# Patient Record
Sex: FEMALE | Race: WHITE | Employment: OTHER | URBAN - METROPOLITAN AREA
[De-identification: names, ages, dates, MRNs, and addresses within clinical notes are randomized per-mention and may not be internally consistent; named-entity substitution may affect disease eponyms.]

---

## 2021-07-06 ENCOUNTER — HOSPITAL ENCOUNTER (INPATIENT)
Facility: HOSPITAL | Age: 76
LOS: 1 days | Discharge: HOME HEALTH | DRG: 176 | End: 2021-07-08
Attending: EMERGENCY MEDICINE | Admitting: HOSPITALIST
Payer: MEDICARE

## 2021-07-06 ENCOUNTER — APPOINTMENT (OUTPATIENT)
Dept: GENERAL RADIOLOGY | Age: 76
DRG: 176 | End: 2021-07-06
Attending: EMERGENCY MEDICINE
Payer: MEDICARE

## 2021-07-06 ENCOUNTER — APPOINTMENT (OUTPATIENT)
Dept: CT IMAGING | Age: 76
DRG: 176 | End: 2021-07-06
Attending: EMERGENCY MEDICINE
Payer: MEDICARE

## 2021-07-06 DIAGNOSIS — E11.65 TYPE 2 DIABETES MELLITUS WITH HYPERGLYCEMIA, WITHOUT LONG-TERM CURRENT USE OF INSULIN (HCC): ICD-10-CM

## 2021-07-06 DIAGNOSIS — I26.99 BILATERAL PULMONARY EMBOLISM (HCC): Primary | ICD-10-CM

## 2021-07-06 LAB
ALBUMIN SERPL-MCNC: 3.1 G/DL (ref 3.4–5)
ALP LIVER SERPL-CCNC: 57 U/L
ALT SERPL-CCNC: 21 U/L
ANION GAP SERPL CALC-SCNC: 9 MMOL/L (ref 0–18)
ANION GAP SERPL CALC-SCNC: 9 MMOL/L (ref 0–18)
AST SERPL-CCNC: 13 U/L (ref 15–37)
BASOPHILS NFR BLD AUTO: 0.4 %
BILIRUB SERPL-MCNC: 0.2 MG/DL (ref 0.1–2)
BUN BLD-MCNC: 16 MG/DL (ref 7–18)
BUN BLD-MCNC: 18 MG/DL (ref 7–18)
BUN/CREAT SERPL: 11 (ref 10–20)
BUN/CREAT SERPL: 14.4 (ref 10–20)
CALCIUM BLD-MCNC: 8.2 MG/DL (ref 8.5–10.1)
CALCIUM BLD-MCNC: 8.5 MG/DL (ref 8.5–10.1)
CHLORIDE SERPL-SCNC: 110 MMOL/L (ref 98–112)
CO2 SERPL-SCNC: 21 MMOL/L (ref 21–32)
CO2 SERPL-SCNC: 24 MMOL/L (ref 21–32)
CREAT BLD-MCNC: 1.25 MG/DL
CREAT BLD-MCNC: 1.45 MG/DL
D-DIMER: 1.15 UG/ML FEU (ref ?–0.77)
DEPRECATED RDW RBC AUTO: 48.6 FL (ref 35.1–46.3)
DEPRECATED RDW RBC AUTO: 48.9 FL (ref 35.1–46.3)
EOSINOPHIL # BLD AUTO: 0.03 X10(3) UL (ref 0–0.7)
EOSINOPHIL NFR BLD AUTO: 0.2 %
ERYTHROCYTE [DISTWIDTH] IN BLOOD BY AUTOMATED COUNT: 13.7 % (ref 11–15)
ERYTHROCYTE [DISTWIDTH] IN BLOOD BY AUTOMATED COUNT: 13.8 % (ref 11–15)
GLOBULIN PLAS-MCNC: 3.4 G/DL (ref 2.8–4.4)
GLUCOSE BLD-MCNC: 158 MG/DL (ref 70–99)
GLUCOSE BLD-MCNC: 310 MG/DL (ref 70–99)
HCT VFR BLD AUTO: 42.5 %
HGB BLD-MCNC: 13.5 G/DL
IMM GRANULOCYTES # BLD AUTO: 0.23 X10(3) UL (ref 0–1)
IMM GRANULOCYTES NFR BLD: 1.7 %
LYMPHOCYTES # BLD AUTO: 1.27 X10(3) UL (ref 1–4)
LYMPHOCYTES NFR BLD AUTO: 9.6 %
M PROTEIN MFR SERPL ELPH: 6.5 G/DL (ref 6.4–8.2)
MCH RBC QN AUTO: 31.4 PG (ref 26–34)
MCH RBC QN AUTO: 31.5 PG (ref 26–34)
MCHC RBC AUTO-ENTMCNC: 32.9 G/DL (ref 31–37)
MCV RBC AUTO: 95.5 FL
MCV RBC AUTO: 96.3 FL
MONOCYTES # BLD AUTO: 0.63 X10(3) UL (ref 0.1–1)
MONOCYTES NFR BLD AUTO: 4.7 %
NEUTROPHILS # BLD AUTO: 11.08 X10 (3) UL (ref 1.5–7.7)
NEUTROPHILS # BLD AUTO: 11.08 X10(3) UL (ref 1.5–7.7)
NEUTROPHILS NFR BLD AUTO: 83.4 %
NT-PROBNP SERPL-MCNC: 804 PG/ML (ref ?–450)
OSMOLALITY SERPL CALC.SUM OF ELEC: 301 MOSM/KG (ref 275–295)
OSMOLALITY SERPL CALC.SUM OF ELEC: 307 MOSM/KG (ref 275–295)
POTASSIUM SERPL-SCNC: 3.7 MMOL/L (ref 3.5–5.1)
POTASSIUM SERPL-SCNC: 4 MMOL/L (ref 3.5–5.1)
PSA SERPL DL<=0.01 NG/ML-MCNC: 12.2 SECONDS (ref 12–14.3)
RBC # BLD AUTO: 4.3 X10(6)UL
RBC # BLD AUTO: 4.45 X10(6)UL
SARS-COV-2 RNA RESP QL NAA+PROBE: NOT DETECTED
SODIUM SERPL-SCNC: 142 MMOL/L (ref 136–145)
SODIUM SERPL-SCNC: 143 MMOL/L (ref 136–145)
TROPONIN I SERPL-MCNC: <0.045 NG/ML (ref ?–0.04)
WBC # BLD AUTO: 11.7 X10(3) UL (ref 4–11)
WBC # BLD AUTO: 13.3 X10(3) UL (ref 4–11)

## 2021-07-06 PROCEDURE — 99219 INITIAL OBSERVATION CARE,LEVL II: CPT | Performed by: STUDENT IN AN ORGANIZED HEALTH CARE EDUCATION/TRAINING PROGRAM

## 2021-07-06 PROCEDURE — 71045 X-RAY EXAM CHEST 1 VIEW: CPT | Performed by: EMERGENCY MEDICINE

## 2021-07-06 PROCEDURE — 71275 CT ANGIOGRAPHY CHEST: CPT | Performed by: EMERGENCY MEDICINE

## 2021-07-06 RX ORDER — LORAZEPAM 2 MG/ML
INJECTION INTRAMUSCULAR EVERY 10 MIN PRN
Status: DISCONTINUED | OUTPATIENT
Start: 2021-07-06 | End: 2021-07-08

## 2021-07-06 RX ORDER — ONDANSETRON 2 MG/ML
4 INJECTION INTRAMUSCULAR; INTRAVENOUS EVERY 6 HOURS PRN
Status: DISCONTINUED | OUTPATIENT
Start: 2021-07-06 | End: 2021-07-08

## 2021-07-06 RX ORDER — METHYLPREDNISOLONE SODIUM SUCCINATE 125 MG/2ML
60 INJECTION, POWDER, LYOPHILIZED, FOR SOLUTION INTRAMUSCULAR; INTRAVENOUS EVERY 8 HOURS
Status: DISCONTINUED | OUTPATIENT
Start: 2021-07-06 | End: 2021-07-08

## 2021-07-06 RX ORDER — ALBUTEROL SULFATE 2.5 MG/3ML
2.5 SOLUTION RESPIRATORY (INHALATION) EVERY 2 HOUR PRN
Status: DISCONTINUED | OUTPATIENT
Start: 2021-07-06 | End: 2021-07-08

## 2021-07-06 RX ORDER — LEVOTHYROXINE SODIUM 88 UG/1
88 TABLET ORAL EVERY OTHER DAY
Status: DISCONTINUED | OUTPATIENT
Start: 2021-07-08 | End: 2021-07-08

## 2021-07-06 RX ORDER — IPRATROPIUM BROMIDE AND ALBUTEROL SULFATE 2.5; .5 MG/3ML; MG/3ML
3 SOLUTION RESPIRATORY (INHALATION) ONCE
Status: COMPLETED | OUTPATIENT
Start: 2021-07-06 | End: 2021-07-06

## 2021-07-06 RX ORDER — FUROSEMIDE 20 MG/1
20 TABLET ORAL 2 TIMES DAILY
COMMUNITY
End: 2021-08-10

## 2021-07-06 RX ORDER — LEVOTHYROXINE SODIUM 88 UG/1
88 TABLET ORAL
COMMUNITY
End: 2021-08-16

## 2021-07-06 RX ORDER — METOCLOPRAMIDE HYDROCHLORIDE 5 MG/ML
10 INJECTION INTRAMUSCULAR; INTRAVENOUS EVERY 8 HOURS PRN
Status: DISCONTINUED | OUTPATIENT
Start: 2021-07-06 | End: 2021-07-08

## 2021-07-06 RX ORDER — HEPARIN SODIUM AND DEXTROSE 10000; 5 [USP'U]/100ML; G/100ML
18 INJECTION INTRAVENOUS ONCE
Status: COMPLETED | OUTPATIENT
Start: 2021-07-06 | End: 2021-07-07

## 2021-07-06 RX ORDER — HEPARIN SODIUM AND DEXTROSE 10000; 5 [USP'U]/100ML; G/100ML
INJECTION INTRAVENOUS CONTINUOUS
Status: DISCONTINUED | OUTPATIENT
Start: 2021-07-06 | End: 2021-07-08

## 2021-07-06 RX ORDER — MELATONIN
3 NIGHTLY PRN
Status: DISCONTINUED | OUTPATIENT
Start: 2021-07-06 | End: 2021-07-08

## 2021-07-06 RX ORDER — LORATADINE 10 MG/1
10 TABLET ORAL DAILY
COMMUNITY

## 2021-07-06 RX ORDER — METOPROLOL TARTRATE 50 MG/1
25 TABLET, FILM COATED ORAL 2 TIMES DAILY
COMMUNITY
End: 2021-08-16

## 2021-07-06 RX ORDER — ALBUTEROL SULFATE 2.5 MG/3ML
2.5 SOLUTION RESPIRATORY (INHALATION) EVERY 6 HOURS PRN
COMMUNITY
End: 2021-07-12 | Stop reason: CLARIF

## 2021-07-06 RX ORDER — ALPRAZOLAM 0.5 MG/1
0.5 TABLET ORAL NIGHTLY
Status: DISCONTINUED | OUTPATIENT
Start: 2021-07-06 | End: 2021-07-08

## 2021-07-06 RX ORDER — IPRATROPIUM BROMIDE AND ALBUTEROL SULFATE 2.5; .5 MG/3ML; MG/3ML
3 SOLUTION RESPIRATORY (INHALATION)
Status: DISCONTINUED | OUTPATIENT
Start: 2021-07-07 | End: 2021-07-08

## 2021-07-06 RX ORDER — HYDROCODONE BITARTRATE AND ACETAMINOPHEN 5; 325 MG/1; MG/1
1 TABLET ORAL EVERY 6 HOURS PRN
Status: DISCONTINUED | OUTPATIENT
Start: 2021-07-06 | End: 2021-07-08

## 2021-07-06 RX ORDER — ALPRAZOLAM 0.5 MG/1
0.5 TABLET ORAL NIGHTLY
Status: ON HOLD | COMMUNITY
End: 2021-07-08

## 2021-07-06 RX ORDER — HEPARIN SODIUM 5000 [USP'U]/ML
80 INJECTION INTRAVENOUS; SUBCUTANEOUS ONCE
Status: COMPLETED | OUTPATIENT
Start: 2021-07-06 | End: 2021-07-06

## 2021-07-06 RX ORDER — HYDROCODONE BITARTRATE AND ACETAMINOPHEN 5; 325 MG/1; MG/1
1 TABLET ORAL EVERY 6 HOURS PRN
COMMUNITY
End: 2021-08-16

## 2021-07-06 RX ORDER — METHYLPREDNISOLONE SODIUM SUCCINATE 125 MG/2ML
125 INJECTION, POWDER, LYOPHILIZED, FOR SOLUTION INTRAMUSCULAR; INTRAVENOUS ONCE
Status: COMPLETED | OUTPATIENT
Start: 2021-07-06 | End: 2021-07-06

## 2021-07-06 RX ORDER — ACETAMINOPHEN 325 MG/1
650 TABLET ORAL EVERY 6 HOURS PRN
Status: DISCONTINUED | OUTPATIENT
Start: 2021-07-06 | End: 2021-07-08

## 2021-07-06 RX ORDER — ESOMEPRAZOLE MAGNESIUM 40 MG/1
40 CAPSULE, DELAYED RELEASE ORAL
COMMUNITY
End: 2022-01-17

## 2021-07-06 RX ORDER — CETIRIZINE HYDROCHLORIDE 10 MG/1
10 TABLET ORAL DAILY
Status: DISCONTINUED | OUTPATIENT
Start: 2021-07-07 | End: 2021-07-08

## 2021-07-06 RX ORDER — LEVOTHYROXINE SODIUM 0.07 MG/1
75 TABLET ORAL EVERY OTHER DAY
Status: DISCONTINUED | OUTPATIENT
Start: 2021-07-07 | End: 2021-07-08

## 2021-07-06 RX ORDER — PANTOPRAZOLE SODIUM 20 MG/1
20 TABLET, DELAYED RELEASE ORAL
Status: DISCONTINUED | OUTPATIENT
Start: 2021-07-07 | End: 2021-07-08

## 2021-07-06 RX ORDER — FUROSEMIDE 20 MG/1
20 TABLET ORAL 2 TIMES DAILY
Status: DISCONTINUED | OUTPATIENT
Start: 2021-07-06 | End: 2021-07-08

## 2021-07-06 RX ORDER — NICOTINE 21 MG/24HR
1 PATCH, TRANSDERMAL 24 HOURS TRANSDERMAL DAILY
Status: DISCONTINUED | OUTPATIENT
Start: 2021-07-06 | End: 2021-07-08

## 2021-07-07 ENCOUNTER — APPOINTMENT (OUTPATIENT)
Dept: CV DIAGNOSTICS | Facility: HOSPITAL | Age: 76
DRG: 176 | End: 2021-07-07
Attending: STUDENT IN AN ORGANIZED HEALTH CARE EDUCATION/TRAINING PROGRAM
Payer: MEDICARE

## 2021-07-07 LAB
ANION GAP SERPL CALC-SCNC: 6 MMOL/L (ref 0–18)
APTT PPP: 101.6 SECONDS (ref 25.4–36.1)
APTT PPP: >300 SECONDS (ref 25.4–36.1)
ATRIAL RATE: 78 BPM
BASOPHILS # BLD AUTO: 0.03 X10(3) UL (ref 0–0.2)
BASOPHILS NFR BLD AUTO: 0.2 %
BUN BLD-MCNC: 16 MG/DL (ref 7–18)
BUN/CREAT SERPL: 14.2 (ref 10–20)
CALCIUM BLD-MCNC: 8.3 MG/DL (ref 8.5–10.1)
CHLORIDE SERPL-SCNC: 111 MMOL/L (ref 98–112)
CO2 SERPL-SCNC: 25 MMOL/L (ref 21–32)
CREAT BLD-MCNC: 1.13 MG/DL
DEPRECATED RDW RBC AUTO: 48.7 FL (ref 35.1–46.3)
EOSINOPHIL # BLD AUTO: 0 X10(3) UL (ref 0–0.7)
EOSINOPHIL NFR BLD AUTO: 0 %
ERYTHROCYTE [DISTWIDTH] IN BLOOD BY AUTOMATED COUNT: 13.7 % (ref 11–15)
GLUCOSE BLD-MCNC: 194 MG/DL (ref 70–99)
HCT VFR BLD AUTO: 40.7 %
HGB BLD-MCNC: 13.5 G/DL
IMM GRANULOCYTES # BLD AUTO: 0.36 X10(3) UL (ref 0–1)
IMM GRANULOCYTES NFR BLD: 2.6 %
LYMPHOCYTES # BLD AUTO: 0.99 X10(3) UL (ref 1–4)
LYMPHOCYTES NFR BLD AUTO: 7.2 %
MCH RBC QN AUTO: 32 PG (ref 26–34)
MCHC RBC AUTO-ENTMCNC: 33.2 G/DL (ref 31–37)
MCV RBC AUTO: 96.4 FL
MONOCYTES # BLD AUTO: 0.2 X10(3) UL (ref 0.1–1)
MONOCYTES NFR BLD AUTO: 1.5 %
NEUTROPHILS # BLD AUTO: 12.09 X10 (3) UL (ref 1.5–7.7)
NEUTROPHILS # BLD AUTO: 12.09 X10(3) UL (ref 1.5–7.7)
NEUTROPHILS NFR BLD AUTO: 88.5 %
OSMOLALITY SERPL CALC.SUM OF ELEC: 300 MOSM/KG (ref 275–295)
P AXIS: 49 DEGREES
P-R INTERVAL: 114 MS
PLATELET # BLD AUTO: 289 10(3)UL (ref 150–450)
PLATELET # BLD AUTO: 289 10(3)UL (ref 150–450)
POTASSIUM SERPL-SCNC: 3.9 MMOL/L (ref 3.5–5.1)
Q-T INTERVAL: 382 MS
QTC CALCULATION (BEZET): 435 MS
R AXIS: -11 DEGREES
RBC # BLD AUTO: 4.22 X10(6)UL
SODIUM SERPL-SCNC: 142 MMOL/L (ref 136–145)
T AXIS: 33 DEGREES
VENTRICULAR RATE: 78 BPM
WBC # BLD AUTO: 13.7 X10(3) UL (ref 4–11)

## 2021-07-07 PROCEDURE — 99225 SUBSEQUENT OBSERVATION CARE: CPT | Performed by: HOSPITALIST

## 2021-07-07 PROCEDURE — 93306 TTE W/DOPPLER COMPLETE: CPT | Performed by: STUDENT IN AN ORGANIZED HEALTH CARE EDUCATION/TRAINING PROGRAM

## 2021-07-07 RX ORDER — BUSPIRONE HYDROCHLORIDE 5 MG/1
5 TABLET ORAL 2 TIMES DAILY
Status: DISCONTINUED | OUTPATIENT
Start: 2021-07-07 | End: 2021-07-08

## 2021-07-07 NOTE — CM/SW NOTE
Lionel met with pt, daughter and granddaughter this afternoon. Pt normally lives alone in [place] and plan is for her to move in with granddaughter in [place]. Pt has a Medicare Supplement that is only good for [place] and she also has a [address] in [place] that covers her medications. Family was advised that they need to look into Med Part D plans and Medicare Supplement plans for her. Daughter also had questions about whether pt qualified for Medicaid as a supplement.     Will provide family with info on Medicare Part C/ [place] [address], [facility] Worker/Discharge Planner  [phone]

## 2021-07-07 NOTE — PLAN OF CARE
Assumed care at 0730  A&Ox4, per granddaughter forgetful at times  NSR on tele  On RA, pt states breathing feels better than on admission  Heparin gtt infusing per protocol  Next PTT tmrw AM  Echo completed  Awaiting US BLE  Will continue to monitor

## 2021-07-08 ENCOUNTER — APPOINTMENT (OUTPATIENT)
Dept: ULTRASOUND IMAGING | Facility: HOSPITAL | Age: 76
DRG: 176 | End: 2021-07-08
Attending: STUDENT IN AN ORGANIZED HEALTH CARE EDUCATION/TRAINING PROGRAM
Payer: MEDICARE

## 2021-07-08 VITALS
SYSTOLIC BLOOD PRESSURE: 131 MMHG | DIASTOLIC BLOOD PRESSURE: 69 MMHG | OXYGEN SATURATION: 90 % | HEIGHT: 64 IN | RESPIRATION RATE: 21 BRPM | WEIGHT: 171.94 LBS | BODY MASS INDEX: 29.35 KG/M2 | HEART RATE: 83 BPM | TEMPERATURE: 98 F

## 2021-07-08 LAB
APTT PPP: 154.8 SECONDS (ref 25.4–36.1)
APTT PPP: 46 SECONDS (ref 25.4–36.1)
BILIRUB UR QL STRIP.AUTO: NEGATIVE
COLOR UR AUTO: YELLOW
EST. AVERAGE GLUCOSE BLD GHB EST-MCNC: 151 MG/DL (ref 68–126)
GLUCOSE BLD-MCNC: 136 MG/DL (ref 70–99)
GLUCOSE BLD-MCNC: 248 MG/DL (ref 70–99)
GLUCOSE UR STRIP.AUTO-MCNC: 50 MG/DL
HBA1C MFR BLD HPLC: 6.9 % (ref ?–5.7)
HYALINE CASTS #/AREA URNS AUTO: PRESENT /LPF
KETONES UR STRIP.AUTO-MCNC: NEGATIVE MG/DL
LEUKOCYTE ESTERASE UR QL STRIP.AUTO: NEGATIVE
PH UR STRIP.AUTO: 5 [PH] (ref 5–8)
PLATELET # BLD AUTO: 300 10(3)UL (ref 150–450)
PROT UR STRIP.AUTO-MCNC: NEGATIVE MG/DL
SP GR UR STRIP.AUTO: 1.01 (ref 1–1.03)
UROBILINOGEN UR STRIP.AUTO-MCNC: <2 MG/DL

## 2021-07-08 PROCEDURE — 99239 HOSP IP/OBS DSCHRG MGMT >30: CPT | Performed by: HOSPITALIST

## 2021-07-08 PROCEDURE — 93970 EXTREMITY STUDY: CPT | Performed by: STUDENT IN AN ORGANIZED HEALTH CARE EDUCATION/TRAINING PROGRAM

## 2021-07-08 PROCEDURE — 90792 PSYCH DIAG EVAL W/MED SRVCS: CPT | Performed by: OTHER

## 2021-07-08 PROCEDURE — 99233 SBSQ HOSP IP/OBS HIGH 50: CPT | Performed by: CLINICAL NURSE SPECIALIST

## 2021-07-08 RX ORDER — ESCITALOPRAM OXALATE 10 MG/1
10 TABLET ORAL EVERY MORNING
Qty: 30 TABLET | Refills: 0 | Status: SHIPPED | OUTPATIENT
Start: 2021-07-08 | End: 2021-08-16

## 2021-07-08 RX ORDER — BUSPIRONE HYDROCHLORIDE 5 MG/1
10 TABLET ORAL 2 TIMES DAILY
Status: DISCONTINUED | OUTPATIENT
Start: 2021-07-08 | End: 2021-07-08

## 2021-07-08 RX ORDER — PREDNISONE 20 MG/1
60 TABLET ORAL
Status: DISCONTINUED | OUTPATIENT
Start: 2021-07-09 | End: 2021-07-08

## 2021-07-08 RX ORDER — DEXTROSE MONOHYDRATE 25 G/50ML
50 INJECTION, SOLUTION INTRAVENOUS
Status: DISCONTINUED | OUTPATIENT
Start: 2021-07-08 | End: 2021-07-08

## 2021-07-08 RX ORDER — BUSPIRONE HYDROCHLORIDE 10 MG/1
10 TABLET ORAL 2 TIMES DAILY
Qty: 60 TABLET | Refills: 0 | Status: SHIPPED | OUTPATIENT
Start: 2021-07-08 | End: 2021-08-16

## 2021-07-08 RX ORDER — PREDNISONE 20 MG/1
20 TABLET ORAL ONCE
Status: COMPLETED | OUTPATIENT
Start: 2021-07-08 | End: 2021-07-08

## 2021-07-08 RX ORDER — PREDNISONE 20 MG/1
TABLET ORAL
Qty: 26 TABLET | Refills: 0 | Status: SHIPPED | OUTPATIENT
Start: 2021-07-09 | End: 2021-07-26

## 2021-07-08 RX ORDER — ALPRAZOLAM 0.5 MG/1
TABLET ORAL
Qty: 60 TABLET | Refills: 0 | Status: SHIPPED | OUTPATIENT
Start: 2021-07-08 | End: 2021-08-16

## 2021-07-08 NOTE — CM/SW NOTE
Order received for  \"Please price apixaban or rivaroxaban for patient\"  Pt presently only has coverage for medications in [place].  SW/CM can provide pt with 30 day coupon for either medication. Would need specific script.     Karime Marion LCSW  /Discharge Planner  [phone]

## 2021-07-08 NOTE — PLAN OF CARE
Assumed pt care at [facility] oriented x4; Forgetful. Patient is very anxious; see MAR  Tele; NSR. Placed on 2L NC to maintain SpO2 >89%. C/o headache; PRN norco given  Expiratory wheezes noted after ambulation  IV solumedrol as scheduled  Heparin gtt infusing per order; Next ptt this am  Ambulating with standby assist to bathroom  US legs to be completed    Plan of care discussed with patient.

## 2021-07-08 NOTE — PROGRESS NOTES
07/08/21 1114   Mobility   O2 walk?  Yes   SPO2% on Room Air at Rest 98   SPO2% Ambulation on Room Air 91

## 2021-07-08 NOTE — HOME CARE LIAISON
Received referral from [address] [place] [address]. Met with patient and daughter Jacy Miguel at the bedside to discuss Residential home health and confirm choice. Patient provided with list of [place] [address] providers from [facility], patient choice is [facility]. Financial interest disclosure provided to patient. Residential brochure provided with contact information. All questions addressed and answered. Referral Reserved in Aidin and contact information placed on AVS. SW notified.

## 2021-07-08 NOTE — PLAN OF CARE
Assumed patient care at 0730.  VSS   Alert x4, forgetful and anxious   Eliquis started, heparin gtt discontinued   02 walk completed with PT, lowest saturating 90-91 RA   Eliquis rx provided by [facility]   A1c 6.9- diabetes education reviewed with nurse specialist   Patient reported urinary frequency- UA negative   Dr. Shu Rodriguez to see  Possible DC today, discussed with pt's víctor coyle

## 2021-07-08 NOTE — CM/SW NOTE
Lionel spoke to granddaughter Aliyah Acevedo- explained that pt will get first 30 days of Eliquis free and our OP pharmacy will deliver to her. Sw called pt's pharmacy ( [facility] in [place] [address]). They will be bel to fill 90 days of Eliquis for $15.00 copay-family will be able to  that prescription. Pt also in need of metformin- Aliyah Acevedo aware that they can fill at [facility] or [facility] - will eave $4.00 list for pt. Discussed HHC with Aliyah Acevedo and she agrees. Referral sent in Aidin- pt will need order for [facility] clinic. RN updated. Await acceptance for HHC.       Marbin Ingram LCSW  /Discharge Planner  [phone]

## 2021-07-09 ENCOUNTER — PATIENT OUTREACH (OUTPATIENT)
Dept: CASE MANAGEMENT | Age: 76
End: 2021-07-09

## 2021-07-09 DIAGNOSIS — Z02.9 ENCOUNTERS FOR UNSPECIFIED ADMINISTRATIVE PURPOSE: ICD-10-CM

## 2021-07-09 NOTE — PROGRESS NOTES
Binh Remy for dc from all services   Medication and follow up instructions reviewed   eliquis delievered to bedside   Other prescriptions printed   IV removed, tele removed   attepmted to contact [facility]

## 2021-07-12 ENCOUNTER — OFFICE VISIT (OUTPATIENT)
Dept: INTERNAL MEDICINE CLINIC | Facility: CLINIC | Age: 76
End: 2021-07-12
Payer: MEDICARE

## 2021-07-12 VITALS
HEART RATE: 85 BPM | TEMPERATURE: 98 F | HEIGHT: 64 IN | BODY MASS INDEX: 28 KG/M2 | RESPIRATION RATE: 18 BRPM | SYSTOLIC BLOOD PRESSURE: 130 MMHG | WEIGHT: 164 LBS | DIASTOLIC BLOOD PRESSURE: 75 MMHG | OXYGEN SATURATION: 97 %

## 2021-07-12 DIAGNOSIS — G89.29 CHRONIC MIDLINE LOW BACK PAIN WITHOUT SCIATICA: ICD-10-CM

## 2021-07-12 DIAGNOSIS — E11.65 TYPE 2 DIABETES MELLITUS WITH HYPERGLYCEMIA, WITHOUT LONG-TERM CURRENT USE OF INSULIN (HCC): ICD-10-CM

## 2021-07-12 DIAGNOSIS — M54.50 CHRONIC MIDLINE LOW BACK PAIN WITHOUT SCIATICA: ICD-10-CM

## 2021-07-12 DIAGNOSIS — I26.99 BILATERAL PULMONARY EMBOLISM (HCC): ICD-10-CM

## 2021-07-12 DIAGNOSIS — R09.02 HYPOXIA: ICD-10-CM

## 2021-07-12 DIAGNOSIS — J44.1 CHRONIC OBSTRUCTIVE PULMONARY DISEASE WITH ACUTE EXACERBATION (HCC): Primary | ICD-10-CM

## 2021-07-12 DIAGNOSIS — F17.210 CIGARETTE SMOKER: ICD-10-CM

## 2021-07-12 DIAGNOSIS — N89.8 VAGINAL DRYNESS: ICD-10-CM

## 2021-07-12 DIAGNOSIS — K64.0 GRADE I HEMORRHOIDS: ICD-10-CM

## 2021-07-12 PROCEDURE — 99495 TRANSJ CARE MGMT MOD F2F 14D: CPT | Performed by: NURSE PRACTITIONER

## 2021-07-12 PROCEDURE — 1111F DSCHRG MED/CURRENT MED MERGE: CPT | Performed by: NURSE PRACTITIONER

## 2021-07-12 PROCEDURE — 99406 BEHAV CHNG SMOKING 3-10 MIN: CPT | Performed by: NURSE PRACTITIONER

## 2021-07-12 RX ORDER — ESTRADIOL 0.1 MG/G
CREAM VAGINAL
Qty: 42.5 G | Refills: 0 | Status: SHIPPED | OUTPATIENT
Start: 2021-07-12 | End: 2021-08-16

## 2021-07-12 RX ORDER — ACYCLOVIR 400 MG/1
400 TABLET ORAL 2 TIMES DAILY
COMMUNITY
Start: 2021-07-09 | End: 2021-08-16

## 2021-07-12 RX ORDER — ROSUVASTATIN CALCIUM 10 MG/1
10 TABLET, COATED ORAL DAILY
COMMUNITY
Start: 2021-07-09 | End: 2021-08-16

## 2021-07-12 RX ORDER — ESTRADIOL 0.1 MG/G
CREAM VAGINAL
COMMUNITY
Start: 2021-03-28 | End: 2021-07-12

## 2021-07-12 RX ORDER — IPRATROPIUM BROMIDE AND ALBUTEROL SULFATE 2.5; .5 MG/3ML; MG/3ML
3 SOLUTION RESPIRATORY (INHALATION) 4 TIMES DAILY
COMMUNITY
Start: 2020-09-21 | End: 2021-10-13

## 2021-07-12 RX ORDER — FLUTICASONE FUROATE, UMECLIDINIUM BROMIDE AND VILANTEROL TRIFENATATE 100; 62.5; 25 UG/1; UG/1; UG/1
1 POWDER RESPIRATORY (INHALATION) DAILY
COMMUNITY
Start: 2021-07-09 | End: 2021-09-01

## 2021-07-12 RX ORDER — PENTOSAN POLYSULFATE SODIUM 100 MG/1
100 CAPSULE, GELATIN COATED ORAL DAILY
COMMUNITY
Start: 2021-07-09 | End: 2021-11-03

## 2021-07-12 RX ORDER — POTASSIUM CHLORIDE 1500 MG/1
1 TABLET, FILM COATED, EXTENDED RELEASE ORAL DAILY
COMMUNITY
Start: 2021-07-09

## 2021-07-12 RX ORDER — LEVOTHYROXINE SODIUM 0.07 MG/1
75 TABLET ORAL
COMMUNITY
End: 2021-08-16

## 2021-07-12 RX ORDER — ALBUTEROL SULFATE 90 UG/1
2 AEROSOL, METERED RESPIRATORY (INHALATION) EVERY 6 HOURS PRN
COMMUNITY

## 2021-07-12 NOTE — PATIENT INSTRUCTIONS
Patient Instructions:    1. We will call you tomorrow with your Pulmonary appt.  If you do not hear from us, please call [phone]

## 2021-07-12 NOTE — PROGRESS NOTES
[address] [facility] CARE CLINIC      HISTORY   CHIEF COMPLAINT: COPD with acute exacerbation, BL PE, hypoxia, DMII, tobacco dependence, vaginal dryness, hemorroids, chronic back pain.      HPI: Heather Kaur is a 64year old female h current nebulizer. She was noted with BL PE in hospital and was started on Eliquis and denies any bleeding at time of exam. She was also noted with A1C of 6.9% and was started on Metformin and is having some mild diarrhea secondary to this.  She is not chec into the lungs 4 (four) times daily. , Disp: , Rfl:   Levothyroxine Sodium 75 MCG Oral Tab, Take 75 mcg by mouth every other day., Disp: , Rfl:   metFORMIN HCl 500 MG Oral Tab, Take 500 mg by mouth daily with lunch., Disp: , Rfl:   apixaban 5 MG Oral Tab, 1 disease) ([facility].)    • Disorder of thyroid    • Esophageal reflux    • Hearing impairment    • High blood pressure    • Muscle weakness    • Shortness of breath    • Sleep apnea    • Thyroid disease    • Visual impairment       No past surgical history on jim .0 07/08/2021 05:30 AM     (H) 07/07/2021 03:36 AM     07/07/2021 03:36 AM    K 3.9 07/07/2021 03:36 AM     07/07/2021 03:36 AM    CO2 25.0 07/07/2021 03:36 AM    BUN 16 07/07/2021 03:36 AM    CREATSERUM 1.13 (H) 07/07/2021 03: appropriate affect    ASSESSMENT/ PLAN:   1.  Chronic obstructive pulmonary disease with acute exacerbation (HCC)  · Continue Prednisone taper through 7/24  · After taper resume Prednisone 5mg daily  · [place] [address]  · Referral sent for P management   · Then should find pain management in her area to refill her Norco  · Continue to monitor      Orders Placed This Encounter      [address] [id number]      Pulmonary Referral - In Network  Orders Placed This Encounter      Tobacco U week.          Dispense:  42.5 g          Refill:  0          Medications & Refills for this Visit:  [facility] 100-62.5-25 MCG/INH Inhalation Aerosol Powder, Breath Activated, Inhale 1 puff into the lungs daily. , Disp: , Rfl:   Albuterol Sulfate HFA morning., Disp: 30 tablet, Rfl: 0  furosemide 20 MG Oral Tab, Take 20 mg by mouth 2 (two) times daily. , Disp: , Rfl:   metoprolol tartrate 50 MG Oral Tab, Take 25 mg by mouth 2 (two) times daily.   , Disp: , Rfl:   Levothyroxine Sodium 88 MCG Oral Tab, Take in orders Assisted in scheduling required follow-up with community providers and services. Medication Reconciliation:  I am aware of an inpatient discharge within the last 30 days.   The discharge medication list has been reconciled with the patient's cu

## 2021-07-12 NOTE — PROGRESS NOTES
TRANSITIONAL CARE CLINIC PHARMACIST MEDICATION RECONCILIATION        Stephanie Nicholson MRN [id number]    1945 PCP None Pcp       Comments: Medication history completed in [address] [place] [address] by pharmacist with patient and daughter.  Several discre po daily x 3 days, then 10mg po daily x 4 days, then resume 5mg po daily   • busPIRone HCl 10 MG Oral Tab Take 1 tablet (10 mg total) by mouth 2 (two) times daily.    • ALPRAZolam 0.5 MG Oral Tab Take 1 tab nightly scheduled and 1/2 tab twice a day as neede Patient confirmed understanding.      Thank you,    Lesea Kilgore, PharmD, 7/12/2021, 1:56 PM  Transitional Care Clinic

## 2021-07-13 PROBLEM — G89.29 CHRONIC MIDLINE LOW BACK PAIN WITHOUT SCIATICA: Status: ACTIVE | Noted: 2021-07-13

## 2021-07-13 PROBLEM — J44.1 COPD EXACERBATION (HCC): Status: ACTIVE | Noted: 2021-07-13

## 2021-07-13 PROBLEM — F17.200 TOBACCO DEPENDENCE: Status: ACTIVE | Noted: 2021-07-13

## 2021-07-13 PROBLEM — F17.210 CIGARETTE SMOKER: Status: ACTIVE | Noted: 2021-07-13

## 2021-07-13 PROBLEM — J44.1 CHRONIC OBSTRUCTIVE PULMONARY DISEASE WITH ACUTE EXACERBATION (HCC): Status: ACTIVE | Noted: 2021-07-13

## 2021-07-13 PROBLEM — M54.50 CHRONIC MIDLINE LOW BACK PAIN WITHOUT SCIATICA: Status: ACTIVE | Noted: 2021-07-13

## 2021-07-13 NOTE — PROGRESS NOTES
Per patients request scheduled PCP visit sooner to establish care.  New appt scheduled for 7/19 at 11:20am

## 2021-07-19 ENCOUNTER — OFFICE VISIT (OUTPATIENT)
Dept: FAMILY MEDICINE CLINIC | Facility: CLINIC | Age: 76
End: 2021-07-19
Payer: MEDICARE

## 2021-07-19 ENCOUNTER — APPOINTMENT (OUTPATIENT)
Dept: GENERAL RADIOLOGY | Facility: HOSPITAL | Age: 76
DRG: 190 | End: 2021-07-19
Attending: EMERGENCY MEDICINE
Payer: MEDICARE

## 2021-07-19 ENCOUNTER — HOSPITAL ENCOUNTER (INPATIENT)
Facility: HOSPITAL | Age: 76
LOS: 6 days | Discharge: HOME | DRG: 190 | End: 2021-07-25
Attending: EMERGENCY MEDICINE | Admitting: HOSPITALIST
Payer: MEDICARE

## 2021-07-19 VITALS
HEIGHT: 64 IN | HEART RATE: 69 BPM | BODY MASS INDEX: 26.8 KG/M2 | RESPIRATION RATE: 20 BRPM | DIASTOLIC BLOOD PRESSURE: 78 MMHG | SYSTOLIC BLOOD PRESSURE: 134 MMHG | OXYGEN SATURATION: 90 % | WEIGHT: 157 LBS | TEMPERATURE: 97 F

## 2021-07-19 DIAGNOSIS — M54.9 CHRONIC BACK PAIN, UNSPECIFIED BACK LOCATION, UNSPECIFIED BACK PAIN LATERALITY: ICD-10-CM

## 2021-07-19 DIAGNOSIS — R41.3 MEMORY CHANGES: ICD-10-CM

## 2021-07-19 DIAGNOSIS — I26.99 BILATERAL PULMONARY EMBOLISM (HCC): Primary | ICD-10-CM

## 2021-07-19 DIAGNOSIS — I10 ESSENTIAL HYPERTENSION: ICD-10-CM

## 2021-07-19 DIAGNOSIS — J44.1 CHRONIC OBSTRUCTIVE PULMONARY DISEASE WITH ACUTE EXACERBATION (HCC): ICD-10-CM

## 2021-07-19 DIAGNOSIS — F17.210 CIGARETTE SMOKER: ICD-10-CM

## 2021-07-19 DIAGNOSIS — R09.02 HYPOXIA: ICD-10-CM

## 2021-07-19 DIAGNOSIS — J44.1 COPD EXACERBATION (HCC): Primary | ICD-10-CM

## 2021-07-19 DIAGNOSIS — N28.9 RENAL INSUFFICIENCY: ICD-10-CM

## 2021-07-19 DIAGNOSIS — F32.A ANXIETY AND DEPRESSION: ICD-10-CM

## 2021-07-19 DIAGNOSIS — E11.65 TYPE 2 DIABETES MELLITUS WITH HYPERGLYCEMIA, WITHOUT LONG-TERM CURRENT USE OF INSULIN (HCC): ICD-10-CM

## 2021-07-19 DIAGNOSIS — F17.200 TOBACCO DEPENDENCE: ICD-10-CM

## 2021-07-19 DIAGNOSIS — F41.9 ANXIETY AND DEPRESSION: ICD-10-CM

## 2021-07-19 DIAGNOSIS — G89.29 CHRONIC BACK PAIN, UNSPECIFIED BACK LOCATION, UNSPECIFIED BACK PAIN LATERALITY: ICD-10-CM

## 2021-07-19 LAB
ADENOVIRUS PCR:: NOT DETECTED
ALBUMIN SERPL-MCNC: 2.9 G/DL (ref 3.4–5)
ALBUMIN/GLOB SERPL: 0.7 {RATIO} (ref 1–2)
ALLENS TEST: POSITIVE
ALP LIVER SERPL-CCNC: 56 U/L
ALT SERPL-CCNC: 26 U/L
ANION GAP SERPL CALC-SCNC: 10 MMOL/L (ref 0–18)
ARTERIAL BLD GAS O2 SATURATION: 94 % (ref 92–100)
ARTERIAL BLOOD GAS BASE EXCESS: -0.1 MMOL/L (ref ?–2)
ARTERIAL BLOOD GAS HCO3: 24.3 MEQ/L (ref 22–26)
ARTERIAL BLOOD GAS PCO2: 39 MM HG (ref 35–45)
ARTERIAL BLOOD GAS PH: 7.41 (ref 7.35–7.45)
ARTERIAL BLOOD GAS PO2: 75 MM HG (ref 80–105)
AST SERPL-CCNC: 20 U/L (ref 15–37)
B PARAPERT DNA SPEC QL NAA+PROBE: NOT DETECTED
B PERT DNA SPEC QL NAA+PROBE: NOT DETECTED
BASOPHILS # BLD AUTO: 0.04 X10(3) UL (ref 0–0.2)
BASOPHILS NFR BLD AUTO: 0.3 %
BILIRUB SERPL-MCNC: 0.3 MG/DL (ref 0.1–2)
BILIRUB UR QL STRIP.AUTO: NEGATIVE
BUN BLD-MCNC: 17 MG/DL (ref 7–18)
BUN/CREAT SERPL: 12.4 (ref 10–20)
C PNEUM DNA SPEC QL NAA+PROBE: NOT DETECTED
CALCIUM BLD-MCNC: 8.6 MG/DL (ref 8.5–10.1)
CALCULATED O2 SATURATION: 95 % (ref 92–100)
CARBOXYHEMOGLOBIN: 1.3 % SAT (ref 0–3)
CHLORIDE SERPL-SCNC: 104 MMOL/L (ref 98–112)
CLARITY UR REFRACT.AUTO: CLEAR
CO2 SERPL-SCNC: 24 MMOL/L (ref 21–32)
COLOR UR AUTO: YELLOW
CORONAVIRUS 229E PCR:: NOT DETECTED
CORONAVIRUS HKU1 PCR:: NOT DETECTED
CORONAVIRUS NL63 PCR:: NOT DETECTED
CORONAVIRUS OC43 PCR:: NOT DETECTED
CREAT BLD-MCNC: 1.37 MG/DL
DEPRECATED RDW RBC AUTO: 46.7 FL (ref 35.1–46.3)
EOSINOPHIL # BLD AUTO: 0.01 X10(3) UL (ref 0–0.7)
EOSINOPHIL NFR BLD AUTO: 0.1 %
ERYTHROCYTE [DISTWIDTH] IN BLOOD BY AUTOMATED COUNT: 13.6 % (ref 11–15)
FLUAV RNA SPEC QL NAA+PROBE: NOT DETECTED
FLUBV RNA SPEC QL NAA+PROBE: NOT DETECTED
GLOBULIN PLAS-MCNC: 4.1 G/DL (ref 2.8–4.4)
GLUCOSE BLD-MCNC: 176 MG/DL (ref 70–99)
GLUCOSE BLD-MCNC: 241 MG/DL (ref 70–99)
GLUCOSE BLD-MCNC: 290 MG/DL (ref 70–99)
GLUCOSE UR STRIP.AUTO-MCNC: NEGATIVE MG/DL
HCT VFR BLD AUTO: 45.4 %
HGB BLD-MCNC: 15.4 G/DL
HYALINE CASTS #/AREA URNS AUTO: PRESENT /LPF
IMM GRANULOCYTES # BLD AUTO: 0.11 X10(3) UL (ref 0–1)
IMM GRANULOCYTES NFR BLD: 0.9 %
IONIZED CALCIUM: 1.16 MMOL/L (ref 1.12–1.32)
KETONES UR STRIP.AUTO-MCNC: NEGATIVE MG/DL
L/M: 2.5 L/MIN
LACTIC ACID ARTERIAL: 3.2 MMOL/L (ref 0.5–2)
LEUKOCYTE ESTERASE UR QL STRIP.AUTO: NEGATIVE
LYMPHOCYTES # BLD AUTO: 0.93 X10(3) UL (ref 1–4)
LYMPHOCYTES NFR BLD AUTO: 7.4 %
M PROTEIN MFR SERPL ELPH: 7 G/DL (ref 6.4–8.2)
MCH RBC QN AUTO: 31.9 PG (ref 26–34)
MCHC RBC AUTO-ENTMCNC: 33.9 G/DL (ref 31–37)
MCV RBC AUTO: 94 FL
METAPNEUMOVIRUS PCR:: NOT DETECTED
METHEMOGLOBIN: 0.7 % SAT (ref 0.4–1.5)
MONOCYTES # BLD AUTO: 0.32 X10(3) UL (ref 0.1–1)
MONOCYTES NFR BLD AUTO: 2.5 %
MYCOPLASMA PNEUMONIA PCR:: NOT DETECTED
NEUTROPHILS # BLD AUTO: 11.24 X10 (3) UL (ref 1.5–7.7)
NEUTROPHILS # BLD AUTO: 11.24 X10(3) UL (ref 1.5–7.7)
NEUTROPHILS NFR BLD AUTO: 88.8 %
NITRITE UR QL STRIP.AUTO: NEGATIVE
NT-PROBNP SERPL-MCNC: 234 PG/ML (ref ?–450)
OSMOLALITY SERPL CALC.SUM OF ELEC: 295 MOSM/KG (ref 275–295)
PARAINFLUENZA 1 PCR:: NOT DETECTED
PARAINFLUENZA 2 PCR:: NOT DETECTED
PARAINFLUENZA 3 PCR:: DETECTED
PARAINFLUENZA 4 PCR:: NOT DETECTED
PATIENT TEMPERATURE: 98.4 F
PH UR STRIP.AUTO: 5 [PH] (ref 5–8)
PLATELET # BLD AUTO: 317 10(3)UL (ref 150–450)
POTASSIUM BLOOD GAS: 3.6 MMOL/L (ref 3.6–5.1)
POTASSIUM SERPL-SCNC: 3.8 MMOL/L (ref 3.5–5.1)
PROT UR STRIP.AUTO-MCNC: NEGATIVE MG/DL
RBC # BLD AUTO: 4.83 X10(6)UL
RHINOVIRUS/ENTERO PCR:: NOT DETECTED
RSV RNA SPEC QL NAA+PROBE: NOT DETECTED
SARS-COV-2 RNA NPH QL NAA+NON-PROBE: NOT DETECTED
SARS-COV-2 RNA RESP QL NAA+PROBE: NOT DETECTED
SODIUM BLOOD GAS: 138 MMOL/L (ref 136–144)
SODIUM SERPL-SCNC: 138 MMOL/L (ref 136–145)
SP GR UR STRIP.AUTO: 1.01 (ref 1–1.03)
TOTAL HEMOGLOBIN: 13.6 G/DL
TROPONIN I SERPL-MCNC: <0.045 NG/ML (ref ?–0.04)
UROBILINOGEN UR STRIP.AUTO-MCNC: <2 MG/DL
WBC # BLD AUTO: 12.7 X10(3) UL (ref 4–11)

## 2021-07-19 PROCEDURE — 99204 OFFICE O/P NEW MOD 45 MIN: CPT | Performed by: FAMILY MEDICINE

## 2021-07-19 PROCEDURE — 99223 1ST HOSP IP/OBS HIGH 75: CPT | Performed by: INTERNAL MEDICINE

## 2021-07-19 PROCEDURE — 1111F DSCHRG MED/CURRENT MED MERGE: CPT | Performed by: FAMILY MEDICINE

## 2021-07-19 PROCEDURE — 71045 X-RAY EXAM CHEST 1 VIEW: CPT | Performed by: EMERGENCY MEDICINE

## 2021-07-19 RX ORDER — ALBUTEROL SULFATE 90 UG/1
2 AEROSOL, METERED RESPIRATORY (INHALATION) EVERY 6 HOURS PRN
Status: DISCONTINUED | OUTPATIENT
Start: 2021-07-19 | End: 2021-07-25

## 2021-07-19 RX ORDER — GUAIFENESIN 600 MG
600 TABLET, EXTENDED RELEASE 12 HR ORAL 2 TIMES DAILY
Status: DISCONTINUED | OUTPATIENT
Start: 2021-07-19 | End: 2021-07-21

## 2021-07-19 RX ORDER — IPRATROPIUM BROMIDE AND ALBUTEROL SULFATE 2.5; .5 MG/3ML; MG/3ML
3 SOLUTION RESPIRATORY (INHALATION)
Status: DISCONTINUED | OUTPATIENT
Start: 2021-07-19 | End: 2021-07-24

## 2021-07-19 RX ORDER — ONDANSETRON 2 MG/ML
4 INJECTION INTRAMUSCULAR; INTRAVENOUS EVERY 6 HOURS PRN
Status: DISCONTINUED | OUTPATIENT
Start: 2021-07-19 | End: 2021-07-25

## 2021-07-19 RX ORDER — PANTOPRAZOLE SODIUM 20 MG/1
20 TABLET, DELAYED RELEASE ORAL
Status: DISCONTINUED | OUTPATIENT
Start: 2021-07-20 | End: 2021-07-19

## 2021-07-19 RX ORDER — METHYLPREDNISOLONE SODIUM SUCCINATE 125 MG/2ML
80 INJECTION, POWDER, LYOPHILIZED, FOR SOLUTION INTRAMUSCULAR; INTRAVENOUS EVERY 8 HOURS
Status: CANCELLED | OUTPATIENT
Start: 2021-07-19

## 2021-07-19 RX ORDER — LEVOTHYROXINE SODIUM 88 UG/1
88 TABLET ORAL
Status: DISCONTINUED | OUTPATIENT
Start: 2021-07-20 | End: 2021-07-25

## 2021-07-19 RX ORDER — ROSUVASTATIN CALCIUM 10 MG/1
10 TABLET, COATED ORAL DAILY
Status: DISCONTINUED | OUTPATIENT
Start: 2021-07-19 | End: 2021-07-25

## 2021-07-19 RX ORDER — IPRATROPIUM BROMIDE AND ALBUTEROL SULFATE 2.5; .5 MG/3ML; MG/3ML
3 SOLUTION RESPIRATORY (INHALATION) EVERY 6 HOURS PRN
Status: DISCONTINUED | OUTPATIENT
Start: 2021-07-19 | End: 2021-07-25

## 2021-07-19 RX ORDER — SODIUM CHLORIDE 9 MG/ML
125 INJECTION, SOLUTION INTRAVENOUS CONTINUOUS
Status: DISCONTINUED | OUTPATIENT
Start: 2021-07-19 | End: 2021-07-20

## 2021-07-19 RX ORDER — ENOXAPARIN SODIUM 100 MG/ML
40 INJECTION SUBCUTANEOUS DAILY
Status: DISCONTINUED | OUTPATIENT
Start: 2021-07-19 | End: 2021-07-19

## 2021-07-19 RX ORDER — LEVOTHYROXINE SODIUM 0.07 MG/1
75 TABLET ORAL
Status: DISCONTINUED | OUTPATIENT
Start: 2021-07-21 | End: 2021-07-25

## 2021-07-19 RX ORDER — BUSPIRONE HYDROCHLORIDE 5 MG/1
10 TABLET ORAL 2 TIMES DAILY
Status: DISCONTINUED | OUTPATIENT
Start: 2021-07-19 | End: 2021-07-25

## 2021-07-19 RX ORDER — DEXTROSE MONOHYDRATE 25 G/50ML
50 INJECTION, SOLUTION INTRAVENOUS
Status: DISCONTINUED | OUTPATIENT
Start: 2021-07-19 | End: 2021-07-25

## 2021-07-19 RX ORDER — CODEINE PHOSPHATE AND GUAIFENESIN 10; 100 MG/5ML; MG/5ML
5 SOLUTION ORAL EVERY 4 HOURS PRN
Status: DISCONTINUED | OUTPATIENT
Start: 2021-07-19 | End: 2021-07-21

## 2021-07-19 RX ORDER — HYDROCODONE BITARTRATE AND ACETAMINOPHEN 5; 325 MG/1; MG/1
1 TABLET ORAL EVERY 6 HOURS PRN
Status: DISCONTINUED | OUTPATIENT
Start: 2021-07-19 | End: 2021-07-25

## 2021-07-19 RX ORDER — ALPRAZOLAM 0.5 MG/1
0.5 TABLET ORAL 2 TIMES DAILY PRN
Status: DISCONTINUED | OUTPATIENT
Start: 2021-07-19 | End: 2021-07-25

## 2021-07-19 RX ORDER — ACETAMINOPHEN 325 MG/1
650 TABLET ORAL EVERY 6 HOURS PRN
Status: DISCONTINUED | OUTPATIENT
Start: 2021-07-19 | End: 2021-07-25

## 2021-07-19 RX ORDER — METHYLPREDNISOLONE SODIUM SUCCINATE 125 MG/2ML
125 INJECTION, POWDER, LYOPHILIZED, FOR SOLUTION INTRAMUSCULAR; INTRAVENOUS ONCE
Status: COMPLETED | OUTPATIENT
Start: 2021-07-19 | End: 2021-07-19

## 2021-07-19 RX ORDER — METHYLPREDNISOLONE SODIUM SUCCINATE 125 MG/2ML
60 INJECTION, POWDER, LYOPHILIZED, FOR SOLUTION INTRAMUSCULAR; INTRAVENOUS EVERY 8 HOURS
Status: DISCONTINUED | OUTPATIENT
Start: 2021-07-19 | End: 2021-07-20

## 2021-07-19 RX ORDER — IPRATROPIUM BROMIDE AND ALBUTEROL SULFATE 2.5; .5 MG/3ML; MG/3ML
3 SOLUTION RESPIRATORY (INHALATION) EVERY 6 HOURS
Status: CANCELLED | OUTPATIENT
Start: 2021-07-19

## 2021-07-19 RX ORDER — PANTOPRAZOLE SODIUM 40 MG/1
40 TABLET, DELAYED RELEASE ORAL
Status: DISCONTINUED | OUTPATIENT
Start: 2021-07-20 | End: 2021-07-25

## 2021-07-19 RX ORDER — NICOTINE 21 MG/24HR
1 PATCH, TRANSDERMAL 24 HOURS TRANSDERMAL DAILY
Status: DISCONTINUED | OUTPATIENT
Start: 2021-07-19 | End: 2021-07-25

## 2021-07-19 NOTE — ED INITIAL ASSESSMENT (HPI)
Pt wheelchaired into triage, pt was recently admitted 7/6-8 for COPD. States that symptoms have not improved since being discharged. Reports SOB with wheezing. Just finished zpak today.  Pt is 90% RA

## 2021-07-19 NOTE — H&P
[facility]                                                               History & Physical         Barbara Delgado Patient Status:  Emergency    1945 MRN [id number]   [address] Attending Luz Vargas MD smokeless tobacco. She reports previous alcohol use. She reports that she does not use drugs.     Allergies:    Keflex [Cephalexin]     RASH    Home Medications:  (Not in a hospital admission)      Review of Systems:  A comprehensive 14 point review of syst 3:57 PM.       INDICATIONS:  AMAIRANI       PATIENT STATED HISTORY: (As transcribed by Technologist)  Patient offered no additional history at this time.            FINDINGS:  Minimal interstitial edema.  Mild bibasilar atelectasis.  Mild left pleural effusion.

## 2021-07-19 NOTE — PROGRESS NOTES
[facility] Family Medicine Office Note  Chief Complaint:   Patient presents with:  Establish Care: NP / TCM hospital follow up inpatient 07/06 for Bilateral pulmonary embolism   TCM (Transition Of Care Management)      HPI:   This is a 64 year ol Currently    Drug use: Never    Family History:  History reviewed. No pertinent family history.   Allergies:    Keflex [Cephalexin]     RASH  Current Meds:  Current Outpatient Medications   Medication Sig Dispense Refill   • azithromycin 250 MG Oral Tab Jaime Morgan Take 1 tab nightly scheduled and 1/2 tab twice a day as needed for anxiety. 60 tablet 0   • escitalopram (LEXAPRO) 10 MG Oral Tab Take 1 tablet (10 mg total) by mouth every morning.  30 tablet 0   • furosemide 20 MG Oral Tab Take 20 mg by mouth 2 (two) time to light. Cardiovascular:      Rate and Rhythm: Normal rate and regular rhythm. Pulses: Normal pulses. Heart sounds: Normal heart sounds. Pulmonary:      Effort: Accessory muscle usage present.       Breath sounds: Wheezing and rhonchi present Essential hypertension  Stable, CPM.       Meds & Refills for this Visit:  Requested Prescriptions      No prescriptions requested or ordered in this encounter       Health Maintenance:  Annual Physical Never done  Diabetes Care Dilated Eye Exam Never done

## 2021-07-19 NOTE — ED PROVIDER NOTES
Patient Seen in: [facility] Emergency Department      History   Patient presents with:  Difficulty Breathing    Stated Complaint: AMAIRANI    HPI/Subjective:   HPI    Patient was discharged from the hospital about 10 days ago.   Patient was diagnosed with • HYSTERECTOMY                  Social History    Tobacco Use      Smoking status: Heavy Tobacco Smoker        Packs/day: 0.50        Types: Cigarettes      Smokeless tobacco: Never Used    Vaping Use      Vaping Use: Never used    Alcohol use: Not Curre Albumin 2.9 (*)     A/G Ratio 0.7 (*)     All other components within normal limits   URINALYSIS WITH CULTURE REFLEX - Abnormal; Notable for the following components:    Blood Urine Moderate (*)     RBC Urine 3-5 (*)     Bacteria Urine Rare (*)     Squamou (*)     Neutrophil Absolute 11.24 (*)     Lymphocyte Absolute 0.93 (*)     All other components within normal limits   TROPONIN I - Normal   PRO BETA NATRIURETIC PEPTIDE - Normal   RAPID SARS-COV-2 BY PCR - Normal   CBC WITH DIFFERENTIAL WITH PLATELET    N 6mm Hg. Peak gradient (D): 13mm Hg. Valve area by     continuity equation (using LVOT flow): 2.5cm^2. Indexed valve area by     continuity equation (using LVOT flow): 1.34cm^2/m^2. 3. Left atrium: The left atrium was mildly dilated. 4. Right ventricle:  Lina Resendez 4:07 pm    Follow-up:  No follow-up provider specified.         Medications Prescribed:  Current Discharge Medication List                          Hospital Problems     Present on Admission  Date Reviewed: 7/13/2021        ICD-10-CM Noted POA    * (Princip

## 2021-07-19 NOTE — CONSULTS
[facility]  Report of Consultation    Enrique Galvanbalaji Patient Status:  Emergency    1945 MRN [id number]   Location [facility] Attending Rubens Lema MD   Hosp Day # 0 PCP Annika Walter MD     Reason for Cons cigarettes. She has been smoking about 0.50 packs per day. She has never used smokeless tobacco. She reports previous alcohol use. She reports that she does not use drugs.     Allergies:    Keflex [Cephalexin]     RASH    Medications:  (Not in a hospital ad clubbing   Skin: No rashes or lesions.    Neurological: Alert, interactive, no focal deficits    Lab Data Review:  Lab Results   Component Value Date    WBC 12.7 07/19/2021    HGB 15.4 07/19/2021    HCT 45.4 07/19/2021    .0 07/19/2021    CREATSERUM depression with ongoing life changing-recommended to see psychiatry  · Ongoing smoker trying to wean  · 4 mm right lower lobe pulmonary nodule on CTA with plans for follow-up she is aware  · Mild chronic kidney disease had improved  · Hyperglycemia unaware

## 2021-07-20 LAB
ALBUMIN SERPL-MCNC: 2.4 G/DL (ref 3.4–5)
ALBUMIN/GLOB SERPL: 0.7 {RATIO} (ref 1–2)
ALLENS TEST: POSITIVE
ALP LIVER SERPL-CCNC: 52 U/L
ALT SERPL-CCNC: 35 U/L
ANION GAP SERPL CALC-SCNC: 7 MMOL/L (ref 0–18)
ARTERIAL BLD GAS O2 SATURATION: 95 % (ref 92–100)
ARTERIAL BLOOD GAS BASE EXCESS: -1 MMOL/L (ref ?–2)
ARTERIAL BLOOD GAS HCO3: 24.2 MEQ/L (ref 22–26)
ARTERIAL BLOOD GAS PCO2: 41 MM HG (ref 35–45)
ARTERIAL BLOOD GAS PH: 7.38 (ref 7.35–7.45)
ARTERIAL BLOOD GAS PO2: 81 MM HG (ref 80–105)
AST SERPL-CCNC: 22 U/L (ref 15–37)
BASOPHILS # BLD AUTO: 0.05 X10(3) UL (ref 0–0.2)
BASOPHILS NFR BLD AUTO: 0.4 %
BILIRUB SERPL-MCNC: 0.2 MG/DL (ref 0.1–2)
BUN BLD-MCNC: 18 MG/DL (ref 7–18)
BUN/CREAT SERPL: 18.4 (ref 10–20)
CALCIUM BLD-MCNC: 7.4 MG/DL (ref 8.5–10.1)
CALCULATED O2 SATURATION: 96 % (ref 92–100)
CARBOXYHEMOGLOBIN: 1.1 % SAT (ref 0–3)
CHLORIDE SERPL-SCNC: 108 MMOL/L (ref 98–112)
CO2 SERPL-SCNC: 25 MMOL/L (ref 21–32)
CREAT BLD-MCNC: 0.98 MG/DL
DEPRECATED RDW RBC AUTO: 46.3 FL (ref 35.1–46.3)
EOSINOPHIL # BLD AUTO: 0.01 X10(3) UL (ref 0–0.7)
EOSINOPHIL NFR BLD AUTO: 0.1 %
ERYTHROCYTE [DISTWIDTH] IN BLOOD BY AUTOMATED COUNT: 13.3 % (ref 11–15)
GLOBULIN PLAS-MCNC: 3.3 G/DL (ref 2.8–4.4)
GLUCOSE BLD-MCNC: 121 MG/DL (ref 70–99)
GLUCOSE BLD-MCNC: 122 MG/DL (ref 70–99)
GLUCOSE BLD-MCNC: 124 MG/DL (ref 70–99)
GLUCOSE BLD-MCNC: 76 MG/DL (ref 70–99)
GLUCOSE BLD-MCNC: 84 MG/DL (ref 70–99)
HCT VFR BLD AUTO: 38.2 %
HGB BLD-MCNC: 12.8 G/DL
IMM GRANULOCYTES # BLD AUTO: 0.15 X10(3) UL (ref 0–1)
IMM GRANULOCYTES NFR BLD: 1.1 %
L/M: 2 L/MIN
LYMPHOCYTES # BLD AUTO: 1.74 X10(3) UL (ref 1–4)
LYMPHOCYTES NFR BLD AUTO: 13 %
M PROTEIN MFR SERPL ELPH: 5.7 G/DL (ref 6.4–8.2)
MCH RBC QN AUTO: 31.7 PG (ref 26–34)
MCHC RBC AUTO-ENTMCNC: 33.5 G/DL (ref 31–37)
MCV RBC AUTO: 94.6 FL
METHEMOGLOBIN: 0.6 % SAT (ref 0.4–1.5)
MONOCYTES # BLD AUTO: 0.74 X10(3) UL (ref 0.1–1)
MONOCYTES NFR BLD AUTO: 5.5 %
NEUTROPHILS # BLD AUTO: 10.66 X10 (3) UL (ref 1.5–7.7)
NEUTROPHILS # BLD AUTO: 10.66 X10(3) UL (ref 1.5–7.7)
NEUTROPHILS NFR BLD AUTO: 79.9 %
OSMOLALITY SERPL CALC.SUM OF ELEC: 293 MOSM/KG (ref 275–295)
P/F RATIO: 393.5 MMHG
PATIENT TEMPERATURE: 97.9 F
PLATELET # BLD AUTO: 295 10(3)UL (ref 150–450)
POTASSIUM SERPL-SCNC: 3.6 MMOL/L (ref 3.5–5.1)
RBC # BLD AUTO: 4.04 X10(6)UL
SODIUM SERPL-SCNC: 140 MMOL/L (ref 136–145)
TOTAL HEMOGLOBIN: 13.4 G/DL
WBC # BLD AUTO: 13.4 X10(3) UL (ref 4–11)

## 2021-07-20 PROCEDURE — 99232 SBSQ HOSP IP/OBS MODERATE 35: CPT | Performed by: INTERNAL MEDICINE

## 2021-07-20 RX ORDER — FUROSEMIDE 10 MG/ML
20 INJECTION INTRAMUSCULAR; INTRAVENOUS ONCE
Status: COMPLETED | OUTPATIENT
Start: 2021-07-20 | End: 2021-07-20

## 2021-07-20 RX ORDER — LORAZEPAM 2 MG/ML
0.5 INJECTION INTRAMUSCULAR ONCE
Status: COMPLETED | OUTPATIENT
Start: 2021-07-20 | End: 2021-07-20

## 2021-07-20 RX ORDER — METHYLPREDNISOLONE SODIUM SUCCINATE 125 MG/2ML
60 INJECTION, POWDER, LYOPHILIZED, FOR SOLUTION INTRAMUSCULAR; INTRAVENOUS EVERY 12 HOURS
Status: DISCONTINUED | OUTPATIENT
Start: 2021-07-20 | End: 2021-07-22

## 2021-07-20 NOTE — CM/SW NOTE
sw notified that pt is current with residential [facility] RN/PT/SW. RACHEL orders obtained. Pt from home with family support. sw notified from RN that pt has a home neb machine. sw to follow for further dc planning.

## 2021-07-20 NOTE — PROGRESS NOTES
07/20/21 1001   Clinical Encounter Type   Visited With Patient and family together   Patient's Supportive Strategies/Resources Obi. Referral To Nurse  (Patient completed HPOA form.  provided a copy to the chart and a copy to the patient.

## 2021-07-20 NOTE — DIETARY NOTE
[facility]     Admitting diagnosis:  Renal insufficiency [N28.9]  Hypoxia [R09.02]  COPD exacerbation ([facility] [address] [J44.1]  Cigarette smoker [F17.210]    Ht: 162.6 cm (5' 4\")  Wt: 78.9 kg (174 lb).    Body mass index i

## 2021-07-20 NOTE — PLAN OF CARE
Pt resting aox4, O2 sats maintained > 90% on 2L nasal cannula. Afebrile, VSS, NSR on tele. Pt tolerating diet, QID accucheck, stressed about insulin administration & accuchecks as she experiences significant anxiety w/needles.   Educated on importance of

## 2021-07-20 NOTE — PROGRESS NOTES
received Advance Directive consult.    07/20/21 1006   Clinical Encounter Type   Referral To Nurse  (Current code status: Not on file. Please refer code status update to hospitalist.)    remains available for POLST form as needed.

## 2021-07-20 NOTE — PROGRESS NOTES
Problem: COPD Exacerbation     Data: Pt AOx4, can be anxious at times. On 1-2 L at rest and 3 L with ambulation. Scheduled nebs. NSR on Eliquis, hx of PE. Voids via the bathroom. Carb controlled diet, tolerating well.  No complaints of pain, nausea or vomit

## 2021-07-20 NOTE — PROGRESS NOTES
[facility]  Progress Note     Luisito Mullins Patient Status:  Inpatient    1945 MRN [id number]   [facility] 5NW-A Attending Odette Mendez MD   Hosp Day # 1 PCP Atul Robledo MD     Chief Complaint: SOB    S: Patient re Results   Component Value Date    COVID19 Not Detected 07/19/2021    COVID19 Not Detected 07/19/2021    COVID19 Not Detected 07/06/2021       Pro-Calcitonin  No results for input(s): PCT in the last 168 hours.     Cardiac  Recent Labs   Lab 07/19/21  0522 hepatic/renal/splenic cysts  10. Nicotine dependence  1. Smoking cessation advised  2. patient states she will try to quit  3.  Nicotine patch    Quality:  · DVT Prophylaxis: eliquis  · CODE status: full  · Clay: no  · Central line: no    Will the patient medication Xanax as needed  6. Essential hypertension  1. Continue home medication metoprolol  7. Recent pulmonary embolism  1. Continue apixaban  8. Diabetes mellitus type 2 with steroid-induced hyperglycemia on admission  1. Hyperglycemia protocol  2.  Johnyd Holyancy

## 2021-07-20 NOTE — HOME CARE LIAISON
Patient is current with [facility]idential [facility] and will need RACHEL order at discharge should home health be continued.

## 2021-07-20 NOTE — PROGRESS NOTES
[facility] Pulmonary/Critical Care Progress Note     SUBJECTIVE/Interval history: All events, procedures, notes reviewed. No acute events overnight, she feels better today, improving dyspnea/cough. Still wheezing.  Johnna MCH 31.9 31.7   MCHC 33.9 33.5   RDW 13.6 13.3   NEPRELIM 11.24* 10.66*   WBC 12.7* 13.4*   .0 295.0     No results for input(s): BNP in the last 168 hours.   Recent Labs   Lab 07/19/21  1516   TROP <0.045     No results for input(s): PT, INR, PTT mg Oral BID (Diuretic)   • Rosuvastatin Calcium  10 mg Oral Daily     acetaminophen, ipratropium-albuterol, ondansetron HCl, guaiFENesin-codeine, glucose **OR** Glucose-Vitamin C **OR** dextrose **OR** glucose **OR** Glucose-Vitamin C, Albuterol Sulfate HF

## 2021-07-21 LAB
ATRIAL RATE: 81 BPM
GLUCOSE BLD-MCNC: 137 MG/DL (ref 70–99)
GLUCOSE BLD-MCNC: 142 MG/DL (ref 70–99)
GLUCOSE BLD-MCNC: 156 MG/DL (ref 70–99)
GLUCOSE BLD-MCNC: 201 MG/DL (ref 70–99)
P AXIS: 64 DEGREES
P-R INTERVAL: 118 MS
Q-T INTERVAL: 362 MS
QRS DURATION: 70 MS
QTC CALCULATION (BEZET): 420 MS
R AXIS: 15 DEGREES
T AXIS: 39 DEGREES
VENTRICULAR RATE: 81 BPM

## 2021-07-21 PROCEDURE — 99232 SBSQ HOSP IP/OBS MODERATE 35: CPT | Performed by: HOSPITALIST

## 2021-07-21 RX ORDER — ESCITALOPRAM OXALATE 10 MG/1
10 TABLET ORAL EVERY MORNING
Status: DISCONTINUED | OUTPATIENT
Start: 2021-07-21 | End: 2021-07-25

## 2021-07-21 RX ORDER — CODEINE PHOSPHATE AND GUAIFENESIN 10; 100 MG/5ML; MG/5ML
5 SOLUTION ORAL EVERY 4 HOURS PRN
Status: DISCONTINUED | OUTPATIENT
Start: 2021-07-21 | End: 2021-07-25

## 2021-07-21 RX ORDER — NICOTINE 21 MG/24HR
1 PATCH, TRANSDERMAL 24 HOURS TRANSDERMAL DAILY
Qty: 14 EACH | Refills: 0 | Status: SHIPPED | OUTPATIENT
Start: 2021-07-22

## 2021-07-21 RX ORDER — GUAIFENESIN 600 MG
1200 TABLET, EXTENDED RELEASE 12 HR ORAL 2 TIMES DAILY
Status: DISCONTINUED | OUTPATIENT
Start: 2021-07-21 | End: 2021-07-25

## 2021-07-21 RX ORDER — GUAIFENESIN 600 MG
600 TABLET, EXTENDED RELEASE 12 HR ORAL ONCE
Status: COMPLETED | OUTPATIENT
Start: 2021-07-21 | End: 2021-07-21

## 2021-07-21 RX ORDER — POLYETHYLENE GLYCOL 3350 17 G/17G
17 POWDER, FOR SOLUTION ORAL DAILY
Status: DISCONTINUED | OUTPATIENT
Start: 2021-07-21 | End: 2021-07-25

## 2021-07-21 NOTE — PROGRESS NOTES
[facility] Pulmonary/Critical Care Progress Note     SUBJECTIVE/Interval history: All events, procedures, notes reviewed. No acute events overnight, she feels better today, dyspnea/cough continues to improve.  Wheeze bet MCH 31.9 31.7   MCHC 33.9 33.5   RDW 13.6 13.3   NEPRELIM 11.24* 10.66*   WBC 12.7* 13.4*   .0 295.0     No results for input(s): BNP in the last 168 hours.   Recent Labs   Lab 07/19/21  1516   TROP <0.045     No results for input(s): PT, INR, PTT tartrate  25 mg Oral BID (Diuretic)   • Rosuvastatin Calcium  10 mg Oral Daily     guaiFENesin-codeine, acetaminophen, ipratropium-albuterol, ondansetron HCl, glucose **OR** Glucose-Vitamin C **OR** dextrose **OR** glucose **OR** Glucose-Vitamin Bro Castillo

## 2021-07-21 NOTE — PROGRESS NOTES
[facility]  Progress Note     Dilip So Patient Status:  Inpatient    1945 MRN [id number]   [facility] 5NW-A Attending Nisreen East MD   Hosp Day # 2 PCP Opal Ragsdale MD     Chief Complaint: SOB    S: Patient re 07/19/2021    COVID19 Not Detected 07/19/2021    COVID19 Not Detected 07/06/2021       Pro-Calcitonin  No results for input(s): PCT in the last 168 hours.     Cardiac  Recent Labs   Lab 07/19/21  1516   TROP <0.045   PBNP 234       Creatinine Kinase  No res advised  2. patient states she will try to quit  3.  Nicotine patch    Quality:  · DVT Prophylaxis: eliquis  · CODE status: full  · Clay: no  · Central line: no    Will the patient be referred to TCC on discharge?: tbd  Estimated date of discharge: 1-2 day

## 2021-07-21 NOTE — PLAN OF CARE
Problem: Patient/Family Goals  Goal: Patient/Family Long Term Goal  Description: Patient's Long Term Goal: discharge with adequate resources    Interventions:  - Participate in and comply with medical treatment plan  - See additional Care Plan goals for

## 2021-07-21 NOTE — CM/SW NOTE
[facility] notified by [facility] that they cannot provide a BSC at this time. New referrals started in [address] to multiple providers. Plan: [address] referral pending. [facility] following.      ADDENDUM:  [facility] Phone: [phone] to coord

## 2021-07-21 NOTE — PROGRESS NOTES
07/21/21 1512   Mobility   O2 walk?  Yes   SPO2% on Room Air at Rest 93   At rest oxygen flow (liters per minute) 0   SPO2% Ambulation on Room Air 86   SPO2% Ambulation on Oxygen 93   Ambulation oxygen flow (liters per minute) 2

## 2021-07-21 NOTE — PROGRESS NOTES
Pt resting aox4, O2 sats maintained > 90% on 1L nasal cannula at rest & 3L w/ambulation. Congested cough, producing frothy white sputum. Afebrile, VSS, NSR on tele. Pt tolerating diet, QID accucheck. Denies pain, ambulates & voids.   Updated on plan of

## 2021-07-21 NOTE — CM/SW NOTE
07/21/21 1000   CM/SW Referral Data   Referral Source Social Work (self-referral)   Reason for Referral Discharge planning   Informant Other  (granddtr)   Readmission Assessment   Factors that patient feels contributed to this readmission Other (only ch

## 2021-07-21 NOTE — PLAN OF CARE
Pt AOx4. Anxious, prn xanax. VSS on RA at rest. Iv steroids. 2L with activity. Audible wheezes. Productive cough, prn antitussives. Tele, NSR. Eliquis. Voids. Denies pain. Up SBA. PT/OT following. Saline locked. Carb controlled diet, QID accu checks.  Pt up

## 2021-07-21 NOTE — PHYSICAL THERAPY NOTE
PHYSICAL THERAPY EVALUATION - INPATIENT     Room Number: 944/704-Z  Evaluation Date: 7/21/2021  Type of Evaluation: Initial  Physician Order: PT Eval and Treat    Presenting Problem: COPD exacerbatino  Reason for Therapy: Mobility Dysfunction and Dis goal is to go home.     OBJECTIVE  Precautions: None  Fall Risk: Standard fall risk    WEIGHT BEARING RESTRICTION  Weight Bearing Restriction: None                PAIN ASSESSMENT  Ratin          COGNITION  · Overall Cognitive Status:  WFL - within funct EOB agreeable to PT. Pt on .5 L NC prior to session, SPO2 at 94%. Pt completed sit>stand mod I, using RW for support. Pt performed standing marches in place. Pt ambulated 100 ft mod I with RW while on .5 L NC.  Encouraged pt to take things slow during gait training;Transfer training  Rehab Potential : Good  Frequency (Obs):  (2-3x/week)  Number of Visits to Meet Established Goals: 4      CURRENT GOALS    Goal #1 Patient is able to demonstrate supine - sit EOB @ level: independent     Goal #2 Patient is able

## 2021-07-22 ENCOUNTER — APPOINTMENT (OUTPATIENT)
Dept: GENERAL RADIOLOGY | Facility: HOSPITAL | Age: 76
DRG: 190 | End: 2021-07-22
Attending: INTERNAL MEDICINE
Payer: MEDICARE

## 2021-07-22 LAB
GLUCOSE BLD-MCNC: 119 MG/DL (ref 70–99)
GLUCOSE BLD-MCNC: 182 MG/DL (ref 70–99)
GLUCOSE BLD-MCNC: 186 MG/DL (ref 70–99)
GLUCOSE BLD-MCNC: 217 MG/DL (ref 70–99)
NT-PROBNP SERPL-MCNC: 1768 PG/ML (ref ?–450)
PROCALCITONIN SERPL-MCNC: <0.05 NG/ML (ref ?–0.16)

## 2021-07-22 PROCEDURE — 71045 X-RAY EXAM CHEST 1 VIEW: CPT | Performed by: INTERNAL MEDICINE

## 2021-07-22 PROCEDURE — 99232 SBSQ HOSP IP/OBS MODERATE 35: CPT | Performed by: HOSPITALIST

## 2021-07-22 RX ORDER — GUAIFENESIN 1200 MG/1
1200 TABLET, EXTENDED RELEASE ORAL 2 TIMES DAILY
Qty: 10 TABLET | Refills: 0 | Status: SHIPPED | OUTPATIENT
Start: 2021-07-22 | End: 2021-07-27

## 2021-07-22 RX ORDER — FUROSEMIDE 10 MG/ML
20 INJECTION INTRAMUSCULAR; INTRAVENOUS
Status: DISCONTINUED | OUTPATIENT
Start: 2021-07-22 | End: 2021-07-25

## 2021-07-22 RX ORDER — METHYLPREDNISOLONE SODIUM SUCCINATE 125 MG/2ML
60 INJECTION, POWDER, LYOPHILIZED, FOR SOLUTION INTRAMUSCULAR; INTRAVENOUS EVERY 8 HOURS
Status: DISCONTINUED | OUTPATIENT
Start: 2021-07-22 | End: 2021-07-24

## 2021-07-22 NOTE — PROGRESS NOTES
[facility] Pulmonary/Critical Care Progress Note     SUBJECTIVE/Interval history: All events, procedures, notes reviewed. Slept poorly overnight, didn't receive nebs on time. Feels tired, fatigued and dyspneic today.  No 38.2   MCV 94.0 94.6   MCH 31.9 31.7   MCHC 33.9 33.5   RDW 13.6 13.3   NEPRELIM 11.24* 10.66*   WBC 12.7* 13.4*   .0 295.0     No results for input(s): BNP in the last 168 hours.   Recent Labs   Lab 07/19/21  1516   TROP <0.045     No results for in Levothyroxine Sodium  88 mcg Oral Q48H   • metoprolol tartrate  25 mg Oral BID (Diuretic)   • Rosuvastatin Calcium  10 mg Oral Daily     guaiFENesin-codeine, acetaminophen, ipratropium-albuterol, ondansetron HCl, glucose **OR** Glucose-Vitamin C **OR** dex

## 2021-07-22 NOTE — PROGRESS NOTES
[facility]  Progress Note     Rupert Torres Patient Status:  Inpatient    1945 MRN [id number]   [facility] 5NW-A Attending Lena Redd MD   Hosp Day # 3 PCP Cat Mosley MD     Chief Complaint: SOB    S: Patient re COVID19 Not Detected 07/19/2021    COVID19 Not Detected 07/06/2021       Pro-Calcitonin  Recent Labs   Lab 07/22/21  1002   PCT <0.05       Cardiac  Recent Labs   Lab 07/19/21  1516 07/19/21  1516 07/22/21  1002   TROP <0.045  --   --    PBNP 234   < > hyperglycemia  1. Hold home Metformin  2. Hemoglobin A1c 6.9 on 7/8/2021  3. Levemir 8 units  4. Inc IC to 1:3  5. Cont. ICF 1:30 >140  10. Recent imaging w/ hepatic/renal/splenic cysts  11. Nicotine dependence  1.  Smoking cessation advised  2. patient sta

## 2021-07-22 NOTE — PROGRESS NOTES
SPO2% on room air at rest: 90%  SPO2% ambulation on room air: 86%  SPO2% ambulation on O2: 91% On 2 Liters per Minute

## 2021-07-23 LAB
ANION GAP SERPL CALC-SCNC: 6 MMOL/L (ref 0–18)
BUN BLD-MCNC: 21 MG/DL (ref 7–18)
BUN/CREAT SERPL: 20 (ref 10–20)
CALCIUM BLD-MCNC: 9.4 MG/DL (ref 8.5–10.1)
CHLORIDE SERPL-SCNC: 104 MMOL/L (ref 98–112)
CO2 SERPL-SCNC: 28 MMOL/L (ref 21–32)
CREAT BLD-MCNC: 1.05 MG/DL
GLUCOSE BLD-MCNC: 125 MG/DL (ref 70–99)
GLUCOSE BLD-MCNC: 133 MG/DL (ref 70–99)
GLUCOSE BLD-MCNC: 179 MG/DL (ref 70–99)
GLUCOSE BLD-MCNC: 195 MG/DL (ref 70–99)
GLUCOSE BLD-MCNC: 222 MG/DL (ref 70–99)
OSMOLALITY SERPL CALC.SUM OF ELEC: 291 MOSM/KG (ref 275–295)
POTASSIUM SERPL-SCNC: 3.6 MMOL/L (ref 3.5–5.1)
SODIUM SERPL-SCNC: 138 MMOL/L (ref 136–145)

## 2021-07-23 PROCEDURE — 99232 SBSQ HOSP IP/OBS MODERATE 35: CPT | Performed by: HOSPITALIST

## 2021-07-23 NOTE — PLAN OF CARE
Pt is admitted for COPD exacerbation. Droplet isolation. Pt is AOx4 and anxious. Gave Xanax. VSS, afebrile and denies any pain. SpO2 maintained on RA, and needed 2L with ambulation. . Lung sounds wheezy. Nebs. Congested strong cough. Tele- NSR.  Eliquis Achieves optimal ventilation and oxygenation  Description: INTERVENTIONS:  - Assess for changes in respiratory status  - Assess for changes in mentation and behavior  - Position to facilitate oxygenation and minimize respiratory effort  - Oxygen supplement

## 2021-07-23 NOTE — PLAN OF CARE
Patient alert and oriented. Maintaining O2 saturation >91% on room air. SANDOVAL. IV lasix/ IV solumedrol. Strong/ congested cough- prn robitussin w/ codeine given w/ relief. Tolerating diet w/ QID accuchecks. Voids. Up w/ SBA. No c/o pain.  Granddaughter update

## 2021-07-23 NOTE — PROGRESS NOTES
[facility]  Progress Note     Luis Nguyen Patient Status:  Inpatient    1945 MRN [id number]   [facility] 5NW-A Attending Vaishali Herrera MD   Hosp Day # 4 PCP Cruz Lambert MD     Chief Complaint: SOB    S: Patient re mg/dL (H)). No results for input(s): PTP, INR in the last 168 hours.          COVID-19 Lab Results    COVID-19  Lab Results   Component Value Date    COVID19 Not Detected 07/19/2021    COVID19 Not Detected 07/19/2021    COVID19 Not Detected 07/06/2021 as needed  7. Essential hypertension  1. metoprolol  8. Recent PE on 7/6  1. apixaban  9. Diabetes mellitus type 2, steroid hyperglycemia  1. Hold home Metformin  2. Hemoglobin A1c 6.9 on 7/8/2021  3. Levemir 8 units  4. Inc IC to 1:10  5. Cont.  ICF 1:30 >

## 2021-07-23 NOTE — PROGRESS NOTES
[facility] Pulmonary/Critical Care Progress Note     SUBJECTIVE/Interval history: All events, procedures, notes reviewed. Slept better overnight, feels better today, but still fatigued. Cough/dsypnea improved.  Ambulate Recent Labs   Lab 07/19/21  1516 07/20/21  0703   RBC 4.83 4.04   HGB 15.4 12.8   HCT 45.4 38.2   MCV 94.0 94.6   MCH 31.9 31.7   MCHC 33.9 33.5   RDW 13.6 13.3   NEPRELIM 11.24* 10.66*   WBC 12.7* 13.4*   .0 295.0     No results for input(s):  B Levothyroxine Sodium  75 mcg Oral Q48H   • Levothyroxine Sodium  88 mcg Oral Q48H   • metoprolol tartrate  25 mg Oral BID (Diuretic)   • Rosuvastatin Calcium  10 mg Oral Daily     guaiFENesin-codeine, acetaminophen, ipratropium-albuterol, ondansetron HCl,

## 2021-07-23 NOTE — CM/SW NOTE
sw notified of possible need for home oxygen. Will need o2 walk and home oxygen order if needed.  Pt arranged with [facility] for commode and is current with residential hhc. sw following

## 2021-07-23 NOTE — PHYSICAL THERAPY NOTE
PHYSICAL THERAPY TREATMENT NOTE - INPATIENT    Room Number: 906/813-B     Session: 1   Number of Visits to Meet Established Goals: 4    Presenting Problem: COPD exacerbatino    History related to current admission: Pt is a 64year old female admitted with currently have. ..  -   Turning over in bed (including adjusting bedclothes, sheets and blankets)?: None   -   Sitting down on and standing up from a chair with arms (e.g., wheelchair, bedside commode, etc.): A Little   -   Moving from lying on back to sitt continues to progress toward functional goals and is motivated to participate in skilled therapy. Pt reports she is committed to smoking cessation and has family support to assist with mobility as needed.  Pt reports she will stay on main level and use BR o

## 2021-07-24 LAB
GLUCOSE BLD-MCNC: 114 MG/DL (ref 70–99)
GLUCOSE BLD-MCNC: 148 MG/DL (ref 70–99)
GLUCOSE BLD-MCNC: 153 MG/DL (ref 70–99)
GLUCOSE BLD-MCNC: 209 MG/DL (ref 70–99)
TSI SER-ACNC: 0.42 MIU/ML (ref 0.36–3.74)

## 2021-07-24 PROCEDURE — 99232 SBSQ HOSP IP/OBS MODERATE 35: CPT | Performed by: HOSPITALIST

## 2021-07-24 RX ORDER — ESCITALOPRAM OXALATE 10 MG/1
10 TABLET ORAL EVERY MORNING
Status: DISCONTINUED | OUTPATIENT
Start: 2021-07-24 | End: 2021-07-24

## 2021-07-24 RX ORDER — IPRATROPIUM BROMIDE AND ALBUTEROL SULFATE 2.5; .5 MG/3ML; MG/3ML
3 SOLUTION RESPIRATORY (INHALATION)
Status: DISCONTINUED | OUTPATIENT
Start: 2021-07-24 | End: 2021-07-25

## 2021-07-24 RX ORDER — SULFAMETHOXAZOLE AND TRIMETHOPRIM 800; 160 MG/1; MG/1
1 TABLET ORAL EVERY 12 HOURS SCHEDULED
Status: DISCONTINUED | OUTPATIENT
Start: 2021-07-24 | End: 2021-07-25

## 2021-07-24 RX ORDER — LABETALOL HYDROCHLORIDE 5 MG/ML
10 INJECTION, SOLUTION INTRAVENOUS EVERY 4 HOURS PRN
Status: DISCONTINUED | OUTPATIENT
Start: 2021-07-24 | End: 2021-07-25

## 2021-07-24 RX ORDER — METHYLPREDNISOLONE SODIUM SUCCINATE 125 MG/2ML
60 INJECTION, POWDER, LYOPHILIZED, FOR SOLUTION INTRAMUSCULAR; INTRAVENOUS EVERY 12 HOURS
Status: DISCONTINUED | OUTPATIENT
Start: 2021-07-25 | End: 2021-07-25

## 2021-07-24 NOTE — PROGRESS NOTES
[facility]  Progress Note     Isela Danielson Patient Status:  Inpatient    1945 MRN [id number]   [facility] 5NW-A Attending Ryland Davenport MD   Hosp Day # 5 PCP Alexandria Borja MD       Chief Complaint: dyspnea    HPI/ 1,768*    < > = values in this interval not displayed. Creatinine Kinase  No results for input(s): CK in the last 168 hours. Inflammatory Markers  No results for input(s): CRP, EDDI, LDH, DDIMER in the last 168 hours.     Imaging: Imaging data revie no    Estimated date of discharge: ? today  Discharge is dependent on: clinical stability  At this point Ms. Ronda Salinas is expected to be discharge to: home

## 2021-07-24 NOTE — PLAN OF CARE
Pt AxOx4. Anxious at times, requested PRN xanax twice within short period during shift, pt states she takes med like this at home, given during previous night. Spoke with alena Martin to give pt xanax per request. RA on .  Cough, PRN antitussive provid

## 2021-07-24 NOTE — PROGRESS NOTES
[facility] Pulmonary/Critical Care Progress Note     SUBJECTIVE/Interval history: All events, procedures, notes reviewed.  Pt is having sinus drainage and sinus tenderness with cough      Review of Systems:   A comprehen 104 108 104   CO2 24.0 25.0 28.0     Recent Labs   Lab 07/19/21  1516 07/20/21  0703   RBC 4.83 4.04   HGB 15.4 12.8   HCT 45.4 38.2   MCV 94.0 94.6   MCH 31.9 31.7   MCHC 33.9 33.5   RDW 13.6 13.3   NEPRELIM 11.24* 10.66*   WBC 12.7* 13.4*   .0 295 TID CC   • fluticasone-Umeclidin-Vilant  1 puff Inhalation Daily   • Pantoprazole Sodium  40 mg Oral QAM AC   • apixaban  5 mg Oral BID   • nicotine  1 patch Transdermal Daily   • busPIRone HCl  10 mg Oral BID   • Levothyroxine Sodium  75 mcg Oral Q48H   •

## 2021-07-24 NOTE — PLAN OF CARE
Problem: Patient/Family Goals  Goal: Patient/Family Long Term Goal  Description: Patient's Long Term Goal: discharge with adequate resources    Interventions:  - Participate in and comply with medical treatment plan  - See additional Care Plan goals for prescribed range  Description: INTERVENTIONS:  - Monitor Blood Glucose as ordered  - Assess for signs and symptoms of hyperglycemia and hypoglycemia  - Administer ordered medications to maintain glucose within target range  - Assess barriers to adequate nu

## 2021-07-24 NOTE — PLAN OF CARE
Problem: Patient/Family Goals  Goal: Patient/Family Long Term Goal  Description: Patient's Long Term Goal: discharge with adequate resources    Interventions:  - Participate in and comply with medical treatment plan  - See additional Care Plan goals for needed  - Instruct patient on self management of diabetes  Outcome: Progressing   Pt is alert and oriented. O2 is WNL RA. Will assess with ambulation today. Bps elevated. Md aware. C/o headache. PRN Tylenol and Norco given previous shift with relief.

## 2021-07-25 VITALS
HEART RATE: 75 BPM | DIASTOLIC BLOOD PRESSURE: 73 MMHG | RESPIRATION RATE: 18 BRPM | BODY MASS INDEX: 27.13 KG/M2 | SYSTOLIC BLOOD PRESSURE: 134 MMHG | WEIGHT: 158.94 LBS | HEIGHT: 64 IN | OXYGEN SATURATION: 94 % | TEMPERATURE: 98 F

## 2021-07-25 LAB
ANION GAP SERPL CALC-SCNC: 6 MMOL/L (ref 0–18)
BUN BLD-MCNC: 38 MG/DL (ref 7–18)
BUN/CREAT SERPL: 25.5 (ref 10–20)
CALCIUM BLD-MCNC: 9 MG/DL (ref 8.5–10.1)
CHLORIDE SERPL-SCNC: 101 MMOL/L (ref 98–112)
CO2 SERPL-SCNC: 28 MMOL/L (ref 21–32)
CREAT BLD-MCNC: 1.49 MG/DL
GLUCOSE BLD-MCNC: 114 MG/DL (ref 70–99)
GLUCOSE BLD-MCNC: 165 MG/DL (ref 70–99)
GLUCOSE BLD-MCNC: 207 MG/DL (ref 70–99)
OSMOLALITY SERPL CALC.SUM OF ELEC: 295 MOSM/KG (ref 275–295)
POTASSIUM SERPL-SCNC: 3.5 MMOL/L (ref 3.5–5.1)
SODIUM SERPL-SCNC: 135 MMOL/L (ref 136–145)

## 2021-07-25 PROCEDURE — 99239 HOSP IP/OBS DSCHRG MGMT >30: CPT | Performed by: HOSPITALIST

## 2021-07-25 RX ORDER — SULFAMETHOXAZOLE AND TRIMETHOPRIM 800; 160 MG/1; MG/1
1 TABLET ORAL EVERY 12 HOURS SCHEDULED
Qty: 12 TABLET | Refills: 0 | Status: SHIPPED | OUTPATIENT
Start: 2021-07-25 | End: 2021-07-31

## 2021-07-25 NOTE — PROGRESS NOTES
[facility]  Progress Note     Yeni Alvarez Patient Status:  Inpatient    1945 MRN [id number]   [facility] 5NW-A Attending Anabella Neely MD   Hosp Day # 6 PCP Jorge Dixon MD       Chief Complaint: dyspnea    HPI/ < > = values in this interval not displayed. Creatinine Kinase  No results for input(s): CK in the last 168 hours. Inflammatory Markers  No results for input(s): CRP, EDDI, LDH, DDIMER in the last 168 hours.     Imaging: Imaging data reviewed in MD    Supplementary Documentation:     Quality:  · DVT Prophylaxis: DOAC  · CODE status: full  · Clay: no  · Central line: no    Estimated date of discharge: today or tomorrow  Discharge is dependent on: clinical stability  At this point Ms.  Gerhardt Lunch is exp

## 2021-07-25 NOTE — PROGRESS NOTES
NURSING DISCHARGE NOTE    Discharged 7/26 via wheelchair. Accompanied by support staff  Belongings sent with patient. AVS explained to patient. All questions answered. IV and tele removed.   Patient taken to [facility] lobby to be driven home to by [facility]

## 2021-07-25 NOTE — PLAN OF CARE
Assumed care for this pt at [address] [place] [address] Road. Pt alert oriented, vss, complains of headache managed by prn meds. , up with standby assist. Handoff to [facility], all questions answered, will continue to monitor

## 2021-07-25 NOTE — PROGRESS NOTES
[facility] Pulmonary/Critical Care Progress Note     SUBJECTIVE/Interval history: All events, procedures, notes reviewed.  Pt feels better after starting bactrim and afrin ns. les dyspnea and cough and better sinus drain Labs   Lab 07/19/21  1516 07/20/21  0703   RBC 4.83 4.04   HGB 15.4 12.8   HCT 45.4 38.2   MCV 94.0 94.6   MCH 31.9 31.7   MCHC 33.9 33.5   RDW 13.6 13.3   NEPRELIM 11.24* 10.66*   WBC 12.7* 13.4*   .0 295.0     No results for input(s): BNP in the l (Diuretic)   • Rosuvastatin Calcium  10 mg Oral Daily     Labetalol HCl, guaiFENesin-codeine, acetaminophen, ipratropium-albuterol, ondansetron HCl, glucose **OR** Glucose-Vitamin C **OR** dextrose **OR** glucose **OR** Glucose-Vitamin C, Albuterol Sulfate

## 2021-07-26 RX ORDER — PREDNISONE 20 MG/1
TABLET ORAL
Qty: 26 TABLET | Refills: 0 | Status: SHIPPED | OUTPATIENT
Start: 2021-07-26 | End: 2021-08-16

## 2021-07-26 NOTE — TELEPHONE ENCOUNTER
Pt's granddaughter called, Pt was released from the hospital and needing a refill of Prednisone. I could not schedule her for hospital f/u as there are not appointment available today. Please call Pt's granddaughter back, thank you.

## 2021-07-26 NOTE — TELEPHONE ENCOUNTER
Per discharge paperwork, this was ordered by hospitalist upon discharge. Would check with her pharmacy.

## 2021-07-26 NOTE — TELEPHONE ENCOUNTER
Called pt't granddaughter and left vm Rx was sent to pharmacy on file. Advised to schedule f/u appt with Dr. Nima ORTEGA. Our office number provided for any further questions.

## 2021-07-26 NOTE — TELEPHONE ENCOUNTER
Pt's granddaughter states she called the pharmacy and there is no script for the prednisone. Per granddaughter, pt received paper scripts and prednisone was not included with the other medications.  Granddaughter states she called Dr. Lisa Canales office and wa

## 2021-07-26 NOTE — TELEPHONE ENCOUNTER
Pt's granddaughter called and states pt was discharged from the hospital this morning and is requesting for medication refill of Prednisone. Please advise. Thank you.    LOV: 07/19/21  LF: Not by PCP

## 2021-07-26 NOTE — DISCHARGE SUMMARY
[facility]  DISCHARGE SUMMARY     Marizol Preciado Patient Status:  Inpatient    1945 MRN [id number]   [facility] 5NW-A Attending No att. providers found   Hosp Day # 6 PCP Rosaura Dent MD     Date of Admission:  Bactrim and nasal sprays. The patient was eventually able for discharge home on a course of antibiotics and steroids.   She will follow-up with her primary care doctor and her primary pulmonologist.    Lace+ Score: 73  59-90 High Risk  29-58 Medium Risk  0 Tabs  Commonly known as: ZOVIRAX      Take 400 mg by mouth 2 (two) times daily. Refills: 0     Albuterol Sulfate  (90 Base) MCG/ACT Aers      Inhale 2 puffs into the lungs every 6 (six) hours as needed for Wheezing.    Refills: 0     ALPRAZolam 0.5 Refills: 0     loratadine 10 MG Tabs  Commonly known as: CLARITIN      Take 10 mg by mouth daily. Refills: 0     metFORMIN HCl 500 MG Tabs  Commonly known as: GLUCOPHAGE      Take 500 mg by mouth daily with lunch.    Refills: 0     metoprolol tartrate 50 sounds.     -----------------------------------------------------------------------------------------------  PATIENT DISCHARGE INSTRUCTIONS: See electronic chart    Debra Jamison MD    Time spent:  > 30 minutes

## 2021-07-27 ENCOUNTER — TELEPHONE (OUTPATIENT)
Dept: FAMILY MEDICINE CLINIC | Facility: CLINIC | Age: 76
End: 2021-07-27

## 2021-07-27 ENCOUNTER — PATIENT OUTREACH (OUTPATIENT)
Dept: CASE MANAGEMENT | Age: 76
End: 2021-07-27

## 2021-07-27 NOTE — PROGRESS NOTES
1st attempt at scheduling         [facility], [address] [place] [address]   [phone]   Appointment scheduled for August 16th @2:40            Heber Hernandez MD   [address] [facility] [address]   Mattie Garces [phone]   Pa

## 2021-07-27 NOTE — PROGRESS NOTES
LM for pt to call [facility] for condition update since discharge. [facility] phone number was provided for pt to call back. TE will be sent to PCP office to see about a sooner appt as pt is a high risk for readmission.

## 2021-07-27 NOTE — TELEPHONE ENCOUNTER
Attempted to contact pt for TCM however no answer and VM was left. Pt is currently in TCM therefore a non- TCM HFU appt is recommended. Pt has an appt scheduled on 8/16/21 however TCM/HFU appt recommended by 8/1/21 as pt is a high risk for readmission.   Pl

## 2021-08-10 DIAGNOSIS — E11.65 TYPE 2 DIABETES MELLITUS WITH HYPERGLYCEMIA, WITHOUT LONG-TERM CURRENT USE OF INSULIN (HCC): ICD-10-CM

## 2021-08-10 NOTE — TELEPHONE ENCOUNTER
Last office visit:   7/19/21 schedule appointment)    Pt has appt on 8/16/21 w/Dr. Latasha Del Valle    Requested medication:   metFORMIN HCl 500 MG Oral Tab  furosemide (LASIX) injection 20 mg    Pharmacy:    [id number] - [address], [place] - [address] [phone], [phone]

## 2021-08-10 NOTE — TELEPHONE ENCOUNTER
LOV: 7/19/21  LF: We have never filled either for patient before, both on medication list as historical.  Please approve or deny pending Rx. Thank you!

## 2021-08-11 ENCOUNTER — TELEPHONE (OUTPATIENT)
Dept: FAMILY MEDICINE CLINIC | Facility: CLINIC | Age: 76
End: 2021-08-11

## 2021-08-11 RX ORDER — FUROSEMIDE 20 MG/1
20 TABLET ORAL 2 TIMES DAILY
Qty: 180 TABLET | Refills: 0 | Status: SHIPPED | OUTPATIENT
Start: 2021-08-11 | End: 2021-08-16

## 2021-08-11 NOTE — TELEPHONE ENCOUNTER
Called pt's granddaughter (on HIPAA) and was informed of below response from Dr. Moises Landon. Granddaughter verbalized understanding and in agreement.

## 2021-08-11 NOTE — TELEPHONE ENCOUNTER
Patient's grand daughter calling the office. States that the patient has been shaking. Mostly left arm- \"strong enough that it looks like she is waving arm. \"  Shaking progressively getting worse since hospital visit. No identifying trigger.   [employer]

## 2021-08-16 ENCOUNTER — OFFICE VISIT (OUTPATIENT)
Dept: FAMILY MEDICINE CLINIC | Facility: CLINIC | Age: 76
End: 2021-08-16
Payer: MEDICARE

## 2021-08-16 VITALS
OXYGEN SATURATION: 94 % | RESPIRATION RATE: 20 BRPM | HEIGHT: 64 IN | WEIGHT: 157 LBS | HEART RATE: 81 BPM | SYSTOLIC BLOOD PRESSURE: 124 MMHG | BODY MASS INDEX: 26.8 KG/M2 | DIASTOLIC BLOOD PRESSURE: 78 MMHG

## 2021-08-16 DIAGNOSIS — J44.1 COPD EXACERBATION (HCC): Primary | ICD-10-CM

## 2021-08-16 DIAGNOSIS — I10 ESSENTIAL HYPERTENSION: ICD-10-CM

## 2021-08-16 DIAGNOSIS — E03.9 HYPOTHYROIDISM, UNSPECIFIED TYPE: ICD-10-CM

## 2021-08-16 DIAGNOSIS — E11.65 TYPE 2 DIABETES MELLITUS WITH HYPERGLYCEMIA, WITHOUT LONG-TERM CURRENT USE OF INSULIN (HCC): ICD-10-CM

## 2021-08-16 DIAGNOSIS — M48.00 SPINAL STENOSIS, UNSPECIFIED SPINAL REGION: ICD-10-CM

## 2021-08-16 DIAGNOSIS — F41.9 ANXIETY AND DEPRESSION: ICD-10-CM

## 2021-08-16 DIAGNOSIS — R25.1 TREMORS OF NERVOUS SYSTEM: ICD-10-CM

## 2021-08-16 DIAGNOSIS — I26.99 BILATERAL PULMONARY EMBOLISM (HCC): ICD-10-CM

## 2021-08-16 DIAGNOSIS — Z76.0 ENCOUNTER FOR MEDICATION REFILL: ICD-10-CM

## 2021-08-16 DIAGNOSIS — N89.8 VAGINAL DRYNESS: ICD-10-CM

## 2021-08-16 DIAGNOSIS — F32.A ANXIETY AND DEPRESSION: ICD-10-CM

## 2021-08-16 DIAGNOSIS — G89.4 CHRONIC PAIN SYNDROME: ICD-10-CM

## 2021-08-16 PROCEDURE — 1111F DSCHRG MED/CURRENT MED MERGE: CPT | Performed by: FAMILY MEDICINE

## 2021-08-16 PROCEDURE — 99215 OFFICE O/P EST HI 40 MIN: CPT | Performed by: FAMILY MEDICINE

## 2021-08-16 RX ORDER — ESCITALOPRAM OXALATE 10 MG/1
10 TABLET ORAL EVERY MORNING
Qty: 30 TABLET | Refills: 0 | Status: CANCELLED | OUTPATIENT
Start: 2021-08-16

## 2021-08-16 RX ORDER — GABAPENTIN 100 MG/1
100 CAPSULE ORAL 3 TIMES DAILY
Qty: 270 CAPSULE | Refills: 0 | Status: SHIPPED | OUTPATIENT
Start: 2021-08-16 | End: 2021-12-15

## 2021-08-16 RX ORDER — BUSPIRONE HYDROCHLORIDE 10 MG/1
10 TABLET ORAL 2 TIMES DAILY
Qty: 180 TABLET | Refills: 0 | Status: SHIPPED | OUTPATIENT
Start: 2021-08-16 | End: 2021-10-15

## 2021-08-16 RX ORDER — FUROSEMIDE 20 MG/1
20 TABLET ORAL 2 TIMES DAILY
Qty: 180 TABLET | Refills: 0 | Status: SHIPPED | OUTPATIENT
Start: 2021-08-16

## 2021-08-16 RX ORDER — ROSUVASTATIN CALCIUM 10 MG/1
10 TABLET, COATED ORAL DAILY
Qty: 90 TABLET | Refills: 0 | Status: SHIPPED | OUTPATIENT
Start: 2021-08-16

## 2021-08-16 RX ORDER — LEVOTHYROXINE SODIUM 0.07 MG/1
75 TABLET ORAL
Qty: 90 TABLET | Refills: 0 | Status: SHIPPED | OUTPATIENT
Start: 2021-08-16

## 2021-08-16 RX ORDER — ALPRAZOLAM 0.5 MG/1
TABLET ORAL
Qty: 60 TABLET | Refills: 0 | Status: SHIPPED | OUTPATIENT
Start: 2021-08-16 | End: 2021-11-10

## 2021-08-16 RX ORDER — ACYCLOVIR 400 MG/1
400 TABLET ORAL 2 TIMES DAILY
Qty: 14 TABLET | Refills: 2 | Status: SHIPPED | OUTPATIENT
Start: 2021-08-16

## 2021-08-16 RX ORDER — PREDNISONE 1 MG/1
5 TABLET ORAL DAILY
Qty: 90 TABLET | Refills: 0 | Status: SHIPPED | OUTPATIENT
Start: 2021-08-16 | End: 2021-12-15

## 2021-08-16 RX ORDER — HYDROCODONE BITARTRATE AND ACETAMINOPHEN 5; 325 MG/1; MG/1
1 TABLET ORAL EVERY 4 HOURS PRN
Qty: 30 TABLET | Refills: 0 | Status: SHIPPED | OUTPATIENT
Start: 2021-08-16 | End: 2021-10-15

## 2021-08-16 RX ORDER — METOPROLOL TARTRATE 50 MG/1
25 TABLET, FILM COATED ORAL 2 TIMES DAILY
Qty: 90 TABLET | Refills: 0 | Status: SHIPPED | OUTPATIENT
Start: 2021-08-16

## 2021-08-16 RX ORDER — LEVOTHYROXINE SODIUM 88 UG/1
88 TABLET ORAL
Qty: 90 TABLET | Refills: 0 | Status: SHIPPED | OUTPATIENT
Start: 2021-08-16

## 2021-08-16 RX ORDER — ESTRADIOL 0.1 MG/G
CREAM VAGINAL
Qty: 3 EACH | Refills: 0 | Status: SHIPPED | OUTPATIENT
Start: 2021-08-16 | End: 2022-01-07

## 2021-08-16 NOTE — PROGRESS NOTES
[facility] Office Note  Chief Complaint:   Patient presents with:  Hospital F/U      HPI:   This is a 64year old female coming in for hospital follow up.   She was readmitted on July 19 to July 25 for worsening cough, shortness of HYSTERECTOMY       Social History:  Social History    Tobacco Use      Smoking status: Heavy Tobacco Smoker        Packs/day: 0.50        Types: Cigarettes      Smokeless tobacco: Never Used    Vaping Use      Vaping Use: Never used    Alcohol use: Not Cur MG/24HR Transdermal Patch 24 Hr Place 1 patch onto the skin daily. 14 each 0   • Arformoterol Tartrate 15 MCG/2ML Inhalation Nebu Soln Take 2 mL (15 mcg total) by nebulization 2 (two) times a day.  60 each 0   • TRELEGY ELLIPTA 100-62.5-25 MCG/INH Inhalatio encounter: 157 lb (71.2 kg). Vital signs reviewed. Appears stated age, well groomed. Physical Exam  Vitals and nursing note reviewed. Constitutional:       Appearance: Normal appearance. HENT:      Head: Normocephalic and atraumatic.    Eyes:      Pup 90 tablet; Refill: 0  - Levothyroxine Sodium 75 MCG Oral Tab; Take 1 tablet (75 mcg total) by mouth every other day. Dispense: 90 tablet; Refill: 0    4.  Vaginal dryness  Stable, CPM  - estradiol 0.1 MG/GM Vaginal Cream; Place 2-4 g vaginally twice a week 6wks. Will CPM.   - Levothyroxine Sodium 88 MCG Oral Tab; Take 1 tablet (88 mcg total) by mouth every other day. Dispense: 90 tablet; Refill: 0  - TSH+FREE T4; Future    11.  Tremors  Unknown underlying etiology - there are multiple factors that could be c Levothyroxine Sodium 88 MCG Oral Tab 90 tablet 0     Sig: Take 1 tablet (88 mcg total) by mouth every other day. • gabapentin 100 MG Oral Cap 270 capsule 0     Sig: Take 1 capsule (100 mg total) by mouth 3 (three) times daily.    • HYDROcodone-acetaminoph

## 2021-08-17 ENCOUNTER — TELEPHONE (OUTPATIENT)
Dept: FAMILY MEDICINE CLINIC | Facility: CLINIC | Age: 76
End: 2021-08-17

## 2021-08-17 DIAGNOSIS — G89.4 CHRONIC PAIN SYNDROME: ICD-10-CM

## 2021-08-17 DIAGNOSIS — M48.00 SPINAL STENOSIS, UNSPECIFIED SPINAL REGION: Primary | ICD-10-CM

## 2021-08-17 NOTE — TELEPHONE ENCOUNTER
Will need new insurance information for when plan becomes effective. Can not place referral until we have this information and plan is active. Will tanya for follow-up.

## 2021-08-17 NOTE — TELEPHONE ENCOUNTER
Patient was referred to see Dr. Steele and she is scheduled for an appointment on 9/1/21 at 1:30pm. Pt is switching her insurance to [facility] as of 9/1/21 and is needing a referral.  I informed Pt's niece that since the policy is not active until 9/1 w

## 2021-08-19 ENCOUNTER — TELEPHONE (OUTPATIENT)
Dept: FAMILY MEDICINE CLINIC | Facility: CLINIC | Age: 76
End: 2021-08-19

## 2021-08-19 DIAGNOSIS — J32.9 SINUSITIS, UNSPECIFIED CHRONICITY, UNSPECIFIED LOCATION: Primary | ICD-10-CM

## 2021-08-19 RX ORDER — AMOXICILLIN AND CLAVULANATE POTASSIUM 875; 125 MG/1; MG/1
1 TABLET, FILM COATED ORAL 2 TIMES DAILY
Qty: 20 TABLET | Refills: 0 | Status: SHIPPED | OUTPATIENT
Start: 2021-08-19 | End: 2021-08-29

## 2021-08-19 NOTE — TELEPHONE ENCOUNTER
Called pt to obtain additional information. Pt states she has had sinus pressure, headaches, and congestion. Denies fevers. Pt states symptoms started last week Friday. Pt requesting for an antibiotic d/t she does not have any transportation.  Pt apologizes

## 2021-08-19 NOTE — TELEPHONE ENCOUNTER
Patient is a hospital followup, just seen on 8/16/21. Patient called to say that she is having sinus issues again and forgot to ask about medication for them. She stated while in the hospital she had sinus problems.  She would like to have an rx for ant

## 2021-08-19 NOTE — TELEPHONE ENCOUNTER
FYI: Called pt and informed her of below response from Dr. Melisa Hartman. Pt denies having any allergies to PCN. All questions answered and pt verbalized understanding.

## 2021-08-19 NOTE — TELEPHONE ENCOUNTER
Okay for Augmentin. Advised to continue with nasal saline rinses, flonase as needed. Make sure she is taking a probiotic or daily yogurt to help prevent any C. Diff. Follow up if no improvement.

## 2021-08-27 ENCOUNTER — TELEPHONE (OUTPATIENT)
Dept: FAMILY MEDICINE CLINIC | Facility: CLINIC | Age: 76
End: 2021-08-27

## 2021-08-27 NOTE — TELEPHONE ENCOUNTER
spoke to pt 8/27 states [facility] 9/1, will call back and schedule later

## 2021-08-31 ENCOUNTER — TELEPHONE (OUTPATIENT)
Dept: FAMILY MEDICINE CLINIC | Facility: CLINIC | Age: 76
End: 2021-08-31

## 2021-08-31 DIAGNOSIS — Z20.822 EXPOSURE TO COVID-19 VIRUS: Primary | ICD-10-CM

## 2021-08-31 NOTE — TELEPHONE ENCOUNTER
Pt's grandson was diagnosed with Covid (Pt lives in the same house), Pt's granddaughter called for advise regarding the dosage and what kind of vitamins Pt can take to prevent or lessen symptoms should Pt contract the virus. Please advise.

## 2021-09-01 DIAGNOSIS — J44.1 CHRONIC OBSTRUCTIVE PULMONARY DISEASE WITH ACUTE EXACERBATION (HCC): ICD-10-CM

## 2021-09-01 DIAGNOSIS — J44.1 COPD EXACERBATION (HCC): Primary | ICD-10-CM

## 2021-09-01 RX ORDER — FLUTICASONE FUROATE, UMECLIDINIUM BROMIDE AND VILANTEROL TRIFENATATE 100; 62.5; 25 UG/1; UG/1; UG/1
1 POWDER RESPIRATORY (INHALATION) DAILY
Qty: 3 EACH | Refills: 1 | Status: SHIPPED | OUTPATIENT
Start: 2021-09-01 | End: 2021-11-30

## 2021-09-01 NOTE — TELEPHONE ENCOUNTER
Notified the patient of medication refill and pulmonology referral. Patient verbalized understanding. Answered all questions at this time.

## 2021-09-01 NOTE — TELEPHONE ENCOUNTER
Patient is needing a refill of TRELEGY ELLIPTA 100-62.5-25 MCG/INH Inhalation Aerosol Powder, Breath Activated. Also, as of today Pt has a new insurance and is needing a referral to Dr. Hernandez Brower (pulmonology). Please call Pt if/when approved.  Thank you

## 2021-09-01 NOTE — TELEPHONE ENCOUNTER
Pt calling and is requesting for medication refill of Trelegy Ellipta 100-62.5-25 mcg/inh. Please approve or deny pending Rx. Thank you. New referral placed.    LOV: 08/16/21  LF: Not by PCP

## 2021-09-01 NOTE — TELEPHONE ENCOUNTER
Pain management referral placed. Notified the patient via phone. Patient verbalized understanding. Answered all questions at this time.

## 2021-09-01 NOTE — TELEPHONE ENCOUNTER
Called and left vm for granddaughter (on HIPAA) regarding below response from Dr. Cathy Carreon. Also informed a call to pt will be made. Called pt and informed her of below response from Dr. Cathy Carreon. Pt denies any symptoms at this time.  All questions answered and

## 2021-09-20 NOTE — PLAN OF CARE
PT IRP Treatment    Primary Rehabilitation Diagnosis: CVA  Expected Discharge Date: 9/30/2021  Planned Discharge Destination: Home (d/c 9/30 )    SUBJECTIVE: Subjective: pt agreeable to therapy session, pleasant and cooperative  (09/20/21 1100)  Subjective/Objective Comments: pt returned to room via rehab aide at the end of the session  (09/20/21 1100)    OBJECTIVE:  Precautions  Other Precautions: fall risk  (09/19/21 1302)    See below for current functional status overview.  See PT flowsheet for full details regarding the PT therapy provided.    ASSESSMENT:   Treatment today focused on progression of LE strengthening, ambulation distance with 4ww, and bed mobility.  Patient is demonstrating good progress supported by improved ability to stand from wc with practice, and ability to get LEs into bed with use of leg .    Patient limited at this time by fatigue.  Patient will benefit from further skilled PT  for continued training with bed mobility and LE strengthening to help the patient meet goal of functional mobility.      PT Identified Barriers to Discharge: assist with mobility, lives alone typically     This patient participated in all scheduled physical therapy time with this therapist today.    EDUCATION:    Education Provided  On this date, education was provided to patient regarding bed mobility, transfers and ambulation.  The response to education was/were: Needs reinforcement.      PLAN:   Continue skilled PT, including the following Treatment/Interventions: Functional transfer training;Strengthening;Bed mobility;Gait training;Neuromuscular re-education (09/19/21 1100)   PT Frequency: 7 days/week (09/20/21 1100), Frequency Comments: 90 min at least 5x a week  (09/20/21 1100)    Treatment Plan for Next Session: bed mobility to R 17\" and leg ,  ambulation with 4ww, sit to stand from various seat heights, sidelying as tolerated for strengthening for getting LEs into bed, standing balance       Problem: Patient/Family Goals  Goal: Patient/Family Long Term Goal  Description: Patient's Long Term Goal: discharge with adequate resources    Interventions:  - Participate in and comply with medical treatment plan  - See additional Care Plan goals for     RECOMMENDATIONS FOR DISCHARGE:  Recommendation for Discharge: PT WI: Home, Home therapy    PT/OT Mobility Equipment for Discharge: owns 4ww - will continue to monitor (09/20/21 1100)  PT/OT ADL Equipment for Discharge: pt owns toilet riser, sock aid, shower chair  (09/19/21 1302)      FUNCTIONAL DATA OVERVIEW LAST 24 HOURS  Bed Mobility        Transfers  Transfers  Sit to Stand: Minimal Assist (Min) (09/20/21 1100)  Stand to Sit: Minimal Assist (Min) (09/20/21 1100)  Assistive Device/: 4-wheeled walker (09/20/21 1100)  Transfer Comments 1: initial stand needing min assist to come to standing, with subsequent stands able to stand with supervision, completed x5 for strengthening and mass practice  (09/20/21 1100)    Gait  Gait  Gait Assistance: Touching/Steadying Assistance;Supervision (Supv) (09/20/21 1100)  Assistive Device/: 4-wheeled walker (09/20/21 1100)  Ambulation Distance (Feet): 120 Feet (09/20/21 1100)  Gait Comments 1: light touch to close supervision for safety,  (09/20/21 1100),      Stairs       Wheelchair Mobility       Balance       Neuromuscular Re-education       Other Therapeutic Interventions        needed  - Instruct patient on self management of diabetes  Outcome: Progressing   Pt is alert and oriented. O2 WNL on RA. O2 walk: 90% on RA. Still on IV Solumedrol. VSS. All meds given per [facility]. Will continue with plan of care.

## 2021-09-24 ENCOUNTER — PATIENT OUTREACH (OUTPATIENT)
Dept: CASE MANAGEMENT | Age: 76
End: 2021-09-24

## 2021-09-24 NOTE — PROGRESS NOTES
NCM attempted to contact the patient for HFU. No answer and unable to leave a message as the VM has not been set up. NATALIO will try again later today.

## 2021-09-24 NOTE — PROGRESS NOTES
NCM attempted to contact the patient for the 2nd time. No answer and VM is not set up therefor unable to leave a message. NCM will try again another time.

## 2021-10-08 ENCOUNTER — TELEPHONE (OUTPATIENT)
Dept: FAMILY MEDICINE CLINIC | Facility: CLINIC | Age: 76
End: 2021-10-08

## 2021-10-08 NOTE — TELEPHONE ENCOUNTER
Called pt and states she is currently admitted in [facility] rehab and states her legs are swelling and she has a rash to side of face. Instructed pt that she needs to tell the doctor at the rehab to exam her. All questions answered and pt verbalized.

## 2021-10-08 NOTE — TELEPHONE ENCOUNTER
Pt is at at [facility] respiratory rehab [place]. She has been there 3 weeks. States the exercises they have her doing is making her legs swell. Wants to know if Dr Chris Ashford can call something in for her.

## 2021-10-12 ENCOUNTER — PATIENT OUTREACH (OUTPATIENT)
Dept: CASE MANAGEMENT | Age: 76
End: 2021-10-12

## 2021-10-12 ENCOUNTER — TELEPHONE (OUTPATIENT)
Dept: FAMILY MEDICINE CLINIC | Facility: CLINIC | Age: 76
End: 2021-10-12

## 2021-10-12 DIAGNOSIS — Z02.9 ENCOUNTERS FOR UNSPECIFIED ADMINISTRATIVE PURPOSE: ICD-10-CM

## 2021-10-12 DIAGNOSIS — J44.1 CHRONIC OBSTRUCTIVE PULMONARY DISEASE WITH ACUTE EXACERBATION (HCC): ICD-10-CM

## 2021-10-12 NOTE — TELEPHONE ENCOUNTER
Patient left message on VM she needs appointment for nebulizer tubing also is due for her 3 month follow up   Unable to leave message patient has mailbox that has not been set up

## 2021-10-12 NOTE — PROGRESS NOTES
NCM attempted to contact the patient for HFU. No answer and unable to leave a message as the MB is not set up. NCM will try again another time.

## 2021-10-12 NOTE — TELEPHONE ENCOUNTER
Patient called back she has scheduled follow up appointment 10/27   She will be coming home tomorrow from rehab and wants to know where to purchase new tubing for her nebulizer    Also wants to know is she is needing any labs prior to her appointment

## 2021-10-12 NOTE — TELEPHONE ENCOUNTER
Patient will be discharged from rehab tomorrow. Scheduled for F/U on 10/27. Inquiring about where to get nebulizer tubing. Also wanting to know if any lab orders needed to be placed prior to appointment. Please advise. Thank you.

## 2021-10-13 ENCOUNTER — TELEPHONE (OUTPATIENT)
Dept: FAMILY MEDICINE CLINIC | Facility: CLINIC | Age: 76
End: 2021-10-13

## 2021-10-13 RX ORDER — IPRATROPIUM BROMIDE AND ALBUTEROL SULFATE 2.5; .5 MG/3ML; MG/3ML
3 SOLUTION RESPIRATORY (INHALATION) EVERY 6 HOURS PRN
Qty: 360 ML | Refills: 0 | Status: SHIPPED | OUTPATIENT
Start: 2021-10-13 | End: 2021-12-13

## 2021-10-13 NOTE — TELEPHONE ENCOUNTER
Can get supplies at pharmacy okay to place order.  There are labs pending that she will need to complete that are already in Epic

## 2021-10-13 NOTE — PROGRESS NOTES
NCM attempted to contact the patient for the 2nd time. No answer and unable to leave a message as the Vm box is not set up. NCM will try again another time.

## 2021-10-13 NOTE — TELEPHONE ENCOUNTER
Patient called, she is needing a new hose and a mask for her nebulizer. Please send to [facility] #[id number].

## 2021-10-13 NOTE — TELEPHONE ENCOUNTER
Pt requesting for medication refill of Ipratropium-Albuterol. Please advise on instructions as PCP has not prescribed this before. Thank you. FYI: Pt states she is to be discharged today but will be going home on oxygen.  Asked pt why is she going home wi

## 2021-10-13 NOTE — TELEPHONE ENCOUNTER
Called [facility], talked with Gi Fierro who states that they do not have any nebulizer tubing and does not know when they will get some in. Per Gi Fierro, pt can go to a medical supply store like [employer] to obtain tubing. Called pt and was informed of above.  Pe

## 2021-10-14 NOTE — TELEPHONE ENCOUNTER
FYI: Called Dr. Fabiana Oliva office and was able to schedule an appointment for pt. Appt scheduled for 10/27 at 1400 in the [place] location. Called pt and informed her of above. Dr. Fabiana Oliva information provided to pt.    Dr. Donna Jin  [address] [address]

## 2021-10-15 ENCOUNTER — TELEPHONE (OUTPATIENT)
Dept: FAMILY MEDICINE CLINIC | Facility: CLINIC | Age: 76
End: 2021-10-15

## 2021-10-15 ENCOUNTER — OFFICE VISIT (OUTPATIENT)
Dept: FAMILY MEDICINE CLINIC | Facility: CLINIC | Age: 76
End: 2021-10-15
Payer: MEDICARE

## 2021-10-15 VITALS
HEIGHT: 64 IN | WEIGHT: 161 LBS | RESPIRATION RATE: 18 BRPM | HEART RATE: 62 BPM | BODY MASS INDEX: 27.49 KG/M2 | DIASTOLIC BLOOD PRESSURE: 80 MMHG | OXYGEN SATURATION: 97 % | SYSTOLIC BLOOD PRESSURE: 136 MMHG

## 2021-10-15 DIAGNOSIS — F41.9 ANXIETY AND DEPRESSION: ICD-10-CM

## 2021-10-15 DIAGNOSIS — J44.9 CHRONIC OBSTRUCTIVE PULMONARY DISEASE, UNSPECIFIED COPD TYPE (HCC): ICD-10-CM

## 2021-10-15 DIAGNOSIS — F32.A ANXIETY AND DEPRESSION: ICD-10-CM

## 2021-10-15 DIAGNOSIS — R19.7 DIARRHEA OF PRESUMED INFECTIOUS ORIGIN: Primary | ICD-10-CM

## 2021-10-15 DIAGNOSIS — G89.4 CHRONIC PAIN SYNDROME: ICD-10-CM

## 2021-10-15 DIAGNOSIS — F33.9 DEPRESSION, RECURRENT (HCC): ICD-10-CM

## 2021-10-15 DIAGNOSIS — R35.0 URINARY FREQUENCY: ICD-10-CM

## 2021-10-15 PROBLEM — F17.200 TOBACCO DEPENDENCE: Status: RESOLVED | Noted: 2021-07-13 | Resolved: 2021-10-15

## 2021-10-15 PROBLEM — R09.02 HYPOXIA: Status: RESOLVED | Noted: 2021-07-19 | Resolved: 2021-10-15

## 2021-10-15 PROBLEM — J44.1 COPD EXACERBATION (HCC): Status: RESOLVED | Noted: 2021-07-19 | Resolved: 2021-10-15

## 2021-10-15 PROBLEM — J44.1 CHRONIC OBSTRUCTIVE PULMONARY DISEASE WITH ACUTE EXACERBATION (HCC): Status: RESOLVED | Noted: 2021-07-13 | Resolved: 2021-10-15

## 2021-10-15 PROCEDURE — 99214 OFFICE O/P EST MOD 30 MIN: CPT | Performed by: FAMILY MEDICINE

## 2021-10-15 PROCEDURE — 3079F DIAST BP 80-89 MM HG: CPT | Performed by: FAMILY MEDICINE

## 2021-10-15 PROCEDURE — 81003 URINALYSIS AUTO W/O SCOPE: CPT | Performed by: FAMILY MEDICINE

## 2021-10-15 PROCEDURE — 3075F SYST BP GE 130 - 139MM HG: CPT | Performed by: FAMILY MEDICINE

## 2021-10-15 PROCEDURE — 87086 URINE CULTURE/COLONY COUNT: CPT | Performed by: FAMILY MEDICINE

## 2021-10-15 PROCEDURE — 3008F BODY MASS INDEX DOCD: CPT | Performed by: FAMILY MEDICINE

## 2021-10-15 RX ORDER — PANTOPRAZOLE SODIUM 40 MG/1
1 TABLET, DELAYED RELEASE ORAL DAILY
COMMUNITY
Start: 2021-09-22 | End: 2022-01-17

## 2021-10-15 RX ORDER — HYDROCODONE BITARTRATE AND ACETAMINOPHEN 5; 325 MG/1; MG/1
1 TABLET ORAL EVERY 4 HOURS PRN
Qty: 30 TABLET | Refills: 0 | Status: SHIPPED | OUTPATIENT
Start: 2021-10-15 | End: 2021-11-02

## 2021-10-15 RX ORDER — BUSPIRONE HYDROCHLORIDE 10 MG/1
10 TABLET ORAL 2 TIMES DAILY
Qty: 180 TABLET | Refills: 0 | Status: SHIPPED | OUTPATIENT
Start: 2021-10-15 | End: 2022-01-07

## 2021-10-15 RX ORDER — DEXAMETHASONE 6 MG/1
1 TABLET ORAL DAILY
COMMUNITY
Start: 2021-09-20 | End: 2021-12-15 | Stop reason: ALTCHOICE

## 2021-10-15 NOTE — PATIENT INSTRUCTIONS
1. Follow up with lung doctor     2. Follow up with pain doctor     3. Follow up with neurologist     4.  Follow up with psychiatrist.

## 2021-10-15 NOTE — PROGRESS NOTES
[facility] Family Medicine Office Note  Chief Complaint:   Patient presents with: Follow - Up: 3 months follow up   Diarrhea: c/o watery diarrheas x 2 weeks       HPI:   This is a 64year old female coming in for follow up.      Hospitalization every 4 (four) hours as needed for Pain. 30 tablet 0   • busPIRone 10 MG Oral Tab Take 1 tablet (10 mg total) by mouth 2 (two) times daily.  180 tablet 0   • ipratropium-albuterol 0.5-2.5 (3) MG/3ML Inhalation Solution Take 3 mL by nebulization every 6 (six UT) Oral Cap Take 5,000 Units by mouth daily. • ELMIRON 100 MG Oral Cap Take 100 mg by mouth daily. • Potassium Chloride ER 20 MEQ Oral Tab CR Take 1 tablet by mouth daily.      • hydrocortisone 2.5 % External Cream Apply 1 each topically 2 (two) ti effort is normal. No respiratory distress. Breath sounds: Normal breath sounds. No wheezing or rhonchi. Abdominal:      General: Abdomen is flat. Bowel sounds are normal. There is no distension. Palpations: Abdomen is soft. There is no mass. CULTURE, ROUTINE    6. Depression, recurrent ([facility] [address]  Stable. No HI/SI. Advised to follow up with counseling.  CPM.       Meds & Refills for this Visit:  Requested Prescriptions     Signed Prescriptions Disp Refills   • HYDROcodone-acetaminophen 5-325 MG Oral

## 2021-10-16 ENCOUNTER — LAB ENCOUNTER (OUTPATIENT)
Dept: LAB | Age: 76
End: 2021-10-16
Attending: FAMILY MEDICINE
Payer: MEDICARE

## 2021-10-16 ENCOUNTER — TELEPHONE (OUTPATIENT)
Dept: FAMILY MEDICINE CLINIC | Facility: CLINIC | Age: 76
End: 2021-10-16

## 2021-10-16 DIAGNOSIS — R19.7 DIARRHEA: Primary | ICD-10-CM

## 2021-10-16 DIAGNOSIS — R19.7 DIARRHEA OF PRESUMED INFECTIOUS ORIGIN: ICD-10-CM

## 2021-10-16 NOTE — TELEPHONE ENCOUNTER
Daughter LVM regarding a psych referral, she stated that the [facility] location is too far, and is wondering if Jolanta Estrada can get a psych referral for the [facility] [employer] location? She asked to be given a call back at [phone].  Thank you

## 2021-10-18 PROCEDURE — 87045 FECES CULTURE AEROBIC BACT: CPT

## 2021-10-18 PROCEDURE — 87177 OVA AND PARASITES SMEARS: CPT

## 2021-10-18 PROCEDURE — 87046 STOOL CULTR AEROBIC BACT EA: CPT

## 2021-10-18 PROCEDURE — 87329 GIARDIA AG IA: CPT

## 2021-10-18 PROCEDURE — 87427 SHIGA-LIKE TOXIN AG IA: CPT

## 2021-10-18 PROCEDURE — 87493 C DIFF AMPLIFIED PROBE: CPT

## 2021-10-18 PROCEDURE — 87209 SMEAR COMPLEX STAIN: CPT

## 2021-10-18 PROCEDURE — 87272 CRYPTOSPORIDIUM AG IF: CPT

## 2021-10-18 PROCEDURE — 89055 LEUKOCYTE ASSESSMENT FECAL: CPT

## 2021-10-18 NOTE — TELEPHONE ENCOUNTER
Called pt and informed her she can call the number on her insurance card to see which psych provider is covered and closer to her home location. Questions answered and pt verbalized understanding.

## 2021-10-18 NOTE — TELEPHONE ENCOUNTER
Called pt d/t no name was left on vm from daughter. Pt did not answer and unable to leave vm d/t mailbox is not set up. Will tanya for follow up.

## 2021-10-19 ENCOUNTER — TELEPHONE (OUTPATIENT)
Dept: FAMILY MEDICINE CLINIC | Facility: CLINIC | Age: 76
End: 2021-10-19

## 2021-10-19 DIAGNOSIS — A04.72 C. DIFFICILE DIARRHEA: Primary | ICD-10-CM

## 2021-10-20 ENCOUNTER — TELEPHONE (OUTPATIENT)
Dept: FAMILY MEDICINE CLINIC | Facility: CLINIC | Age: 76
End: 2021-10-20

## 2021-10-20 DIAGNOSIS — A04.72 C. DIFFICILE DIARRHEA: ICD-10-CM

## 2021-10-20 RX ORDER — VANCOMYCIN HYDROCHLORIDE 125 MG/1
125 CAPSULE ORAL 4 TIMES DAILY
Qty: 40 CAPSULE | Refills: 0 | Status: SHIPPED | OUTPATIENT
Start: 2021-10-20 | End: 2021-10-30

## 2021-10-20 RX ORDER — VANCOMYCIN HYDROCHLORIDE 125 MG/1
125 CAPSULE ORAL 4 TIMES DAILY
Qty: 40 CAPSULE | Refills: 0 | Status: SHIPPED | OUTPATIENT
Start: 2021-10-20 | End: 2021-10-20

## 2021-10-20 NOTE — TELEPHONE ENCOUNTER
Discussed diagnosis and treatment at length w/ pt. Advised of proper hand hygiene and precautions to take around the house, I encouraged her to complete the entire course of the antibiotic even if her symptoms improved. Discussed diet a well.  Verbalized un

## 2021-10-20 NOTE — TELEPHONE ENCOUNTER
Will send over abx course for C. Diff. Advise to practice good hand hygiene to avoid spread, push fluids, soft bland diet (can advance slowly as tolerated).

## 2021-10-20 NOTE — TELEPHONE ENCOUNTER
Patient called, she went to the pharmacy to  the antibiotic and was told they did not receive the prescription.  Please resend and call Pt to let her know when done, thank you

## 2021-10-20 NOTE — TELEPHONE ENCOUNTER
Spoke to pharmacy staff. They have received the script and the out of pocket cost is $1.30. I notified the pt of this.

## 2021-10-21 NOTE — PROGRESS NOTES
Several attempts made to reach the patient with no return call. Patient completed HFU on 10/15/2021. Closing encounter.

## 2021-11-03 ENCOUNTER — TELEPHONE (OUTPATIENT)
Dept: FAMILY MEDICINE CLINIC | Facility: CLINIC | Age: 76
End: 2021-11-03

## 2021-11-03 DIAGNOSIS — A04.72 C. DIFFICILE DIARRHEA: Primary | ICD-10-CM

## 2021-11-03 DIAGNOSIS — J44.1 CHRONIC OBSTRUCTIVE PULMONARY DISEASE WITH ACUTE EXACERBATION (HCC): ICD-10-CM

## 2021-11-03 RX ORDER — PENTOSAN POLYSULFATE SODIUM 100 MG/1
100 CAPSULE, GELATIN COATED ORAL DAILY
Qty: 30 CAPSULE | Refills: 0 | Status: SHIPPED | OUTPATIENT
Start: 2021-11-03 | End: 2021-12-06

## 2021-11-03 NOTE — TELEPHONE ENCOUNTER
Fax received from [facility] pharmacy states Pt has requested refill on Elmiron 100 mg tab. Medication has never been prescribed by PCP. Order placed, please approve / deny if not appropriate.

## 2021-11-03 NOTE — TELEPHONE ENCOUNTER
Patient tested positive of C. Diff on 10/19/2021. Prescribed vancomycin. Completed antibiotic. Patient states that she will be going to [place] on 11/15 to see her daughter and granddaughter. Patient requesting to be tested for C.  Diff again to make

## 2021-11-03 NOTE — TELEPHONE ENCOUNTER
Information received via voicemail     Patient was sick in October and was given this medication     vancomycin 125 MG Oral Cap     Her stomach is still no quite right, is concerned she might still have the infection and she will be traveling to see her da

## 2021-11-04 ENCOUNTER — LAB ENCOUNTER (OUTPATIENT)
Dept: LAB | Age: 76
End: 2021-11-04
Attending: FAMILY MEDICINE
Payer: MEDICARE

## 2021-11-04 DIAGNOSIS — A04.72 C. DIFFICILE DIARRHEA: ICD-10-CM

## 2021-11-04 PROCEDURE — 87493 C DIFF AMPLIFIED PROBE: CPT

## 2021-11-04 NOTE — TELEPHONE ENCOUNTER
Lab ordered. Called pt but unable to leave vm as mailbox has not been set up yet. Will tanya for follow up.

## 2021-11-09 ENCOUNTER — OFFICE VISIT (OUTPATIENT)
Dept: NEUROLOGY | Facility: CLINIC | Age: 76
End: 2021-11-09
Payer: MEDICARE

## 2021-11-09 VITALS
WEIGHT: 159 LBS | RESPIRATION RATE: 18 BRPM | HEART RATE: 77 BPM | HEIGHT: 64 IN | DIASTOLIC BLOOD PRESSURE: 60 MMHG | SYSTOLIC BLOOD PRESSURE: 112 MMHG | BODY MASS INDEX: 27.14 KG/M2 | OXYGEN SATURATION: 97 %

## 2021-11-09 DIAGNOSIS — R25.1 TREMOR: Primary | ICD-10-CM

## 2021-11-09 DIAGNOSIS — G62.9 NEUROPATHY: ICD-10-CM

## 2021-11-09 PROCEDURE — 3078F DIAST BP <80 MM HG: CPT | Performed by: HOSPITALIST

## 2021-11-09 PROCEDURE — 3074F SYST BP LT 130 MM HG: CPT | Performed by: HOSPITALIST

## 2021-11-09 PROCEDURE — 99203 OFFICE O/P NEW LOW 30 MIN: CPT | Performed by: HOSPITALIST

## 2021-11-09 PROCEDURE — 3008F BODY MASS INDEX DOCD: CPT | Performed by: HOSPITALIST

## 2021-11-09 NOTE — PROGRESS NOTES
New patient for Shaking in Hands/Leg- Patient states she started experiencing shaking last March. Patient has bilateral hand & BLE tremors. Patient states it's hard to hold objects.

## 2021-11-09 NOTE — H&P
Neurology H&P    Oksana Wolf Patient Status:  No patient class for patient encounter    1945 MRN [id number]   Location ED [facility] Attending No att. providers found   Hosp Day # 0 PCP Cindy Pham MD     Subjective:  Drew Nguyen needed (shortness of breath/wheezing). 360 mL 0   • TRELEGY ELLIPTA 100-62.5-25 MCG/INH Inhalation Aerosol Powder, Breath Activated Inhale 1 puff into the lungs daily.  3 each 1   • metFORMIN HCl 500 MG Oral Tab Take 1 tablet (500 mg total) by mouth daily w % External Cream Apply 1 each topically 2 (two) times daily as needed (hemmorhoids).  20 g 0   • Esomeprazole Magnesium 40 MG Oral Capsule Delayed Release Take 40 mg by mouth every morning before breakfast.     • loratadine 10 MG Oral Tab Take 10 mg by mout clubbing or cyanosis      Neurologic:   Neurologic Exam     Mental Status   Oriented to person, place, and time. Attention: normal. Concentration: normal.   Speech: speech is normal   Level of consciousness: alert  Knowledge: good.    Normal comprehension Lexi Wolff MD

## 2021-11-10 DIAGNOSIS — F41.9 ANXIETY AND DEPRESSION: ICD-10-CM

## 2021-11-10 DIAGNOSIS — F32.A ANXIETY AND DEPRESSION: ICD-10-CM

## 2021-11-10 RX ORDER — ALPRAZOLAM 0.5 MG/1
TABLET ORAL
Qty: 60 TABLET | Refills: 0 | Status: SHIPPED | OUTPATIENT
Start: 2021-11-10 | End: 2021-12-13

## 2021-12-06 DIAGNOSIS — J44.1 CHRONIC OBSTRUCTIVE PULMONARY DISEASE WITH ACUTE EXACERBATION (HCC): ICD-10-CM

## 2021-12-06 RX ORDER — PENTOSAN POLYSULFATE SODIUM 100 MG/1
CAPSULE, GELATIN COATED ORAL
Qty: 30 CAPSULE | Refills: 5 | Status: SHIPPED | OUTPATIENT
Start: 2021-12-06 | End: 2022-06-13

## 2021-12-13 ENCOUNTER — TELEPHONE (OUTPATIENT)
Dept: FAMILY MEDICINE CLINIC | Facility: CLINIC | Age: 76
End: 2021-12-13

## 2021-12-13 DIAGNOSIS — J44.1 CHRONIC OBSTRUCTIVE PULMONARY DISEASE WITH ACUTE EXACERBATION (HCC): ICD-10-CM

## 2021-12-13 DIAGNOSIS — F32.A ANXIETY AND DEPRESSION: ICD-10-CM

## 2021-12-13 DIAGNOSIS — F41.9 ANXIETY AND DEPRESSION: ICD-10-CM

## 2021-12-13 RX ORDER — ALPRAZOLAM 0.5 MG/1
TABLET ORAL
Qty: 60 TABLET | Refills: 0 | Status: SHIPPED | OUTPATIENT
Start: 2021-12-13 | End: 2022-01-21

## 2021-12-13 RX ORDER — IPRATROPIUM BROMIDE AND ALBUTEROL SULFATE 2.5; .5 MG/3ML; MG/3ML
3 SOLUTION RESPIRATORY (INHALATION) EVERY 6 HOURS PRN
Qty: 360 ML | Refills: 0 | Status: SHIPPED | OUTPATIENT
Start: 2021-12-13 | End: 2022-01-07

## 2021-12-13 NOTE — TELEPHONE ENCOUNTER
Called [facility], talked with pharmacist who just wanted to clarify PCP is aware pt is also taking Norco. Per the pharmacist, the have a hard stop for the Alprazolam and Norco-risk of drowsiness. Okay for pt to be on both medications? Thank you.    LOV: 10/15/21

## 2021-12-13 NOTE — TELEPHONE ENCOUNTER
LF   ipratropium-albuterol  10/13/2021  Xanax 11/10/2021    LOV 10/15/2021  Return to clinic 2 months

## 2021-12-15 ENCOUNTER — VIRTUAL PHONE E/M (OUTPATIENT)
Dept: FAMILY MEDICINE CLINIC | Facility: CLINIC | Age: 76
End: 2021-12-15
Payer: MEDICARE

## 2021-12-15 DIAGNOSIS — B97.89 VIRAL RESPIRATORY ILLNESS: ICD-10-CM

## 2021-12-15 DIAGNOSIS — J98.8 VIRAL RESPIRATORY ILLNESS: ICD-10-CM

## 2021-12-15 DIAGNOSIS — M48.00 SPINAL STENOSIS, UNSPECIFIED SPINAL REGION: ICD-10-CM

## 2021-12-15 DIAGNOSIS — J43.9 PULMONARY EMPHYSEMA, UNSPECIFIED EMPHYSEMA TYPE (HCC): Primary | ICD-10-CM

## 2021-12-15 PROCEDURE — 99442 PHONE E/M BY PHYS 11-20 MIN: CPT | Performed by: FAMILY MEDICINE

## 2021-12-15 RX ORDER — GUAIFENESIN 1200 MG/1
1200 TABLET, EXTENDED RELEASE ORAL 2 TIMES DAILY
Qty: 30 TABLET | Refills: 0 | Status: SHIPPED | OUTPATIENT
Start: 2021-12-15 | End: 2022-01-07 | Stop reason: ALTCHOICE

## 2021-12-15 RX ORDER — GABAPENTIN 100 MG/1
CAPSULE ORAL
Qty: 450 CAPSULE | Refills: 0 | Status: SHIPPED | OUTPATIENT
Start: 2021-12-15

## 2021-12-15 NOTE — PROGRESS NOTES
Virtual Telephone Check-In    Terrance Mccoy verbally consents to a Virtual/Telephone Check-In visit on 12/15/21. Patient has been referred to the [facility] website at [email or web address] to review the yearly Consent to Treat document.     Patient underst

## 2021-12-15 NOTE — TELEPHONE ENCOUNTER
Called [facility], talked with pharmacist who was made aware of below response from Dr. Ganesh Hansen. Pharmacist verbalized understanding.

## 2021-12-15 NOTE — TELEPHONE ENCOUNTER
Yes that's fine - please review side effects with patient. She has been on these medications with no issues in the past, just an fyi.

## 2021-12-17 ENCOUNTER — TELEPHONE (OUTPATIENT)
Dept: FAMILY MEDICINE CLINIC | Facility: CLINIC | Age: 76
End: 2021-12-17

## 2021-12-17 NOTE — TELEPHONE ENCOUNTER
Advised patient to use humidifier in room, prop self up with extra pillows. Also advised vicks vapor rub at night   Pt denies chest pain, SOB. Cough worse at night. Better during the day. Sputum is still clear/white.

## 2021-12-17 NOTE — TELEPHONE ENCOUNTER
Patient calling had tele visit 12/15 patient is still coughing using mucinex as was told patient is requesting a rx for her cough   Patient does have tele visit with Dr Romeo Rehman Monday

## 2021-12-20 ENCOUNTER — VIRTUAL PHONE E/M (OUTPATIENT)
Dept: FAMILY MEDICINE CLINIC | Facility: CLINIC | Age: 76
End: 2021-12-20
Payer: MEDICARE

## 2021-12-20 DIAGNOSIS — B97.89 VIRAL RESPIRATORY ILLNESS: ICD-10-CM

## 2021-12-20 DIAGNOSIS — J43.9 PULMONARY EMPHYSEMA, UNSPECIFIED EMPHYSEMA TYPE (HCC): Primary | ICD-10-CM

## 2021-12-20 DIAGNOSIS — J98.8 VIRAL RESPIRATORY ILLNESS: ICD-10-CM

## 2021-12-20 PROCEDURE — 99441 PHONE E/M BY PHYS 5-10 MIN: CPT | Performed by: FAMILY MEDICINE

## 2021-12-20 RX ORDER — AZITHROMYCIN 250 MG/1
TABLET, FILM COATED ORAL
Qty: 6 TABLET | Refills: 0 | Status: SHIPPED | OUTPATIENT
Start: 2021-12-20 | End: 2021-12-25

## 2021-12-20 RX ORDER — PREDNISONE 20 MG/1
40 TABLET ORAL DAILY
Qty: 10 TABLET | Refills: 0 | Status: SHIPPED | OUTPATIENT
Start: 2021-12-20 | End: 2021-12-25

## 2021-12-20 NOTE — PROGRESS NOTES
Virtual Telephone Check-In    Ami Koch verbally consents to a Virtual/Telephone Check-In visit on 12/20/21. Patient has been referred to the [facility] website at [email or web address] to review the yearly Consent to Treat document.     Patient underst supportive care mgmt - advised to push fluids, tylenol prn, mucinex prn. Reviewed return and ER precautions. Follow up in 1wk.      Shine Hirsch MD

## 2021-12-21 NOTE — PROGRESS NOTES
Pt completed HFU at [facility] today, closing encounter. part of systemic allergic issues. Seeing Allergist at this time.

## 2021-12-30 ENCOUNTER — HOSPITAL ENCOUNTER (EMERGENCY)
Age: 76
Discharge: HOME | End: 2021-12-30
Payer: MEDICARE

## 2021-12-30 VITALS
TEMPERATURE: 98 F | HEART RATE: 78 BPM | OXYGEN SATURATION: 97 % | RESPIRATION RATE: 18 BRPM | WEIGHT: 157 LBS | HEIGHT: 64 IN | DIASTOLIC BLOOD PRESSURE: 61 MMHG | BODY MASS INDEX: 26.8 KG/M2 | SYSTOLIC BLOOD PRESSURE: 143 MMHG

## 2021-12-30 DIAGNOSIS — S81.812A LACERATION OF LEFT LOWER EXTREMITY, INITIAL ENCOUNTER: Primary | ICD-10-CM

## 2021-12-30 PROCEDURE — 12002 RPR S/N/AX/GEN/TRNK2.6-7.5CM: CPT | Performed by: PHYSICIAN ASSISTANT

## 2021-12-30 PROCEDURE — 99283 EMERGENCY DEPT VISIT LOW MDM: CPT | Performed by: PHYSICIAN ASSISTANT

## 2021-12-31 NOTE — ED PROVIDER NOTES
Patient Seen in: THE [facility] Emergency Department In [place]      History   Patient presents with:  Laceration/Abrasion    Stated Complaint: laceration to L leg, + blood thinners    Subjective:   HPI    Pleasant 77-year-old female.   Arrives for evaluation distress, RR, no retraction  Extremities: Full ROM, no deformity, NVI  Skin: 3 inch crescent-shaped laceration to the left lower extremity. Minimal depth.   Distal extremity neurovascularly intact  Neuro:  Normal Gait    ED Course   Labs Reviewed - No data

## 2021-12-31 NOTE — ED INITIAL ASSESSMENT (HPI)
Pt was walking through kitchen and got L leg caught on blinds, laceration to L leg, pt on blood thinners

## 2022-01-03 ENCOUNTER — TELEPHONE (OUTPATIENT)
Dept: FAMILY MEDICINE CLINIC | Facility: CLINIC | Age: 77
End: 2022-01-03

## 2022-01-03 DIAGNOSIS — R19.7 DIARRHEA, UNSPECIFIED TYPE: Primary | ICD-10-CM

## 2022-01-03 NOTE — TELEPHONE ENCOUNTER
Called patient to reschedule appointment is being seen with Dr Mario Connelly Friday wants to know if he will remove her stiches,  Also with loose stool wanting to do stool sample,  Please see below

## 2022-01-03 NOTE — TELEPHONE ENCOUNTER
Called pt and was informed of below response from Dr. April Hurley. Pt informed stool kit placed by . Supportive measures and ER precautions provided. All questions answered and pt verbalized understanding.

## 2022-01-03 NOTE — TELEPHONE ENCOUNTER
Pt calling and states she had a laceration and stitches placed on 12/30/21. Pt scheduled appt for 01/07/22 for suture removal. Pt asking if okay to keep this appt for Friday since she was informed to get stitches out by day 10.  Pt then states she is having

## 2022-01-07 ENCOUNTER — OFFICE VISIT (OUTPATIENT)
Dept: FAMILY MEDICINE CLINIC | Facility: CLINIC | Age: 77
End: 2022-01-07
Payer: MEDICARE

## 2022-01-07 ENCOUNTER — LAB ENCOUNTER (OUTPATIENT)
Dept: LAB | Age: 77
End: 2022-01-07
Attending: FAMILY MEDICINE
Payer: MEDICARE

## 2022-01-07 VITALS
HEART RATE: 78 BPM | OXYGEN SATURATION: 95 % | RESPIRATION RATE: 20 BRPM | SYSTOLIC BLOOD PRESSURE: 144 MMHG | BODY MASS INDEX: 26.46 KG/M2 | DIASTOLIC BLOOD PRESSURE: 84 MMHG | HEIGHT: 64 IN | WEIGHT: 155 LBS

## 2022-01-07 DIAGNOSIS — F32.A ANXIETY AND DEPRESSION: ICD-10-CM

## 2022-01-07 DIAGNOSIS — F41.9 ANXIETY AND DEPRESSION: ICD-10-CM

## 2022-01-07 DIAGNOSIS — J44.1 CHRONIC OBSTRUCTIVE PULMONARY DISEASE WITH ACUTE EXACERBATION (HCC): ICD-10-CM

## 2022-01-07 DIAGNOSIS — G89.4 CHRONIC PAIN SYNDROME: ICD-10-CM

## 2022-01-07 DIAGNOSIS — S81.812D LACERATION OF LEFT LOWER EXTREMITY, SUBSEQUENT ENCOUNTER: ICD-10-CM

## 2022-01-07 DIAGNOSIS — K13.79 ORAL MASS: Primary | ICD-10-CM

## 2022-01-07 DIAGNOSIS — N89.8 VAGINAL DRYNESS: ICD-10-CM

## 2022-01-07 DIAGNOSIS — R19.7 DIARRHEA, UNSPECIFIED TYPE: ICD-10-CM

## 2022-01-07 DIAGNOSIS — Z86.19 HISTORY OF CLOSTRIDIOIDES DIFFICILE INFECTION: ICD-10-CM

## 2022-01-07 DIAGNOSIS — R25.1 TREMOR: ICD-10-CM

## 2022-01-07 PROCEDURE — 3077F SYST BP >= 140 MM HG: CPT | Performed by: FAMILY MEDICINE

## 2022-01-07 PROCEDURE — 3079F DIAST BP 80-89 MM HG: CPT | Performed by: FAMILY MEDICINE

## 2022-01-07 PROCEDURE — 3008F BODY MASS INDEX DOCD: CPT | Performed by: FAMILY MEDICINE

## 2022-01-07 PROCEDURE — 99214 OFFICE O/P EST MOD 30 MIN: CPT | Performed by: FAMILY MEDICINE

## 2022-01-07 RX ORDER — ESTRADIOL 0.1 MG/G
CREAM VAGINAL
Qty: 3 EACH | Refills: 2 | Status: SHIPPED | OUTPATIENT
Start: 2022-01-10 | End: 2022-04-10

## 2022-01-07 RX ORDER — BUSPIRONE HYDROCHLORIDE 10 MG/1
TABLET ORAL
Qty: 180 TABLET | Refills: 0 | OUTPATIENT
Start: 2022-01-07

## 2022-01-07 RX ORDER — IPRATROPIUM BROMIDE AND ALBUTEROL SULFATE 2.5; .5 MG/3ML; MG/3ML
3 SOLUTION RESPIRATORY (INHALATION) EVERY 6 HOURS PRN
Qty: 360 ML | Refills: 2 | Status: SHIPPED | OUTPATIENT
Start: 2022-01-07

## 2022-01-07 RX ORDER — BUSPIRONE HYDROCHLORIDE 10 MG/1
10 TABLET ORAL 2 TIMES DAILY
Qty: 180 TABLET | Refills: 2 | Status: SHIPPED | OUTPATIENT
Start: 2022-01-07

## 2022-01-07 RX ORDER — FLUTICASONE FUROATE, UMECLIDINIUM BROMIDE AND VILANTEROL TRIFENATATE 100; 62.5; 25 UG/1; UG/1; UG/1
POWDER RESPIRATORY (INHALATION)
COMMUNITY
Start: 2021-12-13

## 2022-01-07 RX ORDER — IPRATROPIUM BROMIDE AND ALBUTEROL SULFATE 2.5; .5 MG/3ML; MG/3ML
SOLUTION RESPIRATORY (INHALATION)
Qty: 360 ML | Refills: 0 | OUTPATIENT
Start: 2022-01-07

## 2022-01-07 NOTE — PROGRESS NOTES
[facility] Family Medicine Office Note  Chief Complaint:   Patient presents with:  Referral: pt here asking for referral for dental Surgeon due to gum mass / Referral for Pain Management to jennie ordonez / Neuro to see Dr. Lulú Mendoza   Diarrhea: asking f (shortness of breath/wheezing). 360 mL 2   • estradiol 0.1 MG/GM Vaginal Cream Place 2-4 g vaginally twice a week.  3 each 2   • HYDROcodone-acetaminophen 5-325 MG Oral Tab Take 1 tablet by mouth 2 (two) times daily as needed for Pain (May take half tablets hours as needed for Wheezing. • Vitamin D3, Cholecalciferol, 125 MCG (5000 UT) Oral Cap Take 5,000 Units by mouth daily. • Potassium Chloride ER 20 MEQ Oral Tab CR Take 1 tablet by mouth daily.      • hydrocortisone 2.5 % External Cream Apply 1 each heard.      Pulmonary:      Effort: Pulmonary effort is normal. No respiratory distress. Breath sounds: Rhonchi present. No wheezing. Skin:     Findings: Lesion (LLE laceration, c/d/i, sutures in place. no erythema warmth discharge ) present.    Leata Days Vaccines(1 of 2) Never done  DEXA Scan Never done  COVID-19 Vaccine(2 - Moderna 3-dose series) due on 03/06/2021  Influenza Vaccine(1) due on 10/01/2021  Diabetes Care A1C due on 01/08/2022    Patient/Caregiver Education: Patient/Caregiver Education: There

## 2022-01-07 NOTE — PATIENT INSTRUCTIONS
1. Complete testing again for c diff. 2. Follow up with oral surgeon. 3. Follow up with neurologist.     4. Follow up with pain doctor.      5. Come back next Wednesday or Thursday for suture removal.

## 2022-01-08 PROCEDURE — 87493 C DIFF AMPLIFIED PROBE: CPT

## 2022-01-09 LAB — C DIFF TOX B STL QL: POSITIVE

## 2022-01-10 ENCOUNTER — TELEPHONE (OUTPATIENT)
Dept: FAMILY MEDICINE CLINIC | Facility: CLINIC | Age: 77
End: 2022-01-10

## 2022-01-10 DIAGNOSIS — A04.72 C. DIFFICILE DIARRHEA: Primary | ICD-10-CM

## 2022-01-10 RX ORDER — VANCOMYCIN HYDROCHLORIDE 125 MG/1
125 CAPSULE ORAL 4 TIMES DAILY
Qty: 56 CAPSULE | Refills: 0 | Status: SHIPPED | OUTPATIENT
Start: 2022-01-10 | End: 2022-01-24

## 2022-01-10 NOTE — TELEPHONE ENCOUNTER
Pt returned call and was informed of below response from Dr. Mellissa Bronson. Dr. Nava Soto information provided. Expressed the importance of following up with GI as pt was hesitant d/t location of GI office.  Advised pt if GI is too far then to call her insurance to see

## 2022-01-10 NOTE — TELEPHONE ENCOUNTER
This is now a recurring infection; she needs to follow up with GI. Will start vancomycin. Please review contact precautions with patient.

## 2022-01-13 ENCOUNTER — OFFICE VISIT (OUTPATIENT)
Dept: FAMILY MEDICINE CLINIC | Facility: CLINIC | Age: 77
End: 2022-01-13
Payer: MEDICARE

## 2022-01-13 VITALS
SYSTOLIC BLOOD PRESSURE: 148 MMHG | HEIGHT: 64 IN | WEIGHT: 157 LBS | DIASTOLIC BLOOD PRESSURE: 74 MMHG | RESPIRATION RATE: 20 BRPM | OXYGEN SATURATION: 96 % | BODY MASS INDEX: 26.8 KG/M2 | HEART RATE: 88 BPM

## 2022-01-13 DIAGNOSIS — A04.72 C. DIFFICILE DIARRHEA: ICD-10-CM

## 2022-01-13 DIAGNOSIS — S81.812D LACERATION OF LEFT LOWER EXTREMITY, SUBSEQUENT ENCOUNTER: Primary | ICD-10-CM

## 2022-01-13 PROCEDURE — 99024 POSTOP FOLLOW-UP VISIT: CPT | Performed by: FAMILY MEDICINE

## 2022-01-13 PROCEDURE — 3078F DIAST BP <80 MM HG: CPT | Performed by: FAMILY MEDICINE

## 2022-01-13 PROCEDURE — 3077F SYST BP >= 140 MM HG: CPT | Performed by: FAMILY MEDICINE

## 2022-01-13 PROCEDURE — 3008F BODY MASS INDEX DOCD: CPT | Performed by: FAMILY MEDICINE

## 2022-01-13 PROCEDURE — 99213 OFFICE O/P EST LOW 20 MIN: CPT | Performed by: FAMILY MEDICINE

## 2022-01-13 NOTE — PROGRESS NOTES
[facility] Family Medicine Office Note  Chief Complaint:   Patient presents with:  Suture Removal: LT leg suture removal 5 stitches   Referral: Pt asking for referral to see Dr. Ellis Gates in [place]       HPI:   This is a 64year old female comi tablet by mouth 2 (two) times daily as needed for Pain (May take half tablets). 60 tablet 0   • gabapentin 100 MG Oral Cap Take 2 capsules (200 mg total) by mouth every morning AND 3 capsules (300 mg total) nightly.  (Patient taking differently: Take 1 caps tablet by mouth daily. • hydrocortisone 2.5 % External Cream Apply 1 each topically 2 (two) times daily as needed (hemmorhoids).  20 g 0   • Esomeprazole Magnesium 40 MG Oral Capsule Delayed Release Take 40 mg by mouth every morning before breakfast. Vaccines(1 of 2) Never done  DEXA Scan Never done  COVID-19 Vaccine(2 - Moderna 3-dose series) due on 03/06/2021  Influenza Vaccine(1) due on 10/01/2021  Diabetes Care A1C due on 01/08/2022    Patient/Caregiver Education: Patient/Caregiver Education: There

## 2022-01-13 NOTE — PATIENT INSTRUCTIONS
Laceration, All Closures  A laceration is a cut through the skin. This will usually require stitches or staples if it's deep. Minor cuts may be treated with a surgical tape closure or skin glue.      Home care  · Your healthcare provider may prescribe a naturally within 5 to 10 days.   · Caring for surgical tape: Keep the area dry. If it gets wet, blot it dry with a clean towel. Surgical tape usually falls off within 7 to 10 days. If it has not fallen off after 10 days, you can take it off yourself.  Put m used, remove them yourself when your provider recommends if they have not fallen off on their own. If skin glue was used, the film will wear off by itself.  Generally, you should keep healing wounds out of direct sunlight for the first couple of months to t

## 2022-01-14 DIAGNOSIS — I26.99 BILATERAL PULMONARY EMBOLISM (HCC): ICD-10-CM

## 2022-01-14 RX ORDER — APIXABAN 5 MG/1
TABLET, FILM COATED ORAL
Qty: 180 TABLET | Refills: 3 | Status: SHIPPED | OUTPATIENT
Start: 2022-01-14 | End: 2022-03-21

## 2022-01-17 ENCOUNTER — TELEPHONE (OUTPATIENT)
Dept: FAMILY MEDICINE CLINIC | Facility: CLINIC | Age: 77
End: 2022-01-17

## 2022-01-17 DIAGNOSIS — A04.72 C. DIFFICILE DIARRHEA: Primary | ICD-10-CM

## 2022-01-17 RX ORDER — ESOMEPRAZOLE MAGNESIUM 40 MG/1
40 CAPSULE, DELAYED RELEASE ORAL
Qty: 90 CAPSULE | Refills: 0 | Status: SHIPPED | OUTPATIENT
Start: 2022-01-17

## 2022-01-17 NOTE — TELEPHONE ENCOUNTER
Called pt and is requesting for a third referral to a GI doctor. Informed pt she will need to call her insurance to see which doctor is close to her house since pt wants a location closer to home.  Pt states she will call her insurance and will call office

## 2022-01-17 NOTE — PROCEDURES
Consent obtained. Area prepped with alcohol swab. 5 sutures removed in normal fashion. Wound was c/d/i, no blood loss. No complications. Reviewed risks and call back precutions.

## 2022-01-17 NOTE — TELEPHONE ENCOUNTER
Please confirm if she is taking esomeprazole or pantoprazole as they are similar medications. She should only be taking one of them.

## 2022-01-17 NOTE — TELEPHONE ENCOUNTER
Patient unable to schedule with [facility] doctor that referral was written for  Called her insurance will need new referral for C. difficile diarrheaor      Dr Boyd Petties  [address]     Please call patient when authorized

## 2022-01-17 NOTE — TELEPHONE ENCOUNTER
Medication(s) to Refill:   Requested Prescriptions     Pending Prescriptions Disp Refills   • Esomeprazole Magnesium 40 MG Oral Capsule Delayed Release 90 capsule 0     Sig: Take 1 capsule (40 mg total) by mouth every morning before breakfast.         Reas

## 2022-01-17 NOTE — TELEPHONE ENCOUNTER
Patient is needing a new referral to GI, Dr. Warden Cuello is too far from her. Pt is asking for someone in [place] or [address] [place] [address],[address] area.  Please advise

## 2022-01-19 ENCOUNTER — OFFICE VISIT (OUTPATIENT)
Dept: FAMILY MEDICINE CLINIC | Facility: CLINIC | Age: 77
End: 2022-01-19
Payer: MEDICARE

## 2022-01-19 ENCOUNTER — TELEPHONE (OUTPATIENT)
Dept: FAMILY MEDICINE CLINIC | Facility: CLINIC | Age: 77
End: 2022-01-19

## 2022-01-19 VITALS
WEIGHT: 159 LBS | DIASTOLIC BLOOD PRESSURE: 84 MMHG | SYSTOLIC BLOOD PRESSURE: 150 MMHG | OXYGEN SATURATION: 96 % | HEART RATE: 89 BPM | HEIGHT: 64 IN | RESPIRATION RATE: 20 BRPM | BODY MASS INDEX: 27.14 KG/M2

## 2022-01-19 DIAGNOSIS — M54.9 CHRONIC BACK PAIN, UNSPECIFIED BACK LOCATION, UNSPECIFIED BACK PAIN LATERALITY: ICD-10-CM

## 2022-01-19 DIAGNOSIS — G89.29 CHRONIC BACK PAIN, UNSPECIFIED BACK LOCATION, UNSPECIFIED BACK PAIN LATERALITY: ICD-10-CM

## 2022-01-19 DIAGNOSIS — S81.812D LACERATION OF LEFT LOWER EXTREMITY, SUBSEQUENT ENCOUNTER: Primary | ICD-10-CM

## 2022-01-19 PROCEDURE — 3079F DIAST BP 80-89 MM HG: CPT | Performed by: FAMILY MEDICINE

## 2022-01-19 PROCEDURE — 99214 OFFICE O/P EST MOD 30 MIN: CPT | Performed by: FAMILY MEDICINE

## 2022-01-19 PROCEDURE — 3008F BODY MASS INDEX DOCD: CPT | Performed by: FAMILY MEDICINE

## 2022-01-19 PROCEDURE — 3077F SYST BP >= 140 MM HG: CPT | Performed by: FAMILY MEDICINE

## 2022-01-19 NOTE — PROGRESS NOTES
[facility] Family Medicine Office Note  Chief Complaint:   Patient presents with: Follow - Up: follow up possible LLE infection      HPI:   This is a 64year old female coming in for follow up. S/p suture removal for LLE lac on 01/13.  She re days. 56 capsule 0   • TRELEGY ELLIPTA 100-62.5-25 MCG/INH Inhalation Aerosol Powder, Breath Activated      • busPIRone 10 MG Oral Tab Take 1 tablet (10 mg total) by mouth 2 (two) times daily.  180 tablet 2   • ipratropium-albuterol 0.5-2.5 (3) MG/3ML Inhal by nebulization 2 (two) times a day. 60 each 0   • Albuterol Sulfate  (90 Base) MCG/ACT Inhalation Aero Soln Inhale 2 puffs into the lungs every 6 (six) hours as needed for Wheezing.      • Vitamin D3, Cholecalciferol, 125 MCG (5000 UT) Oral Cap Take back pain laterality  - OP REFERRAL PAIN MANGEMENT      Meds & Refills for this Visit:  Requested Prescriptions     Signed Prescriptions Disp Refills   • mupirocin 2 % External Ointment 30 g 0     Sig: Apply 1 Application topically 3 (three) times daily fo

## 2022-01-19 NOTE — TELEPHONE ENCOUNTER
Patient calling the office. Saw PCP on 1/13 for suture removal. (Had five sutures on left lower leg.)   Patient states that she thinks that the wound is infected. Left lower leg is \"fire\" red and yellow around wound. Leg became \"fire\" red yesterday.

## 2022-01-21 ENCOUNTER — TELEPHONE (OUTPATIENT)
Dept: FAMILY MEDICINE CLINIC | Facility: CLINIC | Age: 77
End: 2022-01-21

## 2022-01-21 DIAGNOSIS — F32.A ANXIETY AND DEPRESSION: ICD-10-CM

## 2022-01-21 DIAGNOSIS — F41.9 ANXIETY AND DEPRESSION: ICD-10-CM

## 2022-01-21 RX ORDER — ALPRAZOLAM 0.5 MG/1
TABLET ORAL
Qty: 60 TABLET | Refills: 0 | Status: SHIPPED | OUTPATIENT
Start: 2022-01-21

## 2022-01-21 NOTE — TELEPHONE ENCOUNTER
Requesting refill on alprazolam.    12/13/2021. LOV for anxiety on 1/7/2022. Please approve or deny pending rx. Thank you.

## 2022-01-21 NOTE — TELEPHONE ENCOUNTER
Called pt and was informed of directions of topical mupirocin-Apply 1 Application topically 3 (three) times daily for 7 days. Advised pt to keep wound clean and dry. All questions answered and pt verbalized understanding.

## 2022-01-21 NOTE — TELEPHONE ENCOUNTER
Pt was seen 01//19 for leg infection. . Pt wants to know how long she should keep it covered for with the antibiotics?

## 2022-01-28 ENCOUNTER — TELEPHONE (OUTPATIENT)
Dept: FAMILY MEDICINE CLINIC | Facility: CLINIC | Age: 77
End: 2022-01-28

## 2022-01-31 ENCOUNTER — TELEPHONE (OUTPATIENT)
Dept: FAMILY MEDICINE CLINIC | Facility: CLINIC | Age: 77
End: 2022-01-31

## 2022-01-31 ENCOUNTER — OFFICE VISIT (OUTPATIENT)
Dept: FAMILY MEDICINE CLINIC | Facility: CLINIC | Age: 77
End: 2022-01-31
Payer: MEDICARE

## 2022-01-31 VITALS
BODY MASS INDEX: 27.14 KG/M2 | HEART RATE: 88 BPM | HEIGHT: 64 IN | DIASTOLIC BLOOD PRESSURE: 70 MMHG | OXYGEN SATURATION: 94 % | SYSTOLIC BLOOD PRESSURE: 130 MMHG | WEIGHT: 159 LBS | RESPIRATION RATE: 16 BRPM

## 2022-01-31 DIAGNOSIS — B37.89 CANDIDIASIS OF BREAST: ICD-10-CM

## 2022-01-31 DIAGNOSIS — A04.72 CLOSTRIDIUM DIFFICILE DIARRHEA: ICD-10-CM

## 2022-01-31 DIAGNOSIS — L03.116 CELLULITIS OF LEFT ANTERIOR LOWER LEG: Primary | ICD-10-CM

## 2022-01-31 PROCEDURE — 3008F BODY MASS INDEX DOCD: CPT | Performed by: NURSE PRACTITIONER

## 2022-01-31 PROCEDURE — 99214 OFFICE O/P EST MOD 30 MIN: CPT | Performed by: NURSE PRACTITIONER

## 2022-01-31 PROCEDURE — 3075F SYST BP GE 130 - 139MM HG: CPT | Performed by: NURSE PRACTITIONER

## 2022-01-31 PROCEDURE — 3078F DIAST BP <80 MM HG: CPT | Performed by: NURSE PRACTITIONER

## 2022-01-31 RX ORDER — MUPIROCIN CALCIUM 20 MG/G
1 CREAM TOPICAL 2 TIMES DAILY
Qty: 30 G | Refills: 2 | Status: SHIPPED | OUTPATIENT
Start: 2022-01-31 | End: 2022-01-31

## 2022-01-31 RX ORDER — VANCOMYCIN HYDROCHLORIDE 125 MG/1
125 CAPSULE ORAL 4 TIMES DAILY
Qty: 56 CAPSULE | Refills: 0 | Status: SHIPPED | OUTPATIENT
Start: 2022-01-31 | End: 2022-02-14

## 2022-01-31 RX ORDER — NYSTATIN 100000 U/G
1 CREAM TOPICAL 2 TIMES DAILY
Qty: 30 G | Refills: 3 | Status: SHIPPED | OUTPATIENT
Start: 2022-01-31 | End: 2022-02-10

## 2022-01-31 RX ORDER — SULFAMETHOXAZOLE AND TRIMETHOPRIM 800; 160 MG/1; MG/1
1 TABLET ORAL 2 TIMES DAILY
Qty: 14 TABLET | Refills: 0 | Status: SHIPPED | OUTPATIENT
Start: 2022-01-31 | End: 2022-02-07

## 2022-01-31 NOTE — TELEPHONE ENCOUNTER
Patient referred to gastro for c.diff diarrhea. Referral still open. Please advise if referral needs to be changed to STAT. Thank you.

## 2022-01-31 NOTE — PROGRESS NOTES
Patient presents with:  Wound Care: check wound onlower left leg      HPI:    Thong Vivar is a 64year old female presents with erythema,  And laceration Left lower leg  The current episode started in the past  12 days.  The problem has been  Getting be (20 mg total) by mouth 2 (two) times daily. 180 tablet 0   • acyclovir 400 MG Oral Tab Take 1 tablet (400 mg total) by mouth 2 (two) times daily. 14 tablet 2   • rosuvastatin 10 MG Oral Tab Take 1 tablet (10 mg total) by mouth daily.  90 tablet 0   • Levoth 0.50        Types: Cigarettes      Smokeless tobacco: Never Used    Vaping Use      Vaping Use: Never used    Alcohol use: Yes      Comment: occasional    Drug use: Never        REVIEW OF SYSTEMS:   GENERAL: feels well otherwise, no fever, no chills.   SKIN breast  -     nystatin 834568 UNIT/GM External Cream; Apply 1 Application topically 2 (two) times daily for 10 days. Skin care d/w the pt. Rx as below. The patient indicates understanding of these issues and agrees to the plan.   The patient is aske

## 2022-01-31 NOTE — TELEPHONE ENCOUNTER
Per PA pt's insurance covers the mupirocin ointment not cream.  Cream discontinued and ointment sent

## 2022-01-31 NOTE — TELEPHONE ENCOUNTER
Patient has a pending referral to see Dr. Imtiaz Schwartz, she would like to know if the we can expedite the approval of this as she would like to make an appointment. Please Advise. Thank you.

## 2022-01-31 NOTE — PATIENT INSTRUCTIONS
What Is C. Diff? C. diff is an infection caused by C. diff (Clostridioides difficile) bacteria. These are germs that live in the part of your belly called your colon, or large intestine. They don't often cause problems.  But if the normal balance of go their body fluids. Wash your hands with soap and water after contact. How can I stop the spread of C. diff? C. diff can easily spread to other people in your home or workplace.  The germs can stay on your hands after using the bathroom, then spread to an

## 2022-02-01 NOTE — TELEPHONE ENCOUNTER
Informed the patient of the approved referral. Patient verbalized understanding. Expressed appreciation of getting the referral approved. States that she will call today to schedule an appointment. Answered all questions at this time.

## 2022-02-06 ENCOUNTER — LAB ENCOUNTER (OUTPATIENT)
Dept: LAB | Age: 77
End: 2022-02-06
Attending: NURSE PRACTITIONER
Payer: MEDICARE

## 2022-02-06 PROCEDURE — 87493 C DIFF AMPLIFIED PROBE: CPT

## 2022-02-07 ENCOUNTER — TELEPHONE (OUTPATIENT)
Dept: FAMILY MEDICINE CLINIC | Facility: CLINIC | Age: 77
End: 2022-02-07

## 2022-02-07 ENCOUNTER — LAB ENCOUNTER (OUTPATIENT)
Dept: LAB | Age: 77
End: 2022-02-07
Attending: NURSE PRACTITIONER
Payer: MEDICARE

## 2022-02-07 ENCOUNTER — OFFICE VISIT (OUTPATIENT)
Dept: FAMILY MEDICINE CLINIC | Facility: CLINIC | Age: 77
End: 2022-02-07
Payer: MEDICARE

## 2022-02-07 VITALS
DIASTOLIC BLOOD PRESSURE: 80 MMHG | HEIGHT: 64 IN | WEIGHT: 155 LBS | RESPIRATION RATE: 16 BRPM | BODY MASS INDEX: 26.46 KG/M2 | SYSTOLIC BLOOD PRESSURE: 124 MMHG | HEART RATE: 68 BPM | OXYGEN SATURATION: 97 %

## 2022-02-07 DIAGNOSIS — I73.9 PVD (PERIPHERAL VASCULAR DISEASE) (HCC): ICD-10-CM

## 2022-02-07 DIAGNOSIS — A04.72 CLOSTRIDIUM DIFFICILE DIARRHEA: Primary | ICD-10-CM

## 2022-02-07 DIAGNOSIS — E11.65 TYPE 2 DIABETES MELLITUS WITH HYPERGLYCEMIA, WITHOUT LONG-TERM CURRENT USE OF INSULIN (HCC): ICD-10-CM

## 2022-02-07 DIAGNOSIS — L03.116 CELLULITIS OF LEFT ANTERIOR LOWER LEG: Primary | ICD-10-CM

## 2022-02-07 DIAGNOSIS — I26.99 BILATERAL PULMONARY EMBOLISM (HCC): ICD-10-CM

## 2022-02-07 LAB
ALBUMIN SERPL-MCNC: 3.2 G/DL (ref 3.4–5)
ALBUMIN/GLOB SERPL: 0.9 {RATIO} (ref 1–2)
ALP LIVER SERPL-CCNC: 61 U/L
ALT SERPL-CCNC: 18 U/L
ANION GAP SERPL CALC-SCNC: 6 MMOL/L (ref 0–18)
AST SERPL-CCNC: 14 U/L (ref 15–37)
BILIRUB SERPL-MCNC: 0.2 MG/DL (ref 0.1–2)
BUN BLD-MCNC: 16 MG/DL (ref 7–18)
CALCIUM BLD-MCNC: 9.6 MG/DL (ref 8.5–10.1)
CHLORIDE SERPL-SCNC: 107 MMOL/L (ref 98–112)
CO2 SERPL-SCNC: 24 MMOL/L (ref 21–32)
CREAT BLD-MCNC: 1.47 MG/DL
CREAT UR-SCNC: 37.7 MG/DL
EST. AVERAGE GLUCOSE BLD GHB EST-MCNC: 151 MG/DL (ref 68–126)
FASTING STATUS PATIENT QL REPORTED: NO
GLOBULIN PLAS-MCNC: 3.4 G/DL (ref 2.8–4.4)
GLUCOSE BLD-MCNC: 122 MG/DL (ref 70–99)
HBA1C MFR BLD: 6.9 % (ref ?–5.7)
MICROALBUMIN UR-MCNC: 0.59 MG/DL
MICROALBUMIN/CREAT 24H UR-RTO: 15.6 UG/MG (ref ?–30)
OSMOLALITY SERPL CALC.SUM OF ELEC: 286 MOSM/KG (ref 275–295)
POTASSIUM SERPL-SCNC: 4.8 MMOL/L (ref 3.5–5.1)
PROT SERPL-MCNC: 6.6 G/DL (ref 6.4–8.2)
SODIUM SERPL-SCNC: 137 MMOL/L (ref 136–145)

## 2022-02-07 PROCEDURE — 83036 HEMOGLOBIN GLYCOSYLATED A1C: CPT

## 2022-02-07 PROCEDURE — 3008F BODY MASS INDEX DOCD: CPT | Performed by: NURSE PRACTITIONER

## 2022-02-07 PROCEDURE — 36415 COLL VENOUS BLD VENIPUNCTURE: CPT

## 2022-02-07 PROCEDURE — 82570 ASSAY OF URINE CREATININE: CPT

## 2022-02-07 PROCEDURE — 82043 UR ALBUMIN QUANTITATIVE: CPT

## 2022-02-07 PROCEDURE — 80053 COMPREHEN METABOLIC PANEL: CPT

## 2022-02-07 PROCEDURE — 3074F SYST BP LT 130 MM HG: CPT | Performed by: NURSE PRACTITIONER

## 2022-02-07 PROCEDURE — 3079F DIAST BP 80-89 MM HG: CPT | Performed by: NURSE PRACTITIONER

## 2022-02-07 PROCEDURE — 99214 OFFICE O/P EST MOD 30 MIN: CPT | Performed by: NURSE PRACTITIONER

## 2022-02-07 NOTE — TELEPHONE ENCOUNTER
Received a call Dr. My Chen (GI) office, talked with Keyla Quick who states she is looking for the referral for Dr. Ilana Hauser d/t pt's insurance. Informed Keyla Quick there is a referral placed for Dr. Rashida Kern per pt request. Keyla Quick states since referral is for Dr. Rashida Kern pt needs to see Dr. Rashida Kern. Pt then requesting to see another provider so she can be seen at a sooner date. Per Keyla Quick, she gave pt provider cards so pt can chose since Dr. Sun Rodriguez next available appt is not until March. Pt to call this office with provider name.

## 2022-02-08 LAB — C DIFF TOX B STL QL: NEGATIVE

## 2022-02-08 NOTE — TELEPHONE ENCOUNTER
Patient called, she would like a referral for Dr. Ashlee Dominguez as her daughter can drive her there.

## 2022-02-08 NOTE — TELEPHONE ENCOUNTER
GI referral placed for Dr. Sahra Joy. Notified the patient. Patient verbalized understanding. Will reschedule an appointment. States that she originally scheduled an appointment for yesterday for Dr. Sahra Joy, but did not realize that she did not have a referral for Dr. Sahra Joy. Answered all questions at this time.

## 2022-02-09 ENCOUNTER — TELEPHONE (OUTPATIENT)
Dept: FAMILY MEDICINE CLINIC | Facility: CLINIC | Age: 77
End: 2022-02-09

## 2022-02-09 NOTE — TELEPHONE ENCOUNTER
Notified the patient of the below lab results and order. Patient verbalized understanding. States that she will start taking the metformin twice a day (taking it once a day now). Confirmed correct pharmacy. Answered all questions at this time. Please approve or deny pending rx. Thank you.

## 2022-02-09 NOTE — TELEPHONE ENCOUNTER
----- Message from SADI Crane sent at 2/9/2022  8:52 AM CST -----  Stable labs , c-diff negative   Diabetes can start Glucophage 500 mg BID to help control sugars

## 2022-02-11 ENCOUNTER — TELEPHONE (OUTPATIENT)
Dept: FAMILY MEDICINE CLINIC | Facility: CLINIC | Age: 77
End: 2022-02-11

## 2022-02-11 NOTE — TELEPHONE ENCOUNTER
Patient is calling to verify if she has active referral for Dr. Rossana Hunt     She has an appt 2/17/22     This is for testing of her breathing     She has had referrals before

## 2022-02-14 DIAGNOSIS — E11.65 TYPE 2 DIABETES MELLITUS WITH HYPERGLYCEMIA, WITHOUT LONG-TERM CURRENT USE OF INSULIN (HCC): ICD-10-CM

## 2022-02-14 DIAGNOSIS — I10 ESSENTIAL HYPERTENSION: ICD-10-CM

## 2022-02-14 RX ORDER — GABAPENTIN 100 MG/1
CAPSULE ORAL
Qty: 450 CAPSULE | Refills: 0 | OUTPATIENT
Start: 2022-02-14

## 2022-02-14 RX ORDER — METOPROLOL TARTRATE 50 MG/1
25 TABLET, FILM COATED ORAL 2 TIMES DAILY
Qty: 90 TABLET | Refills: 0 | Status: SHIPPED | OUTPATIENT
Start: 2022-02-14 | End: 2022-03-24

## 2022-02-14 RX ORDER — FUROSEMIDE 20 MG/1
TABLET ORAL
Qty: 180 TABLET | Refills: 0 | Status: SHIPPED | OUTPATIENT
Start: 2022-02-14 | End: 2022-04-08

## 2022-02-16 ENCOUNTER — TELEPHONE (OUTPATIENT)
Dept: FAMILY MEDICINE CLINIC | Facility: CLINIC | Age: 77
End: 2022-02-16

## 2022-02-16 RX ORDER — METFORMIN HYDROCHLORIDE EXTENDED-RELEASE TABLETS 500 MG/1
500 TABLET, FILM COATED, EXTENDED RELEASE ORAL 2 TIMES DAILY WITH MEALS
Qty: 60 TABLET | Refills: 0 | Status: SHIPPED | OUTPATIENT
Start: 2022-02-16 | End: 2022-02-16 | Stop reason: DRUGHIGH

## 2022-02-16 RX ORDER — ESOMEPRAZOLE MAGNESIUM 40 MG/1
CAPSULE, DELAYED RELEASE ORAL
Qty: 90 CAPSULE | Refills: 0 | Status: SHIPPED | OUTPATIENT
Start: 2022-02-16 | End: 2022-07-04

## 2022-02-16 RX ORDER — PREDNISONE 20 MG/1
TABLET ORAL
Qty: 26 TABLET | Refills: 0 | OUTPATIENT
Start: 2022-02-16

## 2022-02-16 NOTE — TELEPHONE ENCOUNTER
Doc,   You sent the metformin ER OSM 500mg tablets which triggered a PA. Did you mean to send these or something else?

## 2022-02-16 NOTE — TELEPHONE ENCOUNTER
Vito AVITIA reviewed pt's lab results and recommended pt to take Glucophage 500 mg BID. An Rx of Glucophage 500 mg daily was sent to pharmacy on 02/14/22. Please clarify on which dose pt should be on. Thank you.

## 2022-02-16 NOTE — TELEPHONE ENCOUNTER
Rx sent to pharmacy per verbal order from Dr. April Hurley. Called pt and was informed pt to take Metformin daily and not BID. Pt verbalized understanding.

## 2022-02-16 NOTE — TELEPHONE ENCOUNTER
Unable to update Rx d/t following message: The following medications are ordered in a more recent encounter, so you cannot change them here:   - METFORMIN 500 MG Oral Tab  Refill order from 02/14/22 still open. Please advise. Thank you.

## 2022-02-16 NOTE — TELEPHONE ENCOUNTER
Called pt and confirmed that she is taking medication BID. Pt states yes, she is taking medication BID.

## 2022-02-23 ENCOUNTER — TELEPHONE (OUTPATIENT)
Dept: FAMILY MEDICINE CLINIC | Facility: CLINIC | Age: 77
End: 2022-02-23

## 2022-02-23 NOTE — TELEPHONE ENCOUNTER
Called [facility], talked with [facility] who states Metoprolol will be available for refill on 03/19/22. Per [facility], refill is too soon since medication was last filled on 01/03/22. 90 day was given on 01/03/22.

## 2022-02-23 NOTE — TELEPHONE ENCOUNTER
Called pt and was informed of below. Pt states the medication was Metformin and not Metoprolol. Per pt, she will call her pharmacy to see if this is covered by insurance. If not, then pt to notify office. Questions answered and pt verbalized understanding.

## 2022-02-23 NOTE — TELEPHONE ENCOUNTER
Patient called regarding     METOPROLOL TARTRATE 50 MG Oral Tab      Patient states this is not covered by [facility] anymore, either she needs an alternative or contact [employer] at [phone]    Please Advise. Thank you.

## 2022-03-09 ENCOUNTER — TELEPHONE (OUTPATIENT)
Dept: FAMILY MEDICINE CLINIC | Facility: CLINIC | Age: 77
End: 2022-03-09

## 2022-03-09 ENCOUNTER — LAB ENCOUNTER (OUTPATIENT)
Dept: LAB | Age: 77
End: 2022-03-09
Attending: FAMILY MEDICINE
Payer: MEDICARE

## 2022-03-09 ENCOUNTER — OFFICE VISIT (OUTPATIENT)
Dept: FAMILY MEDICINE CLINIC | Facility: CLINIC | Age: 77
End: 2022-03-09
Payer: MEDICARE

## 2022-03-09 VITALS
RESPIRATION RATE: 18 BRPM | HEIGHT: 64 IN | BODY MASS INDEX: 26.8 KG/M2 | OXYGEN SATURATION: 96 % | WEIGHT: 157 LBS | SYSTOLIC BLOOD PRESSURE: 128 MMHG | DIASTOLIC BLOOD PRESSURE: 72 MMHG | HEART RATE: 77 BPM

## 2022-03-09 DIAGNOSIS — R30.0 DYSURIA: ICD-10-CM

## 2022-03-09 DIAGNOSIS — M79.604 BILATERAL LOWER EXTREMITY PAIN: Primary | ICD-10-CM

## 2022-03-09 DIAGNOSIS — L03.116 CELLULITIS OF LEFT LOWER EXTREMITY: ICD-10-CM

## 2022-03-09 DIAGNOSIS — G89.4 CHRONIC PAIN SYNDROME: ICD-10-CM

## 2022-03-09 DIAGNOSIS — M79.605 BILATERAL LOWER EXTREMITY PAIN: Primary | ICD-10-CM

## 2022-03-09 DIAGNOSIS — I73.9 PVD (PERIPHERAL VASCULAR DISEASE) (HCC): ICD-10-CM

## 2022-03-09 DIAGNOSIS — N18.32 STAGE 3B CHRONIC KIDNEY DISEASE (HCC): ICD-10-CM

## 2022-03-09 LAB
BILIRUB UR QL STRIP.AUTO: NEGATIVE
COLOR UR AUTO: YELLOW
GLUCOSE UR STRIP.AUTO-MCNC: NEGATIVE MG/DL
KETONES UR STRIP.AUTO-MCNC: NEGATIVE MG/DL
LEUKOCYTE ESTERASE UR QL STRIP.AUTO: NEGATIVE
NITRITE UR QL STRIP.AUTO: NEGATIVE
PH UR STRIP.AUTO: 5 [PH] (ref 5–8)
PROT UR STRIP.AUTO-MCNC: NEGATIVE MG/DL
RBC UR QL AUTO: NEGATIVE
SP GR UR STRIP.AUTO: 1.01 (ref 1–1.03)
UROBILINOGEN UR STRIP.AUTO-MCNC: <2 MG/DL

## 2022-03-09 PROCEDURE — 87086 URINE CULTURE/COLONY COUNT: CPT

## 2022-03-09 PROCEDURE — 81001 URINALYSIS AUTO W/SCOPE: CPT

## 2022-03-09 PROCEDURE — 3074F SYST BP LT 130 MM HG: CPT | Performed by: FAMILY MEDICINE

## 2022-03-09 PROCEDURE — 99214 OFFICE O/P EST MOD 30 MIN: CPT | Performed by: FAMILY MEDICINE

## 2022-03-09 PROCEDURE — 3008F BODY MASS INDEX DOCD: CPT | Performed by: FAMILY MEDICINE

## 2022-03-09 PROCEDURE — 3078F DIAST BP <80 MM HG: CPT | Performed by: FAMILY MEDICINE

## 2022-03-09 RX ORDER — GABAPENTIN 100 MG/1
CAPSULE ORAL
Qty: 210 CAPSULE | Refills: 2 | Status: SHIPPED | OUTPATIENT
Start: 2022-03-09

## 2022-03-09 NOTE — PATIENT INSTRUCTIONS
1. Complete ultrasound. 2. Follow up with wound care doctor. 3. Follow up with hematologist.     4. Can change gabapentin to 200mg in AM, 200mg in afternoon, 300mg in the evening. Continue compression stockings, elevate feet, continue tylenol as needed. 5. Follow up with nephrology.

## 2022-03-09 NOTE — TELEPHONE ENCOUNTER
Pt came in and said that she was just seen today and forgot to mention to  that it hurts when she urinates, pt was wondering if she can get an order for a urinalysis sample     Please advise    Thank you

## 2022-03-14 ENCOUNTER — OFFICE VISIT (OUTPATIENT)
Dept: HEMATOLOGY/ONCOLOGY | Age: 77
End: 2022-03-14
Attending: INTERNAL MEDICINE
Payer: MEDICARE

## 2022-03-14 ENCOUNTER — TELEPHONE (OUTPATIENT)
Dept: FAMILY MEDICINE CLINIC | Facility: CLINIC | Age: 77
End: 2022-03-14

## 2022-03-14 VITALS
RESPIRATION RATE: 18 BRPM | TEMPERATURE: 97 F | SYSTOLIC BLOOD PRESSURE: 159 MMHG | DIASTOLIC BLOOD PRESSURE: 77 MMHG | HEART RATE: 72 BPM | BODY MASS INDEX: 27 KG/M2 | WEIGHT: 159.13 LBS | OXYGEN SATURATION: 97 %

## 2022-03-14 DIAGNOSIS — I26.99 BILATERAL PULMONARY EMBOLISM (HCC): Primary | ICD-10-CM

## 2022-03-14 PROCEDURE — 99203 OFFICE O/P NEW LOW 30 MIN: CPT | Performed by: INTERNAL MEDICINE

## 2022-03-14 NOTE — TELEPHONE ENCOUNTER
----- Message from Carrington Pena MD sent at 3/14/2022 10:14 AM CDT -----  No UTI on urine studies.

## 2022-03-14 NOTE — TELEPHONE ENCOUNTER
Called pt and left vm of urine results. Instructed to contact office with any questions. Office number provided.

## 2022-03-14 NOTE — PROGRESS NOTES
Education Record    Learner:  Patient/ family member  Disease / Diagnosis: hx of PE last July    Barriers / Limitations:  None   Comments:    Method:  Discussion   Comments:    General Topics:  Plan of care reviewed   Comments:    Outcome:  Shows understanding   Comments:  Has been taking eliquis bid, ordered by PCP. Has spinal stenosis and wants to get injection, try to get off eliquis.

## 2022-03-16 ENCOUNTER — TELEPHONE (OUTPATIENT)
Dept: FAMILY MEDICINE CLINIC | Facility: CLINIC | Age: 77
End: 2022-03-16

## 2022-03-16 RX ORDER — ALPRAZOLAM 0.5 MG/1
TABLET ORAL
Qty: 60 TABLET | Refills: 0 | Status: SHIPPED | OUTPATIENT
Start: 2022-03-16

## 2022-03-16 NOTE — TELEPHONE ENCOUNTER
Patient cannot make appt for wound care services since referral is not approved as of yet     Referral [id number] , dated 3/9/22      ALSO    Patient is also asking again for oral surgeon due to the growth on her gum. She had referral last in January, but the referral is closed and the provider no longer works there.  Would like one in the area, the closest the better

## 2022-03-16 NOTE — TELEPHONE ENCOUNTER
Message sent to [facility] referral department regarding wound care clinic. Awaiting for response from referral department. Will tanya for follow up. Reviewed referral from January regarding oral surgeon. A message was left for pt informing her she was to call her insurance to see who was covered in her network/who is taking new pt's.

## 2022-03-18 NOTE — TELEPHONE ENCOUNTER
Called pt and left vm stating wound care clinic referral was authorized and that she has to call her insurance to see who is covered for the oral surgeon. Instructed to call office with any questions. Office number provided.

## 2022-03-19 ENCOUNTER — HOSPITAL ENCOUNTER (OUTPATIENT)
Dept: CT IMAGING | Age: 77
Discharge: HOME | End: 2022-03-19
Attending: INTERNAL MEDICINE
Payer: MEDICARE

## 2022-03-19 DIAGNOSIS — I26.99 BILATERAL PULMONARY EMBOLISM (HCC): ICD-10-CM

## 2022-03-19 LAB — CREAT BLD-MCNC: 1 MG/DL

## 2022-03-19 PROCEDURE — 71275 CT ANGIOGRAPHY CHEST: CPT | Performed by: INTERNAL MEDICINE

## 2022-03-19 PROCEDURE — 82565 ASSAY OF CREATININE: CPT

## 2022-03-19 RX ORDER — IOHEXOL 350 MG/ML
100 INJECTION, SOLUTION INTRAVENOUS
Status: COMPLETED | OUTPATIENT
Start: 2022-03-19 | End: 2022-03-19

## 2022-03-19 RX ADMIN — IOHEXOL 100 ML: 350 INJECTION, SOLUTION INTRAVENOUS at 13:22:00

## 2022-03-21 ENCOUNTER — TELEPHONE (OUTPATIENT)
Dept: HEMATOLOGY/ONCOLOGY | Facility: HOSPITAL | Age: 77
End: 2022-03-21

## 2022-03-21 ENCOUNTER — TELEPHONE (OUTPATIENT)
Dept: HEMATOLOGY/ONCOLOGY | Age: 77
End: 2022-03-21

## 2022-03-21 ENCOUNTER — VIRTUAL PHONE E/M (OUTPATIENT)
Dept: HEMATOLOGY/ONCOLOGY | Facility: HOSPITAL | Age: 77
End: 2022-03-21
Attending: INTERNAL MEDICINE
Payer: MEDICARE

## 2022-03-21 DIAGNOSIS — I26.99 BILATERAL PULMONARY EMBOLISM (HCC): ICD-10-CM

## 2022-03-21 PROCEDURE — 99442 PHONE E/M BY PHYS 11-20 MIN: CPT | Performed by: INTERNAL MEDICINE

## 2022-03-21 NOTE — TELEPHONE ENCOUNTER
Patient returned Dr. Everitt Crigler call. She couldn't hear Dr. Ivana Valderrama when she called earlier because her answering machine on at the same time. Please call when able. Thank you.

## 2022-03-22 ENCOUNTER — OFFICE VISIT (OUTPATIENT)
Dept: NEPHROLOGY | Facility: CLINIC | Age: 77
End: 2022-03-22
Payer: MEDICARE

## 2022-03-22 VITALS — SYSTOLIC BLOOD PRESSURE: 132 MMHG | WEIGHT: 159 LBS | BODY MASS INDEX: 27 KG/M2 | DIASTOLIC BLOOD PRESSURE: 76 MMHG

## 2022-03-22 DIAGNOSIS — N18.30 STAGE 3 CHRONIC KIDNEY DISEASE, UNSPECIFIED WHETHER STAGE 3A OR 3B CKD (HCC): Primary | ICD-10-CM

## 2022-03-22 PROCEDURE — 3075F SYST BP GE 130 - 139MM HG: CPT | Performed by: INTERNAL MEDICINE

## 2022-03-22 PROCEDURE — 99204 OFFICE O/P NEW MOD 45 MIN: CPT | Performed by: INTERNAL MEDICINE

## 2022-03-22 PROCEDURE — 3078F DIAST BP <80 MM HG: CPT | Performed by: INTERNAL MEDICINE

## 2022-03-23 ENCOUNTER — TELEPHONE (OUTPATIENT)
Dept: FAMILY MEDICINE CLINIC | Facility: CLINIC | Age: 77
End: 2022-03-23

## 2022-03-23 NOTE — TELEPHONE ENCOUNTER
Pt asking if she needs to still f/u with GI for diarrhea since test was negative. Please advise. Thank you.    LOV: 03/09/22

## 2022-03-23 NOTE — TELEPHONE ENCOUNTER
Pt requesting referral for oral surgeon Neha Carraqsuillo. Ph [phone] in [facility]. Pt got provider info from insurance but wants to know if there is an oral surgeon closer. Pt was referred to gastroenterologist she states for diarrhea but said her test was negative. Pt wants to know if she still needs to go.

## 2022-03-23 NOTE — TELEPHONE ENCOUNTER
FYI: Called pt and states she is still having intermittent diarrhea. Per pt, when she takes Imodium the diarrhea goes away but then diarrhea returns. Pt states she has appt with Dr. Estephania Lou (GI) on 04/01/22. Advised pt to keep this appt. Pt verbalized understanding. Pt then stating informed that referral for Dr. Corinna Ny was going to be placed but the information pt provided does not match the referral information in epic. Pt states she will contact her insurance and will let us know tomorrow on her decision. All questions answered and pt verbalized understanding.

## 2022-03-24 ENCOUNTER — OFFICE VISIT (OUTPATIENT)
Dept: FAMILY MEDICINE CLINIC | Facility: CLINIC | Age: 77
End: 2022-03-24
Payer: MEDICARE

## 2022-03-24 VITALS
SYSTOLIC BLOOD PRESSURE: 154 MMHG | BODY MASS INDEX: 26.8 KG/M2 | RESPIRATION RATE: 18 BRPM | HEART RATE: 77 BPM | DIASTOLIC BLOOD PRESSURE: 84 MMHG | WEIGHT: 157 LBS | HEIGHT: 64 IN | OXYGEN SATURATION: 96 %

## 2022-03-24 DIAGNOSIS — E55.9 VITAMIN D DEFICIENCY: ICD-10-CM

## 2022-03-24 DIAGNOSIS — E03.9 HYPOTHYROIDISM, UNSPECIFIED TYPE: Primary | ICD-10-CM

## 2022-03-24 DIAGNOSIS — I10 ESSENTIAL HYPERTENSION: ICD-10-CM

## 2022-03-24 DIAGNOSIS — Z76.0 ENCOUNTER FOR MEDICATION REFILL: ICD-10-CM

## 2022-03-24 DIAGNOSIS — S46.911A STRAIN OF RIGHT SHOULDER, INITIAL ENCOUNTER: ICD-10-CM

## 2022-03-24 DIAGNOSIS — K13.79 ORAL MASS: ICD-10-CM

## 2022-03-24 PROCEDURE — 3077F SYST BP >= 140 MM HG: CPT | Performed by: FAMILY MEDICINE

## 2022-03-24 PROCEDURE — 99214 OFFICE O/P EST MOD 30 MIN: CPT | Performed by: FAMILY MEDICINE

## 2022-03-24 PROCEDURE — 3008F BODY MASS INDEX DOCD: CPT | Performed by: FAMILY MEDICINE

## 2022-03-24 PROCEDURE — 3079F DIAST BP 80-89 MM HG: CPT | Performed by: FAMILY MEDICINE

## 2022-03-24 RX ORDER — METOPROLOL TARTRATE 50 MG/1
25 TABLET, FILM COATED ORAL 2 TIMES DAILY
Qty: 90 TABLET | Refills: 1 | Status: SHIPPED | OUTPATIENT
Start: 2022-03-24

## 2022-03-24 RX ORDER — LEVOTHYROXINE SODIUM 0.07 MG/1
75 TABLET ORAL
Qty: 90 TABLET | Refills: 1 | Status: SHIPPED | OUTPATIENT
Start: 2022-03-24

## 2022-03-24 RX ORDER — LEVOTHYROXINE SODIUM 88 UG/1
88 TABLET ORAL
Qty: 90 TABLET | Refills: 1 | Status: SHIPPED | OUTPATIENT
Start: 2022-03-24

## 2022-03-24 NOTE — PATIENT INSTRUCTIONS
1. Start voltaren gel (anti-inflammatory gel) as needed for your shoulder pain. Apply to right shoulder area. Also can use Biofreeze (this is over the counter). Start performing the attached exercises. 2. Follow up with oral surgeon     3. Complete blood work. 4. Follow up with gastroenterology.

## 2022-03-28 ENCOUNTER — LAB ENCOUNTER (OUTPATIENT)
Dept: LAB | Age: 77
End: 2022-03-28
Attending: INTERNAL MEDICINE
Payer: MEDICARE

## 2022-03-28 DIAGNOSIS — I10 ESSENTIAL HYPERTENSION: ICD-10-CM

## 2022-03-28 DIAGNOSIS — N18.30 STAGE 3 CHRONIC KIDNEY DISEASE, UNSPECIFIED WHETHER STAGE 3A OR 3B CKD (HCC): ICD-10-CM

## 2022-03-28 DIAGNOSIS — E55.9 VITAMIN D DEFICIENCY: ICD-10-CM

## 2022-03-28 DIAGNOSIS — G62.9 NEUROPATHY: ICD-10-CM

## 2022-03-28 DIAGNOSIS — E03.9 HYPOTHYROIDISM, UNSPECIFIED TYPE: ICD-10-CM

## 2022-03-28 LAB
ALBUMIN SERPL-MCNC: 3.4 G/DL (ref 3.4–5)
ALBUMIN/GLOB SERPL: 1.1 {RATIO} (ref 1–2)
ALP LIVER SERPL-CCNC: 70 U/L
ALT SERPL-CCNC: 15 U/L
ANION GAP SERPL CALC-SCNC: 6 MMOL/L (ref 0–18)
AST SERPL-CCNC: 12 U/L (ref 15–37)
BASOPHILS # BLD AUTO: 0.11 X10(3) UL (ref 0–0.2)
BASOPHILS NFR BLD AUTO: 1.1 %
BILIRUB SERPL-MCNC: 0.4 MG/DL (ref 0.1–2)
BILIRUB UR QL STRIP.AUTO: NEGATIVE
BUN BLD-MCNC: 16 MG/DL (ref 7–18)
CALCIUM BLD-MCNC: 9.9 MG/DL (ref 8.5–10.1)
CHLORIDE SERPL-SCNC: 109 MMOL/L (ref 98–112)
CO2 SERPL-SCNC: 26 MMOL/L (ref 21–32)
COLOR UR AUTO: YELLOW
CREAT BLD-MCNC: 1.06 MG/DL
CREAT UR-SCNC: 95.5 MG/DL
EOSINOPHIL # BLD AUTO: 0.27 X10(3) UL (ref 0–0.7)
EOSINOPHIL NFR BLD AUTO: 2.6 %
ERYTHROCYTE [DISTWIDTH] IN BLOOD BY AUTOMATED COUNT: 15.2 %
FASTING STATUS PATIENT QL REPORTED: NO
GLOBULIN PLAS-MCNC: 3.1 G/DL (ref 2.8–4.4)
GLUCOSE BLD-MCNC: 113 MG/DL (ref 70–99)
GLUCOSE UR STRIP.AUTO-MCNC: NEGATIVE MG/DL
HCT VFR BLD AUTO: 43.1 %
HGB BLD-MCNC: 13 G/DL
HYALINE CASTS #/AREA URNS AUTO: PRESENT /LPF
IMM GRANULOCYTES # BLD AUTO: 0.04 X10(3) UL (ref 0–1)
IMM GRANULOCYTES NFR BLD: 0.4 %
KETONES UR STRIP.AUTO-MCNC: NEGATIVE MG/DL
LEUKOCYTE ESTERASE UR QL STRIP.AUTO: NEGATIVE
LYMPHOCYTES # BLD AUTO: 2.43 X10(3) UL (ref 1–4)
MAGNESIUM SERPL-MCNC: 1.9 MG/DL (ref 1.6–2.6)
MCH RBC QN AUTO: 28.3 PG (ref 26–34)
MCHC RBC AUTO-ENTMCNC: 30.2 G/DL (ref 31–37)
MCV RBC AUTO: 93.7 FL
MONOCYTES # BLD AUTO: 1.17 X10(3) UL (ref 0.1–1)
MONOCYTES NFR BLD AUTO: 11.3 %
NEUTROPHILS # BLD AUTO: 6.34 X10 (3) UL (ref 1.5–7.7)
NEUTROPHILS # BLD AUTO: 6.34 X10(3) UL (ref 1.5–7.7)
NEUTROPHILS NFR BLD AUTO: 61.1 %
NITRITE UR QL STRIP.AUTO: NEGATIVE
OSMOLALITY SERPL CALC.SUM OF ELEC: 294 MOSM/KG (ref 275–295)
PH UR STRIP.AUTO: 5 [PH] (ref 5–8)
PHOSPHATE SERPL-MCNC: 4.4 MG/DL (ref 2.5–4.9)
PLATELET # BLD AUTO: 418 10(3)UL (ref 150–450)
POTASSIUM SERPL-SCNC: 4.5 MMOL/L (ref 3.5–5.1)
PROT SERPL-MCNC: 6.5 G/DL (ref 6.4–8.2)
PROT UR STRIP.AUTO-MCNC: NEGATIVE MG/DL
PROT UR-MCNC: 16.8 MG/DL
RBC # BLD AUTO: 4.6 X10(6)UL
SODIUM SERPL-SCNC: 141 MMOL/L (ref 136–145)
SP GR UR STRIP.AUTO: 1.01 (ref 1–1.03)
T4 FREE SERPL-MCNC: 1 NG/DL (ref 0.8–1.7)
TSI SER-ACNC: 0.66 MIU/ML (ref 0.36–3.74)
UROBILINOGEN UR STRIP.AUTO-MCNC: <2 MG/DL
VIT B12 SERPL-MCNC: 276 PG/ML (ref 193–986)
VIT D+METAB SERPL-MCNC: 37.2 NG/ML (ref 30–100)
WBC # BLD AUTO: 10.4 X10(3) UL (ref 4–11)

## 2022-03-28 PROCEDURE — 84156 ASSAY OF PROTEIN URINE: CPT

## 2022-03-28 PROCEDURE — 36415 COLL VENOUS BLD VENIPUNCTURE: CPT

## 2022-03-28 PROCEDURE — 80053 COMPREHEN METABOLIC PANEL: CPT

## 2022-03-28 PROCEDURE — 83735 ASSAY OF MAGNESIUM: CPT

## 2022-03-28 PROCEDURE — 81001 URINALYSIS AUTO W/SCOPE: CPT

## 2022-03-28 PROCEDURE — 84439 ASSAY OF FREE THYROXINE: CPT

## 2022-03-28 PROCEDURE — 82607 VITAMIN B-12: CPT

## 2022-03-28 PROCEDURE — 83970 ASSAY OF PARATHORMONE: CPT

## 2022-03-28 PROCEDURE — 85025 COMPLETE CBC W/AUTO DIFF WBC: CPT

## 2022-03-28 PROCEDURE — 82306 VITAMIN D 25 HYDROXY: CPT

## 2022-03-28 PROCEDURE — 84100 ASSAY OF PHOSPHORUS: CPT

## 2022-03-28 PROCEDURE — 84443 ASSAY THYROID STIM HORMONE: CPT

## 2022-03-28 PROCEDURE — 82570 ASSAY OF URINE CREATININE: CPT

## 2022-03-30 ENCOUNTER — TELEPHONE (OUTPATIENT)
Dept: FAMILY MEDICINE CLINIC | Facility: CLINIC | Age: 77
End: 2022-03-30

## 2022-03-30 NOTE — TELEPHONE ENCOUNTER
----- Message from Addy Gruber MD sent at 3/30/2022  1:21 PM CDT -----  1. CKD - Cr back to baseline. Continue to stay well hydrated and follow good Bp/blood sugar control. Avoid NSAIDs. 2. TSH within range. Continue levothyroxine at current dose.

## 2022-03-30 NOTE — TELEPHONE ENCOUNTER
Pt returned call and was informed of lab results and recommendation from provider. Questions answered and pt verbalized understanding.

## 2022-04-04 ENCOUNTER — TELEPHONE (OUTPATIENT)
Dept: FAMILY MEDICINE CLINIC | Facility: CLINIC | Age: 77
End: 2022-04-04

## 2022-04-04 RX ORDER — IPRATROPIUM BROMIDE AND ALBUTEROL SULFATE 2.5; .5 MG/3ML; MG/3ML
3 SOLUTION RESPIRATORY (INHALATION) EVERY 6 HOURS PRN
Qty: 360 ML | Refills: 2 | Status: SHIPPED | OUTPATIENT
Start: 2022-04-04

## 2022-04-04 NOTE — TELEPHONE ENCOUNTER
Returned call and pt is requesting for oral surgeon to be changed to Dr. Be Cervantes d/t transportation difficulties. Referral placed. All questions answered and pt verbalized understanding.

## 2022-04-04 NOTE — TELEPHONE ENCOUNTER
Patient needs a new referral for oral surgeon    Dr. Petrona Oleary  [address]  [place]    Please Advise. Thank you.

## 2022-04-07 ENCOUNTER — HOSPITAL ENCOUNTER (OUTPATIENT)
Dept: ULTRASOUND IMAGING | Age: 77
Discharge: HOME | End: 2022-04-07
Attending: INTERNAL MEDICINE
Payer: MEDICARE

## 2022-04-07 DIAGNOSIS — N18.30 STAGE 3 CHRONIC KIDNEY DISEASE, UNSPECIFIED WHETHER STAGE 3A OR 3B CKD (HCC): ICD-10-CM

## 2022-04-07 PROCEDURE — 76770 US EXAM ABDO BACK WALL COMP: CPT | Performed by: INTERNAL MEDICINE

## 2022-04-07 RX ORDER — ACYCLOVIR 400 MG/1
400 TABLET ORAL 2 TIMES DAILY
Qty: 60 TABLET | Refills: 2 | Status: SHIPPED | OUTPATIENT
Start: 2022-04-07

## 2022-04-07 NOTE — TELEPHONE ENCOUNTER
Refill request:   acyclovir 400 MG Oral Tab Take 400 mg by mouth 2 (two) times daily. Pharmacy: [address] [facility] [address] [place] [phone], [phone]    LOV: 3/22/22     Per pt old pcp wanted her to take this medication two times a day to prevent her from having a outbreak. Pt states if she doesn't take it she will get vaginal sores and fever. During 7/19/21 visit it was noted this was one of her medications.

## 2022-04-08 RX ORDER — FUROSEMIDE 20 MG/1
TABLET ORAL
Qty: 180 TABLET | Refills: 0 | Status: SHIPPED | OUTPATIENT
Start: 2022-04-08

## 2022-04-14 ENCOUNTER — TELEPHONE (OUTPATIENT)
Dept: NEPHROLOGY | Facility: CLINIC | Age: 77
End: 2022-04-14

## 2022-04-14 NOTE — TELEPHONE ENCOUNTER
Pt called requesting a C/B from Dr Tatyana Brito regarding the [address] results and labs were completed.

## 2022-04-18 DIAGNOSIS — J44.1 CHRONIC OBSTRUCTIVE PULMONARY DISEASE WITH (ACUTE) EXACERBATION (HCC): ICD-10-CM

## 2022-04-20 ENCOUNTER — HOSPITAL ENCOUNTER (OUTPATIENT)
Dept: ULTRASOUND IMAGING | Age: 77
Discharge: HOME | End: 2022-04-20
Attending: NURSE PRACTITIONER
Payer: MEDICARE

## 2022-04-20 DIAGNOSIS — I73.9 PVD (PERIPHERAL VASCULAR DISEASE) (HCC): ICD-10-CM

## 2022-04-20 PROCEDURE — 93923 UPR/LXTR ART STDY 3+ LVLS: CPT | Performed by: NURSE PRACTITIONER

## 2022-04-21 ENCOUNTER — TELEPHONE (OUTPATIENT)
Dept: FAMILY MEDICINE CLINIC | Facility: CLINIC | Age: 77
End: 2022-04-21

## 2022-04-21 NOTE — TELEPHONE ENCOUNTER
Called pt, talked with pt's daughter Daniel Nice (okay per pt since pt was driving) who was informed of below response from provider. Daniel Nice verbalized understanding.

## 2022-04-21 NOTE — TELEPHONE ENCOUNTER
Called pt and was informed of US results. Cardiologist referral information given to pt. Pt then states that both lower extremities are painful, has been off eliquis for the past couple of weeks d/t getting a spinal injection today with Dr. Steele. Pt asking if her blood clots are back. Denies chest pain and sob. Pt also states she has been more active. Please advise. Thank you.

## 2022-04-21 NOTE — TELEPHONE ENCOUNTER
If patient has concerns with blood clots she can schedule with PCP for evaluation and additional testing

## 2022-04-21 NOTE — TELEPHONE ENCOUNTER
----- Message from SADI Hendrickson sent at 4/21/2022  8:07 AM CDT -----  Mild to moderate reduction bilateral ankle and toe -referral to see cardiovascular for further evaluation

## 2022-04-27 ENCOUNTER — OFFICE VISIT (OUTPATIENT)
Dept: FAMILY MEDICINE CLINIC | Facility: CLINIC | Age: 77
End: 2022-04-27
Payer: MEDICARE

## 2022-04-27 VITALS
BODY MASS INDEX: 26.98 KG/M2 | OXYGEN SATURATION: 97 % | RESPIRATION RATE: 18 BRPM | HEIGHT: 64 IN | WEIGHT: 158 LBS | DIASTOLIC BLOOD PRESSURE: 84 MMHG | SYSTOLIC BLOOD PRESSURE: 154 MMHG | HEART RATE: 70 BPM

## 2022-04-27 DIAGNOSIS — M54.50 CHRONIC MIDLINE LOW BACK PAIN WITHOUT SCIATICA: ICD-10-CM

## 2022-04-27 DIAGNOSIS — Z00.00 ENCOUNTER FOR ANNUAL HEALTH EXAMINATION: Primary | ICD-10-CM

## 2022-04-27 DIAGNOSIS — N28.9 RENAL INSUFFICIENCY: ICD-10-CM

## 2022-04-27 DIAGNOSIS — G89.29 CHRONIC MIDLINE LOW BACK PAIN WITHOUT SCIATICA: ICD-10-CM

## 2022-04-27 DIAGNOSIS — F41.9 ANXIETY: ICD-10-CM

## 2022-04-27 DIAGNOSIS — F17.210 CIGARETTE SMOKER: ICD-10-CM

## 2022-04-27 DIAGNOSIS — Z13.820 ENCOUNTER FOR SCREENING FOR OSTEOPOROSIS: ICD-10-CM

## 2022-04-27 DIAGNOSIS — F33.9 DEPRESSION, RECURRENT (HCC): ICD-10-CM

## 2022-04-27 DIAGNOSIS — E11.65 TYPE 2 DIABETES MELLITUS WITH HYPERGLYCEMIA, WITHOUT LONG-TERM CURRENT USE OF INSULIN (HCC): ICD-10-CM

## 2022-04-27 DIAGNOSIS — S46.911D STRAIN OF RIGHT SHOULDER, SUBSEQUENT ENCOUNTER: ICD-10-CM

## 2022-04-27 DIAGNOSIS — I26.99 BILATERAL PULMONARY EMBOLISM (HCC): ICD-10-CM

## 2022-04-27 DIAGNOSIS — J43.9 PULMONARY EMPHYSEMA, UNSPECIFIED EMPHYSEMA TYPE (HCC): ICD-10-CM

## 2022-04-27 DIAGNOSIS — I73.9 PERIPHERAL VASCULAR DISEASE (HCC): ICD-10-CM

## 2022-04-27 DIAGNOSIS — I10 ESSENTIAL HYPERTENSION: ICD-10-CM

## 2022-04-27 PROCEDURE — 3079F DIAST BP 80-89 MM HG: CPT | Performed by: FAMILY MEDICINE

## 2022-04-27 PROCEDURE — 3008F BODY MASS INDEX DOCD: CPT | Performed by: FAMILY MEDICINE

## 2022-04-27 PROCEDURE — 99397 PER PM REEVAL EST PAT 65+ YR: CPT | Performed by: FAMILY MEDICINE

## 2022-04-27 PROCEDURE — 3077F SYST BP >= 140 MM HG: CPT | Performed by: FAMILY MEDICINE

## 2022-04-27 PROCEDURE — G0439 PPPS, SUBSEQ VISIT: HCPCS | Performed by: FAMILY MEDICINE

## 2022-04-27 PROCEDURE — 96160 PT-FOCUSED HLTH RISK ASSMT: CPT | Performed by: FAMILY MEDICINE

## 2022-04-27 RX ORDER — METOPROLOL TARTRATE 50 MG/1
50 TABLET, FILM COATED ORAL 2 TIMES DAILY
Qty: 180 TABLET | Refills: 1 | Status: SHIPPED | OUTPATIENT
Start: 2022-04-27

## 2022-05-05 ENCOUNTER — TELEPHONE (OUTPATIENT)
Dept: FAMILY MEDICINE CLINIC | Facility: CLINIC | Age: 77
End: 2022-05-05

## 2022-05-05 NOTE — TELEPHONE ENCOUNTER
Pt calling and was referred to a cardiologist and pt states it is to far from her.  Pt would like a new referral to a different cardiologist. Pt called her insurance and the office of the cardiologist she wants to see that is closer    Speciality: Cardiology   Provider: [facility] org: [place]   Phone: [phone]  Pt has appt scheduled 5/20/22 at 9:45am     Please advise    Thank you

## 2022-05-05 NOTE — TELEPHONE ENCOUNTER
Pt called and is requesting for another cardiologist as the original cards provider is too far from her house. Okay to place referral for a Dr. Prachi Mcdonnell? Thank you.

## 2022-05-05 NOTE — TELEPHONE ENCOUNTER
Called pt who was informed that after reviewing the chart that there was a cardiologist referral (Dr. Angy Harris) placed by provider on 04/27/22 who is located in [place] (pt does not want to go to [place] as that is too far for her daughter to drive). Pt in agreement with seeing the cardiologist-Dr. Angy Harris in [place] instead of the new cardiologist pt was requesting. Dr. Angy Harris information provided to pt. All questions answered and pt verbalized understanding.    Dr. Angy Harris  [address] [place] [address] [place] [address]  [phone]

## 2022-05-05 NOTE — TELEPHONE ENCOUNTER
Pt called back. She is having some scheduling issues with transportation. Pls call pt for referrals.

## 2022-05-06 ENCOUNTER — TELEPHONE (OUTPATIENT)
Dept: FAMILY MEDICINE CLINIC | Facility: CLINIC | Age: 77
End: 2022-05-06

## 2022-05-06 NOTE — TELEPHONE ENCOUNTER
Called pt but she did not answer. Left vm to call office back. Office number provided. Awaiting call back from pt.

## 2022-05-06 NOTE — TELEPHONE ENCOUNTER
VIVIANA: Talked with pt who states when she took the increase dosage of Metoprolol she became sweaty and had back pain. Pt denies chest pain, palpations, dizziness, blurred vision and headache. Per pt, she is now taking the medication as prescribed. Denies any symptoms at this time. Pt instructed to continue with prescribed dosage and to keep a BP log. All questions answered and pt verbalized understanding.    LOV: 04/27/22

## 2022-05-11 ENCOUNTER — LAB ENCOUNTER (OUTPATIENT)
Dept: LAB | Age: 77
End: 2022-05-11
Attending: FAMILY MEDICINE
Payer: MEDICARE

## 2022-05-11 ENCOUNTER — HOSPITAL ENCOUNTER (OUTPATIENT)
Dept: GENERAL RADIOLOGY | Age: 77
Discharge: HOME | End: 2022-05-11
Attending: FAMILY MEDICINE
Payer: MEDICARE

## 2022-05-11 DIAGNOSIS — E11.65 TYPE 2 DIABETES MELLITUS WITH HYPERGLYCEMIA, WITHOUT LONG-TERM CURRENT USE OF INSULIN (HCC): ICD-10-CM

## 2022-05-11 DIAGNOSIS — I10 ESSENTIAL HYPERTENSION: ICD-10-CM

## 2022-05-11 DIAGNOSIS — S46.911D STRAIN OF RIGHT SHOULDER, SUBSEQUENT ENCOUNTER: ICD-10-CM

## 2022-05-11 LAB
CHOLEST SERPL-MCNC: 147 MG/DL (ref ?–200)
EST. AVERAGE GLUCOSE BLD GHB EST-MCNC: 143 MG/DL (ref 68–126)
FASTING PATIENT LIPID ANSWER: YES
HBA1C MFR BLD: 6.6 % (ref ?–5.7)
HDLC SERPL-MCNC: 67 MG/DL (ref 40–59)
LDLC SERPL CALC-MCNC: 63 MG/DL (ref ?–100)
NONHDLC SERPL-MCNC: 80 MG/DL (ref ?–130)
TRIGL SERPL-MCNC: 89 MG/DL (ref 30–149)
VLDLC SERPL CALC-MCNC: 13 MG/DL (ref 0–30)

## 2022-05-11 PROCEDURE — 83036 HEMOGLOBIN GLYCOSYLATED A1C: CPT

## 2022-05-11 PROCEDURE — 36415 COLL VENOUS BLD VENIPUNCTURE: CPT

## 2022-05-11 PROCEDURE — 80061 LIPID PANEL: CPT

## 2022-05-11 PROCEDURE — 73030 X-RAY EXAM OF SHOULDER: CPT | Performed by: FAMILY MEDICINE

## 2022-05-12 RX ORDER — ALPRAZOLAM 0.5 MG/1
TABLET ORAL
Qty: 60 TABLET | Refills: 5 | Status: SHIPPED | OUTPATIENT
Start: 2022-05-12

## 2022-05-12 NOTE — TELEPHONE ENCOUNTER
Pt called and is requesting for medication refill of ALPRAZolam 0.5 MG Oral Tablet Felicitas Guerrero). Please approve or deny pending Rx. Thank you.    LOV: 04/27/22  LF: 03/16/22

## 2022-05-13 ENCOUNTER — TELEPHONE (OUTPATIENT)
Dept: FAMILY MEDICINE CLINIC | Facility: CLINIC | Age: 77
End: 2022-05-13

## 2022-05-13 NOTE — TELEPHONE ENCOUNTER
----- Message from Mayelin Mijares MD sent at 5/12/2022 10:52 AM CDT -----  XR c/w Mild OA. Can start PT. Okay for ortho referral if she desires joint injection.

## 2022-05-13 NOTE — TELEPHONE ENCOUNTER
----- Message from Candie Jay MD sent at 5/12/2022 10:52 AM CDT -----  Diabetes and lipids excellently controlled. Continue present mgmt.

## 2022-05-13 NOTE — TELEPHONE ENCOUNTER
Called pt and left vm per HIPAA of results and recommendations from provider. Instructed to contact office with any questions and if pt would like ortho referral. Office number provided. PT referral placed. Malinda Singh in [facility]  [address], [facility]   [phone].

## 2022-05-16 DIAGNOSIS — J44.1 CHRONIC OBSTRUCTIVE PULMONARY DISEASE WITH (ACUTE) EXACERBATION (HCC): ICD-10-CM

## 2022-05-23 DIAGNOSIS — J44.1 CHRONIC OBSTRUCTIVE PULMONARY DISEASE WITH ACUTE EXACERBATION (HCC): ICD-10-CM

## 2022-05-23 RX ORDER — ALBUTEROL SULFATE 90 UG/1
2 AEROSOL, METERED RESPIRATORY (INHALATION) EVERY 6 HOURS PRN
Qty: 3 EACH | Refills: 3 | Status: SHIPPED | OUTPATIENT
Start: 2022-05-23

## 2022-05-23 NOTE — TELEPHONE ENCOUNTER
Pt called and is requesting for medication refill of Albuterol sulfate HFA. Please approve or deny pending Rx. Thank you.    LOV: 04/27/22  LF: Not by pcp

## 2022-05-23 NOTE — TELEPHONE ENCOUNTER
Last office visit:  4/27/22schedule appointment)    Requested medication:     [address]:    [address] [facility] [address] [facility] [phone], [phone]      Patient states Dr. Annie Merchant has not filled this but she has been using this for years (prescribed  By a doctor in [place])    Please Advise. Thank you.

## 2022-06-02 DIAGNOSIS — Z76.0 ENCOUNTER FOR MEDICATION REFILL: ICD-10-CM

## 2022-06-02 RX ORDER — ROSUVASTATIN CALCIUM 10 MG/1
10 TABLET, COATED ORAL DAILY
Qty: 90 TABLET | Refills: 3 | Status: SHIPPED | OUTPATIENT
Start: 2022-06-02

## 2022-06-02 NOTE — TELEPHONE ENCOUNTER
Pt called and is requesting for medication refill ofRosuvastatin Calcium 10 MG Oral Tablet (CRESTOR). Please approve or deny pending Rx. Thank you.    LOV: 04/27/22  LF: 08/16/21

## 2022-06-03 ENCOUNTER — TELEPHONE (OUTPATIENT)
Dept: FAMILY MEDICINE CLINIC | Facility: CLINIC | Age: 77
End: 2022-06-03

## 2022-06-03 RX ORDER — LOSARTAN POTASSIUM 25 MG/1
25 TABLET ORAL DAILY
Qty: 90 TABLET | Refills: 0 | Status: SHIPPED | OUTPATIENT
Start: 2022-06-03

## 2022-06-03 NOTE — TELEPHONE ENCOUNTER
Called pt to obtain additional information who states she is taking Metoprolol 25mg BID and not the prescribed dose of 50 mg BID. Pt states when she was taking the 50 mg she \"didn't feel right\", lightheaded and heart was pounding. Denies chest pain. Pt requesting to be on the BP medication she was on when she was in [place]. Pt unsure what the name of that BP medication. Pt not keeping a BP log as previously advised too. Please advise. Thank you.    LOV:  04/27/22

## 2022-06-03 NOTE — TELEPHONE ENCOUNTER
Called pt and was informed of below response from provider. Rx sent. Questions answered and pt verbalized understanding.

## 2022-06-03 NOTE — TELEPHONE ENCOUNTER
Pt states dr Leah Santiago told her to take 1 pill of metoprolol, but it makes her feel not right when she takes a whole one. States she has previously discussed this with dr Leah Santiago. please advise.

## 2022-06-13 DIAGNOSIS — J44.1 CHRONIC OBSTRUCTIVE PULMONARY DISEASE WITH ACUTE EXACERBATION (HCC): ICD-10-CM

## 2022-06-13 RX ORDER — PENTOSAN POLYSULFATE SODIUM 100 MG/1
CAPSULE, GELATIN COATED ORAL
Qty: 30 CAPSULE | Refills: 5 | Status: SHIPPED | OUTPATIENT
Start: 2022-06-13

## 2022-07-02 DIAGNOSIS — Z76.0 ENCOUNTER FOR MEDICATION REFILL: ICD-10-CM

## 2022-07-04 RX ORDER — ESOMEPRAZOLE MAGNESIUM 40 MG/1
40 CAPSULE, DELAYED RELEASE ORAL
Qty: 90 CAPSULE | Refills: 1 | Status: SHIPPED | OUTPATIENT
Start: 2022-07-04 | End: 2023-01-06

## 2022-07-04 RX ORDER — ACYCLOVIR 400 MG/1
TABLET ORAL
Qty: 180 TABLET | Refills: 0 | Status: SHIPPED | OUTPATIENT
Start: 2022-07-04

## 2022-07-05 DIAGNOSIS — S46.911A STRAIN OF RIGHT SHOULDER, INITIAL ENCOUNTER: ICD-10-CM

## 2022-07-08 DIAGNOSIS — F41.9 ANXIETY AND DEPRESSION: ICD-10-CM

## 2022-07-08 DIAGNOSIS — F32.A ANXIETY AND DEPRESSION: ICD-10-CM

## 2022-07-08 RX ORDER — BUSPIRONE HYDROCHLORIDE 10 MG/1
TABLET ORAL
Qty: 180 TABLET | Refills: 2 | OUTPATIENT
Start: 2022-07-08

## 2022-07-08 NOTE — TELEPHONE ENCOUNTER
Requesting refill on Buspar. LOV 4/27/2022. LF 1/7/2022 for 90 days with 2 refills. Rx request denied. Too soon.

## 2022-07-11 DIAGNOSIS — I10 ESSENTIAL HYPERTENSION: ICD-10-CM

## 2022-07-12 RX ORDER — FUROSEMIDE 20 MG/1
TABLET ORAL
Qty: 180 TABLET | Refills: 0 | Status: SHIPPED | OUTPATIENT
Start: 2022-07-12

## 2022-07-12 RX ORDER — LOSARTAN POTASSIUM 25 MG/1
25 TABLET ORAL DAILY
Qty: 90 TABLET | Refills: 0 | Status: SHIPPED | OUTPATIENT
Start: 2022-07-12

## 2022-08-05 DIAGNOSIS — E11.65 TYPE 2 DIABETES MELLITUS WITH HYPERGLYCEMIA, WITHOUT LONG-TERM CURRENT USE OF INSULIN (HCC): ICD-10-CM

## 2022-08-08 ENCOUNTER — OFFICE VISIT (OUTPATIENT)
Dept: FAMILY MEDICINE CLINIC | Facility: CLINIC | Age: 77
End: 2022-08-08
Payer: MEDICARE

## 2022-08-08 VITALS
HEART RATE: 82 BPM | DIASTOLIC BLOOD PRESSURE: 78 MMHG | RESPIRATION RATE: 20 BRPM | WEIGHT: 160 LBS | TEMPERATURE: 97 F | BODY MASS INDEX: 27 KG/M2 | SYSTOLIC BLOOD PRESSURE: 132 MMHG | OXYGEN SATURATION: 97 %

## 2022-08-08 DIAGNOSIS — J06.9 VIRAL URI WITH COUGH: ICD-10-CM

## 2022-08-08 DIAGNOSIS — J44.1 COPD EXACERBATION (HCC): Primary | ICD-10-CM

## 2022-08-08 LAB
OPERATOR ID: NORMAL
POCT LOT NUMBER: NORMAL
RAPID SARS-COV-2 BY PCR: NOT DETECTED

## 2022-08-08 RX ORDER — MONTELUKAST SODIUM 10 MG/1
10 TABLET ORAL NIGHTLY
COMMUNITY
Start: 2022-08-02

## 2022-08-08 RX ORDER — PREDNISONE 20 MG/1
40 TABLET ORAL DAILY
Qty: 10 TABLET | Refills: 0 | Status: SHIPPED | OUTPATIENT
Start: 2022-08-08 | End: 2022-08-13

## 2022-08-08 RX ORDER — AZITHROMYCIN 250 MG/1
TABLET, FILM COATED ORAL
Qty: 6 TABLET | Refills: 0 | Status: SHIPPED | OUTPATIENT
Start: 2022-08-08 | End: 2022-08-13

## 2022-08-09 ENCOUNTER — TELEPHONE (OUTPATIENT)
Dept: FAMILY MEDICINE CLINIC | Facility: CLINIC | Age: 77
End: 2022-08-09

## 2022-08-09 DIAGNOSIS — Z86.19 HISTORY OF CLOSTRIDIOIDES DIFFICILE INFECTION: Primary | ICD-10-CM

## 2022-08-09 NOTE — TELEPHONE ENCOUNTER
Pt called and said that she was seen at [facility] yesterday. She got Zpak and prednisone. She said that she was told that whenever she goes on an antibiotic that she should be on another med as well because of her history of C diff? She does not want to get the diarrhea she had before with the C diff. She will hold off on the Zpak until she hears from Dr. Mayra Betancourt tomorrow    Please advise.

## 2022-08-10 ENCOUNTER — TELEPHONE (OUTPATIENT)
Dept: FAMILY MEDICINE CLINIC | Facility: CLINIC | Age: 77
End: 2022-08-10

## 2022-08-10 DIAGNOSIS — I73.9 PERIPHERAL VASCULAR DISEASE (HCC): Primary | ICD-10-CM

## 2022-08-10 RX ORDER — VANCOMYCIN HYDROCHLORIDE 125 MG/1
125 CAPSULE ORAL DAILY
Qty: 10 CAPSULE | Refills: 0 | Status: ON HOLD | OUTPATIENT
Start: 2022-08-10

## 2022-08-10 NOTE — TELEPHONE ENCOUNTER
Pt requesting referral for cardiologist Dr Juan Pablo Washington at [phone] in [place]. #[phone]. Pt has appt 8/29. States Dr Marvin Adkins does not do vascular treatment.

## 2022-08-10 NOTE — TELEPHONE ENCOUNTER
Prophylactic vancomycin sent for C diff prophylaxis given her history. Make sure to take probiotic as well.

## 2022-08-15 ENCOUNTER — APPOINTMENT (OUTPATIENT)
Dept: GENERAL RADIOLOGY | Age: 77
End: 2022-08-15
Attending: EMERGENCY MEDICINE
Payer: MEDICARE

## 2022-08-15 ENCOUNTER — APPOINTMENT (OUTPATIENT)
Dept: CT IMAGING | Age: 77
End: 2022-08-15
Attending: EMERGENCY MEDICINE
Payer: MEDICARE

## 2022-08-15 ENCOUNTER — HOSPITAL ENCOUNTER (INPATIENT)
Facility: HOSPITAL | Age: 77
LOS: 4 days | Discharge: HOME | End: 2022-08-22
Attending: EMERGENCY MEDICINE | Admitting: HOSPITALIST
Payer: MEDICARE

## 2022-08-15 ENCOUNTER — OFFICE VISIT (OUTPATIENT)
Dept: FAMILY MEDICINE CLINIC | Facility: CLINIC | Age: 77
End: 2022-08-15
Payer: MEDICARE

## 2022-08-15 DIAGNOSIS — J44.1 COPD EXACERBATION (HCC): ICD-10-CM

## 2022-08-15 DIAGNOSIS — Z53.21 PATIENT LEFT WITHOUT BEING SEEN: Primary | ICD-10-CM

## 2022-08-15 DIAGNOSIS — J18.9 PNEUMONIA OF BOTH LUNGS DUE TO INFECTIOUS ORGANISM, UNSPECIFIED PART OF LUNG: ICD-10-CM

## 2022-08-15 DIAGNOSIS — R09.02 HYPOXIA: Primary | ICD-10-CM

## 2022-08-15 LAB
ALBUMIN SERPL-MCNC: 3.1 G/DL (ref 3.4–5)
ALBUMIN/GLOB SERPL: 0.9 {RATIO} (ref 1–2)
ALP LIVER SERPL-CCNC: 76 U/L
ALT SERPL-CCNC: 14 U/L
ANION GAP SERPL CALC-SCNC: 6 MMOL/L (ref 0–18)
AST SERPL-CCNC: 12 U/L (ref 15–37)
BASOPHILS # BLD AUTO: 0.02 X10(3) UL (ref 0–0.2)
BASOPHILS NFR BLD AUTO: 0.2 %
BILIRUB SERPL-MCNC: 0.3 MG/DL (ref 0.1–2)
BILIRUB UR QL STRIP.AUTO: NEGATIVE
BUN BLD-MCNC: 22 MG/DL (ref 7–18)
CALCIUM BLD-MCNC: 8.3 MG/DL (ref 8.5–10.1)
CHLORIDE SERPL-SCNC: 107 MMOL/L (ref 98–112)
CLARITY UR REFRACT.AUTO: CLEAR
CO2 SERPL-SCNC: 23 MMOL/L (ref 21–32)
COLOR UR AUTO: YELLOW
CREAT BLD-MCNC: 1.38 MG/DL
D DIMER PPP FEU-MCNC: 0.91 UG/ML FEU (ref ?–0.79)
EOSINOPHIL # BLD AUTO: 0 X10(3) UL (ref 0–0.7)
EOSINOPHIL NFR BLD AUTO: 0 %
ERYTHROCYTE [DISTWIDTH] IN BLOOD BY AUTOMATED COUNT: 15.3 %
FLUAV + FLUBV RNA SPEC NAA+PROBE: NEGATIVE
FLUAV + FLUBV RNA SPEC NAA+PROBE: NEGATIVE
GFR SERPLBLD BASED ON 1.73 SQ M-ARVRAT: 39 ML/MIN/1.73M2 (ref 60–?)
GLOBULIN PLAS-MCNC: 3.6 G/DL (ref 2.8–4.4)
GLUCOSE BLD-MCNC: 276 MG/DL (ref 70–99)
GLUCOSE UR STRIP.AUTO-MCNC: 100 MG/DL
HCT VFR BLD AUTO: 39.5 %
HGB BLD-MCNC: 12.5 G/DL
IMM GRANULOCYTES # BLD AUTO: 0.03 X10(3) UL (ref 0–1)
IMM GRANULOCYTES NFR BLD: 0.3 %
KETONES UR STRIP.AUTO-MCNC: NEGATIVE MG/DL
LACTATE SERPL-SCNC: 1.9 MMOL/L (ref 0.4–2)
LACTATE SERPL-SCNC: 2.3 MMOL/L (ref 0.4–2)
LEUKOCYTE ESTERASE UR QL STRIP.AUTO: NEGATIVE
LYMPHOCYTES # BLD AUTO: 0.62 X10(3) UL (ref 1–4)
LYMPHOCYTES NFR BLD AUTO: 6.3 %
MCH RBC QN AUTO: 28.8 PG (ref 26–34)
MCHC RBC AUTO-ENTMCNC: 31.6 G/DL (ref 31–37)
MCV RBC AUTO: 91 FL
MONOCYTES # BLD AUTO: 0.53 X10(3) UL (ref 0.1–1)
MONOCYTES NFR BLD AUTO: 5.4 %
NEUTROPHILS # BLD AUTO: 8.68 X10 (3) UL (ref 1.5–7.7)
NEUTROPHILS # BLD AUTO: 8.68 X10(3) UL (ref 1.5–7.7)
NEUTROPHILS NFR BLD AUTO: 87.8 %
NITRITE UR QL STRIP.AUTO: NEGATIVE
NT-PROBNP SERPL-MCNC: 4964 PG/ML (ref ?–450)
OSMOLALITY SERPL CALC.SUM OF ELEC: 295 MOSM/KG (ref 275–295)
PH UR STRIP.AUTO: 5.5 [PH] (ref 5–8)
PLATELET # BLD AUTO: 290 10(3)UL (ref 150–450)
POTASSIUM SERPL-SCNC: 3.8 MMOL/L (ref 3.5–5.1)
PROT SERPL-MCNC: 6.7 G/DL (ref 6.4–8.2)
RBC # BLD AUTO: 4.34 X10(6)UL
RSV RNA SPEC NAA+PROBE: POSITIVE
SARS-COV-2 RNA RESP QL NAA+PROBE: NOT DETECTED
SARS-COV-2 RNA RESP QL NAA+PROBE: NOT DETECTED
SODIUM SERPL-SCNC: 136 MMOL/L (ref 136–145)
SP GR UR STRIP.AUTO: 1.02 (ref 1–1.03)
TROPONIN I HIGH SENSITIVITY: 21 NG/L
TROPONIN I HIGH SENSITIVITY: 26 NG/L
UROBILINOGEN UR STRIP.AUTO-MCNC: 0.2 MG/DL
WBC # BLD AUTO: 9.9 X10(3) UL (ref 4–11)

## 2022-08-15 PROCEDURE — 71275 CT ANGIOGRAPHY CHEST: CPT | Performed by: EMERGENCY MEDICINE

## 2022-08-15 PROCEDURE — 99223 1ST HOSP IP/OBS HIGH 75: CPT | Performed by: INTERNAL MEDICINE

## 2022-08-15 PROCEDURE — 71045 X-RAY EXAM CHEST 1 VIEW: CPT | Performed by: EMERGENCY MEDICINE

## 2022-08-15 RX ORDER — ALBUTEROL SULFATE 90 UG/1
8 AEROSOL, METERED RESPIRATORY (INHALATION) ONCE
Status: COMPLETED | OUTPATIENT
Start: 2022-08-15 | End: 2022-08-15

## 2022-08-15 RX ORDER — LEVOFLOXACIN 5 MG/ML
500 INJECTION, SOLUTION INTRAVENOUS ONCE
Status: COMPLETED | OUTPATIENT
Start: 2022-08-15 | End: 2022-08-15

## 2022-08-15 RX ORDER — METHYLPREDNISOLONE SODIUM SUCCINATE 125 MG/2ML
125 INJECTION, POWDER, LYOPHILIZED, FOR SOLUTION INTRAMUSCULAR; INTRAVENOUS ONCE
Status: COMPLETED | OUTPATIENT
Start: 2022-08-15 | End: 2022-08-15

## 2022-08-15 RX ORDER — IOHEXOL 350 MG/ML
100 INJECTION, SOLUTION INTRAVENOUS
Status: COMPLETED | OUTPATIENT
Start: 2022-08-15 | End: 2022-08-15

## 2022-08-15 RX ORDER — IPRATROPIUM BROMIDE AND ALBUTEROL SULFATE 2.5; .5 MG/3ML; MG/3ML
3 SOLUTION RESPIRATORY (INHALATION) ONCE
Status: COMPLETED | OUTPATIENT
Start: 2022-08-15 | End: 2022-08-15

## 2022-08-15 NOTE — ED INITIAL ASSESSMENT (HPI)
Cough for over 1 week with increased diff breathing - using neb at home- O2 sat at home in upper 80s- 94% on neb

## 2022-08-16 LAB
ADENOVIRUS PCR:: NOT DETECTED
ATRIAL RATE: 76 BPM
B PARAPERT DNA SPEC QL NAA+PROBE: NOT DETECTED
B PERT DNA SPEC QL NAA+PROBE: NOT DETECTED
C PNEUM DNA SPEC QL NAA+PROBE: NOT DETECTED
CORONAVIRUS 229E PCR:: NOT DETECTED
CORONAVIRUS HKU1 PCR:: NOT DETECTED
CORONAVIRUS NL63 PCR:: NOT DETECTED
CORONAVIRUS OC43 PCR:: NOT DETECTED
EST. AVERAGE GLUCOSE BLD GHB EST-MCNC: 140 MG/DL (ref 68–126)
FLUAV RNA SPEC QL NAA+PROBE: NOT DETECTED
FLUBV RNA SPEC QL NAA+PROBE: NOT DETECTED
GLUCOSE BLD-MCNC: 164 MG/DL (ref 70–99)
GLUCOSE BLD-MCNC: 164 MG/DL (ref 70–99)
GLUCOSE BLD-MCNC: 170 MG/DL (ref 70–99)
GLUCOSE BLD-MCNC: 175 MG/DL (ref 70–99)
GLUCOSE BLD-MCNC: 183 MG/DL (ref 70–99)
HBA1C MFR BLD: 6.5 % (ref ?–5.7)
L PNEUMO AG UR QL: NEGATIVE
METAPNEUMOVIRUS PCR:: NOT DETECTED
MYCOPLASMA PNEUMONIA PCR:: NOT DETECTED
P AXIS: 63 DEGREES
P-R INTERVAL: 122 MS
PARAINFLUENZA 1 PCR:: NOT DETECTED
PARAINFLUENZA 2 PCR:: NOT DETECTED
PARAINFLUENZA 3 PCR:: NOT DETECTED
PARAINFLUENZA 4 PCR:: NOT DETECTED
PROCALCITONIN SERPL-MCNC: 0.09 NG/ML (ref ?–0.16)
Q-T INTERVAL: 394 MS
QRS DURATION: 80 MS
QTC CALCULATION (BEZET): 443 MS
R AXIS: -14 DEGREES
RHINOVIRUS/ENTERO PCR:: NOT DETECTED
RSV RNA SPEC QL NAA+PROBE: DETECTED
SARS-COV-2 RNA NPH QL NAA+NON-PROBE: NOT DETECTED
STREP PNEUMO ANTIGEN, URINE: NEGATIVE
T AXIS: -5 DEGREES
VENTRICULAR RATE: 76 BPM

## 2022-08-16 PROCEDURE — 99233 SBSQ HOSP IP/OBS HIGH 50: CPT | Performed by: STUDENT IN AN ORGANIZED HEALTH CARE EDUCATION/TRAINING PROGRAM

## 2022-08-16 RX ORDER — LEVOFLOXACIN 5 MG/ML
750 INJECTION, SOLUTION INTRAVENOUS
Status: DISCONTINUED | OUTPATIENT
Start: 2022-08-16 | End: 2022-08-22

## 2022-08-16 RX ORDER — MELATONIN
3 NIGHTLY PRN
Status: DISCONTINUED | OUTPATIENT
Start: 2022-08-16 | End: 2022-08-22

## 2022-08-16 RX ORDER — BUSPIRONE HYDROCHLORIDE 5 MG/1
10 TABLET ORAL 2 TIMES DAILY
Status: DISCONTINUED | OUTPATIENT
Start: 2022-08-16 | End: 2022-08-22

## 2022-08-16 RX ORDER — VANCOMYCIN HYDROCHLORIDE 125 MG/1
125 CAPSULE ORAL DAILY
Status: DISCONTINUED | OUTPATIENT
Start: 2022-08-16 | End: 2022-08-22

## 2022-08-16 RX ORDER — NICOTINE POLACRILEX 4 MG
15 LOZENGE BUCCAL
Status: DISCONTINUED | OUTPATIENT
Start: 2022-08-16 | End: 2022-08-22

## 2022-08-16 RX ORDER — LEVOTHYROXINE SODIUM 88 UG/1
88 TABLET ORAL
Status: DISCONTINUED | OUTPATIENT
Start: 2022-08-17 | End: 2022-08-22

## 2022-08-16 RX ORDER — MONTELUKAST SODIUM 10 MG/1
10 TABLET ORAL NIGHTLY
Status: DISCONTINUED | OUTPATIENT
Start: 2022-08-16 | End: 2022-08-22

## 2022-08-16 RX ORDER — DEXTROSE MONOHYDRATE 25 G/50ML
50 INJECTION, SOLUTION INTRAVENOUS
Status: DISCONTINUED | OUTPATIENT
Start: 2022-08-16 | End: 2022-08-22

## 2022-08-16 RX ORDER — ROSUVASTATIN CALCIUM 10 MG/1
10 TABLET, COATED ORAL DAILY
Status: DISCONTINUED | OUTPATIENT
Start: 2022-08-16 | End: 2022-08-22

## 2022-08-16 RX ORDER — ALBUTEROL SULFATE 90 UG/1
2 AEROSOL, METERED RESPIRATORY (INHALATION) EVERY 6 HOURS PRN
Status: DISCONTINUED | OUTPATIENT
Start: 2022-08-16 | End: 2022-08-22

## 2022-08-16 RX ORDER — CODEINE PHOSPHATE AND GUAIFENESIN 10; 100 MG/5ML; MG/5ML
5 SOLUTION ORAL EVERY 4 HOURS PRN
Status: DISCONTINUED | OUTPATIENT
Start: 2022-08-16 | End: 2022-08-22

## 2022-08-16 RX ORDER — ALPRAZOLAM 0.5 MG/1
0.5 TABLET ORAL NIGHTLY
Status: DISCONTINUED | OUTPATIENT
Start: 2022-08-16 | End: 2022-08-16

## 2022-08-16 RX ORDER — METHYLPREDNISOLONE SODIUM SUCCINATE 40 MG/ML
40 INJECTION, POWDER, LYOPHILIZED, FOR SOLUTION INTRAMUSCULAR; INTRAVENOUS EVERY 8 HOURS
Status: COMPLETED | OUTPATIENT
Start: 2022-08-16 | End: 2022-08-18

## 2022-08-16 RX ORDER — SODIUM CHLORIDE 9 MG/ML
INJECTION, SOLUTION INTRAVENOUS CONTINUOUS
Status: ACTIVE | OUTPATIENT
Start: 2022-08-16 | End: 2022-08-16

## 2022-08-16 RX ORDER — ALPRAZOLAM 1 MG/1
1 TABLET ORAL NIGHTLY
Status: DISCONTINUED | OUTPATIENT
Start: 2022-08-16 | End: 2022-08-22

## 2022-08-16 RX ORDER — GABAPENTIN 300 MG/1
300 CAPSULE ORAL NIGHTLY
Status: DISCONTINUED | OUTPATIENT
Start: 2022-08-16 | End: 2022-08-22

## 2022-08-16 RX ORDER — PANTOPRAZOLE SODIUM 40 MG/1
40 TABLET, DELAYED RELEASE ORAL
Refills: 1 | Status: DISCONTINUED | OUTPATIENT
Start: 2022-08-16 | End: 2022-08-22

## 2022-08-16 RX ORDER — HEPARIN SODIUM 5000 [USP'U]/ML
5000 INJECTION, SOLUTION INTRAVENOUS; SUBCUTANEOUS EVERY 8 HOURS SCHEDULED
Status: DISCONTINUED | OUTPATIENT
Start: 2022-08-16 | End: 2022-08-22

## 2022-08-16 RX ORDER — POLYETHYLENE GLYCOL 3350 17 G/17G
17 POWDER, FOR SOLUTION ORAL DAILY PRN
Status: DISCONTINUED | OUTPATIENT
Start: 2022-08-16 | End: 2022-08-22

## 2022-08-16 RX ORDER — IPRATROPIUM BROMIDE AND ALBUTEROL SULFATE 2.5; .5 MG/3ML; MG/3ML
3 SOLUTION RESPIRATORY (INHALATION) EVERY 6 HOURS PRN
Status: DISCONTINUED | OUTPATIENT
Start: 2022-08-16 | End: 2022-08-22

## 2022-08-16 RX ORDER — NICOTINE 21 MG/24HR
1 PATCH, TRANSDERMAL 24 HOURS TRANSDERMAL
Status: DISCONTINUED | OUTPATIENT
Start: 2022-08-16 | End: 2022-08-22

## 2022-08-16 RX ORDER — LOSARTAN POTASSIUM 25 MG/1
25 TABLET ORAL DAILY
Status: DISCONTINUED | OUTPATIENT
Start: 2022-08-16 | End: 2022-08-22

## 2022-08-16 RX ORDER — ECHINACEA PURPUREA EXTRACT 125 MG
1 TABLET ORAL
Status: DISCONTINUED | OUTPATIENT
Start: 2022-08-16 | End: 2022-08-22

## 2022-08-16 RX ORDER — BISACODYL 10 MG
10 SUPPOSITORY, RECTAL RECTAL
Status: DISCONTINUED | OUTPATIENT
Start: 2022-08-16 | End: 2022-08-22

## 2022-08-16 RX ORDER — NICOTINE POLACRILEX 4 MG
30 LOZENGE BUCCAL
Status: DISCONTINUED | OUTPATIENT
Start: 2022-08-16 | End: 2022-08-22

## 2022-08-16 RX ORDER — SENNOSIDES 8.6 MG
17.2 TABLET ORAL NIGHTLY PRN
Status: DISCONTINUED | OUTPATIENT
Start: 2022-08-16 | End: 2022-08-20

## 2022-08-16 RX ORDER — IPRATROPIUM BROMIDE AND ALBUTEROL SULFATE 2.5; .5 MG/3ML; MG/3ML
3 SOLUTION RESPIRATORY (INHALATION)
Status: DISCONTINUED | OUTPATIENT
Start: 2022-08-16 | End: 2022-08-22

## 2022-08-16 RX ORDER — BENZONATATE 200 MG/1
200 CAPSULE ORAL 3 TIMES DAILY PRN
Status: DISCONTINUED | OUTPATIENT
Start: 2022-08-16 | End: 2022-08-22

## 2022-08-16 RX ORDER — ACYCLOVIR 400 MG/1
400 TABLET ORAL 2 TIMES DAILY
Status: DISCONTINUED | OUTPATIENT
Start: 2022-08-16 | End: 2022-08-22

## 2022-08-16 RX ORDER — ACETAMINOPHEN 500 MG
500 TABLET ORAL EVERY 4 HOURS PRN
Status: DISCONTINUED | OUTPATIENT
Start: 2022-08-16 | End: 2022-08-22

## 2022-08-16 RX ORDER — ALPRAZOLAM 0.25 MG/1
0.25 TABLET ORAL DAILY PRN
Status: DISCONTINUED | OUTPATIENT
Start: 2022-08-16 | End: 2022-08-22

## 2022-08-16 RX ORDER — HYDROCODONE BITARTRATE AND ACETAMINOPHEN 5; 325 MG/1; MG/1
1 TABLET ORAL 2 TIMES DAILY PRN
Status: DISCONTINUED | OUTPATIENT
Start: 2022-08-16 | End: 2022-08-21

## 2022-08-16 RX ORDER — LEVOTHYROXINE SODIUM 0.07 MG/1
75 TABLET ORAL
Status: DISCONTINUED | OUTPATIENT
Start: 2022-08-16 | End: 2022-08-22

## 2022-08-16 RX ORDER — METOCLOPRAMIDE HYDROCHLORIDE 5 MG/ML
5 INJECTION INTRAMUSCULAR; INTRAVENOUS EVERY 8 HOURS PRN
Status: DISCONTINUED | OUTPATIENT
Start: 2022-08-16 | End: 2022-08-22

## 2022-08-16 RX ORDER — ONDANSETRON 2 MG/ML
4 INJECTION INTRAMUSCULAR; INTRAVENOUS EVERY 6 HOURS PRN
Status: DISCONTINUED | OUTPATIENT
Start: 2022-08-16 | End: 2022-08-22

## 2022-08-16 RX ORDER — GABAPENTIN 100 MG/1
200 CAPSULE ORAL
Status: DISCONTINUED | OUTPATIENT
Start: 2022-08-16 | End: 2022-08-22

## 2022-08-16 NOTE — PLAN OF CARE
Pt is admitted for COPD exacerbation. Droplet/contact plus isolation precaution. Hx of C-diff. Pt is AOX4. VSS, afebrile and denies any pain. SpO2 maintained on 1L, 2L with ambulation. . SOB with exertion and congested cough. Gave Robitussin. Lung sounds wheezy. Scheduled Neb and inhaler. Tele-NSR. Heparin. Reg diet/QID ACCU check. Denies any n/v/d. Voids. Up with SBA. IV fluid DC-ed. IV ABX. IV steroid. Urine specimen sent to lab. WCTM. Pt is updated with plan of care. Multidisciplinary Discharge Rounds held 8/16/2022. Treatment team members present today include , , Charge Nurse, Nurse, RT, PT and Pharmacy caring for Alphion. Other care providers present:    Mobility Goal: up TID    Readmission Risk:     [] Low     [x] Medium     [] High    Active issue(s) requiring resolution prior to discharge: Hypoxia   Anticipated discharge date: TBD    Current discharge plan: Home     F/U appointment scheduled within 7 days. .. [x] Transitional Care Clinic (TCC)     [x] Pulmonologist     [] Primary Care Physician     [] Other Specialty    Watched the home discharge recovery videos related to diagnosis. .. [] Pneumonia     [x] COPD    [] Home Health set up. [x] Care partner identified and updated with the plan of care.     Problem: Patient/Family Goals  Goal: Patient/Family Long Term Goal  Description: Patient's Long Term Goal: Discharge to home     Interventions:  - follow plan of care   - See additional Care Plan goals for specific interventions  Outcome: Progressing  Goal: Patient/Family Short Term Goal  Description: Patient's Short Term Goal:   8/16: breath better and control cough     Interventions:   - PRN cough med, Neb, IV steroid   - See additional Care Plan goals for specific interventions  Outcome: Progressing     Problem: RESPIRATORY - ADULT  Goal: Achieves optimal ventilation and oxygenation  Description: INTERVENTIONS:  - Assess for changes in respiratory status  - Assess for changes in mentation and behavior  - Position to facilitate oxygenation and minimize respiratory effort  - Oxygen supplementation based on oxygen saturation or ABGs  - Provide Smoking Cessation handout, if applicable  - Encourage broncho-pulmonary hygiene including cough, deep breathe, Incentive Spirometry  - Assess the need for suctioning and perform as needed  - Assess and instruct to report SOB or any respiratory difficulty  - Respiratory Therapy support as indicated  - Manage/alleviate anxiety  - Monitor for signs/symptoms of CO2 retention  Outcome: Progressing

## 2022-08-16 NOTE — PROGRESS NOTES
NURSING ADMISSION NOTE      Patient admitted via Cart  Oriented to room. Safety precautions initiated. Bed in low position. Call light in reach. Admitted to room 511    Pt arrived alert and oriented x4, on 2L NC. Weaned to 1L. Admission navigator, med rec, and flowsheets complete. Educated pt on POC, verbalizes understanding, no further questions. Pt resting in bed, WCTM and update on POC.

## 2022-08-16 NOTE — PROGRESS NOTES
[facility] Pharmacy Note:  Renal Adjustment for levofloxacin (Brinda Galvin)    Cruzito Green is a 76year old patient who has been prescribed levofloxacin (LEVAQUIN) 500 mg every 24 hrs. The estimated creatinine clearance is 28.5 mL/min (A) (based on SCr of 1.38 mg/dL (H)). The dose has been adjusted to levofloxacin (LEVAQUIN) 750 mg every 48 hrs per hospital renal dose adjustment protocol for treatment of pneumonia. Pharmacy will follow and adjust dose as warranted for additional renal function changes.     Thank you,    Mere Fitzgerald, PharmD  8/16/2022  12:41 AM

## 2022-08-17 LAB
ANION GAP SERPL CALC-SCNC: 3 MMOL/L (ref 0–18)
BASOPHILS # BLD AUTO: 0.01 X10(3) UL (ref 0–0.2)
BASOPHILS NFR BLD AUTO: 0.1 %
BUN BLD-MCNC: 22 MG/DL (ref 7–18)
C DIFF TOX B STL QL: NEGATIVE
CALCIUM BLD-MCNC: 9 MG/DL (ref 8.5–10.1)
CHLORIDE SERPL-SCNC: 112 MMOL/L (ref 98–112)
CO2 SERPL-SCNC: 25 MMOL/L (ref 21–32)
CREAT BLD-MCNC: 0.98 MG/DL
EOSINOPHIL # BLD AUTO: 0 X10(3) UL (ref 0–0.7)
EOSINOPHIL NFR BLD AUTO: 0 %
ERYTHROCYTE [DISTWIDTH] IN BLOOD BY AUTOMATED COUNT: 15.3 %
GFR SERPLBLD BASED ON 1.73 SQ M-ARVRAT: 59 ML/MIN/1.73M2 (ref 60–?)
GLUCOSE BLD-MCNC: 137 MG/DL (ref 70–99)
GLUCOSE BLD-MCNC: 144 MG/DL (ref 70–99)
GLUCOSE BLD-MCNC: 148 MG/DL (ref 70–99)
GLUCOSE BLD-MCNC: 179 MG/DL (ref 70–99)
GLUCOSE BLD-MCNC: 181 MG/DL (ref 70–99)
HCT VFR BLD AUTO: 36.7 %
HGB BLD-MCNC: 11.3 G/DL
IMM GRANULOCYTES # BLD AUTO: 0.05 X10(3) UL (ref 0–1)
IMM GRANULOCYTES NFR BLD: 0.5 %
LYMPHOCYTES # BLD AUTO: 0.86 X10(3) UL (ref 1–4)
LYMPHOCYTES NFR BLD AUTO: 8.2 %
MCH RBC QN AUTO: 28.9 PG (ref 26–34)
MCHC RBC AUTO-ENTMCNC: 30.8 G/DL (ref 31–37)
MCV RBC AUTO: 93.9 FL
MONOCYTES # BLD AUTO: 0.7 X10(3) UL (ref 0.1–1)
MONOCYTES NFR BLD AUTO: 6.7 %
NEUTROPHILS # BLD AUTO: 8.84 X10 (3) UL (ref 1.5–7.7)
NEUTROPHILS # BLD AUTO: 8.84 X10(3) UL (ref 1.5–7.7)
NEUTROPHILS NFR BLD AUTO: 84.5 %
OSMOLALITY SERPL CALC.SUM OF ELEC: 296 MOSM/KG (ref 275–295)
PLATELET # BLD AUTO: 275 10(3)UL (ref 150–450)
POTASSIUM SERPL-SCNC: 4.7 MMOL/L (ref 3.5–5.1)
RBC # BLD AUTO: 3.91 X10(6)UL
SODIUM SERPL-SCNC: 140 MMOL/L (ref 136–145)
WBC # BLD AUTO: 10.5 X10(3) UL (ref 4–11)

## 2022-08-17 PROCEDURE — 99232 SBSQ HOSP IP/OBS MODERATE 35: CPT | Performed by: STUDENT IN AN ORGANIZED HEALTH CARE EDUCATION/TRAINING PROGRAM

## 2022-08-17 RX ORDER — LOPERAMIDE HYDROCHLORIDE 2 MG/1
4 CAPSULE ORAL ONCE
Status: COMPLETED | OUTPATIENT
Start: 2022-08-17 | End: 2022-08-17

## 2022-08-17 NOTE — PLAN OF CARE
Pt is A&Ox4. Wears glasses, upper/lower dentures. VSS, afebrile. Maintaining O2 sats >90% on 1-2L NC, RA-BL. Nebs. Tele, NSR. Heparin. EP. Last BM 8/15. Carb controlled diet, QID. Voids. Up SBA. Denies pain. PIV, IV ABX. WCTM and update pt on POC. Safety precautions in place.     Problem: Patient/Family Goals  Goal: Patient/Family Long Term Goal  Description: Patient's Long Term Goal: Discharge to home     Interventions:  - follow plan of care   - See additional Care Plan goals for specific interventions  Outcome: Progressing  Goal: Patient/Family Short Term Goal  Description: Patient's Short Term Goal:   8/16: breath better and control cough     Interventions:   - PRN cough med, Neb, IV steroid   - See additional Care Plan goals for specific interventions  Outcome: Progressing     Problem: RESPIRATORY - ADULT  Goal: Achieves optimal ventilation and oxygenation  Description: INTERVENTIONS:  - Assess for changes in respiratory status  - Assess for changes in mentation and behavior  - Position to facilitate oxygenation and minimize respiratory effort  - Oxygen supplementation based on oxygen saturation or ABGs  - Provide Smoking Cessation handout, if applicable  - Encourage broncho-pulmonary hygiene including cough, deep breathe, Incentive Spirometry  - Assess the need for suctioning and perform as needed  - Assess and instruct to report SOB or any respiratory difficulty  - Respiratory Therapy support as indicated  - Manage/alleviate anxiety  - Monitor for signs/symptoms of CO2 retention  Outcome: Progressing

## 2022-08-17 NOTE — PROGRESS NOTES
22 1450   Over the last 2 weeks, how often have you been bothered by any of the following problems? Little interest or pleasure in doing things 0  (Pt reports she watches super naturl at night with her daughter and if she wasn't sick she'd be visiting her sister.)   Feeling down, depressed, or hopeless 2  (Pt reports some days it gets to her but she pulls herself out of it.)   Trouble falling or staying asleep, or sleeping too much 0   Feeling tired or having little energy 1  (Pt reports every now and then she'll feel tired and she believes its a combination of both depression and health)   Poor appetite or overeating 1  (Pt reports appetite isn't the best now but it depends on what she's eating.)   Feeling bad about yourself - or that you are a failure or have let yourself or your family down 0   Trouble concentrating on things, such as reading the newspaper or watching television 2  (Pt reports if she gets overwhelmed she feels like she losing it.)   Moving or speaking so slowly that other people could have noticed. Or the opposite - being so fidgety or restless that you have been moving around a lot more than usual 0  (Pt reports she still moves around; goes to the store, etc.)   Thoughts that you would be better off dead, or of hurting yourself in some way 0   PHQ-9 TOTAL SCORE 6   If you checked off any problems, how difficult have these problems made it for you to do your work, take care of things at home, or get along with other people? Somewhat difficult   OTHER   Any response other than 0 to question #9 requires a call to Social Work (or designated personnel) and [place] [email or web address]. Please call [phone] immediately. The patient cannot leave without an appointment at [facility] to be assessed.  Not completed, comment required  (not required)       [facility] Resource Referral Counselor Note    Pippa Tan Patient Status:  Observation    1945 MRN [id number] Location [facility] SHAWN 5NW-A Attending Risa Burris MD   Hosp Day # 0 PCP Capo Doherty MD       S(subjective) Pt reports she has hx of both anxiety and depression. O(objective) Lincoln Sloany 76year old female here for difficulty breathing. [facility] consulted d/t pts PHQ4 being 8; pts PHQ9 is 6. Pt reports that she's been dealing with depression and anxiety for over 20 years. Pt reports that she was diagnosed with anxiety back when she was in [place] when she had a nervous breakdown. Pt stated that once she started her medication she started getting better and she'd walk places instead of driving. Pt reports that before traffic would make her nervous [place] but now she's better. Pt reports her  passed away 4 years ago and she believe that's where most of her depression comes from because she misses him so much. Pt reports she doesn't feel bad about herself but more so because she's here and her  isn't. Pt states she had to make a huge adjustment after her  passed; she had to move from [place] and get rid of a lot of things that held lots of memories. Pt expressed that it was hard for her then but having her daughter around helps a lot. Pt denies SI but stated that at times she wishes she was with her  but wouldn't kill herself to get to him. A(assessment) R/O: recurrent depressive disorder. R/O: recurrent anxiety disorder    P(plan) Pt declined psychiatric consultation and resources at this time which is within her rights.        Asia Pain  8/17/2022  2:56 PM

## 2022-08-17 NOTE — PLAN OF CARE
Pt is admitted for COPD exacerbation. Droplet/contact plus isolation precaution. Hx of C-diff. Pt is AOX4. VSS, afebrile and denies any pain. SpO2 maintained on 1L, 2L with ambulation. . SOB with exertion and congested cough. Gave Robitussin. Lung sounds wheezy. Scheduled Neb and inhaler. Tele-NSR. Heparin. Reg diet/QID ACCU check. Denies any n/v. Pt had multiple loose bowel movements, stool sent to lab. C-diff negative. Gave Imodium per pt request.  Voids. Up with SBA. IV ABX. IV steroid. WCTM. Pt is updated with plan of care    Multidisciplinary Discharge Rounds held 8/17/2022. Treatment team members present today include , , Charge Nurse, Nurse, RT, PT and Pharmacy caring for Trly [employer]. Other care providers present:    Mobility Goal: up TID     Readmission Risk:     [] Low     [] Medium     [] High    Active issue(s) requiring resolution prior to discharge: TBD     Anticipated discharge date: TBD     Current discharge plan: Home with [facility] PT     F/U appointment scheduled within 7 days. .. [x] Transitional Care Clinic (TCC)     [x] Pulmonologist     [] Primary Care Physician     [] Other Specialty    Watched the home discharge recovery videos related to diagnosis. .. [x] Pneumonia     [x] COPD    [] Home Health set up. [x] Care partner identified and updated with the plan of care.         Problem: Patient/Family Goals  Goal: Patient/Family Long Term Goal  Description: Patient's Long Term Goal: Discharge to home     Interventions:  - follow plan of care   - See additional Care Plan goals for specific interventions  Outcome: Progressing  Goal: Patient/Family Short Term Goal  Description: Patient's Short Term Goal:   8/16: breath better and control cough   8/17: breath better     Interventions:   - PRN cough med, Neb, IV steroid   - See additional Care Plan goals for specific interventions  Outcome: Progressing     Problem: RESPIRATORY - ADULT  Goal: Achieves optimal ventilation and oxygenation  Description: INTERVENTIONS:  - Assess for changes in respiratory status  - Assess for changes in mentation and behavior  - Position to facilitate oxygenation and minimize respiratory effort  - Oxygen supplementation based on oxygen saturation or ABGs  - Provide Smoking Cessation handout, if applicable  - Encourage broncho-pulmonary hygiene including cough, deep breathe, Incentive Spirometry  - Assess the need for suctioning and perform as needed  - Assess and instruct to report SOB or any respiratory difficulty  - Respiratory Therapy support as indicated  - Manage/alleviate anxiety  - Monitor for signs/symptoms of CO2 retention  Outcome: Progressing

## 2022-08-18 LAB
GLUCOSE BLD-MCNC: 166 MG/DL (ref 70–99)
GLUCOSE BLD-MCNC: 224 MG/DL (ref 70–99)
GLUCOSE BLD-MCNC: 284 MG/DL (ref 70–99)
GLUCOSE BLD-MCNC: 310 MG/DL (ref 70–99)

## 2022-08-18 PROCEDURE — 99232 SBSQ HOSP IP/OBS MODERATE 35: CPT | Performed by: STUDENT IN AN ORGANIZED HEALTH CARE EDUCATION/TRAINING PROGRAM

## 2022-08-18 RX ORDER — PREDNISONE 20 MG/1
40 TABLET ORAL
Status: DISCONTINUED | OUTPATIENT
Start: 2022-08-19 | End: 2022-08-20

## 2022-08-18 NOTE — PLAN OF CARE
Pt A&Ox4. Glasses and dentures at bedside. Anxious at times, prn xanax given per pt request. 1L nc at rest, o2 >90%. Needing 2L nc with ambulation. Sob with activity. IV steroids. Scheduled nebs. Productive cough, prn antitussives given. Tele, NSR. Afebrile. Heparin. IV abx. Voids, up self. (-)cdiff this admission. No c/o of pain, n/v/d. Regular diet, QID accuchecks. Pt updated on poc. No further needs at this time. Safety/fall precautions in place. WCTM.       Problem: Patient/Family Goals  Goal: Patient/Family Long Term Goal  Description: Patient's Long Term Goal: Discharge to home     Interventions:  - follow plan of care   - See additional Care Plan goals for specific interventions  Outcome: Progressing  Goal: Patient/Family Short Term Goal  Description: Patient's Short Term Goal:   8/16: breath better and control cough   8/17: breath better   8/17 NOC: control cough, sleep     Interventions:   - PRN cough med, Neb, IV steroid   - See additional Care Plan goals for specific interventions  Outcome: Progressing     Problem: RESPIRATORY - ADULT  Goal: Achieves optimal ventilation and oxygenation  Description: INTERVENTIONS:  - Assess for changes in respiratory status  - Assess for changes in mentation and behavior  - Position to facilitate oxygenation and minimize respiratory effort  - Oxygen supplementation based on oxygen saturation or ABGs  - Provide Smoking Cessation handout, if applicable  - Encourage broncho-pulmonary hygiene including cough, deep breathe, Incentive Spirometry  - Assess the need for suctioning and perform as needed  - Assess and instruct to report SOB or any respiratory difficulty  - Respiratory Therapy support as indicated  - Manage/alleviate anxiety  - Monitor for signs/symptoms of CO2 retention  Outcome: Progressing

## 2022-08-19 LAB
GLUCOSE BLD-MCNC: 157 MG/DL (ref 70–99)
GLUCOSE BLD-MCNC: 164 MG/DL (ref 70–99)
GLUCOSE BLD-MCNC: 173 MG/DL (ref 70–99)
GLUCOSE BLD-MCNC: 179 MG/DL (ref 70–99)

## 2022-08-19 PROCEDURE — 99232 SBSQ HOSP IP/OBS MODERATE 35: CPT | Performed by: STUDENT IN AN ORGANIZED HEALTH CARE EDUCATION/TRAINING PROGRAM

## 2022-08-19 RX ORDER — HYDROCORTISONE ACETATE 25 MG/1
25 SUPPOSITORY RECTAL 2 TIMES DAILY
Status: DISCONTINUED | OUTPATIENT
Start: 2022-08-19 | End: 2022-08-22

## 2022-08-19 RX ORDER — LOPERAMIDE HYDROCHLORIDE 2 MG/1
2 CAPSULE ORAL ONCE
Status: COMPLETED | OUTPATIENT
Start: 2022-08-19 | End: 2022-08-19

## 2022-08-19 NOTE — PLAN OF CARE
Patient is A+Ox4, glasses and dentures. Maintaining sats >90% on RA at rest and ambulation. C/o wheezing in AM, nebs given per MAR. Productive cough, PRN antitussives given per MAR. NSR on tele, Heparin. Voids. Tolerating diet, QID accu check. Patient updated on POC.      Problem: Patient/Family Goals  Goal: Patient/Family Long Term Goal  Description: Patient's Long Term Goal: Discharge to home     Interventions:  - follow plan of care   - See additional Care Plan goals for specific interventions  Outcome: Progressing  Goal: Patient/Family Short Term Goal  Description: Patient's Short Term Goal:   8/16: breath better and control cough   8/17: breath better   8/17 NOC: control cough, sleep   8/18 Am: remain off oxygen  8/15 NOC: remain off o2 at rest, control cough  8/19 AM: wean O2 as tolerated    Interventions:   - PRN cough med, Neb, IV steroid   - See additional Care Plan goals for specific interventions  Outcome: Progressing     Problem: RESPIRATORY - ADULT  Goal: Achieves optimal ventilation and oxygenation  Description: INTERVENTIONS:  - Assess for changes in respiratory status  - Assess for changes in mentation and behavior  - Position to facilitate oxygenation and minimize respiratory effort  - Oxygen supplementation based on oxygen saturation or ABGs  - Provide Smoking Cessation handout, if applicable  - Encourage broncho-pulmonary hygiene including cough, deep breathe, Incentive Spirometry  - Assess the need for suctioning and perform as needed  - Assess and instruct to report SOB or any respiratory difficulty  - Respiratory Therapy support as indicated  - Manage/alleviate anxiety  - Monitor for signs/symptoms of CO2 retention  Outcome: Progressing

## 2022-08-19 NOTE — PROGRESS NOTES
Pt is A/O x4, anxious at times. Glasses and dentures at bedside. Nicotine patch to right arm. Maintaining O2 sat WNL on room air at rest, 2L with activity. SOB with exertion. Productive cough. NSR on tele. Heparin subcutaneous. BM toda. Voids freely. Denies pain. Prn medications given. Up adlib. Po steroids start tomorrow. PO vanco. IV abx. Tolerating diet. Pt is updated on plan of care, verbalized understanding. No needs at this time.

## 2022-08-19 NOTE — PHYSICAL THERAPY NOTE
IP PT attempted, pt occupied with breathing tx, and then occupied c lunch. Will re-attempt as schedule permits.

## 2022-08-19 NOTE — PLAN OF CARE
Pt A&Ox4. Glasses and dentures at bedside. Anxious at times, scheduled xanax given per pt request. Room air at rest, o2 >90%. Needing 2L nc with ambulation. Sob with activity. IV steroids. Scheduled nebs. Productive cough, prn antitussives given. Tele, NSR. Afebrile. Heparin. IV abx. Voids, up self. (-)cdiff this admission. No c/o of n/v/d.C/o of back pain, prn Norco given. Regular diet, QID accuchecks. Pt updated on poc. No further needs at this time. Safety/fall precautions in place. WCTM.         Problem: Patient/Family Goals  Goal: Patient/Family Long Term Goal  Description: Patient's Long Term Goal: Discharge to home     Interventions:  - follow plan of care   - See additional Care Plan goals for specific interventions  Outcome: Progressing  Goal: Patient/Family Short Term Goal  Description: Patient's Short Term Goal:   8/16: breath better and control cough   8/17: breath better   8/17 NOC: control cough, sleep   8/18 Am: remain off oxygen  8/15 NOC: remain off o2 at rest, control cough    Interventions:   - PRN cough med, Neb, IV steroid   - See additional Care Plan goals for specific interventions  Outcome: Progressing     Problem: RESPIRATORY - ADULT  Goal: Achieves optimal ventilation and oxygenation  Description: INTERVENTIONS:  - Assess for changes in respiratory status  - Assess for changes in mentation and behavior  - Position to facilitate oxygenation and minimize respiratory effort  - Oxygen supplementation based on oxygen saturation or ABGs  - Provide Smoking Cessation handout, if applicable  - Encourage broncho-pulmonary hygiene including cough, deep breathe, Incentive Spirometry  - Assess the need for suctioning and perform as needed  - Assess and instruct to report SOB or any respiratory difficulty  - Respiratory Therapy support as indicated  - Manage/alleviate anxiety  - Monitor for signs/symptoms of CO2 retention  Outcome: Progressing

## 2022-08-20 LAB
GLUCOSE BLD-MCNC: 104 MG/DL (ref 70–99)
GLUCOSE BLD-MCNC: 129 MG/DL (ref 70–99)
GLUCOSE BLD-MCNC: 216 MG/DL (ref 70–99)
GLUCOSE BLD-MCNC: 294 MG/DL (ref 70–99)

## 2022-08-20 PROCEDURE — 99232 SBSQ HOSP IP/OBS MODERATE 35: CPT | Performed by: STUDENT IN AN ORGANIZED HEALTH CARE EDUCATION/TRAINING PROGRAM

## 2022-08-20 RX ORDER — SENNOSIDES 8.6 MG
8.6 TABLET ORAL 2 TIMES DAILY PRN
Status: DISCONTINUED | OUTPATIENT
Start: 2022-08-20 | End: 2022-08-22

## 2022-08-20 RX ORDER — BUDESONIDE 0.5 MG/2ML
0.5 INHALANT ORAL
Status: DISCONTINUED | OUTPATIENT
Start: 2022-08-20 | End: 2022-08-22

## 2022-08-20 RX ORDER — METHYLPREDNISOLONE SODIUM SUCCINATE 125 MG/2ML
60 INJECTION, POWDER, LYOPHILIZED, FOR SOLUTION INTRAMUSCULAR; INTRAVENOUS EVERY 6 HOURS
Status: DISCONTINUED | OUTPATIENT
Start: 2022-08-20 | End: 2022-08-21

## 2022-08-20 RX ORDER — SENNOSIDES 8.6 MG
8.6 TABLET ORAL 2 TIMES DAILY
Status: DISCONTINUED | OUTPATIENT
Start: 2022-08-20 | End: 2022-08-20

## 2022-08-20 NOTE — PROGRESS NOTES
08/20/22 1611   Mobility   O2 walk?  Yes   SPO2% on Room Air at Rest 97   SPO2% Ambulation on Room Air 86   SPO2% Ambulation on Oxygen 93   Ambulation oxygen flow (liters per minute) 2

## 2022-08-20 NOTE — PLAN OF CARE
Pt aox4. PRN xanax given before bed. VSS on RA. Expiratory wheezes. Cough- given PRN meds. Tele NSR. Heparin. Chronic diarrhea. Voids. PO prednisone, PO abx. Norco for pain control. Up ad karen. QID accuchecks, insulin coverage per MAR. Regular diet tolerating well. Call light in reach, all needs met at this time.      Problem: RESPIRATORY - ADULT  Goal: Achieves optimal ventilation and oxygenation  Description: INTERVENTIONS:  - Assess for changes in respiratory status  - Assess for changes in mentation and behavior  - Position to facilitate oxygenation and minimize respiratory effort  - Oxygen supplementation based on oxygen saturation or ABGs  - Provide Smoking Cessation handout, if applicable  - Encourage broncho-pulmonary hygiene including cough, deep breathe, Incentive Spirometry  - Assess the need for suctioning and perform as needed  - Assess and instruct to report SOB or any respiratory difficulty  - Respiratory Therapy support as indicated  - Manage/alleviate anxiety  - Monitor for signs/symptoms of CO2 retention  Outcome: Progressing

## 2022-08-21 LAB
GLUCOSE BLD-MCNC: 182 MG/DL (ref 70–99)
GLUCOSE BLD-MCNC: 222 MG/DL (ref 70–99)
GLUCOSE BLD-MCNC: 243 MG/DL (ref 70–99)
GLUCOSE BLD-MCNC: 247 MG/DL (ref 70–99)

## 2022-08-21 PROCEDURE — 99232 SBSQ HOSP IP/OBS MODERATE 35: CPT | Performed by: STUDENT IN AN ORGANIZED HEALTH CARE EDUCATION/TRAINING PROGRAM

## 2022-08-21 RX ORDER — PREDNISONE 20 MG/1
40 TABLET ORAL
Status: DISCONTINUED | OUTPATIENT
Start: 2022-08-21 | End: 2022-08-22

## 2022-08-21 RX ORDER — HYDROCODONE BITARTRATE AND ACETAMINOPHEN 5; 325 MG/1; MG/1
1 TABLET ORAL EVERY 6 HOURS PRN
Status: DISCONTINUED | OUTPATIENT
Start: 2022-08-21 | End: 2022-08-22

## 2022-08-21 RX ORDER — LOPERAMIDE HYDROCHLORIDE 2 MG/1
2 CAPSULE ORAL ONCE
Status: COMPLETED | OUTPATIENT
Start: 2022-08-21 | End: 2022-08-21

## 2022-08-21 NOTE — PLAN OF CARE
Aox4, VSS, afebrile. Reporting pain, prn given with relief. Tele NSR, BROCK . Iv abx/steroids. ind in room, QID accuchecks. Updated pt on POC for NOC. Will follow.     Problem: Patient/Family Goals  Goal: Patient/Family Long Term Goal  Description: Patient's Long Term Goal: Discharge to home     Interventions:  - follow plan of care   - See additional Care Plan goals for specific interventions  Outcome: Progressing  Goal: Patient/Family Short Term Goal  Description: Patient's Short Term Goal:   8/16: breath better and control cough   8/17: breath better   8/17 NOC: control cough, sleep   8/18 Am: remain off oxygen  8/15 NOC: remain off o2 at rest, control cough  8/19 AM: wean O2 as tolerated  8/20 : wean O2, O2 walk and breath better     Interventions:   - PRN cough med, Neb, IV steroid   - See additional Care Plan goals for specific interventions  Outcome: Progressing     Problem: RESPIRATORY - ADULT  Goal: Achieves optimal ventilation and oxygenation  Description: INTERVENTIONS:  - Assess for changes in respiratory status  - Assess for changes in mentation and behavior  - Position to facilitate oxygenation and minimize respiratory effort  - Oxygen supplementation based on oxygen saturation or ABGs  - Provide Smoking Cessation handout, if applicable  - Encourage broncho-pulmonary hygiene including cough, deep breathe, Incentive Spirometry  - Assess the need for suctioning and perform as needed  - Assess and instruct to report SOB or any respiratory difficulty  - Respiratory Therapy support as indicated  - Manage/alleviate anxiety  - Monitor for signs/symptoms of CO2 retention  Outcome: Progressing

## 2022-08-22 VITALS
HEART RATE: 66 BPM | OXYGEN SATURATION: 94 % | HEIGHT: 64 IN | RESPIRATION RATE: 18 BRPM | DIASTOLIC BLOOD PRESSURE: 62 MMHG | SYSTOLIC BLOOD PRESSURE: 129 MMHG | BODY MASS INDEX: 27.43 KG/M2 | WEIGHT: 160.69 LBS | TEMPERATURE: 98 F

## 2022-08-22 LAB
GLUCOSE BLD-MCNC: 158 MG/DL (ref 70–99)
GLUCOSE BLD-MCNC: 205 MG/DL (ref 70–99)

## 2022-08-22 PROCEDURE — 99239 HOSP IP/OBS DSCHRG MGMT >30: CPT | Performed by: INTERNAL MEDICINE

## 2022-08-22 RX ORDER — LEVOFLOXACIN 5 MG/ML
750 INJECTION, SOLUTION INTRAVENOUS ONCE
Status: COMPLETED | OUTPATIENT
Start: 2022-08-22 | End: 2022-08-22

## 2022-08-22 RX ORDER — PREDNISONE 20 MG/1
40 TABLET ORAL DAILY
Qty: 10 TABLET | Refills: 0 | Status: SHIPPED | OUTPATIENT
Start: 2022-08-22 | End: 2022-08-27

## 2022-08-22 NOTE — PROGRESS NOTES
08/21/22 1154   Mobility   O2 walk?  Yes   SPO2% on Room Air at Rest 96   SPO2% Ambulation on Room Air 92

## 2022-08-22 NOTE — PLAN OF CARE
8/22 a/ox4, denies any pain nor sob, on RA sat=96%, ambulates independently. On and off coughing. S/[place]. [id number] Anahi Masters for discharge home. Discharge instructions given to pt., verbalized understanding. Wheeled to naye ,picked up by family at [facility].       Problem: Patient/Family Goals  Goal: Patient/Family Long Term Goal  Description: Patient's Long Term Goal: Discharge to home     Interventions:  - follow plan of care   - See additional Care Plan goals for specific interventions  Outcome: Progressing  Goal: Patient/Family Short Term Goal  Description: Patient's Short Term Goal:   8/16: breath better and control cough   8/17: breath better   8/17 NOC: control cough, sleep   8/18 Am: remain off oxygen  8/15 NOC: remain off o2 at rest, control cough  8/19 AM: wean O2 as tolerated  8/20 : wean O2, O2 walk and breath better   8/21: walk on RA and go home tomorrow     Interventions:   - PRN cough med, Neb, IV steroid   - See additional Care Plan goals for specific interventions  Outcome: Progressing

## 2022-08-23 ENCOUNTER — TELEPHONE (OUTPATIENT)
Dept: FAMILY MEDICINE CLINIC | Facility: CLINIC | Age: 77
End: 2022-08-23

## 2022-08-23 ENCOUNTER — PATIENT OUTREACH (OUTPATIENT)
Dept: CASE MANAGEMENT | Age: 77
End: 2022-08-23

## 2022-08-23 DIAGNOSIS — Z02.9 ENCOUNTERS FOR ADMINISTRATIVE PURPOSE: ICD-10-CM

## 2022-08-23 DIAGNOSIS — F41.9 ANXIETY AND DEPRESSION: ICD-10-CM

## 2022-08-23 DIAGNOSIS — J44.1 CHRONIC OBSTRUCTIVE PULMONARY DISEASE WITH (ACUTE) EXACERBATION (HCC): ICD-10-CM

## 2022-08-23 DIAGNOSIS — F32.A ANXIETY AND DEPRESSION: ICD-10-CM

## 2022-08-23 PROCEDURE — 1111F DSCHRG MED/CURRENT MED MERGE: CPT

## 2022-08-23 RX ORDER — FLUTICASONE FUROATE, UMECLIDINIUM BROMIDE AND VILANTEROL TRIFENATATE 100; 62.5; 25 UG/1; UG/1; UG/1
POWDER RESPIRATORY (INHALATION)
Qty: 60 EACH | Refills: 0 | Status: SHIPPED | OUTPATIENT
Start: 2022-08-23

## 2022-08-23 NOTE — TELEPHONE ENCOUNTER
Patient is needing a refill of TRELEGY ELLIPTA 100-62.5-25 MCG/INH Inhalation Aerosol Powder, Breath Activated. Patient is completely out.

## 2022-08-23 NOTE — PROGRESS NOTES
TATOGARRETT for post hospital follow up. [facility] contact information provided as well as [facility] office number, [phone].

## 2022-08-23 NOTE — PAYOR COMM NOTE
Discharge Notification    [place] Name[place] Felipe Nick [place] [facility] [place] #: [id number]  Authorization Number: [id number]  Admit Date/Time: 8/15/2022 5:44 PM  Discharge Date/Time: 8/22/2022 7:22 PM

## 2022-08-24 RX ORDER — BUSPIRONE HYDROCHLORIDE 10 MG/1
TABLET ORAL
Qty: 180 TABLET | Refills: 2 | Status: SHIPPED | OUTPATIENT
Start: 2022-08-24

## 2022-08-24 NOTE — PROGRESS NOTES
TATOGARRETT for post hospital follow up. [facility] contact information provided as well as [facility] office number, [phone].

## 2022-08-25 ENCOUNTER — OFFICE VISIT (OUTPATIENT)
Dept: FAMILY MEDICINE CLINIC | Facility: CLINIC | Age: 77
End: 2022-08-25
Payer: MEDICARE

## 2022-08-25 VITALS
TEMPERATURE: 98 F | HEART RATE: 85 BPM | HEIGHT: 64 IN | SYSTOLIC BLOOD PRESSURE: 140 MMHG | OXYGEN SATURATION: 96 % | DIASTOLIC BLOOD PRESSURE: 84 MMHG | BODY MASS INDEX: 27.66 KG/M2 | WEIGHT: 162 LBS

## 2022-08-25 DIAGNOSIS — R39.9 UTI SYMPTOMS: Primary | ICD-10-CM

## 2022-08-25 LAB
BILIRUBIN: NEGATIVE
GLUCOSE (URINE DIPSTICK): NEGATIVE MG/DL
KETONES (URINE DIPSTICK): NEGATIVE MG/DL
LEUKOCYTES: NEGATIVE
MULTISTIX LOT#: NORMAL NUMERIC
NITRITE, URINE: NEGATIVE
OCCULT BLOOD: NEGATIVE
PH, URINE: 7 (ref 4.5–8)
PROTEIN (URINE DIPSTICK): NEGATIVE MG/DL
SPECIFIC GRAVITY: 1.01 (ref 1–1.03)
UROBILINOGEN,SEMI-QN: 0.2 MG/DL (ref 0–1.9)

## 2022-08-25 PROCEDURE — 99213 OFFICE O/P EST LOW 20 MIN: CPT | Performed by: PHYSICIAN ASSISTANT

## 2022-08-25 PROCEDURE — 87086 URINE CULTURE/COLONY COUNT: CPT | Performed by: PHYSICIAN ASSISTANT

## 2022-08-25 PROCEDURE — 1111F DSCHRG MED/CURRENT MED MERGE: CPT | Performed by: PHYSICIAN ASSISTANT

## 2022-08-25 PROCEDURE — 81003 URINALYSIS AUTO W/O SCOPE: CPT | Performed by: PHYSICIAN ASSISTANT

## 2022-08-31 ENCOUNTER — OFFICE VISIT (OUTPATIENT)
Dept: FAMILY MEDICINE CLINIC | Facility: CLINIC | Age: 77
End: 2022-08-31
Payer: MEDICARE

## 2022-08-31 VITALS
OXYGEN SATURATION: 93 % | RESPIRATION RATE: 18 BRPM | SYSTOLIC BLOOD PRESSURE: 128 MMHG | HEART RATE: 81 BPM | BODY MASS INDEX: 26.8 KG/M2 | HEIGHT: 64 IN | DIASTOLIC BLOOD PRESSURE: 88 MMHG | WEIGHT: 157 LBS

## 2022-08-31 DIAGNOSIS — E11.65 TYPE 2 DIABETES MELLITUS WITH HYPERGLYCEMIA, WITHOUT LONG-TERM CURRENT USE OF INSULIN (HCC): ICD-10-CM

## 2022-08-31 DIAGNOSIS — I73.9 PERIPHERAL VASCULAR DISEASE (HCC): ICD-10-CM

## 2022-08-31 DIAGNOSIS — J44.1 COPD EXACERBATION (HCC): ICD-10-CM

## 2022-08-31 DIAGNOSIS — J12.1 RSV (RESPIRATORY SYNCYTIAL VIRUS PNEUMONIA): Primary | ICD-10-CM

## 2022-08-31 DIAGNOSIS — B37.3 VAGINAL CANDIDIASIS: ICD-10-CM

## 2022-08-31 DIAGNOSIS — K64.0 GRADE I HEMORRHOIDS: ICD-10-CM

## 2022-08-31 PROCEDURE — 3008F BODY MASS INDEX DOCD: CPT | Performed by: FAMILY MEDICINE

## 2022-08-31 PROCEDURE — 3079F DIAST BP 80-89 MM HG: CPT | Performed by: FAMILY MEDICINE

## 2022-08-31 PROCEDURE — 99214 OFFICE O/P EST MOD 30 MIN: CPT | Performed by: FAMILY MEDICINE

## 2022-08-31 PROCEDURE — 1111F DSCHRG MED/CURRENT MED MERGE: CPT | Performed by: FAMILY MEDICINE

## 2022-08-31 PROCEDURE — 3074F SYST BP LT 130 MM HG: CPT | Performed by: FAMILY MEDICINE

## 2022-08-31 RX ORDER — ESTRADIOL 0.1 MG/G
CREAM VAGINAL
COMMUNITY
Start: 2022-07-19

## 2022-08-31 RX ORDER — NEBULIZER ACCESSORIES
KIT MISCELLANEOUS
Qty: 1 EACH | Refills: 0 | Status: SHIPPED | OUTPATIENT
Start: 2022-08-31

## 2022-08-31 RX ORDER — FLUCONAZOLE 150 MG/1
150 TABLET ORAL ONCE
Qty: 1 TABLET | Refills: 0 | Status: SHIPPED | OUTPATIENT
Start: 2022-08-31 | End: 2022-08-31

## 2022-08-31 NOTE — PROGRESS NOTES
Several attempts made to reach the patient with no return call. Patient completed HFU on 8/31/22. Closing encounter.

## 2022-08-31 NOTE — PATIENT INSTRUCTIONS
Medical supply store    [address], [address]  Phone: [phone]    1. Start fluconazole for yeast infection     2. Continue hydrocortisone cream as needed for hemorrhoids     3. We have printed a prescription for a walker for you. Take this to [facility]'s pharmacy - address as above.      4. You are due for your diabetic eye exam.

## 2022-09-02 ENCOUNTER — TELEPHONE (OUTPATIENT)
Dept: FAMILY MEDICINE CLINIC | Facility: CLINIC | Age: 77
End: 2022-09-02

## 2022-09-02 NOTE — TELEPHONE ENCOUNTER
Pt had ov on 08/31 for a virus. Pt states she's still very weak and tired. Pt wants to know if this is still from the virus?

## 2022-09-02 NOTE — TELEPHONE ENCOUNTER
Patient was last seen on 8/31/2022 for HFU. Patient states that she forgot to stress to PCP the weakness and fatigue that she is experiencing. Patient states that when she has been sitting for a while and she gets up, she is very weak and her legs feel like rubber. Additionally, patient states that fatigue is excessive. Patient will go to sleep around 1100pm and not wake up until 1130am the next day. Denies fever, chest pain, SOB, difficulty breathing at this time. Pulse ox ranging between 93-94%. Patient wondering if the fatigue and weakness are normal. Please advise.

## 2022-09-02 NOTE — TELEPHONE ENCOUNTER
Left detailed message with below response by the provider. Advised to call back if she would like pulmonary rehab referral placed.

## 2022-09-02 NOTE — TELEPHONE ENCOUNTER
Suspect 2/2 recent hospitalization/pneumonia. Continue supportive care mgmt, activity as tolerated. Can consider pulmonary rehab referral if she is interested.

## 2022-09-18 DIAGNOSIS — M79.605 BILATERAL LOWER EXTREMITY PAIN: ICD-10-CM

## 2022-09-18 DIAGNOSIS — M79.604 BILATERAL LOWER EXTREMITY PAIN: ICD-10-CM

## 2022-09-18 DIAGNOSIS — G89.4 CHRONIC PAIN SYNDROME: ICD-10-CM

## 2022-09-19 RX ORDER — GABAPENTIN 100 MG/1
CAPSULE ORAL
Qty: 150 CAPSULE | Refills: 5 | Status: SHIPPED | OUTPATIENT
Start: 2022-09-19 | End: 2022-12-08

## 2022-09-20 ENCOUNTER — TELEPHONE (OUTPATIENT)
Dept: FAMILY MEDICINE CLINIC | Facility: CLINIC | Age: 77
End: 2022-09-20

## 2022-09-20 DIAGNOSIS — M79.604 BILATERAL LOWER EXTREMITY PAIN: Primary | ICD-10-CM

## 2022-09-20 DIAGNOSIS — G89.29 CHRONIC MIDLINE LOW BACK PAIN WITHOUT SCIATICA: ICD-10-CM

## 2022-09-20 DIAGNOSIS — M79.605 BILATERAL LOWER EXTREMITY PAIN: Primary | ICD-10-CM

## 2022-09-20 DIAGNOSIS — M54.50 CHRONIC MIDLINE LOW BACK PAIN WITHOUT SCIATICA: ICD-10-CM

## 2022-09-20 DIAGNOSIS — I73.9 PERIPHERAL VASCULAR DISEASE (HCC): ICD-10-CM

## 2022-09-20 NOTE — TELEPHONE ENCOUNTER
Patient is calling with a different pharmacy to get a walker with a seat     She would like the order sent to this [facility]   Phone [phone]   Fax [phone]     She had order sent to [facility], but she will be with her sister and she can help pick it up.   Her grandaughter has     Looking to have orders sent to this pharmacy

## 2022-09-21 NOTE — TELEPHONE ENCOUNTER
Notified the patient of DME referral. Patient verbalized understanding. Answered all questions at this time.

## 2022-09-23 NOTE — TELEPHONE ENCOUNTER
Medical supply called patient, needs additional information since this an O     Please call patient to hear exactly what is needed as they told patient they have been calling here for the information

## 2022-09-27 DIAGNOSIS — J44.1 CHRONIC OBSTRUCTIVE PULMONARY DISEASE WITH (ACUTE) EXACERBATION (HCC): ICD-10-CM

## 2022-09-29 ENCOUNTER — TELEPHONE (OUTPATIENT)
Dept: FAMILY MEDICINE CLINIC | Facility: CLINIC | Age: 77
End: 2022-09-29

## 2022-09-29 DIAGNOSIS — G89.4 CHRONIC PAIN SYNDROME: Primary | ICD-10-CM

## 2022-09-29 NOTE — TELEPHONE ENCOUNTER
Last office visit:  8/31/22 schedule appointment)        Requested medication:   Diclofenac sodium topical gel 1 %      Pharmacy:    [address] [place] [address] KOLBY Cee [phone], [phone] I concur with the Admission Order and I certify that services are provided in accordance with Section 42 CFR § 412.3

## 2022-09-29 NOTE — TELEPHONE ENCOUNTER
Pt called and is requesting for medication refill of Diclofenac Sodium 1 % External Gel (Voltaren). Please approve or deny pending Rx. Thank you.    LOV: 08/31/22  LF: 07/15/22

## 2022-09-30 DIAGNOSIS — Z76.0 ENCOUNTER FOR MEDICATION REFILL: ICD-10-CM

## 2022-09-30 RX ORDER — ACYCLOVIR 400 MG/1
TABLET ORAL
Qty: 180 TABLET | Refills: 0 | Status: SHIPPED | OUTPATIENT
Start: 2022-09-30

## 2022-10-07 ENCOUNTER — TELEPHONE (OUTPATIENT)
Dept: FAMILY MEDICINE CLINIC | Facility: CLINIC | Age: 77
End: 2022-10-07

## 2022-10-07 DIAGNOSIS — E11.65 TYPE 2 DIABETES MELLITUS WITH HYPERGLYCEMIA, WITHOUT LONG-TERM CURRENT USE OF INSULIN (HCC): Primary | ICD-10-CM

## 2022-10-07 NOTE — TELEPHONE ENCOUNTER
Left message for the patient to call the office back.  (Need to give contact information for Dr. Eli Carlson.)

## 2022-10-07 NOTE — TELEPHONE ENCOUNTER
Attempted to call patient again to give contact information. Patient states that she already received contact information in previous phone call.

## 2022-10-14 DIAGNOSIS — J44.1 CHRONIC OBSTRUCTIVE PULMONARY DISEASE WITH ACUTE EXACERBATION (HCC): ICD-10-CM

## 2022-10-14 RX ORDER — PENTOSAN POLYSULFATE SODIUM 100 MG/1
CAPSULE, GELATIN COATED ORAL
Qty: 90 CAPSULE | Refills: 1 | Status: SHIPPED | OUTPATIENT
Start: 2022-10-14

## 2022-10-15 DIAGNOSIS — I10 ESSENTIAL HYPERTENSION: ICD-10-CM

## 2022-10-15 RX ORDER — FUROSEMIDE 20 MG/1
TABLET ORAL
Qty: 180 TABLET | Refills: 0 | Status: SHIPPED | OUTPATIENT
Start: 2022-10-15

## 2022-11-01 ENCOUNTER — TELEPHONE (OUTPATIENT)
Dept: FAMILY MEDICINE CLINIC | Facility: CLINIC | Age: 77
End: 2022-11-01

## 2022-11-01 NOTE — TELEPHONE ENCOUNTER
Please call pt and schedule a VV with Dr. Berta Ortiz for tomorrow or pt can go to [facility]. Thank you.

## 2022-11-03 ENCOUNTER — TELEPHONE (OUTPATIENT)
Dept: FAMILY MEDICINE CLINIC | Facility: CLINIC | Age: 77
End: 2022-11-03

## 2022-11-03 DIAGNOSIS — E11.65 TYPE 2 DIABETES MELLITUS WITH HYPERGLYCEMIA, WITHOUT LONG-TERM CURRENT USE OF INSULIN (HCC): ICD-10-CM

## 2022-11-03 NOTE — TELEPHONE ENCOUNTER
Prince Pérez from [facility] pain management. Dr Alem Scherer office calling requesting copy of referral to be sent via fax # [phone]. Referral printed and faxed. Success confirmation was received.

## 2022-11-17 ENCOUNTER — TELEPHONE (OUTPATIENT)
Dept: FAMILY MEDICINE CLINIC | Facility: CLINIC | Age: 77
End: 2022-11-17

## 2022-11-17 DIAGNOSIS — I10 ESSENTIAL HYPERTENSION: ICD-10-CM

## 2022-11-17 RX ORDER — METOPROLOL TARTRATE 50 MG/1
TABLET, FILM COATED ORAL
Qty: 180 TABLET | Refills: 0 | Status: SHIPPED | OUTPATIENT
Start: 2022-11-17 | End: 2022-11-17

## 2022-11-17 RX ORDER — METOPROLOL TARTRATE 50 MG/1
25 TABLET, FILM COATED ORAL 2 TIMES DAILY
Qty: 90 TABLET | Refills: 0 | Status: SHIPPED | OUTPATIENT
Start: 2022-11-17

## 2022-11-17 NOTE — TELEPHONE ENCOUNTER
Received Rx request from pharmacy for Metoprolol 50 mg, 1 tablet, BID. Rx that was sent on 03/24/22 was for pt to take 25 mg (0.5 tab) BID. Please advise. Thank you.    LOV (virtual phone): 11/01/22

## 2022-11-17 NOTE — TELEPHONE ENCOUNTER
Metoprolol refilled  Needs office visit before next refill  Pt needs OV for recheck DM and HTN  I also have not met patient

## 2022-11-18 RX ORDER — LOSARTAN POTASSIUM 25 MG/1
25 TABLET ORAL DAILY
Qty: 90 TABLET | Refills: 0 | Status: SHIPPED | OUTPATIENT
Start: 2022-11-18

## 2022-12-08 ENCOUNTER — OFFICE VISIT (OUTPATIENT)
Dept: FAMILY MEDICINE CLINIC | Facility: CLINIC | Age: 77
End: 2022-12-08
Payer: MEDICARE

## 2022-12-08 VITALS
SYSTOLIC BLOOD PRESSURE: 122 MMHG | RESPIRATION RATE: 18 BRPM | HEIGHT: 64 IN | DIASTOLIC BLOOD PRESSURE: 62 MMHG | HEART RATE: 76 BPM | OXYGEN SATURATION: 96 % | BODY MASS INDEX: 27.83 KG/M2 | WEIGHT: 163 LBS

## 2022-12-08 DIAGNOSIS — K76.89 LIVER CYST: ICD-10-CM

## 2022-12-08 DIAGNOSIS — Z23 NEED FOR VACCINATION: ICD-10-CM

## 2022-12-08 DIAGNOSIS — E11.42 TYPE 2 DIABETES MELLITUS WITH DIABETIC POLYNEUROPATHY, WITHOUT LONG-TERM CURRENT USE OF INSULIN (HCC): ICD-10-CM

## 2022-12-08 DIAGNOSIS — Z76.0 MEDICATION REFILL: ICD-10-CM

## 2022-12-08 DIAGNOSIS — G89.4 CHRONIC PAIN SYNDROME: ICD-10-CM

## 2022-12-08 DIAGNOSIS — I73.9 PERIPHERAL VASCULAR DISEASE (HCC): ICD-10-CM

## 2022-12-08 DIAGNOSIS — I10 ESSENTIAL HYPERTENSION: Primary | ICD-10-CM

## 2022-12-08 DIAGNOSIS — F32.A ANXIETY AND DEPRESSION: ICD-10-CM

## 2022-12-08 DIAGNOSIS — F41.9 ANXIETY AND DEPRESSION: ICD-10-CM

## 2022-12-08 PROBLEM — J18.9 PNEUMONIA OF BOTH LUNGS DUE TO INFECTIOUS ORGANISM, UNSPECIFIED PART OF LUNG: Status: RESOLVED | Noted: 2022-08-15 | Resolved: 2022-12-08

## 2022-12-08 PROBLEM — R09.02 HYPOXIA: Status: RESOLVED | Noted: 2022-08-15 | Resolved: 2022-12-08

## 2022-12-08 PROCEDURE — 90662 IIV NO PRSV INCREASED AG IM: CPT | Performed by: FAMILY MEDICINE

## 2022-12-08 PROCEDURE — 3008F BODY MASS INDEX DOCD: CPT | Performed by: FAMILY MEDICINE

## 2022-12-08 PROCEDURE — 99214 OFFICE O/P EST MOD 30 MIN: CPT | Performed by: FAMILY MEDICINE

## 2022-12-08 PROCEDURE — G0008 ADMIN INFLUENZA VIRUS VAC: HCPCS | Performed by: FAMILY MEDICINE

## 2022-12-08 PROCEDURE — 3074F SYST BP LT 130 MM HG: CPT | Performed by: FAMILY MEDICINE

## 2022-12-08 PROCEDURE — 3078F DIAST BP <80 MM HG: CPT | Performed by: FAMILY MEDICINE

## 2022-12-08 RX ORDER — FUROSEMIDE 20 MG/1
20 TABLET ORAL 2 TIMES DAILY
Qty: 180 TABLET | Refills: 1 | Status: SHIPPED | OUTPATIENT
Start: 2022-12-08

## 2022-12-08 RX ORDER — POTASSIUM CHLORIDE 1500 MG/1
1 TABLET, FILM COATED, EXTENDED RELEASE ORAL DAILY
Qty: 90 TABLET | Refills: 1 | Status: SHIPPED | OUTPATIENT
Start: 2022-12-08

## 2022-12-08 RX ORDER — BUPROPION HYDROCHLORIDE 150 MG/1
1 TABLET, EXTENDED RELEASE ORAL 2 TIMES DAILY
COMMUNITY
Start: 2022-11-14

## 2022-12-08 RX ORDER — GABAPENTIN 100 MG/1
CAPSULE ORAL
Qty: 180 CAPSULE | Refills: 5 | Status: SHIPPED | OUTPATIENT
Start: 2022-12-08

## 2022-12-08 RX ORDER — LOSARTAN POTASSIUM 25 MG/1
25 TABLET ORAL DAILY
Qty: 90 TABLET | Refills: 1 | Status: SHIPPED | OUTPATIENT
Start: 2022-12-08

## 2022-12-08 RX ORDER — METOPROLOL TARTRATE 50 MG/1
25 TABLET, FILM COATED ORAL 2 TIMES DAILY
Qty: 90 TABLET | Refills: 1 | Status: SHIPPED | OUTPATIENT
Start: 2022-12-08

## 2022-12-08 RX ORDER — CILOSTAZOL 50 MG/1
50 TABLET ORAL 2 TIMES DAILY
COMMUNITY
Start: 2022-10-31

## 2022-12-08 NOTE — PATIENT INSTRUCTIONS
If you decide to proceed with vascular surgery for your peripheral arterial disease then follow up with surgeon. 2. Complete liver ultrasound  3. Follow up with kidney doctor as needed   4. Continue working on improving diet, staying active. Quit smoking. 5. Increase your gabapentin. Start taking a morning dose in addition to afternoon and evening dose. 6. Follow up with eye doctor this year.

## 2022-12-15 DIAGNOSIS — Z76.0 ENCOUNTER FOR MEDICATION REFILL: ICD-10-CM

## 2022-12-15 RX ORDER — ACYCLOVIR 400 MG/1
TABLET ORAL
Qty: 180 TABLET | Refills: 0 | Status: SHIPPED | OUTPATIENT
Start: 2022-12-15

## 2022-12-19 ENCOUNTER — TELEPHONE (OUTPATIENT)
Dept: FAMILY MEDICINE CLINIC | Facility: CLINIC | Age: 77
End: 2022-12-19

## 2022-12-19 DIAGNOSIS — E11.42 TYPE 2 DIABETES MELLITUS WITH DIABETIC POLYNEUROPATHY, WITHOUT LONG-TERM CURRENT USE OF INSULIN (HCC): Primary | ICD-10-CM

## 2023-01-03 ENCOUNTER — OFFICE VISIT (OUTPATIENT)
Dept: FAMILY MEDICINE CLINIC | Facility: CLINIC | Age: 78
End: 2023-01-03
Payer: MEDICARE

## 2023-01-03 VITALS
OXYGEN SATURATION: 98 % | HEIGHT: 64 IN | HEART RATE: 70 BPM | RESPIRATION RATE: 18 BRPM | WEIGHT: 163 LBS | TEMPERATURE: 98 F | DIASTOLIC BLOOD PRESSURE: 84 MMHG | BODY MASS INDEX: 27.83 KG/M2 | SYSTOLIC BLOOD PRESSURE: 152 MMHG

## 2023-01-03 DIAGNOSIS — Z87.09 HISTORY OF COPD: ICD-10-CM

## 2023-01-03 DIAGNOSIS — J06.9 UPPER RESPIRATORY TRACT INFECTION, UNSPECIFIED TYPE: Primary | ICD-10-CM

## 2023-01-03 DIAGNOSIS — Z01.89 PATIENT REQUEST FOR DIAGNOSTIC TESTING: ICD-10-CM

## 2023-01-03 DIAGNOSIS — R06.2 WHEEZING: ICD-10-CM

## 2023-01-03 LAB
OPERATOR ID: NORMAL
POCT LOT NUMBER: NORMAL
RAPID SARS-COV-2 BY PCR: NOT DETECTED

## 2023-01-03 PROCEDURE — 3008F BODY MASS INDEX DOCD: CPT | Performed by: NURSE PRACTITIONER

## 2023-01-03 PROCEDURE — 99213 OFFICE O/P EST LOW 20 MIN: CPT | Performed by: NURSE PRACTITIONER

## 2023-01-03 PROCEDURE — U0002 COVID-19 LAB TEST NON-CDC: HCPCS | Performed by: NURSE PRACTITIONER

## 2023-01-03 PROCEDURE — 3079F DIAST BP 80-89 MM HG: CPT | Performed by: NURSE PRACTITIONER

## 2023-01-03 PROCEDURE — 3077F SYST BP >= 140 MM HG: CPT | Performed by: NURSE PRACTITIONER

## 2023-01-03 RX ORDER — BENZONATATE 200 MG/1
200 CAPSULE ORAL 3 TIMES DAILY PRN
Qty: 20 CAPSULE | Refills: 0 | Status: SHIPPED | OUTPATIENT
Start: 2023-01-03 | End: 2023-01-10

## 2023-01-03 RX ORDER — PREDNISONE 20 MG/1
20 TABLET ORAL 2 TIMES DAILY
Qty: 10 TABLET | Refills: 0 | Status: SHIPPED | OUTPATIENT
Start: 2023-01-03 | End: 2023-01-08

## 2023-01-03 RX ORDER — DOXYCYCLINE HYCLATE 100 MG
100 TABLET ORAL 2 TIMES DAILY
Qty: 14 TABLET | Refills: 0 | Status: SHIPPED | OUTPATIENT
Start: 2023-01-03 | End: 2023-01-10

## 2023-01-04 ENCOUNTER — TELEPHONE (OUTPATIENT)
Dept: FAMILY MEDICINE CLINIC | Facility: CLINIC | Age: 78
End: 2023-01-04

## 2023-01-04 DIAGNOSIS — Z22.1 CLOSTRIDIUM DIFFICILE CARRIER: ICD-10-CM

## 2023-01-04 RX ORDER — VANCOMYCIN HYDROCHLORIDE 125 MG/1
125 CAPSULE ORAL DAILY
Qty: 10 CAPSULE | Refills: 0 | Status: SHIPPED | OUTPATIENT
Start: 2023-01-04

## 2023-01-04 NOTE — TELEPHONE ENCOUNTER
Called pt and was informed of below response from provider. All questions/concerns addressed. Pt verbalized understanding.

## 2023-01-04 NOTE — TELEPHONE ENCOUNTER
Start doxycycline. Prophylactic vancomycin sent. Start this after completing doxycycline. Please review ER precautions with patient. Okay for f/u visit next week or sooner prn.

## 2023-01-04 NOTE — TELEPHONE ENCOUNTER
Called pt to obtain additional information. Pt c/o chills, productive cough, sob and runny nose. Pt able to talk in complete sentences without difficulty. Pt has had these symptoms x6 days. Denies fevers. Pt seen at [facility] yesterday and was given benzonatate, doxycycline and prednisone. Pt has not yet started doxy as she needs vanco when taking antibiotics. Pt states she is not feeling better. Please advise. Thank you.

## 2023-01-04 NOTE — TELEPHONE ENCOUNTER
Pt went to [facility] 01/04/23 for cough, congestion, chills, runny nose (covid neg). [facility] prescribed cough suppressant, antibiotic, and steroid. Pt is concerned that she should be taking a particular medication to prevent diarrhea, vancomycin 125 MG Oral Cap, as she needs to take it with antibiotics.  Pt requesting the listed medication so she can take her antibiotics    LOV: 12/08/2022

## 2023-01-06 RX ORDER — ESOMEPRAZOLE MAGNESIUM 40 MG/1
CAPSULE, DELAYED RELEASE ORAL
Qty: 90 CAPSULE | Refills: 1 | Status: SHIPPED | OUTPATIENT
Start: 2023-01-06

## 2023-01-09 ENCOUNTER — TELEPHONE (OUTPATIENT)
Dept: FAMILY MEDICINE CLINIC | Facility: CLINIC | Age: 78
End: 2023-01-09

## 2023-01-09 RX ORDER — FLUCONAZOLE 150 MG/1
150 TABLET ORAL ONCE
Qty: 1 TABLET | Refills: 0 | Status: SHIPPED | OUTPATIENT
Start: 2023-01-09 | End: 2023-01-09

## 2023-01-09 NOTE — TELEPHONE ENCOUNTER
Pt is experiencing vaginal burning and itching after starting a course of antibiotics. Pt requesting yeast infection medication, or a phone call.     LOV: 12/08/22

## 2023-01-09 NOTE — TELEPHONE ENCOUNTER
Patient is needing advice. She's been having pressure and burning while urinating and isn't sure it's a yeast infection or a UTI. Dr. Julia Beckman gave her a pill in the past for a yeast infection. I offered her to go to the [facility]. She said she might go in the morning, but would like a call from the nurse she also has the chills.      Please Advise

## 2023-01-09 NOTE — TELEPHONE ENCOUNTER
Pt was treated by [facility] on 1/3 for URI with Doxy, pt now has yeast infection symptoms. Vaginal itching & burning. She is requesting Rx for yeast infection.   LOV 12/8

## 2023-01-09 NOTE — TELEPHONE ENCOUNTER
VIVIANA-spoke to pt. She's not sure if she has a yeast infection or UTI. Quintana while she urinates and is going frequently. Has chills. I told her she germaine need a urine test to determine yeast vs UTI but it sounds more like a UTI. Denies irritation/itching/discharge. She is going to take the fluconazole tonight and if her symptoms aren't any better tomorrow, or they're getting worse, she will be going to [facility].

## 2023-01-10 ENCOUNTER — OFFICE VISIT (OUTPATIENT)
Dept: FAMILY MEDICINE CLINIC | Facility: CLINIC | Age: 78
End: 2023-01-10
Payer: MEDICARE

## 2023-01-10 VITALS
RESPIRATION RATE: 18 BRPM | TEMPERATURE: 98 F | HEART RATE: 68 BPM | SYSTOLIC BLOOD PRESSURE: 152 MMHG | DIASTOLIC BLOOD PRESSURE: 89 MMHG | HEIGHT: 64 IN | WEIGHT: 164 LBS | OXYGEN SATURATION: 95 % | BODY MASS INDEX: 28 KG/M2

## 2023-01-10 DIAGNOSIS — R39.9 LOWER URINARY TRACT SYMPTOMS: Primary | ICD-10-CM

## 2023-01-10 LAB
APPEARANCE: CLEAR
BILIRUBIN: NEGATIVE
GLUCOSE (URINE DIPSTICK): NEGATIVE MG/DL
KETONES (URINE DIPSTICK): NEGATIVE MG/DL
LEUKOCYTES: NEGATIVE
MULTISTIX LOT#: ABNORMAL NUMERIC
NITRITE, URINE: POSITIVE
OCCULT BLOOD: NEGATIVE
PH, URINE: 5.5 (ref 4.5–8)
PROTEIN (URINE DIPSTICK): NEGATIVE MG/DL
SPECIFIC GRAVITY: 1.02 (ref 1–1.03)
URINE-COLOR: YELLOW
UROBILINOGEN,SEMI-QN: 0.2 MG/DL (ref 0–1.9)

## 2023-01-10 PROCEDURE — 87086 URINE CULTURE/COLONY COUNT: CPT | Performed by: NURSE PRACTITIONER

## 2023-01-10 PROCEDURE — 99213 OFFICE O/P EST LOW 20 MIN: CPT | Performed by: NURSE PRACTITIONER

## 2023-01-10 PROCEDURE — 3077F SYST BP >= 140 MM HG: CPT | Performed by: NURSE PRACTITIONER

## 2023-01-10 PROCEDURE — 3079F DIAST BP 80-89 MM HG: CPT | Performed by: NURSE PRACTITIONER

## 2023-01-10 PROCEDURE — 3008F BODY MASS INDEX DOCD: CPT | Performed by: NURSE PRACTITIONER

## 2023-01-10 PROCEDURE — 81003 URINALYSIS AUTO W/O SCOPE: CPT | Performed by: NURSE PRACTITIONER

## 2023-01-10 NOTE — PATIENT INSTRUCTIONS
Take Diflucan once by mouth today x 1 dose  Continue the Doxycycline (antibiotic) given to you  Use Estrogen cream vaginal inserts twice weekly  Use Albuterol inhaler every 8 hours as needed  Drink LOTS of water to flush out symptoms  Wear cotton underwear, change frequently if needed. Follow up with Dr. Kristin Grier  if symptoms persist, seek emergency care if symptoms worsen. We will prescribe an antibiotic if needed based on result from the urine culture-should be resulted in 2 days.

## 2023-01-19 ENCOUNTER — TELEPHONE (OUTPATIENT)
Dept: FAMILY MEDICINE CLINIC | Facility: CLINIC | Age: 78
End: 2023-01-19

## 2023-01-19 NOTE — TELEPHONE ENCOUNTER
FYI: Pt states she was informed by Dr. Nicolás Goode (cardiology) that she should do peripheral angiogram d/t having decreased blood flow to left leg. Pt then states she did tell Dr. Nicolás Goode that her bilateral toes \"look blue and swelling. . Per pt, if she moves her toes a lot then they are not blue. Advised pt to complete test but pt is hesitant. Pt has appt scheduled for 02/01/23 at 12:4 pm. Supportive measures provided-support stocking and elevated legs. All questions/comcerns answered and pt verbalized understanding.

## 2023-01-19 NOTE — TELEPHONE ENCOUNTER
Patient called questioning her peripheral artery tests, she is scared to do them,  and then she reported her feet have been swelling on and off, she has tingling and sometimes her toes turn blue which is bothering her as well     She then went on to say she did not mention this to her cardiologist and I advised her to call them and they can also talk about her anxiety of her test     She is concerned about her sometimes blue toes, I made an appt for 2/1 as she wanted dr Janeth Moss and is 60 min patient

## 2023-02-01 ENCOUNTER — OFFICE VISIT (OUTPATIENT)
Dept: FAMILY MEDICINE CLINIC | Facility: CLINIC | Age: 78
End: 2023-02-01
Payer: MEDICARE

## 2023-02-01 VITALS
SYSTOLIC BLOOD PRESSURE: 154 MMHG | HEIGHT: 64 IN | DIASTOLIC BLOOD PRESSURE: 90 MMHG | OXYGEN SATURATION: 95 % | RESPIRATION RATE: 18 BRPM | BODY MASS INDEX: 28.68 KG/M2 | HEART RATE: 71 BPM | WEIGHT: 168 LBS

## 2023-02-01 DIAGNOSIS — F33.9 DEPRESSION, RECURRENT (HCC): ICD-10-CM

## 2023-02-01 DIAGNOSIS — B37.89 CANDIDIASIS OF BREAST: ICD-10-CM

## 2023-02-01 DIAGNOSIS — Z00.00 LABORATORY EXAMINATION ORDERED AS PART OF A ROUTINE GENERAL MEDICAL EXAMINATION: ICD-10-CM

## 2023-02-01 DIAGNOSIS — I73.9 PERIPHERAL VASCULAR DISEASE (HCC): Primary | ICD-10-CM

## 2023-02-01 DIAGNOSIS — I10 ESSENTIAL HYPERTENSION: ICD-10-CM

## 2023-02-01 DIAGNOSIS — E11.42 TYPE 2 DIABETES MELLITUS WITH DIABETIC POLYNEUROPATHY, WITHOUT LONG-TERM CURRENT USE OF INSULIN (HCC): ICD-10-CM

## 2023-02-01 DIAGNOSIS — M25.471 RIGHT ANKLE SWELLING: ICD-10-CM

## 2023-02-01 DIAGNOSIS — E55.9 VITAMIN D DEFICIENCY: ICD-10-CM

## 2023-02-01 PROBLEM — J44.1 COPD EXACERBATION (HCC): Status: RESOLVED | Noted: 2022-08-15 | Resolved: 2023-02-01

## 2023-02-01 PROCEDURE — 3008F BODY MASS INDEX DOCD: CPT | Performed by: FAMILY MEDICINE

## 2023-02-01 PROCEDURE — 99214 OFFICE O/P EST MOD 30 MIN: CPT | Performed by: FAMILY MEDICINE

## 2023-02-01 PROCEDURE — 3077F SYST BP >= 140 MM HG: CPT | Performed by: FAMILY MEDICINE

## 2023-02-01 PROCEDURE — 3080F DIAST BP >= 90 MM HG: CPT | Performed by: FAMILY MEDICINE

## 2023-02-01 RX ORDER — NYSTATIN 100000 U/G
1 CREAM TOPICAL 2 TIMES DAILY
Qty: 30 G | Refills: 3 | Status: SHIPPED | OUTPATIENT
Start: 2023-02-01 | End: 2023-02-11

## 2023-02-01 RX ORDER — LOSARTAN POTASSIUM 50 MG/1
50 TABLET ORAL DAILY
Qty: 30 TABLET | Refills: 2 | Status: SHIPPED | OUTPATIENT
Start: 2023-02-01

## 2023-02-03 ENCOUNTER — HOSPITAL ENCOUNTER (OUTPATIENT)
Dept: ULTRASOUND IMAGING | Age: 78
Discharge: HOME | End: 2023-02-03
Attending: FAMILY MEDICINE
Payer: MEDICARE

## 2023-02-03 ENCOUNTER — LAB ENCOUNTER (OUTPATIENT)
Dept: LAB | Age: 78
End: 2023-02-03
Attending: FAMILY MEDICINE
Payer: MEDICARE

## 2023-02-03 DIAGNOSIS — Z00.00 LABORATORY EXAMINATION ORDERED AS PART OF A ROUTINE GENERAL MEDICAL EXAMINATION: ICD-10-CM

## 2023-02-03 DIAGNOSIS — K76.89 LIVER CYST: ICD-10-CM

## 2023-02-03 DIAGNOSIS — E55.9 VITAMIN D DEFICIENCY: ICD-10-CM

## 2023-02-03 DIAGNOSIS — E11.42 TYPE 2 DIABETES MELLITUS WITH DIABETIC POLYNEUROPATHY, WITHOUT LONG-TERM CURRENT USE OF INSULIN (HCC): ICD-10-CM

## 2023-02-03 LAB
ALBUMIN SERPL-MCNC: 3.4 G/DL (ref 3.4–5)
ALBUMIN/GLOB SERPL: 0.9 {RATIO} (ref 1–2)
ALP LIVER SERPL-CCNC: 63 U/L
ALT SERPL-CCNC: 16 U/L
ANION GAP SERPL CALC-SCNC: 6 MMOL/L (ref 0–18)
AST SERPL-CCNC: 17 U/L (ref 15–37)
BASOPHILS # BLD AUTO: 0.07 X10(3) UL (ref 0–0.2)
BASOPHILS NFR BLD AUTO: 0.7 %
BILIRUB SERPL-MCNC: 0.3 MG/DL (ref 0.1–2)
BUN BLD-MCNC: 17 MG/DL (ref 7–18)
CALCIUM BLD-MCNC: 9.3 MG/DL (ref 8.5–10.1)
CHLORIDE SERPL-SCNC: 111 MMOL/L (ref 98–112)
CHOLEST SERPL-MCNC: 205 MG/DL (ref ?–200)
CO2 SERPL-SCNC: 26 MMOL/L (ref 21–32)
CREAT BLD-MCNC: 1.26 MG/DL
CREAT UR-SCNC: 109 MG/DL
EOSINOPHIL # BLD AUTO: 0.23 X10(3) UL (ref 0–0.7)
EOSINOPHIL NFR BLD AUTO: 2.3 %
ERYTHROCYTE [DISTWIDTH] IN BLOOD BY AUTOMATED COUNT: 14.6 %
EST. AVERAGE GLUCOSE BLD GHB EST-MCNC: 143 MG/DL (ref 68–126)
FASTING PATIENT LIPID ANSWER: YES
FASTING STATUS PATIENT QL REPORTED: YES
GFR SERPLBLD BASED ON 1.73 SQ M-ARVRAT: 43 ML/MIN/1.73M2 (ref 60–?)
GLOBULIN PLAS-MCNC: 3.6 G/DL (ref 2.8–4.4)
GLUCOSE BLD-MCNC: 90 MG/DL (ref 70–99)
HBA1C MFR BLD: 6.6 % (ref ?–5.7)
HCT VFR BLD AUTO: 41.9 %
HDLC SERPL-MCNC: 89 MG/DL (ref 40–59)
HGB BLD-MCNC: 13.2 G/DL
IMM GRANULOCYTES # BLD AUTO: 0.05 X10(3) UL (ref 0–1)
IMM GRANULOCYTES NFR BLD: 0.5 %
LDLC SERPL CALC-MCNC: 102 MG/DL (ref ?–100)
LYMPHOCYTES # BLD AUTO: 3.06 X10(3) UL (ref 1–4)
LYMPHOCYTES NFR BLD AUTO: 30.2 %
MCH RBC QN AUTO: 29.8 PG (ref 26–34)
MCHC RBC AUTO-ENTMCNC: 31.5 G/DL (ref 31–37)
MCV RBC AUTO: 94.6 FL
MICROALBUMIN UR-MCNC: 3.02 MG/DL
MICROALBUMIN/CREAT 24H UR-RTO: 27.7 UG/MG (ref ?–30)
MONOCYTES # BLD AUTO: 0.96 X10(3) UL (ref 0.1–1)
MONOCYTES NFR BLD AUTO: 9.5 %
NEUTROPHILS # BLD AUTO: 5.77 X10 (3) UL (ref 1.5–7.7)
NEUTROPHILS # BLD AUTO: 5.77 X10(3) UL (ref 1.5–7.7)
NEUTROPHILS NFR BLD AUTO: 56.8 %
NONHDLC SERPL-MCNC: 116 MG/DL (ref ?–130)
OSMOLALITY SERPL CALC.SUM OF ELEC: 297 MOSM/KG (ref 275–295)
PLATELET # BLD AUTO: 417 10(3)UL (ref 150–450)
POTASSIUM SERPL-SCNC: 3.7 MMOL/L (ref 3.5–5.1)
PROT SERPL-MCNC: 7 G/DL (ref 6.4–8.2)
RBC # BLD AUTO: 4.43 X10(6)UL
SODIUM SERPL-SCNC: 143 MMOL/L (ref 136–145)
TRIGL SERPL-MCNC: 78 MG/DL (ref 30–149)
TSI SER-ACNC: 1.67 MIU/ML (ref 0.36–3.74)
VIT D+METAB SERPL-MCNC: 23.6 NG/ML (ref 30–100)
VLDLC SERPL CALC-MCNC: 13 MG/DL (ref 0–30)
WBC # BLD AUTO: 10.1 X10(3) UL (ref 4–11)

## 2023-02-03 PROCEDURE — 84443 ASSAY THYROID STIM HORMONE: CPT

## 2023-02-03 PROCEDURE — 76700 US EXAM ABDOM COMPLETE: CPT | Performed by: FAMILY MEDICINE

## 2023-02-03 PROCEDURE — 36415 COLL VENOUS BLD VENIPUNCTURE: CPT

## 2023-02-03 PROCEDURE — 82306 VITAMIN D 25 HYDROXY: CPT

## 2023-02-03 PROCEDURE — 83036 HEMOGLOBIN GLYCOSYLATED A1C: CPT

## 2023-02-03 PROCEDURE — 80053 COMPREHEN METABOLIC PANEL: CPT

## 2023-02-03 PROCEDURE — 80061 LIPID PANEL: CPT

## 2023-02-03 PROCEDURE — 82570 ASSAY OF URINE CREATININE: CPT

## 2023-02-03 PROCEDURE — 85025 COMPLETE CBC W/AUTO DIFF WBC: CPT

## 2023-02-03 PROCEDURE — 82043 UR ALBUMIN QUANTITATIVE: CPT

## 2023-02-07 ENCOUNTER — TELEPHONE (OUTPATIENT)
Dept: FAMILY MEDICINE CLINIC | Facility: CLINIC | Age: 78
End: 2023-02-07

## 2023-02-07 NOTE — TELEPHONE ENCOUNTER
Pt made aware of lab and US results. Pt requesting for Rx for vitamin D. Okay for Rx? Pt then states when she saw the pain doctor 2 yrs ago while living in [place] she was informed that she \"has tumors in her back\". Pt asking if her pcp knows about and what should she do about this. Please advise. Thank you.

## 2023-02-07 NOTE — TELEPHONE ENCOUNTER
----- Message from Britt Spencer MD sent at 2/7/2023 10:51 AM CST -----  Stable ultrasound. Liver cysts are benign and stable. Spleen cyst is benign appearing and stable. Stable kidney cyst. She does have some gallbladder sludge but no evidence of any infection or acute cholecystitis.

## 2023-02-07 NOTE — TELEPHONE ENCOUNTER
----- Message from Nohemi Ureña MD sent at 2/7/2023 10:53 AM CST -----  1. DM stable. CPM  2. CKD is stable. Continue to push fluids, avoid nephrotoxic meds such as nsaids. 3. Lipids stable. CPM  4. Vit d slightly low - continue vitD3 otc 1000iu daily.

## 2023-02-08 NOTE — TELEPHONE ENCOUNTER
Okay for prescription. Not sure what tumors he was talking about; she has cysts in multiple locations. Otherwise imaging has been negative for any \"tumors. \" Possibly meant lipomas?

## 2023-02-09 RX ORDER — ERGOCALCIFEROL 1.25 MG/1
50000 CAPSULE ORAL WEEKLY
Qty: 8 CAPSULE | Refills: 0 | Status: SHIPPED | OUTPATIENT
Start: 2023-02-09

## 2023-02-09 NOTE — TELEPHONE ENCOUNTER
Called pt and left vm to call office back. Office number provided. Rx sent. Okay for high dose vitamin D for 8 weeks then continue with otc vitamin d3 1000 units daily per Dr. Jerson Metcalf.

## 2023-02-09 NOTE — TELEPHONE ENCOUNTER
Called pt and was informed of below response from provider. All questions/concerns answered. Pt verbalized understanding.

## 2023-02-10 ENCOUNTER — HOSPITAL ENCOUNTER (OUTPATIENT)
Dept: ULTRASOUND IMAGING | Age: 78
Discharge: HOME | End: 2023-02-10
Attending: FAMILY MEDICINE
Payer: MEDICARE

## 2023-02-10 DIAGNOSIS — M25.471 RIGHT ANKLE SWELLING: ICD-10-CM

## 2023-02-10 PROCEDURE — 93971 EXTREMITY STUDY: CPT | Performed by: FAMILY MEDICINE

## 2023-02-14 ENCOUNTER — TELEPHONE (OUTPATIENT)
Dept: FAMILY MEDICINE CLINIC | Facility: CLINIC | Age: 78
End: 2023-02-14

## 2023-02-14 NOTE — TELEPHONE ENCOUNTER
Called pt and was informed of US results. Pt states she is still having some tenderness to right ankle and the lump is still present. Pt also states she is going to schedule sx to open her vein in her left leg. Pt is to have a  peripheral angiography. Pt wanting to know if she should get this surgery as she is worried. Please advise. Thank you.

## 2023-02-15 NOTE — TELEPHONE ENCOUNTER
Returned pt's call and was informed of below response from provider. All questions/concerns addressed. Pt verbalized understanding.

## 2023-02-15 NOTE — TELEPHONE ENCOUNTER
Called pt and was informed of below response from provider. Instructed to contact office with any questions. Office number provided.

## 2023-02-16 ENCOUNTER — TELEPHONE (OUTPATIENT)
Dept: FAMILY MEDICINE CLINIC | Facility: CLINIC | Age: 78
End: 2023-02-16

## 2023-02-16 NOTE — TELEPHONE ENCOUNTER
Phoned pt to notify of Dr. Chapo Mac recommendation to be seen in IC due to her history of C-Diff. Pt will see if her dtr can take her.

## 2023-02-16 NOTE — TELEPHONE ENCOUNTER
Pt states she is having right side discomfort that goes in to her back. She has had this for a few weeks. Pt states she is having a lot of diarhea but she is prone to diarhea, she also was nauseas this morning.

## 2023-02-18 ENCOUNTER — HOSPITAL ENCOUNTER (EMERGENCY)
Age: 78
Discharge: HOME | End: 2023-02-18
Attending: EMERGENCY MEDICINE
Payer: MEDICARE

## 2023-02-18 ENCOUNTER — APPOINTMENT (OUTPATIENT)
Dept: CT IMAGING | Age: 78
End: 2023-02-18
Attending: EMERGENCY MEDICINE
Payer: MEDICARE

## 2023-02-18 VITALS
TEMPERATURE: 97 F | WEIGHT: 163 LBS | BODY MASS INDEX: 27.83 KG/M2 | OXYGEN SATURATION: 99 % | RESPIRATION RATE: 18 BRPM | HEART RATE: 70 BPM | SYSTOLIC BLOOD PRESSURE: 145 MMHG | HEIGHT: 64 IN | DIASTOLIC BLOOD PRESSURE: 58 MMHG

## 2023-02-18 DIAGNOSIS — R19.7 DIARRHEA, UNSPECIFIED TYPE: ICD-10-CM

## 2023-02-18 DIAGNOSIS — Z76.0 ENCOUNTER FOR MEDICATION REFILL: ICD-10-CM

## 2023-02-18 DIAGNOSIS — I10 ESSENTIAL HYPERTENSION: ICD-10-CM

## 2023-02-18 DIAGNOSIS — E03.9 HYPOTHYROIDISM, UNSPECIFIED TYPE: ICD-10-CM

## 2023-02-18 DIAGNOSIS — R10.11 RUQ PAIN: Primary | ICD-10-CM

## 2023-02-18 LAB
ALBUMIN SERPL-MCNC: 3.5 G/DL (ref 3.4–5)
ALBUMIN/GLOB SERPL: 0.9 {RATIO} (ref 1–2)
ALP LIVER SERPL-CCNC: 68 U/L
ALT SERPL-CCNC: 11 U/L
ANION GAP SERPL CALC-SCNC: 6 MMOL/L (ref 0–18)
AST SERPL-CCNC: 14 U/L (ref 15–37)
BASOPHILS # BLD AUTO: 0.08 X10(3) UL (ref 0–0.2)
BASOPHILS NFR BLD AUTO: 0.8 %
BILIRUB SERPL-MCNC: 0.4 MG/DL (ref 0.1–2)
BILIRUB UR QL STRIP.AUTO: NEGATIVE
BUN BLD-MCNC: 16 MG/DL (ref 7–18)
CALCIUM BLD-MCNC: 9.4 MG/DL (ref 8.5–10.1)
CHLORIDE SERPL-SCNC: 108 MMOL/L (ref 98–112)
CLARITY UR REFRACT.AUTO: CLEAR
CO2 SERPL-SCNC: 25 MMOL/L (ref 21–32)
COLOR UR AUTO: YELLOW
CREAT BLD-MCNC: 1.16 MG/DL
EOSINOPHIL # BLD AUTO: 0.08 X10(3) UL (ref 0–0.7)
EOSINOPHIL NFR BLD AUTO: 0.8 %
ERYTHROCYTE [DISTWIDTH] IN BLOOD BY AUTOMATED COUNT: 14.9 %
GFR SERPLBLD BASED ON 1.73 SQ M-ARVRAT: 48 ML/MIN/1.73M2 (ref 60–?)
GLOBULIN PLAS-MCNC: 3.9 G/DL (ref 2.8–4.4)
GLUCOSE BLD-MCNC: 131 MG/DL (ref 70–99)
GLUCOSE UR STRIP.AUTO-MCNC: NEGATIVE MG/DL
HCT VFR BLD AUTO: 42.8 %
HGB BLD-MCNC: 14 G/DL
IMM GRANULOCYTES # BLD AUTO: 0.03 X10(3) UL (ref 0–1)
IMM GRANULOCYTES NFR BLD: 0.3 %
KETONES UR STRIP.AUTO-MCNC: NEGATIVE MG/DL
LEUKOCYTE ESTERASE UR QL STRIP.AUTO: NEGATIVE
LIPASE SERPL-CCNC: 136 U/L (ref 73–393)
LIPASE SERPL-CCNC: 32 U/L (ref 13–75)
LYMPHOCYTES # BLD AUTO: 2.12 X10(3) UL (ref 1–4)
LYMPHOCYTES NFR BLD AUTO: 20 %
MCH RBC QN AUTO: 30 PG (ref 26–34)
MCHC RBC AUTO-ENTMCNC: 32.7 G/DL (ref 31–37)
MCV RBC AUTO: 91.6 FL
MONOCYTES # BLD AUTO: 1.02 X10(3) UL (ref 0.1–1)
MONOCYTES NFR BLD AUTO: 9.6 %
NEUTROPHILS # BLD AUTO: 7.27 X10 (3) UL (ref 1.5–7.7)
NEUTROPHILS # BLD AUTO: 7.27 X10(3) UL (ref 1.5–7.7)
NEUTROPHILS NFR BLD AUTO: 68.5 %
NITRITE UR QL STRIP.AUTO: NEGATIVE
OSMOLALITY SERPL CALC.SUM OF ELEC: 291 MOSM/KG (ref 275–295)
PH UR STRIP.AUTO: 5 [PH] (ref 5–8)
PLATELET # BLD AUTO: 402 10(3)UL (ref 150–450)
POTASSIUM SERPL-SCNC: 4.1 MMOL/L (ref 3.5–5.1)
PROT SERPL-MCNC: 7.4 G/DL (ref 6.4–8.2)
PROT UR STRIP.AUTO-MCNC: NEGATIVE MG/DL
RBC # BLD AUTO: 4.67 X10(6)UL
SODIUM SERPL-SCNC: 139 MMOL/L (ref 136–145)
SP GR UR STRIP.AUTO: 1.01 (ref 1–1.03)
UROBILINOGEN UR STRIP.AUTO-MCNC: 0.2 MG/DL
WBC # BLD AUTO: 10.6 X10(3) UL (ref 4–11)

## 2023-02-18 PROCEDURE — 85025 COMPLETE CBC W/AUTO DIFF WBC: CPT | Performed by: EMERGENCY MEDICINE

## 2023-02-18 PROCEDURE — 83690 ASSAY OF LIPASE: CPT | Performed by: EMERGENCY MEDICINE

## 2023-02-18 PROCEDURE — 99285 EMERGENCY DEPT VISIT HI MDM: CPT

## 2023-02-18 PROCEDURE — 99284 EMERGENCY DEPT VISIT MOD MDM: CPT

## 2023-02-18 PROCEDURE — 81001 URINALYSIS AUTO W/SCOPE: CPT | Performed by: EMERGENCY MEDICINE

## 2023-02-18 PROCEDURE — 96360 HYDRATION IV INFUSION INIT: CPT

## 2023-02-18 PROCEDURE — 74177 CT ABD & PELVIS W/CONTRAST: CPT | Performed by: EMERGENCY MEDICINE

## 2023-02-18 PROCEDURE — 80053 COMPREHEN METABOLIC PANEL: CPT | Performed by: EMERGENCY MEDICINE

## 2023-02-18 PROCEDURE — 81015 MICROSCOPIC EXAM OF URINE: CPT | Performed by: EMERGENCY MEDICINE

## 2023-02-18 RX ORDER — ONDANSETRON 4 MG/1
4 TABLET, ORALLY DISINTEGRATING ORAL EVERY 4 HOURS PRN
Qty: 10 TABLET | Refills: 0 | Status: SHIPPED | OUTPATIENT
Start: 2023-02-18 | End: 2023-02-25

## 2023-02-18 RX ORDER — LEVOTHYROXINE SODIUM 88 MCG
TABLET ORAL
Qty: 45 TABLET | Refills: 3 | Status: SHIPPED | OUTPATIENT
Start: 2023-02-18

## 2023-02-18 RX ORDER — LOSARTAN POTASSIUM 50 MG/1
TABLET ORAL
Qty: 90 TABLET | Refills: 0 | Status: SHIPPED | OUTPATIENT
Start: 2023-02-18

## 2023-02-18 RX ORDER — LEVOTHYROXINE SODIUM 75 MCG
TABLET ORAL
Qty: 45 TABLET | Refills: 3 | Status: SHIPPED | OUTPATIENT
Start: 2023-02-18

## 2023-02-20 ENCOUNTER — TELEPHONE (OUTPATIENT)
Dept: FAMILY MEDICINE CLINIC | Facility: CLINIC | Age: 78
End: 2023-02-20

## 2023-03-01 ENCOUNTER — TELEPHONE (OUTPATIENT)
Dept: FAMILY MEDICINE CLINIC | Facility: CLINIC | Age: 78
End: 2023-03-01

## 2023-03-01 DIAGNOSIS — R19.7 DIARRHEA, UNSPECIFIED TYPE: ICD-10-CM

## 2023-03-01 DIAGNOSIS — Z22.1 CLOSTRIDIUM DIFFICILE CARRIER: Primary | ICD-10-CM

## 2023-03-01 NOTE — TELEPHONE ENCOUNTER
Returned pt's call and was informed she has had intermittent diarrhea for the past couple of weeks. Denies fevers, blood/mucous in diarrhea and recent diarrhea. Pt had x2 episodes of diarrhea yesterday but none today. Pt is scheduled for surgery tomorrow. Advised pt to contact surgeon's office and inform them of her current condition. Pt also requesting for GI referral.   Verbally discussed above with Dr. Ricardo Velazquez. Okay for C. Diff order, GI referral and pt to contact surgeon's office and inform them of her current condition. Pt made aware of Dr. Edouard Wyatt orders. All questions/concerns addressed. Pt verbalized understanding.      Dr. Rcia Wick  [address] [place] [address]  [phone]

## 2023-03-01 NOTE — TELEPHONE ENCOUNTER
Patient is having leg surgery tomorrow and very nervous due to her having this ongoing diarrhea twice a day and wondering if this will interfere with her surgery. She says she knows she needs to see an Gastroenterologist. She just needs Emilia's opinion and wanted to speak with Dr Elvin Jesus.      Please Call patient

## 2023-03-06 ENCOUNTER — TELEPHONE (OUTPATIENT)
Dept: FAMILY MEDICINE CLINIC | Facility: CLINIC | Age: 78
End: 2023-03-06

## 2023-03-06 NOTE — TELEPHONE ENCOUNTER
Pt asking for a tool sample test kit as she is having gastro issues on and off for a few months.  Pt looking for a call back    LOV: 02/01/23

## 2023-03-06 NOTE — TELEPHONE ENCOUNTER
Pt notified that CDiff order was placed & she can stop by any [facility] lab to  Cdiff stool kit to take home & give specimen. Pt said she has not had BM in 2 days. Informed pt that stool can not be formed or test will not be run. All questions answered, pt expresses understanding.

## 2023-03-11 DIAGNOSIS — Z76.0 ENCOUNTER FOR MEDICATION REFILL: ICD-10-CM

## 2023-03-13 RX ORDER — ACYCLOVIR 400 MG/1
TABLET ORAL
Qty: 180 TABLET | Refills: 0 | Status: SHIPPED | OUTPATIENT
Start: 2023-03-13

## 2023-03-21 ENCOUNTER — TELEPHONE (OUTPATIENT)
Dept: FAMILY MEDICINE CLINIC | Facility: CLINIC | Age: 78
End: 2023-03-21

## 2023-03-21 NOTE — TELEPHONE ENCOUNTER
Referral placed on 1/31/2023 for Dr. China Edward and authorized. Faxed referral to [phone]. Received confirmation fax.

## 2023-03-21 NOTE — TELEPHONE ENCOUNTER
Dr. Remberto Rodriguez office called to obtain referral for upcoming appointment tomorrow for pain management. Fax: [phone]    Phone: [phone]    Thank you!

## 2023-03-24 DIAGNOSIS — Z76.0 ENCOUNTER FOR MEDICATION REFILL: ICD-10-CM

## 2023-03-24 RX ORDER — ROSUVASTATIN CALCIUM 10 MG/1
TABLET, COATED ORAL
Qty: 90 TABLET | Refills: 3 | Status: SHIPPED | OUTPATIENT
Start: 2023-03-24

## 2023-03-28 ENCOUNTER — TELEPHONE (OUTPATIENT)
Dept: FAMILY MEDICINE CLINIC | Facility: CLINIC | Age: 78
End: 2023-03-28

## 2023-04-03 RX ORDER — ERGOCALCIFEROL 1.25 MG/1
CAPSULE ORAL
Qty: 8 CAPSULE | Refills: 0 | OUTPATIENT
Start: 2023-04-03

## 2023-04-03 NOTE — TELEPHONE ENCOUNTER
Denied / Pt was given 8 tabs to complete treatment.      PCP:  Vit d slightly low - continue vitD3 otc 1000iu daily

## 2023-04-12 ENCOUNTER — OFFICE VISIT (OUTPATIENT)
Dept: FAMILY MEDICINE CLINIC | Facility: CLINIC | Age: 78
End: 2023-04-12
Payer: MEDICARE

## 2023-04-12 ENCOUNTER — LAB ENCOUNTER (OUTPATIENT)
Dept: LAB | Age: 78
End: 2023-04-12
Attending: NURSE PRACTITIONER
Payer: MEDICARE

## 2023-04-12 ENCOUNTER — HOSPITAL ENCOUNTER (OUTPATIENT)
Dept: GENERAL RADIOLOGY | Age: 78
Discharge: HOME | End: 2023-04-12
Attending: NURSE PRACTITIONER
Payer: MEDICARE

## 2023-04-12 ENCOUNTER — TELEPHONE (OUTPATIENT)
Dept: FAMILY MEDICINE CLINIC | Facility: CLINIC | Age: 78
End: 2023-04-12

## 2023-04-12 VITALS
SYSTOLIC BLOOD PRESSURE: 134 MMHG | HEIGHT: 64 IN | OXYGEN SATURATION: 96 % | DIASTOLIC BLOOD PRESSURE: 70 MMHG | BODY MASS INDEX: 28.34 KG/M2 | HEART RATE: 68 BPM | RESPIRATION RATE: 16 BRPM | WEIGHT: 166 LBS

## 2023-04-12 DIAGNOSIS — R06.2 WHEEZING: ICD-10-CM

## 2023-04-12 DIAGNOSIS — R19.7 DIARRHEA OF PRESUMED INFECTIOUS ORIGIN: ICD-10-CM

## 2023-04-12 DIAGNOSIS — R19.7 DIARRHEA OF PRESUMED INFECTIOUS ORIGIN: Primary | ICD-10-CM

## 2023-04-12 DIAGNOSIS — N76.0 ACUTE VAGINITIS: ICD-10-CM

## 2023-04-12 LAB
ALBUMIN SERPL-MCNC: 3.2 G/DL (ref 3.4–5)
ALBUMIN/GLOB SERPL: 0.9 {RATIO} (ref 1–2)
ALP LIVER SERPL-CCNC: 58 U/L
ALT SERPL-CCNC: 16 U/L
ANION GAP SERPL CALC-SCNC: 8 MMOL/L (ref 0–18)
AST SERPL-CCNC: 10 U/L (ref 15–37)
BASOPHILS # BLD AUTO: 0.07 X10(3) UL (ref 0–0.2)
BASOPHILS NFR BLD AUTO: 0.8 %
BILIRUB SERPL-MCNC: 0.4 MG/DL (ref 0.1–2)
BILIRUB UR QL STRIP.AUTO: NEGATIVE
BUN BLD-MCNC: 16 MG/DL (ref 7–18)
CALCIUM BLD-MCNC: 9 MG/DL (ref 8.5–10.1)
CHLORIDE SERPL-SCNC: 111 MMOL/L (ref 98–112)
CLARITY UR REFRACT.AUTO: CLEAR
CO2 SERPL-SCNC: 23 MMOL/L (ref 21–32)
COLOR UR AUTO: YELLOW
CREAT BLD-MCNC: 1.08 MG/DL
EOSINOPHIL # BLD AUTO: 0.18 X10(3) UL (ref 0–0.7)
EOSINOPHIL NFR BLD AUTO: 2 %
ERYTHROCYTE [DISTWIDTH] IN BLOOD BY AUTOMATED COUNT: 15.1 %
FASTING STATUS PATIENT QL REPORTED: NO
GFR SERPLBLD BASED ON 1.73 SQ M-ARVRAT: 52 ML/MIN/1.73M2 (ref 60–?)
GLOBULIN PLAS-MCNC: 3.7 G/DL (ref 2.8–4.4)
GLUCOSE BLD-MCNC: 109 MG/DL (ref 70–99)
GLUCOSE UR STRIP.AUTO-MCNC: NEGATIVE MG/DL
HCT VFR BLD AUTO: 40.9 %
HGB BLD-MCNC: 12.8 G/DL
IMM GRANULOCYTES # BLD AUTO: 0.03 X10(3) UL (ref 0–1)
IMM GRANULOCYTES NFR BLD: 0.3 %
KETONES UR STRIP.AUTO-MCNC: NEGATIVE MG/DL
LEUKOCYTE ESTERASE UR QL STRIP.AUTO: NEGATIVE
LYMPHOCYTES # BLD AUTO: 1.79 X10(3) UL (ref 1–4)
LYMPHOCYTES NFR BLD AUTO: 19.4 %
MCH RBC QN AUTO: 29.3 PG (ref 26–34)
MCHC RBC AUTO-ENTMCNC: 31.3 G/DL (ref 31–37)
MCV RBC AUTO: 93.6 FL
MONOCYTES # BLD AUTO: 0.86 X10(3) UL (ref 0.1–1)
MONOCYTES NFR BLD AUTO: 9.3 %
NEUTROPHILS # BLD AUTO: 6.28 X10 (3) UL (ref 1.5–7.7)
NEUTROPHILS # BLD AUTO: 6.28 X10(3) UL (ref 1.5–7.7)
NEUTROPHILS NFR BLD AUTO: 68.2 %
NITRITE UR QL STRIP.AUTO: NEGATIVE
OSMOLALITY SERPL CALC.SUM OF ELEC: 296 MOSM/KG (ref 275–295)
PH UR STRIP.AUTO: 5 [PH] (ref 5–8)
PLATELET # BLD AUTO: 358 10(3)UL (ref 150–450)
POTASSIUM SERPL-SCNC: 3.8 MMOL/L (ref 3.5–5.1)
PROT SERPL-MCNC: 6.9 G/DL (ref 6.4–8.2)
PROT UR STRIP.AUTO-MCNC: NEGATIVE MG/DL
RBC # BLD AUTO: 4.37 X10(6)UL
RBC UR QL AUTO: NEGATIVE
SODIUM SERPL-SCNC: 142 MMOL/L (ref 136–145)
SP GR UR STRIP.AUTO: 1.01 (ref 1–1.03)
UROBILINOGEN UR STRIP.AUTO-MCNC: <2 MG/DL
WBC # BLD AUTO: 9.2 X10(3) UL (ref 4–11)

## 2023-04-12 PROCEDURE — 3075F SYST BP GE 130 - 139MM HG: CPT | Performed by: NURSE PRACTITIONER

## 2023-04-12 PROCEDURE — 99214 OFFICE O/P EST MOD 30 MIN: CPT | Performed by: NURSE PRACTITIONER

## 2023-04-12 PROCEDURE — 80053 COMPREHEN METABOLIC PANEL: CPT

## 2023-04-12 PROCEDURE — 85025 COMPLETE CBC W/AUTO DIFF WBC: CPT

## 2023-04-12 PROCEDURE — 36415 COLL VENOUS BLD VENIPUNCTURE: CPT

## 2023-04-12 PROCEDURE — 87510 GARDNER VAG DNA DIR PROBE: CPT | Performed by: NURSE PRACTITIONER

## 2023-04-12 PROCEDURE — 81003 URINALYSIS AUTO W/O SCOPE: CPT

## 2023-04-12 PROCEDURE — 87086 URINE CULTURE/COLONY COUNT: CPT

## 2023-04-12 PROCEDURE — 87480 CANDIDA DNA DIR PROBE: CPT | Performed by: NURSE PRACTITIONER

## 2023-04-12 PROCEDURE — 87660 TRICHOMONAS VAGIN DIR PROBE: CPT | Performed by: NURSE PRACTITIONER

## 2023-04-12 PROCEDURE — 3008F BODY MASS INDEX DOCD: CPT | Performed by: NURSE PRACTITIONER

## 2023-04-12 PROCEDURE — 71046 X-RAY EXAM CHEST 2 VIEWS: CPT | Performed by: NURSE PRACTITIONER

## 2023-04-12 PROCEDURE — 3078F DIAST BP <80 MM HG: CPT | Performed by: NURSE PRACTITIONER

## 2023-04-12 RX ORDER — LOSARTAN POTASSIUM 25 MG/1
TABLET ORAL
COMMUNITY
Start: 2023-02-07

## 2023-04-12 RX ORDER — NYSTATIN 100000 U/G
1 CREAM TOPICAL 2 TIMES DAILY
Qty: 30 G | Refills: 1 | Status: SHIPPED | OUTPATIENT
Start: 2023-04-12 | End: 2023-04-22

## 2023-04-12 RX ORDER — FLUCONAZOLE 150 MG/1
150 TABLET ORAL DAILY
Qty: 3 TABLET | Refills: 0 | Status: SHIPPED | OUTPATIENT
Start: 2023-04-12 | End: 2023-04-15

## 2023-04-12 RX ORDER — CLOPIDOGREL BISULFATE 75 MG/1
75 TABLET ORAL DAILY
COMMUNITY
Start: 2023-03-02

## 2023-04-12 NOTE — TELEPHONE ENCOUNTER
----- Message from SADI Juares sent at 4/12/2023  1:43 PM CDT -----  Negative chest Xray     Left msg for pt with results

## 2023-04-17 ENCOUNTER — TELEPHONE (OUTPATIENT)
Dept: FAMILY MEDICINE CLINIC | Facility: CLINIC | Age: 78
End: 2023-04-17

## 2023-04-17 NOTE — TELEPHONE ENCOUNTER
----- Message from Roselinda Angelucci, APRN sent at 4/13/2023 11:34 AM CDT -----  Negative urine culture

## 2023-04-17 NOTE — TELEPHONE ENCOUNTER
Called pt and left vm per HIPAA of results and recommendation from provider. Instructed to contact office with any questions. Office number provided.

## 2023-04-19 DIAGNOSIS — J44.1 CHRONIC OBSTRUCTIVE PULMONARY DISEASE WITH ACUTE EXACERBATION (HCC): ICD-10-CM

## 2023-04-20 RX ORDER — PENTOSAN POLYSULFATE SODIUM 100 MG/1
CAPSULE, GELATIN COATED ORAL
Qty: 90 CAPSULE | Refills: 1 | Status: SHIPPED | OUTPATIENT
Start: 2023-04-20

## 2023-04-28 ENCOUNTER — TELEPHONE (OUTPATIENT)
Dept: FAMILY MEDICINE CLINIC | Facility: CLINIC | Age: 78
End: 2023-04-28

## 2023-04-28 NOTE — TELEPHONE ENCOUNTER
Diabetic eye exam reports dated 4/26/2023 was received via fax from Dr Yara Mccauley. HM updated. Report placed in Dr Chapo Mac in-box pending review.

## 2023-05-01 DIAGNOSIS — J44.1 CHRONIC OBSTRUCTIVE PULMONARY DISEASE WITH ACUTE EXACERBATION (HCC): ICD-10-CM

## 2023-05-01 RX ORDER — IPRATROPIUM BROMIDE AND ALBUTEROL SULFATE 2.5; .5 MG/3ML; MG/3ML
3 SOLUTION RESPIRATORY (INHALATION) EVERY 6 HOURS PRN
Qty: 360 ML | Refills: 0 | Status: SHIPPED | OUTPATIENT
Start: 2023-05-01

## 2023-05-02 ENCOUNTER — MED REC SCAN ONLY (OUTPATIENT)
Dept: FAMILY MEDICINE CLINIC | Facility: CLINIC | Age: 78
End: 2023-05-02

## 2023-05-02 ENCOUNTER — TELEPHONE (OUTPATIENT)
Dept: FAMILY MEDICINE CLINIC | Facility: CLINIC | Age: 78
End: 2023-05-02

## 2023-05-03 ENCOUNTER — HOSPITAL ENCOUNTER (OUTPATIENT)
Dept: GENERAL RADIOLOGY | Age: 78
Discharge: HOME | End: 2023-05-03
Attending: FAMILY MEDICINE
Payer: MEDICARE

## 2023-05-03 ENCOUNTER — OFFICE VISIT (OUTPATIENT)
Dept: FAMILY MEDICINE CLINIC | Facility: CLINIC | Age: 78
End: 2023-05-03
Payer: MEDICARE

## 2023-05-03 VITALS
RESPIRATION RATE: 18 BRPM | BODY MASS INDEX: 28.17 KG/M2 | DIASTOLIC BLOOD PRESSURE: 74 MMHG | SYSTOLIC BLOOD PRESSURE: 134 MMHG | WEIGHT: 165 LBS | OXYGEN SATURATION: 94 % | HEART RATE: 72 BPM | HEIGHT: 64 IN

## 2023-05-03 DIAGNOSIS — N28.1 RENAL CYST: ICD-10-CM

## 2023-05-03 DIAGNOSIS — N28.9 RENAL INSUFFICIENCY: ICD-10-CM

## 2023-05-03 DIAGNOSIS — E11.42 TYPE 2 DIABETES MELLITUS WITH DIABETIC POLYNEUROPATHY, WITHOUT LONG-TERM CURRENT USE OF INSULIN (HCC): ICD-10-CM

## 2023-05-03 DIAGNOSIS — D73.4 SPLENIC CYST: ICD-10-CM

## 2023-05-03 DIAGNOSIS — K76.89 LIVER CYST: ICD-10-CM

## 2023-05-03 DIAGNOSIS — Z78.0 POSTMENOPAUSAL STATE: ICD-10-CM

## 2023-05-03 DIAGNOSIS — J43.9 PULMONARY EMPHYSEMA, UNSPECIFIED EMPHYSEMA TYPE (HCC): ICD-10-CM

## 2023-05-03 DIAGNOSIS — Z00.00 ENCOUNTER FOR ANNUAL HEALTH EXAMINATION: Primary | ICD-10-CM

## 2023-05-03 DIAGNOSIS — F17.210 CIGARETTE SMOKER: ICD-10-CM

## 2023-05-03 DIAGNOSIS — I26.99 BILATERAL PULMONARY EMBOLISM (HCC): ICD-10-CM

## 2023-05-03 DIAGNOSIS — S30.0XXA CONTUSION OF LOWER BACK, INITIAL ENCOUNTER: ICD-10-CM

## 2023-05-03 DIAGNOSIS — F33.9 DEPRESSION, RECURRENT (HCC): ICD-10-CM

## 2023-05-03 DIAGNOSIS — G89.29 CHRONIC MIDLINE LOW BACK PAIN WITHOUT SCIATICA: ICD-10-CM

## 2023-05-03 DIAGNOSIS — F41.9 ANXIETY: ICD-10-CM

## 2023-05-03 DIAGNOSIS — M54.50 CHRONIC MIDLINE LOW BACK PAIN WITHOUT SCIATICA: ICD-10-CM

## 2023-05-03 DIAGNOSIS — E03.9 HYPOTHYROIDISM, UNSPECIFIED TYPE: ICD-10-CM

## 2023-05-03 DIAGNOSIS — I73.9 PERIPHERAL VASCULAR DISEASE (HCC): ICD-10-CM

## 2023-05-03 PROCEDURE — 1159F MED LIST DOCD IN RCRD: CPT | Performed by: FAMILY MEDICINE

## 2023-05-03 PROCEDURE — G0439 PPPS, SUBSEQ VISIT: HCPCS | Performed by: FAMILY MEDICINE

## 2023-05-03 PROCEDURE — 96160 PT-FOCUSED HLTH RISK ASSMT: CPT | Performed by: FAMILY MEDICINE

## 2023-05-03 PROCEDURE — 1160F RVW MEDS BY RX/DR IN RCRD: CPT | Performed by: FAMILY MEDICINE

## 2023-05-03 PROCEDURE — 72220 X-RAY EXAM SACRUM TAILBONE: CPT | Performed by: FAMILY MEDICINE

## 2023-05-03 PROCEDURE — 1170F FXNL STATUS ASSESSED: CPT | Performed by: FAMILY MEDICINE

## 2023-05-03 PROCEDURE — 1125F AMNT PAIN NOTED PAIN PRSNT: CPT | Performed by: FAMILY MEDICINE

## 2023-05-03 PROCEDURE — 99214 OFFICE O/P EST MOD 30 MIN: CPT | Performed by: FAMILY MEDICINE

## 2023-05-03 PROCEDURE — 3078F DIAST BP <80 MM HG: CPT | Performed by: FAMILY MEDICINE

## 2023-05-03 PROCEDURE — 3075F SYST BP GE 130 - 139MM HG: CPT | Performed by: FAMILY MEDICINE

## 2023-05-03 PROCEDURE — 3008F BODY MASS INDEX DOCD: CPT | Performed by: FAMILY MEDICINE

## 2023-05-03 RX ORDER — BUPROPION HYDROCHLORIDE 150 MG/1
150 TABLET, EXTENDED RELEASE ORAL 2 TIMES DAILY
COMMUNITY
Start: 2023-04-17

## 2023-05-03 RX ORDER — NYSTATIN 100000 U/G
CREAM TOPICAL
COMMUNITY
Start: 2023-04-29

## 2023-05-03 NOTE — TELEPHONE ENCOUNTER
Patient returned call; getting ready to head into to [facility] with PCP today so unable to complete med review prior to appt. Patient will call me back if any questions or concerns.

## 2023-05-04 ENCOUNTER — TELEPHONE (OUTPATIENT)
Dept: FAMILY MEDICINE CLINIC | Facility: CLINIC | Age: 78
End: 2023-05-04

## 2023-05-04 DIAGNOSIS — S30.0XXA CONTUSION OF BUTTOCK, INITIAL ENCOUNTER: Primary | ICD-10-CM

## 2023-05-04 NOTE — TELEPHONE ENCOUNTER
Srinivasan Ashford  LOV: 5/3/2023     Calling for  New condition    Patient experiencing: Pain in Buttock area and wondering  If theres something she can buy to believe her pain. Real sore. Please contact patient      Also, need a new Orthopaedic doctor she cannot travel to [place] to see the doctor recommended . Would like to stay in [place] if possible.

## 2023-05-04 NOTE — TELEPHONE ENCOUNTER
----- Message from Natanael Bang MD sent at 5/3/2023 11:44 PM CDT -----  C/w sacral fracture. Recommend to see ortho. Treatment is generally conservative. Follow supportive care mgmt.  Usually takes 8-12 wks to heal.

## 2023-05-04 NOTE — TELEPHONE ENCOUNTER
Called pt and left vm per HIPAA of results and recommendation from provider. Instructed to contact office with any questions. Office number provided.    Dr. Daniele Rubalcava  [address] [place] [address] [phone]

## 2023-05-04 NOTE — TELEPHONE ENCOUNTER
Returned pt's call and was informed to continue with ibuprofen and norco as directed. Supportive measures provided. Pt states she cannot drive to [place] but will call us back if she decides to f/u with ortho. All questions/concerns addressed. Pt verbalized understanding.

## 2023-05-08 RX ORDER — ERGOCALCIFEROL 1.25 MG/1
CAPSULE ORAL
Qty: 8 CAPSULE | Refills: 0 | OUTPATIENT
Start: 2023-05-08

## 2023-05-09 DIAGNOSIS — J44.1 CHRONIC OBSTRUCTIVE PULMONARY DISEASE WITH ACUTE EXACERBATION (HCC): ICD-10-CM

## 2023-05-09 RX ORDER — IPRATROPIUM BROMIDE AND ALBUTEROL SULFATE 2.5; .5 MG/3ML; MG/3ML
3 SOLUTION RESPIRATORY (INHALATION) EVERY 6 HOURS PRN
Qty: 1080 ML | Refills: 1 | Status: SHIPPED | OUTPATIENT
Start: 2023-05-09 | End: 2023-11-05

## 2023-05-10 RX ORDER — IPRATROPIUM BROMIDE AND ALBUTEROL SULFATE 2.5; .5 MG/3ML; MG/3ML
SOLUTION RESPIRATORY (INHALATION)
Qty: 3240 ML | Refills: 0 | OUTPATIENT
Start: 2023-05-10

## 2023-05-23 ENCOUNTER — TELEPHONE (OUTPATIENT)
Dept: FAMILY MEDICINE CLINIC | Facility: CLINIC | Age: 78
End: 2023-05-23

## 2023-05-23 NOTE — TELEPHONE ENCOUNTER
Pt asking if she can use nystatin 100,000 Units/g External Cream   For her pubic area. She said it's flaming red. She uses this cream for under her breasts when she gets real sweaty.  LOV 5/3/23 Please advise

## 2023-05-23 NOTE — TELEPHONE ENCOUNTER
Pt wants to know if she can use nystatin 100,000 Units/g External Cream   For her pubic area. She said it's flaming red. She uses this cream for under her breasts when she gets real sweaty.

## 2023-05-24 ENCOUNTER — TELEPHONE (OUTPATIENT)
Dept: FAMILY MEDICINE CLINIC | Facility: CLINIC | Age: 78
End: 2023-05-24

## 2023-05-24 RX ORDER — CLOTRIMAZOLE 1 %
1 CREAM (GRAM) TOPICAL 2 TIMES DAILY
Qty: 60 G | Refills: 0 | Status: SHIPPED | OUTPATIENT
Start: 2023-05-24 | End: 2023-06-07

## 2023-05-24 NOTE — TELEPHONE ENCOUNTER
Would recommend clotrimazole instead for groin rash as it is more effective; suspect tinea cruris. F/u if no improvement.

## 2023-05-24 NOTE — TELEPHONE ENCOUNTER
Called pt regarding referral for Dr. Steele. Pt states she called [facility] referral dept and was informed they will sent referral to Dr. Eleni Sanchez office. Pt then had questions regarding Rx sent today. All questions/concerns addressed. Pt verbalized understanding.

## 2023-05-24 NOTE — TELEPHONE ENCOUNTER
Pt is calling in requesting a referral for pain management so that she can go in month to month without having to call us every time.

## 2023-06-02 DIAGNOSIS — G89.4 CHRONIC PAIN SYNDROME: ICD-10-CM

## 2023-06-02 NOTE — TELEPHONE ENCOUNTER
Pt called and is requesting for refills of Diclofenac Sodium 1 % External Gel (Voltaren) LF: 10/03/22 and estradiol 0.1 mg vaginal cream LF: Not by PCP. Please approve or deny pending Rx's. Thank you.    LOV: 05/03/23

## 2023-06-05 RX ORDER — ESTRADIOL 0.1 MG/G
1 CREAM VAGINAL DAILY
Qty: 42.5 G | Refills: 5 | Status: SHIPPED | OUTPATIENT
Start: 2023-06-05

## 2023-06-11 DIAGNOSIS — Z76.0 ENCOUNTER FOR MEDICATION REFILL: ICD-10-CM

## 2023-06-12 RX ORDER — ACYCLOVIR 400 MG/1
TABLET ORAL
Qty: 180 TABLET | Refills: 0 | Status: SHIPPED | OUTPATIENT
Start: 2023-06-12

## 2023-06-14 ENCOUNTER — OFFICE VISIT (OUTPATIENT)
Dept: FAMILY MEDICINE CLINIC | Facility: CLINIC | Age: 78
End: 2023-06-14
Payer: MEDICARE

## 2023-06-14 VITALS
WEIGHT: 168 LBS | HEART RATE: 76 BPM | HEIGHT: 64 IN | RESPIRATION RATE: 23 BRPM | SYSTOLIC BLOOD PRESSURE: 142 MMHG | OXYGEN SATURATION: 97 % | DIASTOLIC BLOOD PRESSURE: 70 MMHG | TEMPERATURE: 98 F | BODY MASS INDEX: 28.68 KG/M2

## 2023-06-14 DIAGNOSIS — I83.813 VARICOSE VEINS OF BOTH LOWER EXTREMITIES WITH PAIN: ICD-10-CM

## 2023-06-14 DIAGNOSIS — E11.65 TYPE 2 DIABETES MELLITUS WITH HYPERGLYCEMIA, WITHOUT LONG-TERM CURRENT USE OF INSULIN (HCC): ICD-10-CM

## 2023-06-14 DIAGNOSIS — G62.9 NEUROPATHY: Primary | ICD-10-CM

## 2023-06-14 DIAGNOSIS — I73.9 PERIPHERAL VASCULAR DISEASE (HCC): ICD-10-CM

## 2023-06-14 PROCEDURE — 1160F RVW MEDS BY RX/DR IN RCRD: CPT | Performed by: FAMILY MEDICINE

## 2023-06-14 PROCEDURE — 3078F DIAST BP <80 MM HG: CPT | Performed by: FAMILY MEDICINE

## 2023-06-14 PROCEDURE — 1159F MED LIST DOCD IN RCRD: CPT | Performed by: FAMILY MEDICINE

## 2023-06-14 PROCEDURE — 3077F SYST BP >= 140 MM HG: CPT | Performed by: FAMILY MEDICINE

## 2023-06-14 PROCEDURE — 99214 OFFICE O/P EST MOD 30 MIN: CPT | Performed by: FAMILY MEDICINE

## 2023-06-14 PROCEDURE — 3008F BODY MASS INDEX DOCD: CPT | Performed by: FAMILY MEDICINE

## 2023-06-14 RX ORDER — DULOXETIN HYDROCHLORIDE 30 MG/1
CAPSULE, DELAYED RELEASE ORAL
Qty: 150 CAPSULE | Refills: 0 | Status: SHIPPED | OUTPATIENT
Start: 2023-06-14 | End: 2023-09-12

## 2023-06-14 NOTE — PATIENT INSTRUCTIONS
Try aspercreme WITH lidocaine every 6 hours as needed (not more than 4 times daily)     Take b12   Take magnesium glycinate     Stop gabapentin   Try duloxetine (cymbalta)

## 2023-06-19 ENCOUNTER — TELEPHONE (OUTPATIENT)
Dept: FAMILY MEDICINE CLINIC | Facility: CLINIC | Age: 78
End: 2023-06-19

## 2023-06-19 DIAGNOSIS — G89.29 CHRONIC MIDLINE LOW BACK PAIN WITHOUT SCIATICA: Primary | ICD-10-CM

## 2023-06-19 DIAGNOSIS — M54.50 CHRONIC MIDLINE LOW BACK PAIN WITHOUT SCIATICA: Primary | ICD-10-CM

## 2023-06-19 NOTE — TELEPHONE ENCOUNTER
Patient requesting referral to see Dr. Steele for trigger point injection for her back.      Pain management   Dr. Malinda Veliz See  Office phone #:  [phone]

## 2023-06-20 ENCOUNTER — TELEPHONE (OUTPATIENT)
Dept: PHYSICAL THERAPY | Facility: HOSPITAL | Age: 78
End: 2023-06-20

## 2023-06-20 ENCOUNTER — TELEPHONE (OUTPATIENT)
Dept: FAMILY MEDICINE CLINIC | Facility: CLINIC | Age: 78
End: 2023-06-20

## 2023-06-20 DIAGNOSIS — I83.813 VARICOSE VEINS OF BOTH LOWER EXTREMITIES WITH PAIN: ICD-10-CM

## 2023-06-20 DIAGNOSIS — G62.9 NEUROPATHY: Primary | ICD-10-CM

## 2023-06-20 DIAGNOSIS — I73.9 PERIPHERAL VASCULAR DISEASE (HCC): ICD-10-CM

## 2023-06-20 NOTE — TELEPHONE ENCOUNTER
Pt called wanting appt with pcp, states she is still in pain in legs(neuropathy), pt was prescribed DULoxetine (CYMBALTA) 30 MG Oral Cap DR Particles by Cristofer Cazares when she saw her 6/14. Pt states she cannot take the medication because it gives her severe diarrhea, pt wanting to get in to see pcp by next week. Pt requesting to be seen next Thursday 6/22.   LOV:6/14/23

## 2023-06-20 NOTE — TELEPHONE ENCOUNTER
Called pt to obtain additional information. Pt c/o bilateral burning and numbness to feet. Pt states she saw Dr. Natalie Sharp on 06/14 and was given cymbalta. Pt states she took cymbalta but it gave her diarrhea so she stopped taking medication. Reviewed gabapentin Rx instructions. Pt also instructed to schedule PT appt. Please advise. Thank you.    LOV: 06/14/23

## 2023-06-21 NOTE — TELEPHONE ENCOUNTER
Phoned pt to notify of Rx dosing insturctions and PCP recommendation to see Neuro.  Contact info provided and referral placed to:  [facility] [place]  [facility] [address]  [address] [facility] Allen Leung 21 [phone]

## 2023-06-22 DIAGNOSIS — I10 ESSENTIAL HYPERTENSION: ICD-10-CM

## 2023-06-22 DIAGNOSIS — Z76.0 ENCOUNTER FOR MEDICATION REFILL: ICD-10-CM

## 2023-06-22 RX ORDER — DULOXETIN HYDROCHLORIDE 30 MG/1
CAPSULE, DELAYED RELEASE ORAL
Qty: 150 CAPSULE | Refills: 0 | OUTPATIENT
Start: 2023-06-22 | End: 2023-09-20

## 2023-06-23 RX ORDER — LOSARTAN POTASSIUM 50 MG/1
TABLET ORAL
Qty: 90 TABLET | Refills: 0 | Status: SHIPPED | OUTPATIENT
Start: 2023-06-23

## 2023-06-23 RX ORDER — ERGOCALCIFEROL 1.25 MG/1
CAPSULE ORAL
Qty: 8 CAPSULE | Refills: 0 | OUTPATIENT
Start: 2023-06-23

## 2023-06-23 RX ORDER — ACYCLOVIR 400 MG/1
TABLET ORAL
Qty: 180 TABLET | Refills: 0 | OUTPATIENT
Start: 2023-06-23

## 2023-06-26 ENCOUNTER — TELEPHONE (OUTPATIENT)
Dept: PHYSICAL THERAPY | Facility: HOSPITAL | Age: 78
End: 2023-06-26

## 2023-07-20 ENCOUNTER — TELEPHONE (OUTPATIENT)
Dept: FAMILY MEDICINE CLINIC | Facility: CLINIC | Age: 78
End: 2023-07-20

## 2023-07-20 NOTE — TELEPHONE ENCOUNTER
Mikhail Harris  LOV: 5/3/2023     Calling for new  Patient experiencing: headache/gums hurt/r. Ear pain/coughing patient states she thinks she has a sinus infection. Patient also has copd  Duration/Onset of symptom: yesterday    Appointment Offered: no appt. Available, patient is willing to do a phone call. Please Advise. Thank you.

## 2023-07-21 ENCOUNTER — VIRTUAL PHONE E/M (OUTPATIENT)
Dept: FAMILY MEDICINE CLINIC | Facility: CLINIC | Age: 78
End: 2023-07-21
Payer: MEDICARE

## 2023-07-21 DIAGNOSIS — J01.90 ACUTE SINUSITIS, RECURRENCE NOT SPECIFIED, UNSPECIFIED LOCATION: Primary | ICD-10-CM

## 2023-07-21 DIAGNOSIS — Z22.1 CLOSTRIDIUM DIFFICILE CARRIER: ICD-10-CM

## 2023-07-21 DIAGNOSIS — J43.9 PULMONARY EMPHYSEMA, UNSPECIFIED EMPHYSEMA TYPE (HCC): ICD-10-CM

## 2023-07-21 PROCEDURE — 99214 OFFICE O/P EST MOD 30 MIN: CPT | Performed by: FAMILY MEDICINE

## 2023-07-21 RX ORDER — VANCOMYCIN HYDROCHLORIDE 125 MG/1
125 CAPSULE ORAL DAILY
Qty: 10 CAPSULE | Refills: 0 | Status: SHIPPED | OUTPATIENT
Start: 2023-07-21 | End: 2023-07-31

## 2023-07-21 RX ORDER — PREDNISONE 20 MG/1
40 TABLET ORAL DAILY
Qty: 10 TABLET | Refills: 0 | Status: SHIPPED | OUTPATIENT
Start: 2023-07-21 | End: 2023-07-26

## 2023-07-21 RX ORDER — DOXYCYCLINE HYCLATE 100 MG/1
100 CAPSULE ORAL 2 TIMES DAILY
Qty: 20 CAPSULE | Refills: 0 | Status: SHIPPED | OUTPATIENT
Start: 2023-07-21 | End: 2023-07-31

## 2023-07-21 NOTE — PROGRESS NOTES
Virtual Telephone Check-In    Jamie Pro verbally consents to a Virtual/Telephone Check-In visit on 07/21/23. Patient has been referred to the [facility] website at [email or web address] to review the yearly Consent to Treat document. Patient understands and accepts financial responsibility for any deductible, co-insurance and/or co-pays associated with this service. Duration of the service: 8 minutes      Summary of topics discussed:     HPI:  She reports rhinorrhea, sinus congestion/pressure, teeth pain, fatigue, cough, ear pain ongoing for the past 2-3 days. Has not taken anything over the counter. She was advised to start mucinex by triage but she has not started yet. She has history of COPD, she has some shortness of breath. No chest pain. No fevers. Appetite is low. No loss of taste or smell. Has history of sinus infections. ROS: pertinent positives and negatives as per HPI     PE:   Gen: AAOx3   Resp: Audible congestion, cough Able to speak full sentences without any SOB  Psych: normal affect     A/P:  1. Acute sinusitis, recurrence not specified, unspecified location  C/w acute sinusitis. Start supportive care mgmt. Push fluids, start humidifier, nasal saline spray, tylenol prn. Will start doxycycline. Given her COPD will also start prednisone as well. Reviewed return/call back precautions. F/u 1wk PRN. - doxycycline 100 MG Oral Cap; Take 1 capsule (100 mg total) by mouth 2 (two) times daily for 10 days. Dispense: 20 capsule; Refill: 0  - predniSONE 20 MG Oral Tab; Take 2 tablets (40 mg total) by mouth daily for 5 days. Dispense: 10 tablet; Refill: 0    2. 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   - vancomycin 125 MG Oral Cap; Take 1 capsule (125 mg total) by mouth daily for 10 days. Dispense: 10 capsule; Refill: 0    3. Pulmonary emphysema, unspecified emphysema type (HCC)  - predniSONE 20 MG Oral Tab;  Take 2 tablets (40 mg total) by mouth daily for 5 days. Dispense: 10 tablet;  Refill: 0        August Padron MD

## 2023-07-26 ENCOUNTER — OFFICE VISIT (OUTPATIENT)
Dept: FAMILY MEDICINE CLINIC | Facility: CLINIC | Age: 78
End: 2023-07-26
Payer: MEDICARE

## 2023-07-26 ENCOUNTER — LAB ENCOUNTER (OUTPATIENT)
Dept: LAB | Age: 78
End: 2023-07-26
Attending: FAMILY MEDICINE
Payer: MEDICARE

## 2023-07-26 VITALS
HEART RATE: 77 BPM | OXYGEN SATURATION: 94 % | SYSTOLIC BLOOD PRESSURE: 132 MMHG | BODY MASS INDEX: 26.98 KG/M2 | HEIGHT: 64 IN | RESPIRATION RATE: 22 BRPM | WEIGHT: 158 LBS | DIASTOLIC BLOOD PRESSURE: 64 MMHG

## 2023-07-26 DIAGNOSIS — J01.90 ACUTE SINUSITIS, RECURRENCE NOT SPECIFIED, UNSPECIFIED LOCATION: Primary | ICD-10-CM

## 2023-07-26 DIAGNOSIS — G62.9 NEUROPATHY: ICD-10-CM

## 2023-07-26 DIAGNOSIS — I10 PRIMARY HYPERTENSION: ICD-10-CM

## 2023-07-26 DIAGNOSIS — E11.42 TYPE 2 DIABETES MELLITUS WITH DIABETIC POLYNEUROPATHY, WITHOUT LONG-TERM CURRENT USE OF INSULIN (HCC): ICD-10-CM

## 2023-07-26 DIAGNOSIS — I73.9 PERIPHERAL VASCULAR DISEASE (HCC): ICD-10-CM

## 2023-07-26 DIAGNOSIS — N18.31 STAGE 3A CHRONIC KIDNEY DISEASE (HCC): ICD-10-CM

## 2023-07-26 DIAGNOSIS — E03.9 HYPOTHYROIDISM, UNSPECIFIED TYPE: ICD-10-CM

## 2023-07-26 LAB
EST. AVERAGE GLUCOSE BLD GHB EST-MCNC: 146 MG/DL (ref 68–126)
HBA1C MFR BLD: 6.7 % (ref ?–5.7)

## 2023-07-26 PROCEDURE — 3078F DIAST BP <80 MM HG: CPT | Performed by: FAMILY MEDICINE

## 2023-07-26 PROCEDURE — 83036 HEMOGLOBIN GLYCOSYLATED A1C: CPT

## 2023-07-26 PROCEDURE — 99214 OFFICE O/P EST MOD 30 MIN: CPT | Performed by: FAMILY MEDICINE

## 2023-07-26 PROCEDURE — 3075F SYST BP GE 130 - 139MM HG: CPT | Performed by: FAMILY MEDICINE

## 2023-07-26 PROCEDURE — 1159F MED LIST DOCD IN RCRD: CPT | Performed by: FAMILY MEDICINE

## 2023-07-26 PROCEDURE — 1160F RVW MEDS BY RX/DR IN RCRD: CPT | Performed by: FAMILY MEDICINE

## 2023-07-26 PROCEDURE — 36415 COLL VENOUS BLD VENIPUNCTURE: CPT

## 2023-07-26 PROCEDURE — 3008F BODY MASS INDEX DOCD: CPT | Performed by: FAMILY MEDICINE

## 2023-07-26 NOTE — PATIENT INSTRUCTIONS
For sinus infection - complete antibiotics. Start mucinex. Continue inhaler/nebulizers. Start humidifier. For neuropathy - continue gabapentin. Follow up with physical therapy. Continue compression stockings.  Can complete Nerve Conduction Study

## 2023-07-27 ENCOUNTER — TELEPHONE (OUTPATIENT)
Dept: FAMILY MEDICINE CLINIC | Facility: CLINIC | Age: 78
End: 2023-07-27

## 2023-07-27 ENCOUNTER — PATIENT OUTREACH (OUTPATIENT)
Dept: CASE MANAGEMENT | Age: 78
End: 2023-07-27

## 2023-07-27 PROBLEM — Z22.1 CLOSTRIDIOIDES DIFFICILE CARRIER: Status: ACTIVE | Noted: 2023-07-27

## 2023-07-27 PROBLEM — I26.99 BILATERAL PULMONARY EMBOLISM (HCC): Status: RESOLVED | Noted: 2021-07-06 | Resolved: 2023-07-27

## 2023-07-27 PROBLEM — I10 PRIMARY HYPERTENSION: Status: ACTIVE | Noted: 2023-07-27

## 2023-07-27 PROBLEM — N18.31 STAGE 3A CHRONIC KIDNEY DISEASE (HCC): Status: ACTIVE | Noted: 2021-07-19

## 2023-07-27 NOTE — TELEPHONE ENCOUNTER
----- Message from Marco Antonio Foster MD sent at 7/27/2023  8:50 AM CDT -----  A1c stable at 6.7%. Given her neuropathy I would recommend better control to see if this helps. Okay to increase to metformin 500mg BID.
Called pt and left vm per HIPAA of results and recommendation from provider. Instructed to contact office with any questions. Office number provided. Rx sent.
no

## 2023-08-09 NOTE — PROGRESS NOTES
Patient identified with a potential need for Chronic Care Management services. Called patient to introduce self and availability of Chronic Care Management services. Patient informed about the following service elements:  Health information sharing - complying with all laws related to privacy and security of their health information  Patient/Insurance Cost sharing - includes deductible and any ongoing copays and/or coinsurance  Only one provider can bill for this service monthly  Patient right to discontinue services at any time for any reason effective last day of current month  Patient verbally accepts to participate in Chronic Care Management services and any associated charges.        Assessment schedule for 8/14/23 @11 am.

## 2023-08-14 RX ORDER — ERGOCALCIFEROL 1.25 MG/1
50000 CAPSULE ORAL WEEKLY
Qty: 8 CAPSULE | Refills: 0 | OUTPATIENT
Start: 2023-08-14

## 2023-09-08 RX ORDER — DULOXETIN HYDROCHLORIDE 30 MG/1
CAPSULE, DELAYED RELEASE ORAL
Qty: 150 CAPSULE | Refills: 0 | OUTPATIENT
Start: 2023-09-08 | End: 2023-12-06

## 2023-09-12 ENCOUNTER — OFFICE VISIT (OUTPATIENT)
Dept: FAMILY MEDICINE CLINIC | Facility: CLINIC | Age: 78
End: 2023-09-12
Payer: MEDICARE

## 2023-09-12 ENCOUNTER — TELEPHONE (OUTPATIENT)
Dept: FAMILY MEDICINE CLINIC | Facility: CLINIC | Age: 78
End: 2023-09-12

## 2023-09-12 ENCOUNTER — HOSPITAL ENCOUNTER (OUTPATIENT)
Dept: GENERAL RADIOLOGY | Age: 78
Discharge: HOME | End: 2023-09-12
Attending: NURSE PRACTITIONER
Payer: MEDICARE

## 2023-09-12 VITALS
BODY MASS INDEX: 27.83 KG/M2 | DIASTOLIC BLOOD PRESSURE: 80 MMHG | HEART RATE: 80 BPM | OXYGEN SATURATION: 94 % | RESPIRATION RATE: 20 BRPM | HEIGHT: 64 IN | SYSTOLIC BLOOD PRESSURE: 124 MMHG | WEIGHT: 163 LBS

## 2023-09-12 DIAGNOSIS — J44.1 ACUTE EXACERBATION OF CHRONIC OBSTRUCTIVE PULMONARY DISEASE (COPD) (HCC): ICD-10-CM

## 2023-09-12 DIAGNOSIS — J44.1 ACUTE EXACERBATION OF CHRONIC OBSTRUCTIVE PULMONARY DISEASE (COPD) (HCC): Primary | ICD-10-CM

## 2023-09-12 PROCEDURE — 3074F SYST BP LT 130 MM HG: CPT | Performed by: NURSE PRACTITIONER

## 2023-09-12 PROCEDURE — 1159F MED LIST DOCD IN RCRD: CPT | Performed by: NURSE PRACTITIONER

## 2023-09-12 PROCEDURE — 71046 X-RAY EXAM CHEST 2 VIEWS: CPT | Performed by: NURSE PRACTITIONER

## 2023-09-12 PROCEDURE — 99214 OFFICE O/P EST MOD 30 MIN: CPT | Performed by: NURSE PRACTITIONER

## 2023-09-12 PROCEDURE — 3079F DIAST BP 80-89 MM HG: CPT | Performed by: NURSE PRACTITIONER

## 2023-09-12 PROCEDURE — 3008F BODY MASS INDEX DOCD: CPT | Performed by: NURSE PRACTITIONER

## 2023-09-12 RX ORDER — METHYLPREDNISOLONE 4 MG/1
TABLET ORAL
Qty: 21 EACH | Refills: 0 | Status: SHIPPED | OUTPATIENT
Start: 2023-09-12

## 2023-09-13 ENCOUNTER — TELEPHONE (OUTPATIENT)
Dept: FAMILY MEDICINE CLINIC | Facility: CLINIC | Age: 78
End: 2023-09-13

## 2023-09-15 ENCOUNTER — TELEPHONE (OUTPATIENT)
Dept: FAMILY MEDICINE CLINIC | Facility: CLINIC | Age: 78
End: 2023-09-15

## 2023-09-15 NOTE — TELEPHONE ENCOUNTER
Pt w/ hx of  chronic diarrhea, incidentally she sees GI on Monday 9/18. Hx of c-diff. Most recent occurrence 4-5 months ago. She was see by Fina Olivera  on 9/12 and prescribed Medrol Dosepak for COPD. Yesterday and today she has had more diarrhea than usual. About 4 bouts. No abdominal pain or fever. I did explain that Medrol is a steroid, does not contain any antibiotic. Has not had any recent antibiotic use so less concerning for c-diff. Will monitor over the weekend and will discuss further w/ GI on Monday. Encouraged her to push fluids in the meantime. ER precautions reviewed. Verbalized understanding.

## 2023-09-15 NOTE — TELEPHONE ENCOUNTER
Pt has diarrhea and is worst that usual after taking methylPREDNISolone (MEDROL) 4 MG Oral Tablet Therapy Pack, pt is wondering if she needs to take vancomycin 125 MG Oral Cap along with that pedication and forgot to as on her last visit.  Please advice

## 2023-09-18 PROBLEM — J44.9 CHRONIC OBSTRUCTIVE PULMONARY DISEASE, UNSPECIFIED (HCC): Status: ACTIVE | Noted: 2021-09-22

## 2023-09-18 PROBLEM — J12.82 PNEUMONIA DUE TO CORONAVIRUS DISEASE 2019: Status: ACTIVE | Noted: 2021-09-22

## 2023-09-18 PROBLEM — M54.41 CHRONIC MIDLINE LOW BACK PAIN WITH BILATERAL SCIATICA: Status: ACTIVE | Noted: 2021-05-30

## 2023-09-18 PROBLEM — U07.1 PNEUMONIA DUE TO CORONAVIRUS DISEASE 2019: Status: ACTIVE | Noted: 2021-09-22

## 2023-09-18 PROBLEM — U07.1 PNEUMONIA DUE TO COVID-19 VIRUS: Status: ACTIVE | Noted: 2023-09-18

## 2023-09-18 PROBLEM — J96.01 ACUTE RESPIRATORY FAILURE WITH HYPOXIA (HCC): Status: ACTIVE | Noted: 2021-09-22

## 2023-09-18 PROBLEM — F41.9 ANXIETY DISORDER: Status: ACTIVE | Noted: 2021-03-20

## 2023-09-18 PROBLEM — M54.42 CHRONIC MIDLINE LOW BACK PAIN WITH BILATERAL SCIATICA: Status: ACTIVE | Noted: 2021-05-30

## 2023-09-18 PROBLEM — H02.401 PTOSIS OF RIGHT EYELID: Status: ACTIVE | Noted: 2019-04-12

## 2023-09-18 PROBLEM — R68.89 FLU-LIKE SYMPTOMS: Status: ACTIVE | Noted: 2023-09-18

## 2023-09-18 PROBLEM — R06.00 DYSPNEA: Status: ACTIVE | Noted: 2023-09-18

## 2023-09-18 PROBLEM — N94.89 VAGINAL BURNING: Status: ACTIVE | Noted: 2021-02-16

## 2023-09-18 PROBLEM — K64.3 GRADE IV HEMORRHOIDS: Status: ACTIVE | Noted: 2021-03-20

## 2023-09-18 PROBLEM — H52.223 REGULAR ASTIGMATISM OF BOTH EYES: Status: ACTIVE | Noted: 2019-04-12

## 2023-09-18 PROBLEM — E11.9 TYPE 2 DIABETES MELLITUS WITHOUT COMPLICATIONS (HCC): Status: ACTIVE | Noted: 2021-09-22

## 2023-09-18 PROBLEM — L08.9 INFECTION OF FINGER: Status: ACTIVE | Noted: 2017-03-24

## 2023-09-18 PROBLEM — J12.82 PNEUMONIA DUE TO COVID-19 VIRUS: Status: ACTIVE | Noted: 2023-09-18

## 2023-09-18 PROBLEM — D13.5 AMPULLARY ADENOMA: Status: ACTIVE | Noted: 2020-08-06

## 2023-09-18 PROBLEM — U07.1 COVID-19: Status: ACTIVE | Noted: 2021-09-22

## 2023-09-18 PROBLEM — G89.29 CHRONIC MIDLINE LOW BACK PAIN WITH BILATERAL SCIATICA: Status: ACTIVE | Noted: 2021-05-30

## 2023-09-18 PROBLEM — N94.9 VAGINAL BURNING: Status: ACTIVE | Noted: 2021-02-16

## 2023-09-18 PROBLEM — N95.2 ATROPHIC VAGINITIS: Status: ACTIVE | Noted: 2021-02-16

## 2023-09-21 DIAGNOSIS — J44.1 CHRONIC OBSTRUCTIVE PULMONARY DISEASE WITH (ACUTE) EXACERBATION (HCC): ICD-10-CM

## 2023-09-21 RX ORDER — FLUTICASONE FUROATE, UMECLIDINIUM BROMIDE AND VILANTEROL TRIFENATATE 100; 62.5; 25 UG/1; UG/1; UG/1
1 POWDER RESPIRATORY (INHALATION) DAILY
Qty: 180 EACH | Refills: 3 | Status: SHIPPED | OUTPATIENT
Start: 2023-09-21

## 2023-09-26 ENCOUNTER — OFFICE VISIT (OUTPATIENT)
Dept: FAMILY MEDICINE CLINIC | Facility: CLINIC | Age: 78
End: 2023-09-26
Payer: MEDICARE

## 2023-09-26 ENCOUNTER — TELEPHONE (OUTPATIENT)
Dept: FAMILY MEDICINE CLINIC | Facility: CLINIC | Age: 78
End: 2023-09-26

## 2023-09-26 VITALS
DIASTOLIC BLOOD PRESSURE: 80 MMHG | BODY MASS INDEX: 27.66 KG/M2 | RESPIRATION RATE: 16 BRPM | HEIGHT: 64 IN | HEART RATE: 68 BPM | WEIGHT: 162 LBS | SYSTOLIC BLOOD PRESSURE: 110 MMHG | OXYGEN SATURATION: 95 %

## 2023-09-26 DIAGNOSIS — H00.014 HORDEOLUM EXTERNUM OF LEFT UPPER EYELID: Primary | ICD-10-CM

## 2023-09-26 PROCEDURE — 99214 OFFICE O/P EST MOD 30 MIN: CPT | Performed by: NURSE PRACTITIONER

## 2023-09-26 PROCEDURE — 3079F DIAST BP 80-89 MM HG: CPT | Performed by: NURSE PRACTITIONER

## 2023-09-26 PROCEDURE — 3074F SYST BP LT 130 MM HG: CPT | Performed by: NURSE PRACTITIONER

## 2023-09-26 PROCEDURE — 1159F MED LIST DOCD IN RCRD: CPT | Performed by: NURSE PRACTITIONER

## 2023-09-26 PROCEDURE — 3008F BODY MASS INDEX DOCD: CPT | Performed by: NURSE PRACTITIONER

## 2023-09-26 RX ORDER — PREDNISONE 20 MG/1
40 TABLET ORAL DAILY
COMMUNITY
Start: 2023-09-22

## 2023-09-26 RX ORDER — VANCOMYCIN HYDROCHLORIDE 125 MG/1
125 CAPSULE ORAL 4 TIMES DAILY
Qty: 20 CAPSULE | Refills: 0 | Status: SHIPPED | OUTPATIENT
Start: 2023-09-26 | End: 2023-10-01

## 2023-09-26 RX ORDER — DOXYCYCLINE HYCLATE 100 MG/1
100 CAPSULE ORAL 2 TIMES DAILY
Qty: 14 CAPSULE | Refills: 0 | Status: SHIPPED | OUTPATIENT
Start: 2023-09-26 | End: 2023-10-03

## 2023-09-26 RX ORDER — LOSARTAN POTASSIUM 25 MG/1
TABLET ORAL
COMMUNITY
Start: 2023-09-20

## 2023-09-26 NOTE — TELEPHONE ENCOUNTER
Pt called stating her ins is not covering this medication:  tobramycin-dexamethasone (TOBRADEX) 0.3-0.1 % Ophthalmic Ointment     Pt is requesting alternative if possible medications is over $200 and she does not have the money to cover the cost.

## 2023-09-27 ENCOUNTER — LAB ENCOUNTER (OUTPATIENT)
Dept: LAB | Age: 78
End: 2023-09-27
Attending: STUDENT IN AN ORGANIZED HEALTH CARE EDUCATION/TRAINING PROGRAM
Payer: MEDICARE

## 2023-09-27 DIAGNOSIS — K52.9 CHRONIC DIARRHEA: ICD-10-CM

## 2023-09-27 PROCEDURE — 87427 SHIGA-LIKE TOXIN AG IA: CPT

## 2023-09-27 PROCEDURE — 87272 CRYPTOSPORIDIUM AG IF: CPT

## 2023-09-27 PROCEDURE — 87045 FECES CULTURE AEROBIC BACT: CPT

## 2023-09-27 PROCEDURE — 82656 EL-1 FECAL QUAL/SEMIQ: CPT

## 2023-09-27 PROCEDURE — 87329 GIARDIA AG IA: CPT

## 2023-09-27 PROCEDURE — 87493 C DIFF AMPLIFIED PROBE: CPT

## 2023-09-27 PROCEDURE — 87177 OVA AND PARASITES SMEARS: CPT

## 2023-09-27 PROCEDURE — 87046 STOOL CULTR AEROBIC BACT EA: CPT

## 2023-09-27 PROCEDURE — 83993 ASSAY FOR CALPROTECTIN FECAL: CPT

## 2023-09-27 PROCEDURE — 87209 SMEAR COMPLEX STAIN: CPT

## 2023-09-27 RX ORDER — NEOMYCIN SULFATE, POLYMYXIN B SULFATE, BACITRACIN ZINC, HYDROCORTISONE 3.5; 10000; 400; 1 MG/G; [USP'U]/G; [USP'U]/G; MG/G
1 OINTMENT OPHTHALMIC 2 TIMES DAILY
Qty: 3.5 G | Refills: 0 | Status: SHIPPED | OUTPATIENT
Start: 2023-09-27 | End: 2023-10-02

## 2023-09-27 NOTE — TELEPHONE ENCOUNTER
Called pt and left vm that alternative medication was sent. Office number provided for any questions.

## 2023-09-28 ENCOUNTER — PATIENT OUTREACH (OUTPATIENT)
Dept: CASE MANAGEMENT | Age: 78
End: 2023-09-28

## 2023-09-28 LAB
C DIFF TOX B STL QL: NEGATIVE
CALPROTECTIN STL-MCNT: 47.3 ΜG/G (ref ?–50)
CRYPTOSP AG STL QL IA: NEGATIVE
G LAMBLIA AG STL QL IA: NEGATIVE

## 2023-09-28 NOTE — PROGRESS NOTES
I called for ccm monthly outreach patient states she was getting ready to go for lunch with her daughter. Patient requested a call back tomorrow.        Total time - min  Total Monthly time -5 min

## 2023-10-05 LAB — PANCREATIC ELAST FECAL: 309 UG ELAST./G

## 2023-10-09 DIAGNOSIS — I10 ESSENTIAL HYPERTENSION: ICD-10-CM

## 2023-10-09 RX ORDER — LOSARTAN POTASSIUM 50 MG/1
50 TABLET ORAL DAILY
Qty: 90 TABLET | Refills: 0 | Status: SHIPPED | OUTPATIENT
Start: 2023-10-09

## 2023-10-16 RX ORDER — CILOSTAZOL 50 MG/1
50 TABLET ORAL 2 TIMES DAILY
Qty: 180 TABLET | Refills: 1 | Status: SHIPPED | OUTPATIENT
Start: 2023-10-16

## 2023-10-16 NOTE — TELEPHONE ENCOUNTER
Medication(s) to Refill:   Requested Prescriptions     Pending Prescriptions Disp Refills    CILOSTAZOL 50 MG Oral Tab [Pharmacy Med Name: CILOSTAZOL 50 MG TABLET] 180 tablet 1     Sig: TAKE 1 TABLET BY MOUTH TWICE A DAY*PRIOR AUTH, SENT TO  4/17, 4/20         Reason for Medication Refill being sent to Provider / Reason Protocol Failed:  [] 90 day refill has already been granted  [] Blood Pressure out of range  [] Labs Abnormal/over due  [] Medication not previously prescribed by Provider  [x] Non-Protocol Medication  [] Controlled Substance   [] Due for appointment- no future appointment scheduled  [] No Follow up specified      Last Time Medication was Filled:  7/19/2023      Last Office Visit with PCP: 9/26/2023    When Patient was Due Back to the Office:    (from when PCP last addressed condition)    Future Appointments:  Future Appointments   Date Time Provider Jany Jackson   10/24/2023  2:30 PM Stanley Serrato MD [facility] GI   10/24/2023  3:00 PM Stanley Serrato MD [facility] SUB GI   10/25/2023  1:00 PM Amilcar Solomon MD [facility] [place]         Last Blood Pressures:  BP Readings from Last 2 Encounters:  09/26/23 : 110/80  09/18/23 : 140/82      Action taken:  [] Refill approved per protocol  [x] Routing to provider for approval

## 2023-10-18 DIAGNOSIS — J44.1 CHRONIC OBSTRUCTIVE PULMONARY DISEASE WITH ACUTE EXACERBATION (HCC): ICD-10-CM

## 2023-10-19 RX ORDER — PENTOSAN POLYSULFATE SODIUM 100 MG/1
100 CAPSULE, GELATIN COATED ORAL DAILY
Qty: 90 CAPSULE | Refills: 1 | Status: SHIPPED | OUTPATIENT
Start: 2023-10-19

## 2023-10-25 ENCOUNTER — OFFICE VISIT (OUTPATIENT)
Dept: FAMILY MEDICINE CLINIC | Facility: CLINIC | Age: 78
End: 2023-10-25
Payer: MEDICARE

## 2023-10-25 VITALS
BODY MASS INDEX: 28.34 KG/M2 | HEIGHT: 64 IN | RESPIRATION RATE: 18 BRPM | OXYGEN SATURATION: 94 % | SYSTOLIC BLOOD PRESSURE: 124 MMHG | HEART RATE: 65 BPM | WEIGHT: 166 LBS | DIASTOLIC BLOOD PRESSURE: 76 MMHG

## 2023-10-25 DIAGNOSIS — E11.42 TYPE 2 DIABETES MELLITUS WITH DIABETIC POLYNEUROPATHY, WITHOUT LONG-TERM CURRENT USE OF INSULIN (HCC): ICD-10-CM

## 2023-10-25 DIAGNOSIS — R91.8 PULMONARY NODULES: ICD-10-CM

## 2023-10-25 DIAGNOSIS — R45.89 DYSPHORIC MOOD: ICD-10-CM

## 2023-10-25 DIAGNOSIS — I10 ESSENTIAL HYPERTENSION: ICD-10-CM

## 2023-10-25 DIAGNOSIS — Z23 NEED FOR VACCINATION: ICD-10-CM

## 2023-10-25 DIAGNOSIS — I73.9 PERIPHERAL VASCULAR DISEASE (HCC): ICD-10-CM

## 2023-10-25 DIAGNOSIS — E03.9 HYPOTHYROIDISM, UNSPECIFIED TYPE: ICD-10-CM

## 2023-10-25 DIAGNOSIS — J44.9 CHRONIC OBSTRUCTIVE PULMONARY DISEASE, UNSPECIFIED COPD TYPE (HCC): Primary | ICD-10-CM

## 2023-10-25 DIAGNOSIS — H00.014 HORDEOLUM EXTERNUM OF LEFT UPPER EYELID: ICD-10-CM

## 2023-10-25 PROBLEM — U07.1 PNEUMONIA DUE TO CORONAVIRUS DISEASE 2019: Status: RESOLVED | Noted: 2021-09-22 | Resolved: 2023-10-25

## 2023-10-25 PROBLEM — J43.9 PULMONARY EMPHYSEMA (HCC): Status: ACTIVE | Noted: 2023-09-18

## 2023-10-25 PROBLEM — U07.1 PNEUMONIA DUE TO COVID-19 VIRUS: Status: RESOLVED | Noted: 2023-09-18 | Resolved: 2023-10-25

## 2023-10-25 PROBLEM — J12.82 PNEUMONIA DUE TO COVID-19 VIRUS: Status: RESOLVED | Noted: 2023-09-18 | Resolved: 2023-10-25

## 2023-10-25 PROBLEM — J12.82 PNEUMONIA DUE TO CORONAVIRUS DISEASE 2019: Status: RESOLVED | Noted: 2021-09-22 | Resolved: 2023-10-25

## 2023-10-25 PROCEDURE — 1159F MED LIST DOCD IN RCRD: CPT | Performed by: FAMILY MEDICINE

## 2023-10-25 PROCEDURE — 99214 OFFICE O/P EST MOD 30 MIN: CPT | Performed by: FAMILY MEDICINE

## 2023-10-25 PROCEDURE — 1160F RVW MEDS BY RX/DR IN RCRD: CPT | Performed by: FAMILY MEDICINE

## 2023-10-25 PROCEDURE — G0008 ADMIN INFLUENZA VIRUS VAC: HCPCS | Performed by: FAMILY MEDICINE

## 2023-10-25 PROCEDURE — 3074F SYST BP LT 130 MM HG: CPT | Performed by: FAMILY MEDICINE

## 2023-10-25 PROCEDURE — 90662 IIV NO PRSV INCREASED AG IM: CPT | Performed by: FAMILY MEDICINE

## 2023-10-25 PROCEDURE — 3008F BODY MASS INDEX DOCD: CPT | Performed by: FAMILY MEDICINE

## 2023-10-25 PROCEDURE — 3078F DIAST BP <80 MM HG: CPT | Performed by: FAMILY MEDICINE

## 2023-10-25 RX ORDER — IPRATROPIUM BROMIDE AND ALBUTEROL SULFATE 2.5; .5 MG/3ML; MG/3ML
3 SOLUTION RESPIRATORY (INHALATION) EVERY 6 HOURS PRN
Qty: 1080 ML | Refills: 1 | Status: SHIPPED | OUTPATIENT
Start: 2023-10-25 | End: 2024-04-22

## 2023-10-25 RX ORDER — ESCITALOPRAM OXALATE 5 MG/1
5 TABLET ORAL DAILY
Qty: 30 TABLET | Refills: 2 | Status: SHIPPED | OUTPATIENT
Start: 2023-10-25 | End: 2023-11-06

## 2023-10-25 RX ORDER — NEBULIZER ACCESSORIES
KIT MISCELLANEOUS
Qty: 1 EACH | Refills: 0 | Status: SHIPPED | OUTPATIENT
Start: 2023-10-25

## 2023-10-25 RX ORDER — ALBUTEROL SULFATE 90 UG/1
2 AEROSOL, METERED RESPIRATORY (INHALATION) EVERY 6 HOURS PRN
Qty: 3 EACH | Refills: 3 | Status: SHIPPED | OUTPATIENT
Start: 2023-10-25

## 2023-10-26 ENCOUNTER — TELEPHONE (OUTPATIENT)
Dept: FAMILY MEDICINE CLINIC | Facility: CLINIC | Age: 78
End: 2023-10-26

## 2023-10-26 NOTE — TELEPHONE ENCOUNTER
Phoned pt who is currently at MD appt and will return call when able to speak. Did advise that Dr. Caio Diallo she find transportation to Dr. Juan Jose Rivera for second opinion. She said, \" No one closer? ? Let me call you back after my appointment to discuss\".

## 2023-10-26 NOTE — TELEPHONE ENCOUNTER
Patient called and asked to get a second opinion from a local pulmonologist - her granddaughter suggested that she see Dr. Darren Gama but he's in [place] and she can't get there. She is wondering if you could recommend/refer her to for a second opinion? Currently she sees Dr. Kinjal Beckford and he wants to do chemo on her and her family is questioning that because of her age. Please call.

## 2023-11-01 ENCOUNTER — TELEPHONE (OUTPATIENT)
Dept: FAMILY MEDICINE CLINIC | Facility: CLINIC | Age: 78
End: 2023-11-01

## 2023-11-01 NOTE — TELEPHONE ENCOUNTER
Received a call from Sami at [facility]. ([phone])  Patient will be seeing the radiation oncologist, Dr. Christiano Olvera; however, they do not have a referral.   Notified Sami that a referral was placed on 10/25 and authorized. Sami requesting to have the referral faxed to her at [phone]. Faxed referral from 10/25 to number listed above. Received confirmation fax.

## 2023-11-02 ENCOUNTER — TELEPHONE (OUTPATIENT)
Dept: FAMILY MEDICINE CLINIC | Facility: CLINIC | Age: 78
End: 2023-11-02

## 2023-11-02 DIAGNOSIS — K64.9 HEMORRHOIDS, UNSPECIFIED HEMORRHOID TYPE: ICD-10-CM

## 2023-11-02 DIAGNOSIS — H00.014 HORDEOLUM EXTERNUM OF LEFT UPPER EYELID: Primary | ICD-10-CM

## 2023-11-02 RX ORDER — ERYTHROMYCIN 5 MG/G
1 OINTMENT OPHTHALMIC NIGHTLY
Qty: 3.5 G | Refills: 0 | Status: SHIPPED | OUTPATIENT
Start: 2023-11-02 | End: 2023-11-03

## 2023-11-02 RX ORDER — HYDROCORTISONE ACETATE 25 MG/1
25 SUPPOSITORY RECTAL 2 TIMES DAILY
Qty: 7 SUPPOSITORY | Refills: 1 | Status: SHIPPED | OUTPATIENT
Start: 2023-11-02 | End: 2023-11-06

## 2023-11-02 NOTE — TELEPHONE ENCOUNTER
Rx for erythromycin sent for her stye. Continue warm compresses. If no improvement see ophthalmology. Will send in RX for anusol suppositories. F/u if no improvement. Push fluids, inc fiber intake. Can use colace prn.

## 2023-11-02 NOTE — TELEPHONE ENCOUNTER
- LOV 9/26/23. Stye getting worse. Pt requesting referral to specialist.   - Hemorrhoids are bleeding quite a lot today. Please advise.

## 2023-11-02 NOTE — TELEPHONE ENCOUNTER
Pt is concerned with infection because her whole eye lid is very red and she is to start radiation tx next week. She saw Norm Atkins 9/26 and was given doxycycline for this issue but she didn't take it at that time. She is asking if she can take this oral antibiotic well? Does she need to take vanco because of her hx of c-diff? Also requesting prescription cream for her hemorrhoids. Thanks. Called at request of Dr Aranda to attend repeat schedule .  [place] vigorous at time of birth with strong, spontaneous cry.   displaying adequate color and tone at time of delivery.  Delayed cord clamping performed.  Brought to warmer dried and stimulated.  Hat placed on head.  Bulb suction to mouth and nose for retained amniotic fluid.  Nasal congestion noted, therefore deep suction performed.   well appearing without respiratory distress. Will be admitted to [facility].  Apgar 9 and 9.

## 2023-11-02 NOTE — TELEPHONE ENCOUNTER
Pt say you 10/25 for stye and reports worsening symptoms, please advise. She is also requesting prescription suppository for bleeding hemorrhoids. Please advise, thanks.

## 2023-11-02 NOTE — TELEPHONE ENCOUNTER
I would try the erythromycin ointment first. Anusol sent over. She is not starting treatment next week, she hasn't even seen the radiation oncologist yet.

## 2023-11-02 NOTE — TELEPHONE ENCOUNTER
Pt called for an update     Pt is stating her eye is hurting and needs antibiotic and hemorrhoids are bleeding and needs suppositories that might be able to help her better than over the counter. Pt stated Dr. Marianne Gibson is aware of the eye and told her if it gets worst to call for antibiotic. Please advice    I did let pt know her message has been submitted to [facility] for review.

## 2023-11-02 NOTE — TELEPHONE ENCOUNTER
[id number] Anahi Masters for referral to see Dr. Aurora Mistry , did not see patient for hemorrhoids and LOV 10/25/23 with PCP

## 2023-11-02 NOTE — TELEPHONE ENCOUNTER
Patient requesting suppository for hemorrhoids. She mentioned Dr. Jean Marie Calderón prescribed her a cream but did not work, please advise.

## 2023-11-02 NOTE — TELEPHONE ENCOUNTER
Patient called stating that the stye she had on her eyelid is now getting worse and last time she saw you you said that she should see a specialist.  Also her hemorrhoids are bleeding quite a lot today. Please advise.

## 2023-11-03 ENCOUNTER — PATIENT OUTREACH (OUTPATIENT)
Dept: CASE MANAGEMENT | Age: 78
End: 2023-11-03

## 2023-11-03 RX ORDER — ERYTHROMYCIN 5 MG/G
1 OINTMENT OPHTHALMIC NIGHTLY
Qty: 3.5 G | Refills: 0 | Status: SHIPPED | OUTPATIENT
Start: 2023-11-03 | End: 2023-11-10

## 2023-11-03 NOTE — PROGRESS NOTES
JERMAINE verified for CCM monthly outreach. I called patient and left a detailed message to call back and that I am new care manager. I will follow up with patient at a later time.       Medical record reviewed including recent office visits and test results

## 2023-11-03 NOTE — TELEPHONE ENCOUNTER
Pt is upset. Looking for a call back by 400 today. Next Tuesday she starts radiology and does not want to go with an eye infection. [address] [facility] has this prescription in stock if we stay w/original. Also has questions about the antidepressant she is on. Please advise.

## 2023-11-03 NOTE — TELEPHONE ENCOUNTER
Called pt to obtain additional information. Pt states lexapro is too strong as she was laying in bed. Pt requesting for alternative medication. Please advise. Thank you. Pt then requesting for erythromycin to be sent to [place] [facility] as [facility] not have medication. Rx sent per office protocol. F/u appt scheduled for 11/06 at 10:40 am. Pt made aware if better than appt can be cancelled. All questions answered and pt verbalized understanding.

## 2023-11-03 NOTE — TELEPHONE ENCOUNTER
Patient is requesting for another alternative since pharmacy does not have erythromycin 5 MG/GM Ophthalmic Ointment.      Pharmacy:   [facility]/pharmacy #[id number]   LOV: 10/25/23

## 2023-11-06 ENCOUNTER — OFFICE VISIT (OUTPATIENT)
Dept: FAMILY MEDICINE CLINIC | Facility: CLINIC | Age: 78
End: 2023-11-06
Payer: MEDICARE

## 2023-11-06 ENCOUNTER — LAB ENCOUNTER (OUTPATIENT)
Dept: LAB | Age: 78
End: 2023-11-06
Attending: STUDENT IN AN ORGANIZED HEALTH CARE EDUCATION/TRAINING PROGRAM
Payer: MEDICARE

## 2023-11-06 VITALS
HEART RATE: 71 BPM | BODY MASS INDEX: 27.66 KG/M2 | SYSTOLIC BLOOD PRESSURE: 132 MMHG | HEIGHT: 64 IN | DIASTOLIC BLOOD PRESSURE: 76 MMHG | WEIGHT: 162 LBS | RESPIRATION RATE: 18 BRPM | OXYGEN SATURATION: 96 %

## 2023-11-06 DIAGNOSIS — H01.001 BLEPHARITIS OF UPPER EYELIDS OF BOTH EYES, UNSPECIFIED TYPE: ICD-10-CM

## 2023-11-06 DIAGNOSIS — H01.004 BLEPHARITIS OF UPPER EYELIDS OF BOTH EYES, UNSPECIFIED TYPE: ICD-10-CM

## 2023-11-06 DIAGNOSIS — K64.9 HEMORRHOIDS, UNSPECIFIED HEMORRHOID TYPE: Primary | ICD-10-CM

## 2023-11-06 DIAGNOSIS — K52.9 CHRONIC DIARRHEA: ICD-10-CM

## 2023-11-06 DIAGNOSIS — R91.8 PULMONARY NODULES: ICD-10-CM

## 2023-11-06 LAB — IGA SERPL-MCNC: 28.8 MG/DL (ref 70–312)

## 2023-11-06 PROCEDURE — 1159F MED LIST DOCD IN RCRD: CPT | Performed by: FAMILY MEDICINE

## 2023-11-06 PROCEDURE — 99214 OFFICE O/P EST MOD 30 MIN: CPT | Performed by: FAMILY MEDICINE

## 2023-11-06 PROCEDURE — 3008F BODY MASS INDEX DOCD: CPT | Performed by: FAMILY MEDICINE

## 2023-11-06 PROCEDURE — 36415 COLL VENOUS BLD VENIPUNCTURE: CPT

## 2023-11-06 PROCEDURE — 86364 TISS TRNSGLTMNASE EA IG CLAS: CPT

## 2023-11-06 PROCEDURE — 1160F RVW MEDS BY RX/DR IN RCRD: CPT | Performed by: FAMILY MEDICINE

## 2023-11-06 PROCEDURE — 3078F DIAST BP <80 MM HG: CPT | Performed by: FAMILY MEDICINE

## 2023-11-06 PROCEDURE — 82784 ASSAY IGA/IGD/IGG/IGM EACH: CPT

## 2023-11-06 PROCEDURE — 3075F SYST BP GE 130 - 139MM HG: CPT | Performed by: FAMILY MEDICINE

## 2023-11-06 RX ORDER — PEAK FLOW METER
1 EACH MISCELLANEOUS AS DIRECTED
COMMUNITY
Start: 2023-11-02

## 2023-11-06 RX ORDER — HYDROCORTISONE 25 MG/G
1 CREAM TOPICAL 2 TIMES DAILY
Qty: 28 G | Refills: 1 | Status: SHIPPED | OUTPATIENT
Start: 2023-11-06 | End: 2023-11-20

## 2023-11-06 RX ORDER — SERTRALINE HYDROCHLORIDE 25 MG/1
25 TABLET, FILM COATED ORAL DAILY
Qty: 30 TABLET | Refills: 2 | Status: SHIPPED | OUTPATIENT
Start: 2023-11-06

## 2023-11-06 RX ORDER — DIBUCAINE 0.28 G/28G
1 OINTMENT TOPICAL 3 TIMES DAILY PRN
Qty: 28 G | Refills: 1 | Status: SHIPPED | OUTPATIENT
Start: 2023-11-06 | End: 2023-11-13

## 2023-11-06 RX ORDER — HYDROCORTISONE ACETATE 25 MG/1
25 SUPPOSITORY RECTAL 2 TIMES DAILY
Qty: 7 SUPPOSITORY | Refills: 1 | Status: CANCELLED | OUTPATIENT
Start: 2023-11-06 | End: 2023-11-20

## 2023-11-06 NOTE — TELEPHONE ENCOUNTER
Discussed with pt regarding below response from provider. All questions answered and pt verbalized understanding. Rx sent.

## 2023-11-07 LAB — TTG IGA SER-ACNC: <0.2 U/ML (ref ?–7)

## 2023-11-08 ENCOUNTER — TELEPHONE (OUTPATIENT)
Dept: FAMILY MEDICINE CLINIC | Facility: CLINIC | Age: 78
End: 2023-11-08

## 2023-11-08 DIAGNOSIS — H01.004 BLEPHARITIS OF UPPER EYELIDS OF BOTH EYES, UNSPECIFIED TYPE: Primary | ICD-10-CM

## 2023-11-08 DIAGNOSIS — E11.42 TYPE 2 DIABETES MELLITUS WITH DIABETIC POLYNEUROPATHY, WITHOUT LONG-TERM CURRENT USE OF INSULIN (HCC): ICD-10-CM

## 2023-11-08 DIAGNOSIS — Z86.19 HISTORY OF CLOSTRIDIUM DIFFICILE INFECTION: ICD-10-CM

## 2023-11-08 DIAGNOSIS — H01.001 BLEPHARITIS OF UPPER EYELIDS OF BOTH EYES, UNSPECIFIED TYPE: Primary | ICD-10-CM

## 2023-11-08 RX ORDER — VANCOMYCIN HYDROCHLORIDE 125 MG/1
125 CAPSULE ORAL DAILY
Qty: 14 CAPSULE | Refills: 0 | Status: SHIPPED | OUTPATIENT
Start: 2023-11-08 | End: 2023-11-22

## 2023-11-08 RX ORDER — DOXYCYCLINE HYCLATE 100 MG/1
100 CAPSULE ORAL 2 TIMES DAILY
Qty: 20 CAPSULE | Refills: 0 | Status: SHIPPED | OUTPATIENT
Start: 2023-11-08 | End: 2023-11-18

## 2023-11-08 NOTE — TELEPHONE ENCOUNTER
Pt calling due to erythromycin 5 MG/GM Ophthalmic Ointment not working. Eyes are not getting better. Swell up especially right eye. Eye lids are \"like balloons\". Wondering if she can get a different RX and eye doctor referral as discussed at [address] 11/6. Please advise.

## 2023-11-08 NOTE — TELEPHONE ENCOUNTER
Will send in RX for doxycycline. Take with food. Will also send in RX for vancomycin for C diff prophylaxis given her history. Refer to ophthalmology, f/u if no improvement. Please review ER precautions with patient.

## 2023-11-08 NOTE — TELEPHONE ENCOUNTER
Returned call to pt to notify of Rx and dosing instructions. Provided contact information for Ophthalmologist. ER Precautions and education provided r/t bilat eyes.

## 2023-11-08 NOTE — TELEPHONE ENCOUNTER
Pt is calling with questions. Thinks she sees RX on her phone. Doesn't want to go pick them up until she hears from us. Said she was told she would hear back from us. Please advise.

## 2023-11-08 NOTE — TELEPHONE ENCOUNTER
Spoke to patient and she saw Dr Latasha Del Valle on  Monday and now the right eye is \"like a balloon\" and the left eye is swollen as well. She has been using the erythromycin ointment since Friday but no changes. Seemed to have gotten worse since using the med  Using warm compresses    Left eye lid has fluid as well but pt kept stating the right eye is the worse    Due to have radiation treatment on Tuesday. Pt is asking about seeing an eye doctor and possibly a different medication to use. Please advise.

## 2023-11-09 ENCOUNTER — HOSPITAL ENCOUNTER (EMERGENCY)
Age: 78
Discharge: HOME | End: 2023-11-09
Attending: EMERGENCY MEDICINE
Payer: MEDICARE

## 2023-11-09 VITALS
OXYGEN SATURATION: 99 % | WEIGHT: 162 LBS | SYSTOLIC BLOOD PRESSURE: 140 MMHG | BODY MASS INDEX: 27.66 KG/M2 | DIASTOLIC BLOOD PRESSURE: 78 MMHG | HEART RATE: 71 BPM | RESPIRATION RATE: 16 BRPM | HEIGHT: 64 IN | TEMPERATURE: 98 F

## 2023-11-09 DIAGNOSIS — H00.019 HORDEOLUM EXTERNUM, UNSPECIFIED LATERALITY: Primary | ICD-10-CM

## 2023-11-09 PROCEDURE — 99283 EMERGENCY DEPT VISIT LOW MDM: CPT

## 2023-11-09 PROCEDURE — 99282 EMERGENCY DEPT VISIT SF MDM: CPT

## 2023-11-09 NOTE — ED INITIAL ASSESSMENT (HPI)
Pt to ed from home with c/o bilat eye redness and inflammation since sept, pt placed on ABX, symptoms have gotten worse, no change in vision

## 2023-11-10 NOTE — TELEPHONE ENCOUNTER
Patient requesting referral for Ophthalmologist . Patient states she needs it by Monday for her appointment.

## 2023-11-10 NOTE — TELEPHONE ENCOUNTER
Message left for pt to call office back, need to confirm which Ophthalmologist she has appt with on Monday so Referral can be placed.

## 2023-11-13 ENCOUNTER — TELEPHONE (OUTPATIENT)
Dept: FAMILY MEDICINE CLINIC | Facility: CLINIC | Age: 78
End: 2023-11-13

## 2023-11-13 ENCOUNTER — OFFICE VISIT (OUTPATIENT)
Dept: FAMILY MEDICINE CLINIC | Facility: CLINIC | Age: 78
End: 2023-11-13
Payer: MEDICARE

## 2023-11-13 VITALS
BODY MASS INDEX: 27.66 KG/M2 | HEART RATE: 81 BPM | SYSTOLIC BLOOD PRESSURE: 133 MMHG | OXYGEN SATURATION: 97 % | TEMPERATURE: 97 F | DIASTOLIC BLOOD PRESSURE: 84 MMHG | WEIGHT: 162 LBS | HEIGHT: 64 IN

## 2023-11-13 DIAGNOSIS — R39.9 UTI SYMPTOMS: Primary | ICD-10-CM

## 2023-11-13 LAB
APPEARANCE: CLEAR
GLUCOSE (URINE DIPSTICK): NEGATIVE MG/DL
KETONES (URINE DIPSTICK): NEGATIVE MG/DL
LEUKOCYTES: NEGATIVE
MULTISTIX LOT#: ABNORMAL NUMERIC
NITRITE, URINE: NEGATIVE
OCCULT BLOOD: NEGATIVE
PH, URINE: 5.5 (ref 4.5–8)
PROTEIN (URINE DIPSTICK): NEGATIVE MG/DL
SPECIFIC GRAVITY: 1.02 (ref 1–1.03)
URINE-COLOR: YELLOW
UROBILINOGEN,SEMI-QN: 0.2 MG/DL (ref 0–1.9)

## 2023-11-13 PROCEDURE — 87086 URINE CULTURE/COLONY COUNT: CPT | Performed by: NURSE PRACTITIONER

## 2023-11-13 NOTE — TELEPHONE ENCOUNTER
Referral was placed 11/10/23 but still pending. Told pt that I would send message to referral department. Pt says eye is improving. I did tell pt that she should follow up with her radiation-oncologist to see if they are comfortable with her proceeding w/ radiation. Verbalized understanding.

## 2023-11-13 NOTE — TELEPHONE ENCOUNTER
Patient went to ER for an eye problem and now needs to see an optho for a f/u. She needs a referral to see:    Dr. Baljeet Carter -  for hordeolum externum    She had an appt today but had to cx due to not having a referral.     Also she's having radiation tomorrow for lungs and she's currently having bouts of diarrhea and now this eye inf - is it ok for her to have radiation? Please advise.

## 2023-11-17 ENCOUNTER — TELEPHONE (OUTPATIENT)
Dept: FAMILY MEDICINE CLINIC | Facility: CLINIC | Age: 78
End: 2023-11-17

## 2023-11-28 ENCOUNTER — TELEPHONE (OUTPATIENT)
Dept: FAMILY MEDICINE CLINIC | Facility: CLINIC | Age: 78
End: 2023-11-28

## 2023-11-28 DIAGNOSIS — Z22.1 CLOSTRIDIUM DIFFICILE CARRIER: Primary | ICD-10-CM

## 2023-11-28 NOTE — TELEPHONE ENCOUNTER
Patient has an eye infection and saw an optho yesterday where they prescribed Clinda but she says she has C. Diff and can't take certain meds without taking another med to calm it down. She's not sure if she should start taking the antibx or if she should wait. Please call patient.

## 2023-11-28 NOTE — TELEPHONE ENCOUNTER
Called pt to obtain additional information. Pt states she saw ophtho yesterday and was prescribed clindamycin for her eye infection. Pt started clinda last night and requesting an Rx of vancomycin as she does not want C. Diff. Denies diarrhea at this time. Please advise. Thank you.    LOV: 11/06/23

## 2023-11-29 RX ORDER — FLUCONAZOLE 150 MG/1
150 TABLET ORAL ONCE
Qty: 1 TABLET | Refills: 0 | Status: SHIPPED | OUTPATIENT
Start: 2023-11-29 | End: 2023-11-29

## 2023-11-29 RX ORDER — CLINDAMYCIN HYDROCHLORIDE 150 MG/1
CAPSULE ORAL
COMMUNITY
Start: 2023-11-27

## 2023-11-29 RX ORDER — VANCOMYCIN HYDROCHLORIDE 125 MG/1
125 CAPSULE ORAL DAILY
Qty: 14 CAPSULE | Refills: 0 | Status: SHIPPED | OUTPATIENT
Start: 2023-11-29 | End: 2023-12-13

## 2023-11-29 NOTE — TELEPHONE ENCOUNTER
Pt requesting tx for yeast infection. Please advise, thanks. Pt was prescribed 7 day course of clindamycin. She started on 11/27. I told pt 10 day course of Vanco was sent to [facility] and advised her to take the entire course of that. Verbalized understanding.

## 2023-11-29 NOTE — TELEPHONE ENCOUNTER
RX sent. Please confirm total duration of clindamycin prescription. She should continue vancomycin at least 3 days after completing clindamycin prescription.

## 2023-12-04 ENCOUNTER — PATIENT OUTREACH (OUTPATIENT)
Dept: CASE MANAGEMENT | Age: 78
End: 2023-12-04

## 2023-12-04 NOTE — PROGRESS NOTES
JERMAINE verified for CCM monthly outreach. I called patient and left a detailed message to call back. I will follow up with patient at a later time.       Medical record reviewed including recent office visits and test results

## 2023-12-06 ENCOUNTER — LAB ENCOUNTER (OUTPATIENT)
Dept: LAB | Age: 78
End: 2023-12-06
Attending: FAMILY MEDICINE
Payer: MEDICARE

## 2023-12-06 ENCOUNTER — OFFICE VISIT (OUTPATIENT)
Dept: FAMILY MEDICINE CLINIC | Facility: CLINIC | Age: 78
End: 2023-12-06
Payer: MEDICARE

## 2023-12-06 VITALS
DIASTOLIC BLOOD PRESSURE: 68 MMHG | OXYGEN SATURATION: 96 % | SYSTOLIC BLOOD PRESSURE: 128 MMHG | RESPIRATION RATE: 20 BRPM | HEIGHT: 64 IN | HEART RATE: 71 BPM | BODY MASS INDEX: 27.31 KG/M2 | WEIGHT: 160 LBS

## 2023-12-06 DIAGNOSIS — E11.42 TYPE 2 DIABETES MELLITUS WITH DIABETIC POLYNEUROPATHY, WITHOUT LONG-TERM CURRENT USE OF INSULIN (HCC): ICD-10-CM

## 2023-12-06 DIAGNOSIS — I73.9 PERIPHERAL VASCULAR DISEASE (HCC): ICD-10-CM

## 2023-12-06 DIAGNOSIS — J43.9 PULMONARY EMPHYSEMA, UNSPECIFIED EMPHYSEMA TYPE (HCC): ICD-10-CM

## 2023-12-06 DIAGNOSIS — K64.9 HEMORRHOIDS, UNSPECIFIED HEMORRHOID TYPE: ICD-10-CM

## 2023-12-06 DIAGNOSIS — H01.004 BLEPHARITIS OF UPPER EYELIDS OF BOTH EYES, UNSPECIFIED TYPE: ICD-10-CM

## 2023-12-06 DIAGNOSIS — E03.9 HYPOTHYROIDISM, UNSPECIFIED TYPE: ICD-10-CM

## 2023-12-06 DIAGNOSIS — N18.31 STAGE 3A CHRONIC KIDNEY DISEASE (HCC): ICD-10-CM

## 2023-12-06 DIAGNOSIS — G89.29 CHRONIC MIDLINE LOW BACK PAIN WITHOUT SCIATICA: ICD-10-CM

## 2023-12-06 DIAGNOSIS — F17.209 NICOTINE DEPENDENCE WITH NICOTINE-INDUCED DISORDER, UNSPECIFIED NICOTINE PRODUCT TYPE: ICD-10-CM

## 2023-12-06 DIAGNOSIS — D80.2 IGA DEFICIENCY (HCC): ICD-10-CM

## 2023-12-06 DIAGNOSIS — M54.50 CHRONIC MIDLINE LOW BACK PAIN WITHOUT SCIATICA: ICD-10-CM

## 2023-12-06 DIAGNOSIS — I10 PRIMARY HYPERTENSION: ICD-10-CM

## 2023-12-06 DIAGNOSIS — C34.11 CANCER OF BRONCHUS OF RIGHT UPPER LOBE (HCC): Primary | ICD-10-CM

## 2023-12-06 DIAGNOSIS — K52.9 CHRONIC DIARRHEA OF UNKNOWN ORIGIN: ICD-10-CM

## 2023-12-06 DIAGNOSIS — F33.9 DEPRESSION, RECURRENT (HCC): ICD-10-CM

## 2023-12-06 DIAGNOSIS — H01.001 BLEPHARITIS OF UPPER EYELIDS OF BOTH EYES, UNSPECIFIED TYPE: ICD-10-CM

## 2023-12-06 DIAGNOSIS — K52.9 CHRONIC DIARRHEA: ICD-10-CM

## 2023-12-06 PROBLEM — U07.1 COVID-19: Status: RESOLVED | Noted: 2021-09-22 | Resolved: 2023-12-06

## 2023-12-06 PROBLEM — N94.9 VAGINAL BURNING: Status: RESOLVED | Noted: 2021-02-16 | Resolved: 2023-12-06

## 2023-12-06 PROBLEM — R91.8 PULMONARY NODULES: Status: ACTIVE | Noted: 2023-12-06

## 2023-12-06 PROBLEM — L08.9 INFECTION OF FINGER: Status: RESOLVED | Noted: 2017-03-24 | Resolved: 2023-12-06

## 2023-12-06 PROBLEM — N94.89 VAGINAL BURNING: Status: RESOLVED | Noted: 2021-02-16 | Resolved: 2023-12-06

## 2023-12-06 PROBLEM — R68.89 FLU-LIKE SYMPTOMS: Status: RESOLVED | Noted: 2023-09-18 | Resolved: 2023-12-06

## 2023-12-06 LAB
EST. AVERAGE GLUCOSE BLD GHB EST-MCNC: 131 MG/DL (ref 68–126)
HBA1C MFR BLD: 6.2 % (ref ?–5.7)
IGA SERPL-MCNC: 28.8 MG/DL (ref 70–312)
IGM SERPL-MCNC: 40.2 MG/DL (ref 43–279)
IMMUNOGLOBULIN PNL SER-MCNC: 1250 MG/DL (ref 791–1643)

## 2023-12-06 PROCEDURE — 81377 HLA II TYPE 1 AG EQUIV LR: CPT

## 2023-12-06 PROCEDURE — 99499 UNLISTED E&M SERVICE: CPT | Performed by: FAMILY MEDICINE

## 2023-12-06 PROCEDURE — 86364 TISS TRNSGLTMNASE EA IG CLAS: CPT

## 2023-12-06 PROCEDURE — 86258 DGP ANTIBODY EACH IG CLASS: CPT

## 2023-12-06 PROCEDURE — 82784 ASSAY IGA/IGD/IGG/IGM EACH: CPT

## 2023-12-06 PROCEDURE — 3078F DIAST BP <80 MM HG: CPT | Performed by: FAMILY MEDICINE

## 2023-12-06 PROCEDURE — 1159F MED LIST DOCD IN RCRD: CPT | Performed by: FAMILY MEDICINE

## 2023-12-06 PROCEDURE — 83036 HEMOGLOBIN GLYCOSYLATED A1C: CPT

## 2023-12-06 PROCEDURE — 1160F RVW MEDS BY RX/DR IN RCRD: CPT | Performed by: FAMILY MEDICINE

## 2023-12-06 PROCEDURE — 36415 COLL VENOUS BLD VENIPUNCTURE: CPT

## 2023-12-06 PROCEDURE — 99214 OFFICE O/P EST MOD 30 MIN: CPT | Performed by: FAMILY MEDICINE

## 2023-12-06 PROCEDURE — 3008F BODY MASS INDEX DOCD: CPT | Performed by: FAMILY MEDICINE

## 2023-12-06 PROCEDURE — 3074F SYST BP LT 130 MM HG: CPT | Performed by: FAMILY MEDICINE

## 2023-12-06 RX ORDER — HYDROCORTISONE ACETATE 25 MG/1
25 SUPPOSITORY RECTAL AS NEEDED
COMMUNITY
Start: 2023-11-06

## 2023-12-06 RX ORDER — FLUCONAZOLE 150 MG/1
150 TABLET ORAL ONCE
COMMUNITY
Start: 2023-11-29

## 2023-12-07 LAB
GLIADIN IGG SER-ACNC: <0.6 U/ML (ref ?–7)
TTG IGG SER-ACNC: <0.6 U/ML (ref ?–7)

## 2023-12-07 RX ORDER — ESTRADIOL 0.1 MG/G
1 CREAM VAGINAL DAILY
Qty: 42.5 G | Refills: 5 | Status: SHIPPED | OUTPATIENT
Start: 2023-12-07

## 2023-12-13 ENCOUNTER — TELEPHONE (OUTPATIENT)
Dept: FAMILY MEDICINE CLINIC | Facility: CLINIC | Age: 78
End: 2023-12-13

## 2023-12-13 LAB
DQ2 (DQA1 0501/0505,DQB1 02XX): NEGATIVE
DQ8 (DQA1 03XX, DQB1 0302): NEGATIVE

## 2023-12-13 NOTE — TELEPHONE ENCOUNTER
----- Message from Marco Antonio Foster MD sent at 12/13/2023  2:57 PM CST -----  1. DM stable, cpm.   2. IgA, IgM levels low. Added on IgG (not sure why this didn't result, added on again). Not sure if this could be related to her cancer. Please have her relay these results to her oncologist to see what their opinion is on this.

## 2023-12-13 NOTE — TELEPHONE ENCOUNTER
Called pt and was informed of results/recommendations from provider. Pt states she is not taking metformin. Okay for pt to continue to not take metformin? Please advise. Thank you. Pt then states immunoglobulin levels were low prior to starting radiation. Pt will get lab draw next week when she has a ride. All questions answered and pt verbalized understanding.

## 2023-12-13 NOTE — TELEPHONE ENCOUNTER
Called pt and left vm to call office back. Office number provided. Procedure start    Chest port placement moderate sedation

## 2023-12-14 RX ORDER — MULTIVIT WITH MINERALS/LUTEIN
1000 TABLET ORAL DAILY
COMMUNITY

## 2023-12-14 RX ORDER — HYDROCODONE BITARTRATE AND ACETAMINOPHEN 5; 325 MG/1; MG/1
1 TABLET ORAL 2 TIMES DAILY PRN
COMMUNITY

## 2023-12-14 RX ORDER — LORATADINE 10 MG/1
10 TABLET, ORALLY DISINTEGRATING ORAL DAILY
COMMUNITY

## 2023-12-14 RX ORDER — TURMERIC 400 MG
1 CAPSULE ORAL DAILY
COMMUNITY

## 2023-12-14 RX ORDER — ZINC SULFATE 50(220)MG
220 CAPSULE ORAL DAILY
COMMUNITY

## 2023-12-14 RX ORDER — CHLORAL HYDRATE 500 MG
1000 CAPSULE ORAL DAILY
COMMUNITY

## 2023-12-14 RX ORDER — SODIUM CHLORIDE, SODIUM LACTATE, POTASSIUM CHLORIDE, CALCIUM CHLORIDE 600; 310; 30; 20 MG/100ML; MG/100ML; MG/100ML; MG/100ML
INJECTION, SOLUTION INTRAVENOUS CONTINUOUS
OUTPATIENT
Start: 2023-12-14

## 2023-12-16 DIAGNOSIS — F41.9 ANXIETY AND DEPRESSION: ICD-10-CM

## 2023-12-16 DIAGNOSIS — Z76.0 MEDICATION REFILL: ICD-10-CM

## 2023-12-16 DIAGNOSIS — F32.A ANXIETY AND DEPRESSION: ICD-10-CM

## 2023-12-18 RX ORDER — POTASSIUM CHLORIDE 1500 MG/1
1 TABLET, EXTENDED RELEASE ORAL DAILY
Qty: 90 TABLET | Refills: 1 | Status: SHIPPED | OUTPATIENT
Start: 2023-12-18

## 2023-12-18 RX ORDER — BUSPIRONE HYDROCHLORIDE 10 MG/1
10 TABLET ORAL 2 TIMES DAILY
Qty: 180 TABLET | Refills: 1 | Status: SHIPPED | OUTPATIENT
Start: 2023-12-18

## 2023-12-20 ENCOUNTER — LAB ENCOUNTER (OUTPATIENT)
Dept: LAB | Age: 78
End: 2023-12-20
Attending: FAMILY MEDICINE
Payer: MEDICARE

## 2023-12-20 DIAGNOSIS — D80.2 IGA DEFICIENCY (HCC): ICD-10-CM

## 2023-12-20 LAB — IMMUNOGLOBULIN PNL SER-MCNC: 1330 MG/DL (ref 791–1643)

## 2023-12-20 PROCEDURE — 36415 COLL VENOUS BLD VENIPUNCTURE: CPT

## 2023-12-20 PROCEDURE — 82784 ASSAY IGA/IGD/IGG/IGM EACH: CPT

## 2023-12-26 DIAGNOSIS — F32.A ANXIETY AND DEPRESSION: ICD-10-CM

## 2023-12-26 DIAGNOSIS — F41.9 ANXIETY AND DEPRESSION: ICD-10-CM

## 2023-12-27 RX ORDER — ALPRAZOLAM 0.5 MG/1
TABLET ORAL
Qty: 45 TABLET | Refills: 2 | Status: SHIPPED | OUTPATIENT
Start: 2023-12-27

## 2023-12-30 DIAGNOSIS — I10 ESSENTIAL HYPERTENSION: ICD-10-CM

## 2024-01-01 RX ORDER — LOSARTAN POTASSIUM 25 MG/1
25 TABLET ORAL DAILY
Qty: 90 TABLET | Refills: 1 | OUTPATIENT
Start: 2024-01-01

## 2024-01-02 ENCOUNTER — TELEPHONE (OUTPATIENT)
Dept: FAMILY MEDICINE CLINIC | Facility: CLINIC | Age: 79
End: 2024-01-02

## 2024-01-02 RX ORDER — LOSARTAN POTASSIUM 25 MG/1
25 TABLET ORAL DAILY
Qty: 90 TABLET | Refills: 0 | Status: SHIPPED | OUTPATIENT
Start: 2024-01-02

## 2024-01-02 NOTE — TELEPHONE ENCOUNTER
Called pt and reminded why she needs to f/u with oncologist. See TE 12/13/23. All questions answered and pt verbalized understanding.

## 2024-01-02 NOTE — TELEPHONE ENCOUNTER
PT states that she was called by an EMG nurse and told to schedule an appointment with oncologist and was not told why. Pt would like a call back to discuss why she needs to schedule an appointment with an oncologist. Reference TE on 12/20/2023 - Last message was read to pt, she is seeking further clarification. Pt declined a follow up appt. Pt would like for  Or RN to clarify why she needs to contact the oncologist to restart radiation.     Pt has other questions regarding radiation.

## 2024-01-03 ENCOUNTER — PATIENT OUTREACH (OUTPATIENT)
Dept: CASE MANAGEMENT | Age: 79
End: 2024-01-03

## 2024-01-15 ENCOUNTER — HOSPITAL ENCOUNTER (OUTPATIENT)
Facility: HOSPITAL | Age: 79
Setting detail: HOSPITAL OUTPATIENT SURGERY
Discharge: HOME | End: 2024-01-15
Attending: STUDENT IN AN ORGANIZED HEALTH CARE EDUCATION/TRAINING PROGRAM | Admitting: STUDENT IN AN ORGANIZED HEALTH CARE EDUCATION/TRAINING PROGRAM
Payer: MEDICARE

## 2024-01-15 ENCOUNTER — ANESTHESIA (OUTPATIENT)
Dept: ENDOSCOPY | Facility: HOSPITAL | Age: 79
End: 2024-01-15
Payer: MEDICARE

## 2024-01-15 ENCOUNTER — ANESTHESIA EVENT (OUTPATIENT)
Dept: ENDOSCOPY | Facility: HOSPITAL | Age: 79
End: 2024-01-15
Payer: MEDICARE

## 2024-01-15 VITALS
SYSTOLIC BLOOD PRESSURE: 111 MMHG | OXYGEN SATURATION: 92 % | RESPIRATION RATE: 18 BRPM | HEART RATE: 66 BPM | TEMPERATURE: 99 F | BODY MASS INDEX: 27.31 KG/M2 | HEIGHT: 64 IN | DIASTOLIC BLOOD PRESSURE: 60 MMHG | WEIGHT: 160 LBS

## 2024-01-15 DIAGNOSIS — D13.2 DUODENAL ADENOMA: ICD-10-CM

## 2024-01-15 DIAGNOSIS — Z92.29 HX OF LONG TERM USE OF BLOOD THINNERS: ICD-10-CM

## 2024-01-15 DIAGNOSIS — Z80.0 FAMILY HISTORY OF COLON CANCER: ICD-10-CM

## 2024-01-15 DIAGNOSIS — K52.9 CHRONIC DIARRHEA OF UNKNOWN ORIGIN: ICD-10-CM

## 2024-01-15 PROCEDURE — 0DBM8ZX EXCISION OF DESCENDING COLON, VIA NATURAL OR ARTIFICIAL OPENING ENDOSCOPIC, DIAGNOSTIC: ICD-10-PCS | Performed by: STUDENT IN AN ORGANIZED HEALTH CARE EDUCATION/TRAINING PROGRAM

## 2024-01-15 PROCEDURE — 0DBH8ZX EXCISION OF CECUM, VIA NATURAL OR ARTIFICIAL OPENING ENDOSCOPIC, DIAGNOSTIC: ICD-10-PCS | Performed by: STUDENT IN AN ORGANIZED HEALTH CARE EDUCATION/TRAINING PROGRAM

## 2024-01-15 PROCEDURE — 0DBB8ZX EXCISION OF ILEUM, VIA NATURAL OR ARTIFICIAL OPENING ENDOSCOPIC, DIAGNOSTIC: ICD-10-PCS | Performed by: STUDENT IN AN ORGANIZED HEALTH CARE EDUCATION/TRAINING PROGRAM

## 2024-01-15 PROCEDURE — 0DBE8ZX EXCISION OF LARGE INTESTINE, VIA NATURAL OR ARTIFICIAL OPENING ENDOSCOPIC, DIAGNOSTIC: ICD-10-PCS | Performed by: STUDENT IN AN ORGANIZED HEALTH CARE EDUCATION/TRAINING PROGRAM

## 2024-01-15 PROCEDURE — 0DB98ZX EXCISION OF DUODENUM, VIA NATURAL OR ARTIFICIAL OPENING ENDOSCOPIC, DIAGNOSTIC: ICD-10-PCS | Performed by: STUDENT IN AN ORGANIZED HEALTH CARE EDUCATION/TRAINING PROGRAM

## 2024-01-15 PROCEDURE — 88305 TISSUE EXAM BY PATHOLOGIST: CPT | Performed by: STUDENT IN AN ORGANIZED HEALTH CARE EDUCATION/TRAINING PROGRAM

## 2024-01-15 PROCEDURE — 88342 IMHCHEM/IMCYTCHM 1ST ANTB: CPT | Performed by: STUDENT IN AN ORGANIZED HEALTH CARE EDUCATION/TRAINING PROGRAM

## 2024-01-15 PROCEDURE — 0DB78ZX EXCISION OF STOMACH, PYLORUS, VIA NATURAL OR ARTIFICIAL OPENING ENDOSCOPIC, DIAGNOSTIC: ICD-10-PCS | Performed by: STUDENT IN AN ORGANIZED HEALTH CARE EDUCATION/TRAINING PROGRAM

## 2024-01-15 RX ORDER — SODIUM CHLORIDE, SODIUM LACTATE, POTASSIUM CHLORIDE, CALCIUM CHLORIDE 600; 310; 30; 20 MG/100ML; MG/100ML; MG/100ML; MG/100ML
INJECTION, SOLUTION INTRAVENOUS CONTINUOUS PRN
Status: DISCONTINUED | OUTPATIENT
Start: 2024-01-15 | End: 2024-01-15 | Stop reason: SURG

## 2024-01-15 RX ORDER — NALOXONE HYDROCHLORIDE 0.4 MG/ML
0.08 INJECTION, SOLUTION INTRAMUSCULAR; INTRAVENOUS; SUBCUTANEOUS ONCE AS NEEDED
Status: DISCONTINUED | OUTPATIENT
Start: 2024-01-15 | End: 2024-01-15

## 2024-01-15 RX ORDER — SODIUM CHLORIDE, SODIUM LACTATE, POTASSIUM CHLORIDE, CALCIUM CHLORIDE 600; 310; 30; 20 MG/100ML; MG/100ML; MG/100ML; MG/100ML
INJECTION, SOLUTION INTRAVENOUS CONTINUOUS
Status: DISCONTINUED | OUTPATIENT
Start: 2024-01-15 | End: 2024-01-15

## 2024-01-15 RX ORDER — LIDOCAINE HYDROCHLORIDE 10 MG/ML
INJECTION, SOLUTION EPIDURAL; INFILTRATION; INTRACAUDAL; PERINEURAL AS NEEDED
Status: DISCONTINUED | OUTPATIENT
Start: 2024-01-15 | End: 2024-01-15 | Stop reason: SURG

## 2024-01-15 RX ORDER — EPHEDRINE SULFATE 50 MG/ML
INJECTION INTRAVENOUS AS NEEDED
Status: DISCONTINUED | OUTPATIENT
Start: 2024-01-15 | End: 2024-01-15 | Stop reason: SURG

## 2024-01-15 RX ADMIN — SODIUM CHLORIDE, SODIUM LACTATE, POTASSIUM CHLORIDE, CALCIUM CHLORIDE: 600; 310; 30; 20 INJECTION, SOLUTION INTRAVENOUS at 08:34:00

## 2024-01-15 RX ADMIN — LIDOCAINE HYDROCHLORIDE 5 ML: 10 INJECTION, SOLUTION EPIDURAL; INFILTRATION; INTRACAUDAL; PERINEURAL at 08:05:00

## 2024-01-15 RX ADMIN — SODIUM CHLORIDE, SODIUM LACTATE, POTASSIUM CHLORIDE, CALCIUM CHLORIDE: 600; 310; 30; 20 INJECTION, SOLUTION INTRAVENOUS at 08:02:00

## 2024-01-15 RX ADMIN — EPHEDRINE SULFATE 10 MG: 50 INJECTION INTRAVENOUS at 08:15:00

## 2024-01-15 NOTE — DISCHARGE INSTRUCTIONS
Home Care Instructions for Colonoscopy and EGD With Sedation    Diet:  - Resume your regular diet as tolerated unless otherwise instructed.  - start with light meals to minimize bloating.  - Do not drink alcohol today.    Medication:  - If you have questions about resuming your normal medications, please contact your Primary Care Physician.    Activities:  - Take it easy today. Do not return to work today.  - Do not drive today.  - Do not operate any machinery today (including kitchen equipment).    Colonoscopy:  - You may notice some rectal \"spotting\" (a little blood on the toilet tissue) for a day or two after the exam. This is normal.  - If you experience any rectal bleeding (not spotting), persistent tenderness or sharp severe abdominal pains, oral temperature over 100 degrees Farenheit, light-headedness or dizziness, or any other problems, contact your doctor.    EGD:  - You may have a sore throat for 2-3 days following the exam. This is normal. Gargling with warm salt water (1/2 tsp salt to 1 glass warm water) or using throat lozenges will help.  - If you experience any sharp pain in your neck, abdomen or chest, vomiting of blood, oral temperature over 100 degrees Farenheit, light-headedness or dizziness, or any other problems, contact your doctor.    **If unable to reach your doctor, please go to the [facility] Emergency Room**    - Your referring physician will receive a full report of your examination.  - If you do not hear from your doctor's office within two weeks of your biopsy, please call them for your results.    Additional Comments/Instructions (if applicable):

## 2024-01-15 NOTE — ANESTHESIA POSTPROCEDURE EVALUATION
[facility]    Gabriela Nidaye Patient Status:  Hospital Outpatient Surgery   Age/Gender 78 year old female MRN [id number]   Location [facility] Attending Tirso Kimball MD   Hosp Day # 0 PCP August Coley MD       Anesthesia Post-op Note    ESOPHAGOGASTRODUODENOSCOPY (EGD) with biopsy and colonoscopy with biopsy, cold snare polypectomy    Procedure Summary       Date: 01/15/24 Room / Location:  ENDOSCOPY 02 /  ENDOSCOPY    Anesthesia Start: 0802 Anesthesia Stop: 0841    Procedures:       ESOPHAGOGASTRODUODENOSCOPY (EGD) with biopsy and colonoscopy with biopsy, cold snare polypectomy      COLONOSCOPY Diagnosis:       Family history of colon cancer      Duodenal adenoma      Chronic diarrhea of unknown origin      Hx of long term use of blood thinners      (egd: duodenal lesion, colon: polyp,lipoma)    Surgeons: Tirso Kimball MD Anesthesiologist: Mason Bran MD    Anesthesia Type: MAC ASA Status: 3            Anesthesia Type: MAC    Vitals Value Taken Time   /60 01/15/24 0905   Temp  01/15/24 0930   Pulse 66 01/15/24 0905   Resp 18 01/15/24 0840   SpO2 92 % 01/15/24 0905       Patient Location: Endoscopy    Anesthesia Type: MAC    Airway Patency: patent    Postop Pain Control: adequate    Mental Status: preanesthetic baseline    Nausea/Vomiting: none    Cardiopulmonary/Hydration status: stable euvolemic    Complications: no apparent anesthesia related complications    Postop vital signs: stable    Dental Exam: Unchanged from Preop    Patient to be discharged home when criteria met.

## 2024-01-15 NOTE — ANESTHESIA PREPROCEDURE EVALUATION
PRE-OP EVALUATION    Patient Name: Gabriela Ndiaye    Admit Diagnosis: Family history of colon cancer [Z80.0]  Duodenal adenoma [D13.2]  Chronic diarrhea of unknown origin [K52.9]  Hx of long term use of blood thinners [Z92.29]    Pre-op Diagnosis: Family history of colon cancer [Z80.0]  Duodenal adenoma [D13.2]  Chronic diarrhea of unknown origin [K52.9]  Hx of long term use of blood thinners [Z92.29]    ESOPHAGOGASTRODUODENOSCOPY (EGD) and colonoscopy    Anesthesia Procedure: ESOPHAGOGASTRODUODENOSCOPY (EGD) and colonoscopy  COLONOSCOPY    Surgeon(s) and Role:     * Tirso Kimball MD - Primary    Pre-op vitals reviewed.  Temp: 98.6 °F (37 °C)  Pulse: 63  Resp: 20  BP: 154/80  SpO2: 94 %  Body mass index is 27.46 kg/m².    Current medications reviewed.  Hospital Medications:  No current facility-administered medications on file as of 1/15/2024.       Outpatient Medications:     Medications Prior to Admission   Medication Sig Dispense Refill Last Dose    B Complex Vitamins (B COMPLEX-B12 OR) Take 1 tablet by mouth daily.       Turmeric 400 MG Oral Cap Take 1 capsule by mouth daily.       Ascorbic Acid (VITAMIN C) 1000 MG Oral Tab Take 1 tablet (1,000 mg total) by mouth daily.       zinc sulfate 220 (50 Zn) MG Oral Cap Take 1 capsule (220 mg total) by mouth daily.       omega-3 fatty acids 1000 MG Oral Cap Take 1,000 mg by mouth daily.       HYDROcodone-acetaminophen 5-325 MG Oral Tab Take 1 tablet by mouth 2 (two) times daily as needed for Pain.       loratadine 10 MG Oral Tablet Dispersible Take 1 tablet (10 mg total) by mouth daily.       Capsicum, Cayenne, (CAYENNE PEPPER OR) Take 1 tablet by mouth daily.       estradiol 0.1 MG/GM Vaginal Cream Place 1 g vaginally daily. (Patient taking differently: Place 1 g vaginally twice a week.) 42.5 g 5     hydrocortisone 25 MG Rectal Suppos Place 1 suppository (25 mg total) rectally as needed.       [] vancomycin 125 MG Oral Cap Take 1 capsule (125 mg total) by mouth  daily for 14 days. 14 capsule 0     clindamycin 150 MG Oral Cap Take 1 capsule (150 mg total) by mouth 2 (two) times daily.       [] dibucaine 1 % External Ointment Apply 1 Application topically 3 (three) times daily as needed for Pain (hemorrhoid pain). 28 g 1     albuterol 108 (90 Base) MCG/ACT Inhalation Aero Soln Inhale 2 puffs into the lungs every 6 (six) hours as needed for Wheezing or Shortness of Breath. 3 each 3     ipratropium-albuterol 0.5-2.5 (3) MG/3ML Inhalation Solution Take 3 mL by nebulization every 6 (six) hours as needed (shortness of breath, wheezing.). 1080 mL 1     ELMIRON 100 MG Oral Cap Take 1 capsule (100 mg total) by mouth daily. 90 capsule 1     cilostazol 50 MG Oral Tab Take 1 tablet (50 mg total) by mouth 2 (two) times daily. 180 tablet 1     PEG 3350-KCl-Na Bicarb-NaCl 420 g Oral Recon Soln Take as directed by physician 4000 mL 0     TRELEGY ELLIPTA 100-62.5-25 MCG/ACT Inhalation Aerosol Powder, Breath Activated Inhale 1 puff into the lungs daily. 180 each 3     FUROSEMIDE 20 MG Oral Tab TAKE 1 TABLET BY MOUTH TWICE A  tablet 3     ESOMEPRAZOLE MAGNESIUM 40 MG Oral Capsule Delayed Release TAKE 1 CAPSULE BY MOUTH BEFORE BREAKFAST 90 capsule 3     ACYCLOVIR 400 MG Oral Tab TAKE 1 TABLET BY MOUTH TWICE A  tablet 0     diclofenac 1 % External Gel Apply 2 g topically 4 (four) times daily. (Patient taking differently: Apply 2 g topically 4 (four) times daily as needed.) 100 g 5     nystatin 100,000 Units/g External Cream Apply topically as needed.       ROSUVASTATIN 10 MG Oral Tab TAKE 1 TABLET BY MOUTH EVERY DAY 90 tablet 3     SYNTHROID 88 MCG Oral Tab TAKE 1 TABLET (88 MCG TOTAL) BY MOUTH EVERY OTHER DAY. 45 tablet 3     SYNTHROID 75 MCG Oral Tab TAKE 1 TABLET (75 MCG TOTAL) BY MOUTH EVERY OTHER DAY. 45 tablet 3     metoprolol tartrate 50 MG Oral Tab Take 0.5 tablets (25 mg total) by mouth 2 (two) times daily. 90 tablet 1     gabapentin 100 MG Oral Cap Take 1 capsule  by mouth in the morning, 1-2 capsules by mouth in the afternoon, and 3 capsules by mouth in the evening daily. (Patient taking differently: Take 1 capsule by mouth in the morning and 3 capsules by mouth in the evening daily.) 180 capsule 5     hydrocortisone 2.5 % External Cream Apply 1 each topically 2 (two) times daily as needed (hemmorhoids). 20 g 2     montelukast 10 MG Oral Tab Take 1 tablet (10 mg total) by mouth nightly.       nicotine 21 MG/24HR Transdermal Patch 24 Hr Place 1 patch onto the skin daily. (Patient taking differently: Place 1 patch onto the skin daily as needed.) 14 each 0     Vitamin D3, Cholecalciferol, 125 MCG (5000 UT) Oral Cap Take 1 capsule (5,000 Units total) by mouth daily.       neomycin-polymyxin-dexamethasone 3.5-31321-9.1 Ophthalmic Ointment APPLY TO AFFECTED EYELID TWICE DAILY FOR 2 WEEKS       losartan 25 MG Oral Tab Take 1 tablet (25 mg total) by mouth daily. 90 tablet 0     ALPRAZolam 0.5 MG Oral Tab TAKE 1 TABLET BY MOUTH NIGHTLY SCHEDULED AND TAKE 1/2 TABLET BY MOUTH DAILY AS NEEDED FOR ANXIETY 45 tablet 2     Potassium Chloride ER 20 MEQ Oral Tab CR Take 1 tablet by mouth daily. 90 tablet 1     busPIRone 10 MG Oral Tab Take 1 tablet (10 mg total) by mouth 2 (two) times daily. 180 tablet 1     Nebulizers ([employer] AEROSOL DELIVERY SYSTEM) Does not apply Misc Take 1 Bottle by mouth As Directed.       Respiratory Therapy Supplies (NEBULIZER/TUBING/MOUTHPIECE) Does not apply Kit Use as directed 1 each 0        Allergies: Cephalosporins and Keflex [cephalexin]      Anesthesia Evaluation    Patient summary reviewed.    Anesthetic Complications  (-) history of anesthetic complications         GI/Hepatic/Renal      (+) GERD       (+) chronic renal disease   (+) liver disease                 Cardiovascular        Exercise tolerance: good     MET: >4      (+) hypertension   (+) hyperlipidemia                                  Endo/Other      (+) diabetes  type 2,                   (+)  arthritis       Pulmonary  Comment: Smoker       (+) COPD       (+) shortness of breath            Neuro/Psych        (+) anxiety         (+) neuromuscular disease             PVD        Past Surgical History:   Procedure Laterality Date    AMPUTATION FINGER/THUMB+FLAPS      COLECTOMY      COLONOSCOPY      HYSTERECTOMY       Social History     Socioeconomic History    Marital status:    Tobacco Use    Smoking status: Every Day     Packs/day: 0.25     Years: 63.00     Additional pack years: 0.00     Total pack years: 15.75     Types: Cigarettes     Start date: 2/24/1964    Smokeless tobacco: Never   Vaping Use    Vaping Use: Never used   Substance and Sexual Activity    Alcohol use: Not Currently     Comment: rare    Drug use: Never   Other Topics Concern    Caffeine Concern Yes     Comment: coffee/coca cola/Iced tea    Exercise No     History   Drug Use Unknown     Available pre-op labs reviewed.               Airway      Mallampati: II  Mouth opening: >3 FB  TM distance: > 6 cm   Cardiovascular    Cardiovascular exam normal.         Dental             Pulmonary    Pulmonary exam normal.                 Other findings              ASA: 3   Plan: MAC  NPO status verified and patient meets guidelines.          Plan/risks discussed with: patient                Present on Admission:  **None**

## 2024-01-15 NOTE — OPERATIVE REPORT
EGD Operative Report  Patient Name: Gabriela Ndiaye  YOB: 1945  MRN: [id number]  Procedure: Esophagogastroduodenoscopy (EGD)  with biopsies   Pre-operative Diagnosis & Indication:   History of duodenal adenoma  Chronic diarrhea   Post-operative Diagnosis:  Duodenal lesion   Attending Endoscopist: Tirso Kimball M.D.  Informed Consent: The planned procedure(s), the explanation of the procedure, its expected benefits, the potential complications and risks and possible alternatives and their benefits and risks were discussed with the patient or the patient's surrogate. The discussion of risks, not limited to but including bleeding, infection, perforation, adverse effects from anesthesia, need for emergency surgery/prolonged hospitalization,  cardiac arrhythmias,  and aspiration were discussed with patient.  Pt and/or surrogate understood the proposed procedure(s), its risks, benefits and alternatives and wish to proceed with procedure(s). All questions answered in full.  After all questions were answered to their satisfaction, a signed, informed, and witnessed consent was obtained.  Physical Exam: Heart: regular rate and rhythm. No rubs, murmurs, or gallops. Lungs: Clear to auscultation bilaterally. Abdomen: Soft, non-tender, non distended. No rebound tenderness, no guarding.   A TIME OUT WAS COMPLETED prior to the procedure to confirm the patient, procedure(s) and complete endoscopy safety procedure.  Sedation: Monitored Anesthesia Care; ASA class per anesthesiology team   Monitoring: Pulsoximetry, pulse, respirations, and blood pressure , vitals were monitored throughout the entire procedure under monitored anesthesia care.   Procedure: The patient was then brought to the endoscopy suite where his/her pulse, pulse oximetry and blood pressure were monitored. The patient was placed in the left lateral decubitus position and deep sedation was administered. Once adequate sedation was achieved, a bite block was  placed and a lubricated tip of an Olympus video upper endoscope was inserted through the oropharynx and gently manipulated through the esophagus into the stomach and the second portion of the duodenum. Upon withdrawal of the endoscope, careful visualization of the mucosa was performed. The endoscope was then withdrawn into the gastric antrum and placed in a retroflexed position.  The endoscope was then righted, and air was suctioned from the stomach.  The endoscope was then withdrawn from the patient, with careful visual inspection of the mucosa. The patient left the procedure room in stable condition for recovery. Findings and endotherapy as listed below  Toleration: Patient tolerated procedure well   Complications: No immediate complications   Technical Difficulty:  The procedure was not technically difficult   Estimated Blood Loss: Minimal, less than 5mL of estimated blood loss.   Findings and Therapeutics:  Esophagus:   The mucosa was normal.   There were no strictures or stenosis. GEJ junction traversed with endoscope without resistance.  The Z-line was irregular, appreciated at 40 cm from the incisors.    Stomach:    Normal stomach. .   The gastric body, antrum, fundus, cardia, and angularis were normal. No ulcers, erosions or masses visualized. Endoscope was placed in a retroflexion view in the stomach. There was no evidence of a hiatal hernia. Biopsies with cold forceps were obtained.  Duodenum:   One 1.2cm duodenal lesion, endoscopically consistent with adenoma. Biopsies obtained with cold forceps for histology.   Otherwise, normal mucosa. Random duodenal   Biopsies with cold forceps were obtained, rule out celiac sprue.   Recommendations:  Post EGD precautions, watch for bleeding, infection, perforation, adverse drug reactions   Follow-up biopsies  Pending biopsies, refer to advanced GI for evaluation of EMR/ESD of duodenal lesion  Avoid non-aspirin NSAIDs  Proceed with colonoscopy today     Tirso Kimball  MD  1/15/2024  8:15 AM

## 2024-01-15 NOTE — OPERATIVE REPORT
Colonoscopy Operative Report  Patient Name: Gabriela Ndiaye  YOB: 1945  MRN: [id number]  Procedure: Colonoscopy with biopsies, polypectomy  Pre-operative Diagnosis & Indication:   Chronic diarrhea  Family history of colorectal cancer  Post-operative Diagnosis:   Colon polyp x1 s/p polypectomy   Cecal lesion c/w lipoma s/p biopsies  Hemorrhoids   Attending Endoscopist: Tirso Kimball M.D.  Informed Consent: The planned procedure(s), the explanation of the procedure, its expected benefits, the potential complications and risks and possible alternatives and their benefits and risks were discussed with the patient or the patient's surrogate. The discussion of risks, not limited to but including bleeding, infection, perforation, adverse effects from anesthesia, need for emergency surgery, medication effects, cardiac arrhythmias, missed polyps, and aspiration and death, were discussed with patient.  Pt and/or surrogate understood the proposed procedure(s), its risks, benefits and alternatives and wish to proceed with procedure(s). All questions answered in full.  After all questions were answered to their satisfaction, a signed, informed, and witnessed consent was obtained.  Physical Exam: Heart: regular rate and rhythm. No rubs, murmurs, or gallops. Lungs: Clear to auscultation bilaterally. Abdomen: Soft, non-tender, non distended. Positive bowel sounds. No rebound tenderness, no guarding.   A TIME OUT WAS COMPLETED prior to the procedure to confirm the patient, procedure and complete endoscopy safety procedure.   Sedation: Monitored Anesthesia Care; ASA class per anesthesiology team   Monitoring: Pulsoximetry, pulse, respirations, and blood pressure, vitals were monitored throughout the entire procedure under monitored anesthesia care.   Preparation Quality:  Douglas Bowel Prep Score: 7  [Right 2 / Transverse 2 / Left 3 ]   Procedure: After achieving adequate sedation, and placing the patient in the left  lateral decubitus position, a digital rectal examination was performed.  The lubricated tip of the  colonoscope was then introduced into the rectum and advanced to the terminal ileum.  The appendiceal orifice and ileocecal valve were clearly and distinctly visualized, thus verifying the cecum.   The terminal ileum was  intubated.  The endoscope was then carefully withdrawn from the patient with careful visualization of the colonic mucosa to the best of my ability, with bowel preparation quality as noted above.  Air was suctioned to the best of my ability, during withdrawal of the endoscope.  When the endoscope reached the rectum, it was placed in a retroflexed position, and the rectal bulb was thus visualized.  The endoscope was righted, and air was suctioned from the colon to the best of my ability, as it was during withdrawn from the colon.  The endoscope was then removed from the patient.  The patient tolerated the procedure without apparent procedural complications.  The patient left the procedure room in stable condition for recovery.  Toleration: Patient tolerated procedure well   Complications: No immediate complications   Technical Difficulty:  The procedure was not technically difficult   Estimated Blood Loss: Minimal, less than 5mL of estimated blood loss.   Withdrawal time: Withdrawal of endoscope was 17 minutes  Findings and Therapeutics:  Terminal Ileum:  The entire examined ileum was normal. Biopsies with cold forceps for histology, rule out IBD.   Colon:   One 6mm sessile polyp in the descending colon. Complete polypectomy with cold snare. Polyp retrieved. Hemostasis.   One small - medium sized cecal lesion, consistent with lipoma with positive Pillow sign, and s/p biopsies with cold forceps  Random Biopsies with cold forceps for histology in the colon, rule out microscopic colitis.    The entire visualized colon was normal. There were no signs of polyps or masses. No signs of inflammation, ulceration  or erosions. There were no diverticula noted throughout the entire visualized colon.   Rectum:   There were medium sized, internal and external hemorrhoids seen on retroflexion.   Recommendations:    Post Colonoscopy/polypectomy precautions, watch for bleeding, infection, perforation, adverse drug reactions.   High fiber diet  Follow up pathology   Surveillance colonoscopy in 5 years pending pathology, family history and as clinically indicated based on co-morbid conditions   OK to resume plavix in 48 hours from GI standpoint    Tirso Kimball MD  1/15/2024  8:36 AM

## 2024-01-15 NOTE — H&P
[facility] History and Physical Procedure Note    CC: EGD, colonoscopy    History of Present Illness: Gabriela Ndiaye is a 78 year old female who presents for a  EGD, colonoscopy.    Indication:   Seen in GI OV , last 1/11/24  Chronic diarrhea  History of small intestinal adenoma? duodenum  Family history of CRC          No overt Blood in stool   No Constipation  + Diarrhea   No Abdominal pain     No Reflux symptoms   No Dysphagia       Medications reviewed as below:  +non-aspirin antithrombotic agents - last dose of plavix on Jan 8th (eight) 2024    Medications:  No current outpatient medications on file.    Past Medical History:  Past Medical History:   Diagnosis Date    Abdominal hernia     Anxiety     Arthritis     Back pain     Back problem     spinal stenosis    Bilateral pulmonary embolism (HCC) 07/06/2021    Bloating     Cancer (HCC)     lung    COPD (chronic obstructive pulmonary disease) (HCC)     not using oxygen    Diabetes (HCC)     diet controlled    Diabetes mellitus (HCC)     Diarrhea, unspecified     Disorder of thyroid     Easy bruising     Esophageal reflux     Exposure to medical diagnostic radiation     last tx 11/2023    Feeling lonely     Flatulence/gas pain/belching     Food intolerance     Hearing impairment     Hearing loss     Hemorrhoids     High blood pressure     High cholesterol     History of eating disorder     Muscle weakness     Neuropathy     Night sweats     Pain in joints     Pneumonia of both lungs due to infectious organism, unspecified part of lung 08/15/2022    Pulmonary emboli (HCC)     Pulmonary embolism (HCC)     RSV (acute bronchiolitis due to respiratory syncytial virus)     Shortness of breath     Stress     Thyroid disease     Uncomfortable fullness after meals     Visual impairment     glasses    Wears glasses        Past Surgical History:  Past Surgical History:   Procedure Laterality Date    AMPUTATION FINGER/THUMB+FLAPS      COLECTOMY       COLONOSCOPY      HYSTERECTOMY         Family History:  Family History   Problem Relation Age of Onset    Heart Disorder Father     Stroke Father     Uterine Cancer Mother     Diabetes Mother     Cancer Mother         colon    Heart Attack Daughter     Diabetes Daughter     Colon Polyps Daughter     Colon Polyps Son        Social History:  Social History     Socioeconomic History    Marital status:    Tobacco Use    Smoking status: Every Day     Packs/day: 0.25     Years: 63.00     Additional pack years: 0.00     Total pack years: 15.75     Types: Cigarettes     Start date: 2/24/1964    Smokeless tobacco: Never   Vaping Use    Vaping Use: Never used   Substance and Sexual Activity    Alcohol use: Not Currently     Comment: rare    Drug use: Never   Other Topics Concern    Caffeine Concern Yes     Comment: coffee/coca cola/Iced tea    Exercise No     Social Determinants of Health     Financial Resource Strain: Low Risk  (8/15/2023)    Financial Resource Strain     Difficulty of Paying Living Expenses: Not hard at all     Med Affordability: No   Food Insecurity: No Food Insecurity (8/15/2023)    Food Insecurity     Food Insecurity: Never true   Transportation Needs: Unmet Transportation Needs (8/15/2023)    Transportation Needs     Lack of Transportation: Yes   Physical Activity: Insufficiently Active (8/15/2023)    Exercise Vital Sign     Days of Exercise per Week: 2 days     Minutes of Exercise per Session: 30 min   Stress: No Stress Concern Present (8/15/2023)    Stress     Feeling of Stress : No   Social Connections: Socially Integrated (8/15/2023)    Social Connections     Frequency of Socialization with Friends and Family: 2   Housing Stability: Low Risk  (8/15/2023)    Housing Stability     Housing Instability: No       Review of Systems  Negative unless otherwise mentioned in HPI.     Allergies:  Allergies   Allergen Reactions    Cephalosporins NAUSEA ONLY     Pt reports she tolerates this medication  well, had nausea from taking it on empty stomach    Keflex [Cephalexin] RASH         Objective:    Physical Exam  /80 (BP Location: Left arm)   Pulse 63   Temp 98.6 °F (37 °C) (Temporal)   Resp 20   Ht 5' 4\" (1.626 m)   Wt 160 lb (72.6 kg)   SpO2 94%   BMI 27.46 kg/m²   Body mass index is 27.46 kg/m².    Gen: Awake and alert, NAD  CV: Ext warm b/l  Resp: no resp distress  Neuro: NAD, Aox3.     Pertinent labs:   Lab Results   Component Value Date     04/12/2023    K 3.8 04/12/2023     04/12/2023    CO2 23.0 04/12/2023    ANIONGAP 8 04/12/2023    BUN 16 04/12/2023     Lab Results   Component Value Date    WBC 9.2 04/12/2023    HGB 12.8 04/12/2023    HCT 40.9 04/12/2023    MCV 93.6 04/12/2023    .0 04/12/2023     Lab Results   Component Value Date    AST 10 (L) 04/12/2023    ALT 16 04/12/2023    ALB 3.2 (L) 04/12/2023     Lab Results   Component Value Date    INR 0.92 07/06/2021       Imaging:  XR CHEST PA + LAT CHEST (PWK=[id number])  Narrative: PROCEDURE:  XR CHEST PA + LAT CHEST (CPT=71046)     INDICATIONS:  J44.1 Acute exacerbation of chronic obstructive pulmonary disease (COPD) ([facility])     COMPARISON:  [place], XR, XR CHEST PA + LAT CHEST (CPT=71046), 4/12/2023, 1:26 PM.     TECHNIQUE:  PA and lateral chest radiographs were obtained.     PATIENT STATED HISTORY: (As transcribed by Technologist)  2 week history wheeze and shortness of breath associated with COPD. No surgeries to chest, current smoker.         FINDINGS:    Stable borderline heart size without CHF.  Athero calcification of the aorta.  No sign of acute pneumonia.  Interstitial scarring at the lung base bilateral greater on the left.  Hyperinflation.  Little significant interval change since the prior exam   April 2023.                    Impression: CONCLUSION:  WARNING:  THIS REPORT WAS APPROVED PREMATURELY AND IS NOT COMPLETE.  PLEASE CONTACT US IMMEDIATELY.        LOCATION:  [facility]        Dictated by (CST):  Timmy Ceron MD on 9/12/2023 at 12:20 PM       Finalized by (CST): Timmy Ceron MD on 9/12/2023 at 12:21 PM          Informed consent  Informed Consent:   The planned procedure(s), the explanation of the procedure, indications, its expected benefits, the potential complications and risks and possible alternatives and their benefits and risks were discussed with the patient. The discussion of risks, not limited to but including bleeding, infection, perforation / tear in the gastrointestinal tract, adverse effects from anesthesia, and possible prolonged hospitalization, emergency surgery, risk of morbidity or mortality, and risk of missed lesion(s) were discussed with patient.     Pt understands the missed rate of colonoscopy of polyps, lesions of 5-10% even in the best of circumstances and that although this is an accurate test, there are limitations to colonoscopy.     Patient understood the proposed procedure(s), and informed consented to the procedure and elected to proceed with the procedure(s) with possible intervention (such as polypectomy, biopsy, control of bleeding, etc.) and its risks, benefits and alternatives and wish to proceed with procedure(s). All questions answered in full to patient's  satisfaction in full.       Impression/Recommendations:  Gabriela Ndiaye is a 78 year old female who presents for a EGD, colonoscopy +/- endotherapy.   Plan to proceed with EGD, colonoscopy with anesthesia.     ASA class per anesthesia.   Informed consent obtained as detailed above.       CANDY OVALLES M.D.  [facility]

## 2024-01-25 DIAGNOSIS — J44.9 CHRONIC OBSTRUCTIVE PULMONARY DISEASE, UNSPECIFIED COPD TYPE (HCC): ICD-10-CM

## 2024-01-25 RX ORDER — IPRATROPIUM BROMIDE AND ALBUTEROL SULFATE 2.5; .5 MG/3ML; MG/3ML
3 SOLUTION RESPIRATORY (INHALATION) EVERY 6 HOURS PRN
Qty: 1080 ML | Refills: 1 | Status: SHIPPED | OUTPATIENT
Start: 2024-01-25 | End: 2024-07-23

## 2024-02-02 ENCOUNTER — PATIENT OUTREACH (OUTPATIENT)
Dept: CASE MANAGEMENT | Age: 79
End: 2024-02-02

## 2024-02-07 ENCOUNTER — TELEPHONE (OUTPATIENT)
Dept: FAMILY MEDICINE CLINIC | Facility: CLINIC | Age: 79
End: 2024-02-07

## 2024-02-07 RX ORDER — VARENICLINE TARTRATE 0.5 (11)-1
1 KIT ORAL DAILY
Qty: 53 EACH | Refills: 0 | Status: SHIPPED | OUTPATIENT
Start: 2024-02-07

## 2024-02-07 RX ORDER — VARENICLINE TARTRATE 0.5 (11)-1
KIT ORAL
Refills: 0 | Status: CANCELLED | OUTPATIENT
Start: 2024-02-07

## 2024-02-07 NOTE — TELEPHONE ENCOUNTER
Pt returned call and was informed to contact Dr. Kimball's office regarding pantoprazole.   Pt then asking if PCP will prescribe varenicline tablets to help quit smoking. Please approve or deny pending Rx. Thank you.

## 2024-02-07 NOTE — TELEPHONE ENCOUNTER
Patient used to take Esomeprazole Mag 40mg and but this time you sent Pantoprazole.  Patient is asking for you to send the Nexium instead.  Also she is asking for a prescription of Varenicline tabs so she can stop smoking.  Please advise.    Pharm is   [facility]/pharmacy #[id number] - [place], [place]

## 2024-02-27 ENCOUNTER — TELEPHONE (OUTPATIENT)
Dept: FAMILY MEDICINE CLINIC | Facility: CLINIC | Age: 79
End: 2024-02-27

## 2024-02-27 DIAGNOSIS — Z22.1 CLOSTRIDIOIDES DIFFICILE CARRIER: Primary | ICD-10-CM

## 2024-02-27 NOTE — TELEPHONE ENCOUNTER
Pt is going to begin an antibiotic for styes in both of her eyes. Pt was prescribed an antibiotic by an ophthalmologist - pt has not started the medication yet & does not know the named of the antibiotic. Pt states she has a condition that causes diarrhea (Pt refers to it as sids). Pt requesting a prescription for vancomycin to counter the side affects of the antibiotic.     Gabriela Ndiaye requesting Medication Refill for:    vancomycin 125 MG Oral Cap [[id number]]     LOV: 12/6/2023   Last Refill date: 11/29/2023  Pharmacy: [facility]/pharmacy #[id number] [place] [id number] [facility] [phone], [phone]   Next Scheduled appointment: 3/6/2024

## 2024-02-27 NOTE — TELEPHONE ENCOUNTER
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   Saw ophthalmologist regarding bilateral styes.   Prescribed doxycycline 50mg BID x2 weeks.   Requesting rx for vancomycin.

## 2024-02-28 RX ORDER — VANCOMYCIN HYDROCHLORIDE 125 MG/1
125 CAPSULE ORAL DAILY
Qty: 14 CAPSULE | Refills: 0 | Status: SHIPPED | OUTPATIENT
Start: 2024-02-28 | End: 2024-03-13

## 2024-02-28 NOTE — TELEPHONE ENCOUNTER
Called pt and left vm informing pt Rx sent to pharmacy. Pharmacy to contact pt once medication is ready for pickup.

## 2024-03-03 DIAGNOSIS — E03.9 HYPOTHYROIDISM, UNSPECIFIED TYPE: ICD-10-CM

## 2024-03-03 DIAGNOSIS — Z76.0 ENCOUNTER FOR MEDICATION REFILL: ICD-10-CM

## 2024-03-04 ENCOUNTER — PATIENT OUTREACH (OUTPATIENT)
Dept: CASE MANAGEMENT | Age: 79
End: 2024-03-04

## 2024-03-04 DIAGNOSIS — I10 ESSENTIAL HYPERTENSION: ICD-10-CM

## 2024-03-04 RX ORDER — METOPROLOL TARTRATE 50 MG/1
25 TABLET, FILM COATED ORAL 2 TIMES DAILY
Qty: 90 TABLET | Refills: 1 | Status: SHIPPED | OUTPATIENT
Start: 2024-03-04

## 2024-03-04 RX ORDER — LEVOTHYROXINE SODIUM 88 UG/1
88 TABLET ORAL EVERY OTHER DAY
Qty: 45 TABLET | Refills: 3 | Status: SHIPPED | OUTPATIENT
Start: 2024-03-04

## 2024-03-04 RX ORDER — LEVOTHYROXINE SODIUM 0.07 MG/1
75 TABLET ORAL EVERY OTHER DAY
Qty: 45 TABLET | Refills: 3 | Status: SHIPPED | OUTPATIENT
Start: 2024-03-04

## 2024-03-06 ENCOUNTER — OFFICE VISIT (OUTPATIENT)
Dept: FAMILY MEDICINE CLINIC | Facility: CLINIC | Age: 79
End: 2024-03-06
Payer: MEDICARE

## 2024-03-06 VITALS
OXYGEN SATURATION: 98 % | BODY MASS INDEX: 27.66 KG/M2 | DIASTOLIC BLOOD PRESSURE: 74 MMHG | SYSTOLIC BLOOD PRESSURE: 124 MMHG | HEIGHT: 64 IN | HEART RATE: 69 BPM | WEIGHT: 162 LBS | RESPIRATION RATE: 18 BRPM

## 2024-03-06 DIAGNOSIS — I10 PRIMARY HYPERTENSION: ICD-10-CM

## 2024-03-06 DIAGNOSIS — D73.4 SPLENIC CYST: ICD-10-CM

## 2024-03-06 DIAGNOSIS — M54.50 CHRONIC MIDLINE LOW BACK PAIN WITHOUT SCIATICA: ICD-10-CM

## 2024-03-06 DIAGNOSIS — Z22.1 CLOSTRIDIOIDES DIFFICILE CARRIER: ICD-10-CM

## 2024-03-06 DIAGNOSIS — C34.11 CANCER OF BRONCHUS OF RIGHT UPPER LOBE (HCC): ICD-10-CM

## 2024-03-06 DIAGNOSIS — I73.9 PERIPHERAL VASCULAR DISEASE (HCC): ICD-10-CM

## 2024-03-06 DIAGNOSIS — F41.9 ANXIETY DISORDER, UNSPECIFIED TYPE: ICD-10-CM

## 2024-03-06 DIAGNOSIS — H52.223 REGULAR ASTIGMATISM OF BOTH EYES: ICD-10-CM

## 2024-03-06 DIAGNOSIS — B37.31 VAGINAL CANDIDIASIS: ICD-10-CM

## 2024-03-06 DIAGNOSIS — D36.9 ADENOMATOUS POLYP: ICD-10-CM

## 2024-03-06 DIAGNOSIS — F17.209 NICOTINE DEPENDENCE WITH NICOTINE-INDUCED DISORDER, UNSPECIFIED NICOTINE PRODUCT TYPE: ICD-10-CM

## 2024-03-06 DIAGNOSIS — J31.0 NONALLERGIC RHINITIS: ICD-10-CM

## 2024-03-06 DIAGNOSIS — E03.9 HYPOTHYROIDISM, UNSPECIFIED TYPE: ICD-10-CM

## 2024-03-06 DIAGNOSIS — Z00.00 ENCOUNTER FOR ANNUAL HEALTH EXAMINATION: Primary | ICD-10-CM

## 2024-03-06 DIAGNOSIS — K58.9 IRRITABLE BOWEL SYNDROME, UNSPECIFIED TYPE: ICD-10-CM

## 2024-03-06 DIAGNOSIS — F33.9 DEPRESSION, RECURRENT (HCC): ICD-10-CM

## 2024-03-06 DIAGNOSIS — G89.29 CHRONIC MIDLINE LOW BACK PAIN WITHOUT SCIATICA: ICD-10-CM

## 2024-03-06 DIAGNOSIS — K64.3 GRADE IV HEMORRHOIDS: ICD-10-CM

## 2024-03-06 DIAGNOSIS — E11.42 TYPE 2 DIABETES MELLITUS WITH DIABETIC POLYNEUROPATHY, WITHOUT LONG-TERM CURRENT USE OF INSULIN (HCC): ICD-10-CM

## 2024-03-06 DIAGNOSIS — F32.A DEPRESSIVE DISORDER: ICD-10-CM

## 2024-03-06 DIAGNOSIS — H02.401 PTOSIS OF RIGHT EYELID: ICD-10-CM

## 2024-03-06 DIAGNOSIS — G62.9 NEUROPATHY: ICD-10-CM

## 2024-03-06 DIAGNOSIS — Z00.00 LABORATORY EXAMINATION ORDERED AS PART OF A ROUTINE GENERAL MEDICAL EXAMINATION: ICD-10-CM

## 2024-03-06 DIAGNOSIS — K76.89 LIVER CYST: ICD-10-CM

## 2024-03-06 DIAGNOSIS — H93.19 TINNITUS, UNSPECIFIED LATERALITY: ICD-10-CM

## 2024-03-06 DIAGNOSIS — E78.5 DYSLIPIDEMIA: ICD-10-CM

## 2024-03-06 DIAGNOSIS — M79.7 FIBROMYALGIA: ICD-10-CM

## 2024-03-06 DIAGNOSIS — N28.1 RENAL CYST: ICD-10-CM

## 2024-03-06 DIAGNOSIS — J43.9 PULMONARY EMPHYSEMA, UNSPECIFIED EMPHYSEMA TYPE (HCC): ICD-10-CM

## 2024-03-06 DIAGNOSIS — D13.5 AMPULLARY ADENOMA: ICD-10-CM

## 2024-03-06 DIAGNOSIS — H91.90 HEARING LOSS, UNSPECIFIED HEARING LOSS TYPE, UNSPECIFIED LATERALITY: ICD-10-CM

## 2024-03-06 DIAGNOSIS — N18.31 STAGE 3A CHRONIC KIDNEY DISEASE (HCC): ICD-10-CM

## 2024-03-06 RX ORDER — DOXYCYCLINE HYCLATE 50 MG/1
50 CAPSULE ORAL 2 TIMES DAILY
COMMUNITY
Start: 2024-02-27 | End: 2024-03-27 | Stop reason: ALTCHOICE

## 2024-03-06 RX ORDER — DULOXETIN HYDROCHLORIDE 30 MG/1
30 CAPSULE, DELAYED RELEASE ORAL DAILY
Qty: 90 CAPSULE | Refills: 1 | Status: SHIPPED | OUTPATIENT
Start: 2024-03-06 | End: 2024-03-19

## 2024-03-06 RX ORDER — VARENICLINE TARTRATE 1 MG/1
1 TABLET, FILM COATED ORAL 2 TIMES DAILY
Qty: 60 TABLET | Refills: 0 | Status: SHIPPED | OUTPATIENT
Start: 2024-03-06 | End: 2024-03-11

## 2024-03-06 RX ORDER — FLUCONAZOLE 150 MG/1
150 TABLET ORAL ONCE
Qty: 2 TABLET | Refills: 0 | Status: SHIPPED | OUTPATIENT
Start: 2024-03-06 | End: 2024-03-06

## 2024-03-06 NOTE — PROGRESS NOTES
Chief Complaint   Patient presents with    Wellness Visit     MA supervisit     Yeast Infection     Pt requesting yeast infection rx, reports \" burning \" to vaginal area, Pt notes currently taking abx.     Referral     Pt requesting referral for new pulmo, notes previous one moved to [facility] practice      Subjective:   Gabriela Ndiaye is a 78 year old female who presents for a Medicare Subsequent Annual Wellness visit (Pt already had Initial Annual Wellness) and acute complicated new problem and chronic illness follow up .     History/Other:   Fall Risk Assessment:   She has been screened for Falls and is low risk.      Cognitive Assessment:   She had a completely normal cognitive assessment - see flowsheet entries     Functional Ability/Status:   Gabriela Ndiaye has some abnormal functions as listed below:  She has Vision problems based on screening of functional status. She has Walking problems based on screening of functional status.       Depression Screening (PHQ-2/PHQ-9): PHQ-2 SCORE: 1  , done 3/6/2024   Feeling down, depressed, or hopeless: 1    Moving or speaking so slowly that other people could have noticed. Or the opposite - being so fidgety or restless that you have been moving around a lot more than usual: 1        Advanced Directives:   She does NOT have a Living Will. [Do you have a living will?: Yes]  She has a Power of  for Health Care on file in [employer].      Patient Active Problem List   Diagnosis    Pulmonary emphysema (HCC)    Type 2 diabetes mellitus with diabetic polyneuropathy, without long-term current use of insulin (HCC)    Chronic midline low back pain without sciatica    Nicotine dependence    Depression, recurrent (HCC)    Hypothyroidism    Liver cyst    Renal cyst    Splenic cyst    Neuropathy    Stage 3a chronic kidney disease (HCC)    Primary hypertension    Clostridioides difficile carrier    Adenomatous polyp    Ampullary adenoma    Atrophic vaginitis    Chronic interstitial  cystitis    Dyslipidemia    Grade IV hemorrhoids    Hearing loss    IBS (irritable bowel syndrome)    Nonallergic rhinitis    Ptosis of right eyelid    Regular astigmatism of both eyes    Tinnitus    Vitreous membrane    Anxiety disorder    Chronic midline low back pain with bilateral sciatica    Fibromyalgia    Peripheral vascular disease (HCC)    Cancer of bronchus of right upper lobe (HCC)     Allergies:  She is allergic to rosuvastatin, cephalosporins, and keflex [cephalexin].    Current Medications:  Outpatient Medications Marked as Taking for the 3/6/24 encounter (Office Visit) with August Coley MD   Medication Sig    [DISCONTINUED] varenicline 1 MG Oral Tab Take 1 tablet (1 mg total) by mouth 2 (two) times daily.    [DISCONTINUED] doxycycline 50 MG Oral Cap Take 1 capsule (50 mg total) by mouth 2 (two) times daily.    [] fluconazole 150 MG Oral Tab Take 1 tablet (150 mg total) by mouth once for 1 dose. Repeat in 72 hours if symptomatic.    [DISCONTINUED] DULoxetine 30 MG Oral Cap DR Particles Take 1 capsule (30 mg total) by mouth daily. (Patient not taking: Reported on 3/19/2024)    levothyroxine (SYNTHROID) 88 MCG Oral Tab Take 1 tablet (88 mcg total) by mouth every other day.    levothyroxine (SYNTHROID) 75 MCG Oral Tab Take 1 tablet (75 mcg total) by mouth every other day.    metoprolol tartrate 50 MG Oral Tab Take 0.5 tablets (25 mg total) by mouth 2 (two) times daily.    [DISCONTINUED] vancomycin 125 MG Oral Cap Take 1 capsule (125 mg total) by mouth daily for 14 days.    [DISCONTINUED] Esomeprazole Magnesium 20 MG Oral Capsule Delayed Release Take 1 capsule (20 mg total) by mouth every morning before breakfast.    ipratropium-albuterol 0.5-2.5 (3) MG/3ML Inhalation Solution Take 3 mL by nebulization every 6 (six) hours as needed (shortness of breath, wheezing.).    neomycin-polymyxin-dexamethasone 3.5-56050-6.1 Ophthalmic Ointment APPLY TO AFFECTED EYELID TWICE DAILY FOR 2 WEEKS     [DISCONTINUED] losartan 25 MG Oral Tab Take 1 tablet (25 mg total) by mouth daily.    ALPRAZolam 0.5 MG Oral Tab TAKE 1 TABLET BY MOUTH NIGHTLY SCHEDULED AND TAKE 1/2 TABLET BY MOUTH DAILY AS NEEDED FOR ANXIETY    [DISCONTINUED] Potassium Chloride ER 20 MEQ Oral Tab CR Take 1 tablet by mouth daily.    [DISCONTINUED] busPIRone 10 MG Oral Tab Take 1 tablet (10 mg total) by mouth 2 (two) times daily.    B Complex Vitamins (B COMPLEX-B12 OR) Take 1 tablet by mouth daily.    Turmeric 400 MG Oral Cap Take 1 capsule by mouth daily.    Ascorbic Acid (VITAMIN C) 1000 MG Oral Tab Take 1 tablet (1,000 mg total) by mouth daily.    zinc sulfate 220 (50 Zn) MG Oral Cap Take 1 capsule (220 mg total) by mouth daily.    omega-3 fatty acids 1000 MG Oral Cap Take 1,000 mg by mouth daily.    HYDROcodone-acetaminophen 5-325 MG Oral Tab Take 1 tablet by mouth 2 (two) times daily as needed for Pain.    loratadine 10 MG Oral Tablet Dispersible Take 1 tablet (10 mg total) by mouth daily.    Capsicum, Cayenne, (CAYENNE PEPPER OR) Take 1 tablet by mouth daily.    estradiol 0.1 MG/GM Vaginal Cream Place 1 g vaginally daily. (Patient taking differently: Place 1 g vaginally twice a week.)    hydrocortisone 25 MG Rectal Suppos Place 1 suppository (25 mg total) rectally as needed.    [DISCONTINUED] clindamycin 150 MG Oral Cap Take 1 capsule (150 mg total) by mouth 2 (two) times daily. (Patient not taking: Reported on 3/19/2024)    Nebulizers (Troodon AEROSOL DELIVERY SYSTEM) Does not apply Misc Take 1 Bottle by mouth As Directed.    albuterol 108 (90 Base) MCG/ACT Inhalation Aero Soln Inhale 2 puffs into the lungs every 6 (six) hours as needed for Wheezing or Shortness of Breath.    Respiratory Therapy Supplies (NEBULIZER/TUBING/MOUTHPIECE) Does not apply Kit Use as directed    [DISCONTINUED] ELMIRON 100 MG Oral Cap Take 1 capsule (100 mg total) by mouth daily.    [DISCONTINUED] cilostazol 50 MG Oral Tab Take 1 tablet (50 mg total) by mouth 2 (two)  times daily.    TRELEGY ELLIPTA 100-62.5-25 MCG/ACT Inhalation Aerosol Powder, Breath Activated Inhale 1 puff into the lungs daily.    FUROSEMIDE 20 MG Oral Tab TAKE 1 TABLET BY MOUTH TWICE A DAY    [DISCONTINUED] ACYCLOVIR 400 MG Oral Tab TAKE 1 TABLET BY MOUTH TWICE A DAY    diclofenac 1 % External Gel Apply 2 g topically 4 (four) times daily. (Patient taking differently: Apply 2 g topically 4 (four) times daily as needed.)    nystatin 100,000 Units/g External Cream Apply topically as needed.    [DISCONTINUED] ROSUVASTATIN 10 MG Oral Tab TAKE 1 TABLET BY MOUTH EVERY DAY    gabapentin 100 MG Oral Cap Take 1 capsule by mouth in the morning, 1-2 capsules by mouth in the afternoon, and 3 capsules by mouth in the evening daily. (Patient taking differently: Take 1 capsule (100 mg total) by mouth 2 (two) times daily. Take 1 capsule by mouth in the morning and 3 capsules by mouth in the evening daily.)    hydrocortisone 2.5 % External Cream Apply 1 each topically 2 (two) times daily as needed (hemmorhoids).    montelukast 10 MG Oral Tab Take 1 tablet (10 mg total) by mouth nightly.    nicotine 21 MG/24HR Transdermal Patch 24 Hr Place 1 patch onto the skin daily.    Vitamin D3, Cholecalciferol, 125 MCG (5000 UT) Oral Cap Take 1 capsule (5,000 Units total) by mouth daily.       Medical History:  She  has a past medical history of Abdominal hernia, Anxiety, Arthritis, Back pain, Back problem, Bilateral pulmonary embolism (HCC) (07/06/2021), Bloating, Cancer ([facility]), COPD (chronic obstructive pulmonary disease) ([facility]), Diabetes (HCC), Diabetes mellitus (HCC), Diarrhea, unspecified, Disorder of thyroid, Easy bruising, Esophageal reflux, Exposure to medical diagnostic radiation, Feeling lonely, Flatulence/gas pain/belching, Food intolerance, Hearing impairment, Hearing loss, Hemorrhoids, High blood pressure, High cholesterol, History of eating disorder, Muscle weakness, Neuropathy, Night sweats, Pain in joints, Pneumonia of both  lungs due to infectious organism, unspecified part of lung (08/15/2022), Pulmonary emboli (HCC), Pulmonary embolism (HCC), RSV (acute bronchiolitis due to respiratory syncytial virus), Shortness of breath, Stress, Thyroid disease, Uncomfortable fullness after meals, Visual impairment, and Wears glasses.  Surgical History:  She  has a past surgical history that includes hysterectomy; Colectomy; amputation finger/thumb+flaps; colonoscopy; and colonoscopy (N/A, 1/15/2024).   Family History:  Her family history includes Cancer in her mother; Colon Polyps in her daughter and son; Diabetes in her daughter and mother; Heart Attack in her daughter; Heart Disorder in her father; Stroke in her father; Uterine Cancer in her mother.  Social History:  She  reports that she has been smoking cigarettes. She started smoking about 60 years ago. She has a 31.5 pack-year smoking history. She has never used smokeless tobacco. She reports that she does not currently use alcohol. She reports that she does not use drugs.    Tobacco:  She currently smokes tobacco.  Social History    Tobacco Use      Smoking status: Every Day        Packs/day: 0.25        Years: 63.00        Additional pack years: 0.00        Total pack years: 15.75        Types: Cigarettes        Start date: 2/24/1966      Smokeless tobacco: Never     Tobacco Cessation Documentation (Smoking and Smokeless included):  I had an in depth therapy session with Gabriela Ndiaye about her tobacco use risks and options using the USPSTF's Five A's approach:    Ask: Gabriela is using tobacco products.  Assess: We asked about/assessed behavioral health risk and factors affecting choice of behavior change goals/methods.  Specifically I asked about readiness to quit tobacco.  Advise: We gave clear, specific, and personalized behavior change advice, including information about personal health harms and benefits. Specifically, she was told that Quitting tobacco is one of the best things  she can do for her health. I strongly encouraged her to quit.      Agree: We collaboratively selected appropriate treatment goals and methods based on the patient’s interest in and willingness to change the behavior.   Assist: We used behavior change techniques (self-help and/or counseling), to aid Gabriela in achieving agreed-upon goals by acquiring the skills, confidence, and social/environmental supports for behavior change, supplemented with adjunctive medical treatments when appropriate. Additionally, national quitting tobacco aides were discussed.   Arrange: Follow up at next visit- see specific follow up below.    Time Counseled: 3 minutes    CAGE Alcohol Screen:   CAGE screening score of 0 on 3/6/2024, showing low risk of alcohol abuse.      Patient Care Team:  August Coley MD as PCP - General (Family Medicine)  Varghese Rodriguez MD as Consulting Physician (NEUROLOGY)  Boo Vazquez MD (GASTROENTEROLOGY)  Li Quiroz as Physical Therapist (Physical Therapy)  Jana Connell MD (Cardiovascular Diseases)  Elizabeth Nguyen RMA as [facility] Care Manager  Kira Barton APRN (Nurse Practitioner)  Tirso Kimball MD (GASTROENTEROLOGY)  Rick Bernabe APRN (Nurse Practitioner Family)    Review of Systems   Constitutional: Negative.    HENT: Negative.     Eyes: Negative.    Respiratory: Negative.     Cardiovascular: Negative.    Gastrointestinal: Negative.    Endocrine: Negative.    Genitourinary:  Positive for vaginal discharge and vaginal pain (burning pain).   Musculoskeletal: Negative.    Skin: Negative.    Neurological: Negative.    Psychiatric/Behavioral: Negative.       Objective:   Physical Exam  Vitals and nursing note reviewed.   Constitutional:       Appearance: Normal appearance.   HENT:      Head: Normocephalic and atraumatic.   Eyes:      Pupils: Pupils are equal, round, and reactive to light.   Cardiovascular:      Rate and Rhythm: Normal rate and regular rhythm.      Pulses: Normal  pulses.      Heart sounds: Normal heart sounds. No murmur heard.  Pulmonary:      Effort: Pulmonary effort is normal. No respiratory distress.      Breath sounds: Normal breath sounds. No wheezing or rhonchi.   Abdominal:      General: Abdomen is flat. Bowel sounds are normal. There is no distension.      Palpations: Abdomen is soft. There is no mass.      Tenderness: There is no abdominal tenderness.      Hernia: No hernia is present.   Musculoskeletal:      Right lower leg: No edema.      Left lower leg: No edema.   Skin:     Findings: No rash.   Neurological:      General: No focal deficit present.      Mental Status: She is alert and oriented to person, place, and time.         /74   Pulse 69   Resp 18   Ht 5' 4\" (1.626 m)   Wt 162 lb (73.5 kg)   SpO2 98%   BMI 27.81 kg/m²  Estimated body mass index is 27.81 kg/m² as calculated from the following:    Height as of this encounter: 5' 4\" (1.626 m).    Weight as of this encounter: 162 lb (73.5 kg).    Medicare Hearing Assessment:   Hearing Screening    Screening Method: Questionnaire  I have a problem hearing over the telephone: No I have trouble following the conversations when two or more people are talking at the same time: No   I have trouble understanding things on the TV: Sometimes I have to strain to understand conversations: Sometimes   I have to worry about missing the telephone ring or doorbell: No I have trouble hearing conversations in a noisy background such as a crowded room or restaurant: Sometimes   I get confused about where sounds come from: No I misunderstand some words in a sentence and need to ask people to repeat themselves: No   I especially have trouble understanding the speech of women and children: No I have trouble understanding the speaker in a large room such as at a meeting or place of Restorationist: No   Many people I talk to seem to mumble (or don't speak clearly): No People get annoyed because I misunderstand what they say: No    I misunderstand what others are saying and make inappropriate responses: No I avoid social activities because I cannot hear well and fear I will reply improperly: No   Family members and friends have told me they think I may have hearing loss: Sometimes             Visual Acuity:   Right Eye Visual Acuity: Uncorrected Right Eye Chart Acuity: 20/40   Left Eye Visual Acuity: Uncorrected Left Eye Chart Acuity: 20/25     Both Eyes Chart Acuity: 20/20   Able To Tolerate Visual Acuity: Yes        Assessment & Plan:   Gabriela Ndiaye is a 78 year old female who presents for a Medicare Assessment.     1. Encounter for annual health examination (Primary)  -Immunizations: Recommend shingrex   -Metabolic: BMI 27. BP wnl . Due for annual labs   -Cancer screening: UTD  -Communicable disease: low risk   -Mental health: no concerns   -Other preventative: follow with dentistry and optometry. Due for DXA.    -Lifestyle: Follow a well balanced healthy diet with emphasis on fruits, vegetables, whole grains, lean meats. Limit processed and junk foods. Aim for at least 150 minutes of moderate intensity exercise weekly. Make sure you are staying adequately hydrated. Aim to get 7-9 hours of sleep nightly.     2. Laboratory examination ordered as part of a routine general medical examination  -     CBC With Differential With Platelet; Future; Expected date: 03/06/2024  -     Comp Metabolic Panel (14); Future; Expected date: 03/06/2024  -     Lipid Panel; Future; Expected date: 03/06/2024  -     TSH W Reflex To Free T4; Future; Expected date: 03/06/2024    3. Type 2 diabetes mellitus with diabetic polyneuropathy, without long-term current use of insulin (HCC)  Last A1c value was 6.2% done 12/6/2023. Stable, well controlled - CPM. DM screenings UTD.     -     Hemoglobin A1C; Future; Expected date: 06/06/2024  -     Microalb/Creat Ratio, Random Urine; Future; Expected date: 03/06/2024    4. Pulmonary emphysema, unspecified emphysema type  (HCC)  Stable, CPM    5. Depression, recurrent (HCC)  Stable, CPM.     6. Stage 3a chronic kidney disease (HCC)  Stable, avoid nephrotoxic meds and stay well hydrated.     7. Peripheral vascular disease (HCC)  Stable, CTM. Continue to work on improving risk factor mgmt. Quit smoking.     8. Cancer of bronchus of right upper lobe (HCC)  Needs new referral to pulmonology, oncology. She has completed SBRT. She denies any new symptoms.   -     Pulmonary Referral - In Network  -     Oncology/Hematology Referral - In Network    9. Primary hypertension  Stable,CPM.  10. Dyslipidemia  Stable,CPM.  11. Hypothyroidism, unspecified type  Stable,CPM.  12. Neuropathy  Stable,CPM.  13. Anxiety disorder, unspecified type  Stable,CPM.  14. Fibromyalgia  Stable,CPM.  15. Liver cyst  Stable,CTM.  16. Grade IV hemorrhoids  Stable,CPM.  17. Irritable bowel syndrome, unspecified type  Stable,CPM.  18. Chronic midline low back pain without sciatica  Stable,CPM.  19. Renal cyst  Stable,CTM  20. Clostridioides difficile carrier  Stable, she uses vancomycin when on abx. Currently on course.   21. Splenic cyst  Stable,CTM.  22. Adenomatous polyp  Stable,CTM.  23. Ampullary adenoma  Stable,CTM.  24. Hearing loss, unspecified hearing loss type, unspecified laterality  Stable,CPM.  25. Tinnitus, unspecified laterality  Stable,CPM.  26. Nonallergic rhinitis  Stable,CPM.  27. Ptosis of right eyelid  Stable,CPM.  28. Regular astigmatism of both eyes  Stable,CPM.  29. Nicotine dependence with nicotine-induced disorder, unspecified nicotine product type  She is on chantix, CPM. Continue to refrain from smoking use.   30. Vaginal candidiasis  Likely 2/2 to recent abx use. Will start on diflucan.   31. Depression  Symptomatic. Start duloxetine. She has been on this in the past and had some issues with diarrhea but this is a chronic issue & wants to try to restart as her depression has been worse. Advised to send us condition update within the next few  weeks.     The patient indicates understanding of these issues and agrees to the plan.  Reinforced healthy diet, lifestyle, and exercise.      Return in about 4 months (around 7/6/2024).     August Coley MD, 3/6/2024     Supplementary Documentation:   General Health:  In the past six months, have you lost more than 10 pounds without trying?: 3 - Don't know  Has your appetite been poor?: Yes  Type of Diet: Balanced  How does the patient maintain a good energy level?: Other  How would you describe your daily physical activity?: Moderate  How would you describe your current health state?: Fair  How do you maintain positive mental well-being?: Visiting Family  On a scale of 0 to 10, with 0 being no pain and 10 being severe pain, what is your pain level?: 6 - (Moderate)  In the past six months, have you experienced urine leakage?: 1-Yes  At any time do you feel concerned for the safety/well-being of yourself and/or your children, in your home or elsewhere?: No  Have you had any immunizations at another office such as Influenza, Hepatitis B, Tetanus, or Pneumococcal?: No         Gabriela Ndiaye's SCREENING SCHEDULE   Tests on this list are recommended by your physician but may not be covered, or covered at this frequency, by your insurer.   Please check with your insurance carrier before scheduling to verify coverage.   PREVENTATIVE SERVICES FREQUENCY &  COVERAGE DETAILS LAST COMPLETION DATE   Diabetes Screening    Fasting Blood Sugar /  Glucose    One screening every 12 months if never tested or if previously tested but not diagnosed with pre-diabetes   One screening every 6 months if diagnosed with pre-diabetes Lab Results   Component Value Date     (H) 03/27/2024        Cardiovascular Disease Screening    Lipid Panel  Cholesterol  Lipoprotein (HDL)  Triglycerides Covered every 5 years for all Medicare beneficiaries without apparent signs or symptoms of cardiovascular disease Lab Results   Component  Value Date    CHOLEST 279 (H) 03/14/2024    HDL 64 (H) 03/14/2024     (H) 03/14/2024    TRIG 122 03/14/2024         Electrocardiogram (EKG)   Covered if needed at Welcome to Medicare, and non-screening if indicated for medical reasons 03/25/2024      Ultrasound Screening for Abdominal Aortic Aneurysm (AAA) Covered once in a lifetime for one of the following risk factors    Men who are 65-75 years old and have ever smoked    Anyone with a family history -     Colorectal Cancer Screening  Covered for ages 50-85; only need ONE of the following:    Colonoscopy   Covered every 10 years    Covered every 2 years if patient is at high risk or previous colonoscopy was abnormal -    No recommendations at this time    Flexible Sigmoidoscopy   Covered every 4 years -    Fecal Occult Blood Test Covered annually -   Bone Density Screening    Bone density screening    Covered every 2 years after age 65 if diagnosed with risk of osteoporosis or estrogen deficiency.    Covered yearly for long-term glucocorticoid medication use (Steroids) No results found for this or any previous visit.      Health Maintenance Due   Topic Date Due    DEXA Scan  Never done      Pap and Pelvic    Pap   Covered every 2 years for women at normal risk; Annually if at high risk -  No recommendations at this time    Chlamydia Annually if high risk -  No recommendations at this time   Screening Mammogram    Mammogram     Recommend annually for all female patients aged 40 and older    One baseline mammogram covered for patients aged 35-39 -    No recommendations at this time    Immunizations    Influenza Covered once per flu season  Please get every year 10/25/2023  No recommendations at this time    Pneumococcal Each vaccine ([id number] & [id number]) covered once after 65 Prevnar 13: 12/01/2015    [id number]: 07/18/2017     No recommendations at this time    Hepatitis B One screening covered for patients with certain risk factors   -  No  recommendations at this time    Tetanus Toxoid Not covered by Medicare Part B unless medically necessary (cut with metal); may be covered with your pharmacy prescription benefits 12/22/2005    Tetanus, Diptheria and Pertusis TD and TDaP Not covered by Medicare Part B -  No recommendations at this time    Zoster Not covered by Medicare Part B; may be covered with your pharmacy  prescription benefits -  Zoster Vaccines(1 of 2) Never done     Diabetes      Hemoglobin A1C Annually; if last result is elevated, may be asked to retest more frequently.    Medicare covers every 3 months Lab Results   Component Value Date     (H) 12/06/2023    A1C 6.2 (H) 12/06/2023       No recommendations at this time    Creat/alb ratio Annually Lab Results   Component Value Date    MICROALBCREA 30.4 (H) 03/14/2024       LDL Annually Lab Results   Component Value Date     (H) 03/14/2024       Dilated Eye Exam Annually Last Diabetic Eye Exam:  Last Dilated Eye Exam: 04/26/23  Eye Exam shows Diabetic Retinopathy?: No       Annual Monitoring of Persistent Medications (ACE/ARB, digoxin diuretics, anticonvulsants)    Potassium Annually Lab Results   Component Value Date    K 3.4 (L) 03/27/2024         Creatinine   Annually Lab Results   Component Value Date    CREATSERUM 1.17 (H) 03/27/2024         BUN Annually Lab Results   Component Value Date    BUN 14 03/27/2024       Drug Serum Conc Annually No results found for: \"DIGOXIN\", \"DIG\", \"VALP\"           Chronic Obstructive Pulmonary Disease (COPD)    Spirometry Annually Spirometry date:

## 2024-03-06 NOTE — PATIENT INSTRUCTIONS
Gabriela Ndiaye's SCREENING SCHEDULE   Tests on this list are recommended by your physician but may not be covered, or covered at this frequency, by your insurer.   Please check with your insurance carrier before scheduling to verify coverage.   PREVENTATIVE SERVICES FREQUENCY &  COVERAGE DETAILS LAST COMPLETION DATE   Diabetes Screening    Fasting Blood Sugar /  Glucose    One screening every 12 months if never tested or if previously tested but not diagnosed with pre-diabetes   One screening every 6 months if diagnosed with pre-diabetes Lab Results   Component Value Date     (H) 04/12/2023        Cardiovascular Disease Screening    Lipid Panel  Cholesterol  Lipoprotein (HDL)  Triglycerides Covered every 5 years for all Medicare beneficiaries without apparent signs or symptoms of cardiovascular disease Lab Results   Component Value Date    CHOLEST 205 (H) 02/03/2023    HDL 89 (H) 02/03/2023     (H) 02/03/2023    TRIG 78 02/03/2023         Electrocardiogram (EKG)   Covered if needed at Welcome to Medicare, and non-screening if indicated for medical reasons 08/16/2022      Ultrasound Screening for Abdominal Aortic Aneurysm (AAA) Covered once in a lifetime for one of the following risk factors   • Men who are 65-75 years old and have ever smoked   • Anyone with a family history -     Colorectal Cancer Screening  Covered for ages 50-85; only need ONE of the following:    Colonoscopy   Covered every 10 years    Covered every 2 years if patient is at high risk or previous colonoscopy was abnormal -    No recommendations at this time    Flexible Sigmoidoscopy   Covered every 4 years -    Fecal Occult Blood Test Covered annually -   Bone Density Screening    Bone density screening    Covered every 2 years after age 65 if diagnosed with risk of osteoporosis or estrogen deficiency.    Covered yearly for long-term glucocorticoid medication use (Steroids) No results found for this or any previous  visit.      Health Maintenance Due   Topic Date Due   • DEXA Scan  Never done      Pap and Pelvic    Pap   Covered every 2 years for women at normal risk; Annually if at high risk -  No recommendations at this time    Chlamydia Annually if high risk -  No recommendations at this time   Screening Mammogram    Mammogram     Recommend annually for all female patients aged 40 and older    One baseline mammogram covered for patients aged 35-39 -    No recommendations at this time    Immunizations    Influenza Covered once per flu season  Please get every year 10/25/2023  No recommendations at this time    Pneumococcal Each vaccine (Msuupah12 & Cweejrllf81) covered once after 65 Prevnar 13: 12/01/2015    [id number]: 07/18/2017     No recommendations at this time    Hepatitis B One screening covered for patients with certain risk factors   -  No recommendations at this time    Tetanus Toxoid Not covered by Medicare Part B unless medically necessary (cut with metal); may be covered with your pharmacy prescription benefits 12/22/2005    Tetanus, Diptheria and Pertusis TD and TDaP Not covered by Medicare Part B -  No recommendations at this time    Zoster Not covered by Medicare Part B; may be covered with your pharmacy  prescription benefits -  Zoster Vaccines(1 of 2) Never done     Diabetes      Hemoglobin A1C Annually; if last result is elevated, may be asked to retest more frequently.    Medicare covers every 3 months Lab Results   Component Value Date     (H) 12/06/2023    A1C 6.2 (H) 12/06/2023       No recommendations at this time    Creat/alb ratio Annually Lab Results   Component Value Date    MICROALBCREA 27.7 02/03/2023       LDL Annually Lab Results   Component Value Date     (H) 02/03/2023       Dilated Eye Exam Annually [unfilled]     Annual Monitoring of Persistent Medications (ACE/ARB, digoxin diuretics, anticonvulsants)    Potassium Annually Lab Results   Component Value Date    K 3.8  04/12/2023         Creatinine   Annually Lab Results   Component Value Date    CREATSERUM 1.08 (H) 04/12/2023         BUN Annually Lab Results   Component Value Date    BUN 16 04/12/2023       Drug Serum Conc Annually No results found for: \"DIGOXIN\", \"DIG\", \"VALP\"         Chronic Obstructive Pulmonary Disease (COPD)    Spirometry Annually Spirometry date:

## 2024-03-07 DIAGNOSIS — Z76.0 ENCOUNTER FOR MEDICATION REFILL: ICD-10-CM

## 2024-03-07 RX ORDER — ROSUVASTATIN CALCIUM 10 MG/1
TABLET, COATED ORAL
Qty: 90 TABLET | Refills: 3 | Status: SHIPPED | OUTPATIENT
Start: 2024-03-07

## 2024-03-11 DIAGNOSIS — J44.1 CHRONIC OBSTRUCTIVE PULMONARY DISEASE WITH ACUTE EXACERBATION (HCC): ICD-10-CM

## 2024-03-11 DIAGNOSIS — Z76.0 ENCOUNTER FOR MEDICATION REFILL: ICD-10-CM

## 2024-03-11 RX ORDER — CILOSTAZOL 50 MG/1
50 TABLET ORAL 2 TIMES DAILY
Qty: 180 TABLET | Refills: 1 | Status: SHIPPED | OUTPATIENT
Start: 2024-03-11

## 2024-03-11 RX ORDER — ACYCLOVIR 400 MG/1
TABLET ORAL
Qty: 180 TABLET | Refills: 0 | Status: SHIPPED | OUTPATIENT
Start: 2024-03-11

## 2024-03-11 RX ORDER — VARENICLINE TARTRATE 1 MG/1
1 TABLET, FILM COATED ORAL 2 TIMES DAILY
Qty: 60 TABLET | Refills: 0 | Status: SHIPPED | OUTPATIENT
Start: 2024-03-11

## 2024-03-11 RX ORDER — LOSARTAN POTASSIUM 25 MG/1
25 TABLET ORAL DAILY
Qty: 90 TABLET | Refills: 0 | Status: SHIPPED | OUTPATIENT
Start: 2024-03-11

## 2024-03-11 RX ORDER — PENTOSAN POLYSULFATE SODIUM 100 MG/1
100 CAPSULE, GELATIN COATED ORAL DAILY
Qty: 90 CAPSULE | Refills: 1 | Status: SHIPPED | OUTPATIENT
Start: 2024-03-11

## 2024-03-13 DIAGNOSIS — Z76.0 MEDICATION REFILL: ICD-10-CM

## 2024-03-13 DIAGNOSIS — F32.A ANXIETY AND DEPRESSION: ICD-10-CM

## 2024-03-13 DIAGNOSIS — Z76.0 ENCOUNTER FOR MEDICATION REFILL: ICD-10-CM

## 2024-03-13 DIAGNOSIS — F41.9 ANXIETY AND DEPRESSION: ICD-10-CM

## 2024-03-13 RX ORDER — BUSPIRONE HYDROCHLORIDE 10 MG/1
10 TABLET ORAL 2 TIMES DAILY
Qty: 180 TABLET | Refills: 1 | Status: SHIPPED | OUTPATIENT
Start: 2024-03-13

## 2024-03-13 RX ORDER — ACYCLOVIR 400 MG/1
TABLET ORAL
Qty: 180 TABLET | Refills: 0 | OUTPATIENT
Start: 2024-03-13

## 2024-03-13 RX ORDER — POTASSIUM CHLORIDE 1500 MG/1
1 TABLET, EXTENDED RELEASE ORAL DAILY
Qty: 90 TABLET | Refills: 1 | Status: SHIPPED | OUTPATIENT
Start: 2024-03-13

## 2024-03-14 ENCOUNTER — LAB ENCOUNTER (OUTPATIENT)
Dept: LAB | Age: 79
End: 2024-03-14
Attending: FAMILY MEDICINE
Payer: MEDICARE

## 2024-03-14 DIAGNOSIS — E11.42 TYPE 2 DIABETES MELLITUS WITH DIABETIC POLYNEUROPATHY, WITHOUT LONG-TERM CURRENT USE OF INSULIN (HCC): ICD-10-CM

## 2024-03-14 DIAGNOSIS — Z00.00 LABORATORY EXAMINATION ORDERED AS PART OF A ROUTINE GENERAL MEDICAL EXAMINATION: ICD-10-CM

## 2024-03-14 LAB
ALBUMIN SERPL-MCNC: 3.1 G/DL (ref 3.4–5)
ALBUMIN/GLOB SERPL: 0.9 {RATIO} (ref 1–2)
ALP LIVER SERPL-CCNC: 57 U/L
ALT SERPL-CCNC: 10 U/L
ANION GAP SERPL CALC-SCNC: 3 MMOL/L (ref 0–18)
AST SERPL-CCNC: 12 U/L (ref 15–37)
BASOPHILS # BLD AUTO: 0.06 X10(3) UL (ref 0–0.2)
BASOPHILS NFR BLD AUTO: 0.7 %
BILIRUB SERPL-MCNC: 0.5 MG/DL (ref 0.1–2)
BUN BLD-MCNC: 14 MG/DL (ref 9–23)
CALCIUM BLD-MCNC: 9.1 MG/DL (ref 8.5–10.1)
CHLORIDE SERPL-SCNC: 108 MMOL/L (ref 98–112)
CHOLEST SERPL-MCNC: 279 MG/DL (ref ?–200)
CO2 SERPL-SCNC: 28 MMOL/L (ref 21–32)
CREAT BLD-MCNC: 1.25 MG/DL
EGFRCR SERPLBLD CKD-EPI 2021: 44 ML/MIN/1.73M2 (ref 60–?)
EOSINOPHIL # BLD AUTO: 0.13 X10(3) UL (ref 0–0.7)
EOSINOPHIL NFR BLD AUTO: 1.5 %
ERYTHROCYTE [DISTWIDTH] IN BLOOD BY AUTOMATED COUNT: 14.7 %
FASTING PATIENT LIPID ANSWER: YES
FASTING STATUS PATIENT QL REPORTED: YES
GLOBULIN PLAS-MCNC: 3.6 G/DL (ref 2.8–4.4)
GLUCOSE BLD-MCNC: 109 MG/DL (ref 70–99)
HCT VFR BLD AUTO: 39.5 %
HDLC SERPL-MCNC: 64 MG/DL (ref 40–59)
HGB BLD-MCNC: 12.7 G/DL
IMM GRANULOCYTES # BLD AUTO: 0.04 X10(3) UL (ref 0–1)
IMM GRANULOCYTES NFR BLD: 0.5 %
LDLC SERPL CALC-MCNC: 193 MG/DL (ref ?–100)
LYMPHOCYTES # BLD AUTO: 1.54 X10(3) UL (ref 1–4)
LYMPHOCYTES NFR BLD AUTO: 17.8 %
MCH RBC QN AUTO: 30 PG (ref 26–34)
MCHC RBC AUTO-ENTMCNC: 32.2 G/DL (ref 31–37)
MCV RBC AUTO: 93.2 FL
MONOCYTES # BLD AUTO: 0.97 X10(3) UL (ref 0.1–1)
MONOCYTES NFR BLD AUTO: 11.2 %
NEUTROPHILS # BLD AUTO: 5.92 X10 (3) UL (ref 1.5–7.7)
NEUTROPHILS # BLD AUTO: 5.92 X10(3) UL (ref 1.5–7.7)
NEUTROPHILS NFR BLD AUTO: 68.3 %
NONHDLC SERPL-MCNC: 215 MG/DL (ref ?–130)
OSMOLALITY SERPL CALC.SUM OF ELEC: 289 MOSM/KG (ref 275–295)
PLATELET # BLD AUTO: 373 10(3)UL (ref 150–450)
POTASSIUM SERPL-SCNC: 4.1 MMOL/L (ref 3.5–5.1)
PROT SERPL-MCNC: 6.7 G/DL (ref 6.4–8.2)
RBC # BLD AUTO: 4.24 X10(6)UL
SODIUM SERPL-SCNC: 139 MMOL/L (ref 136–145)
T4 FREE SERPL-MCNC: 1.2 NG/DL (ref 0.8–1.7)
TRIGL SERPL-MCNC: 122 MG/DL (ref 30–149)
TSI SER-ACNC: 0.23 MIU/ML (ref 0.36–3.74)
VLDLC SERPL CALC-MCNC: 26 MG/DL (ref 0–30)
WBC # BLD AUTO: 8.7 X10(3) UL (ref 4–11)

## 2024-03-14 PROCEDURE — 84443 ASSAY THYROID STIM HORMONE: CPT

## 2024-03-14 PROCEDURE — 80061 LIPID PANEL: CPT

## 2024-03-14 PROCEDURE — 36415 COLL VENOUS BLD VENIPUNCTURE: CPT

## 2024-03-14 PROCEDURE — 80053 COMPREHEN METABOLIC PANEL: CPT

## 2024-03-14 PROCEDURE — 84439 ASSAY OF FREE THYROXINE: CPT

## 2024-03-14 PROCEDURE — 85025 COMPLETE CBC W/AUTO DIFF WBC: CPT

## 2024-03-14 PROCEDURE — 82570 ASSAY OF URINE CREATININE: CPT

## 2024-03-14 PROCEDURE — 82043 UR ALBUMIN QUANTITATIVE: CPT

## 2024-03-14 PROCEDURE — 84481 FREE ASSAY (FT-3): CPT

## 2024-03-15 LAB
CREAT UR-SCNC: 87.7 MG/DL
MICROALBUMIN UR-MCNC: 2.67 MG/DL
MICROALBUMIN/CREAT 24H UR-RTO: 30.4 UG/MG (ref ?–30)
T3FREE SERPL-MCNC: 2.25 PG/ML (ref 2.4–4.2)

## 2024-03-16 ENCOUNTER — LAB ENCOUNTER (OUTPATIENT)
Dept: LAB | Age: 79
End: 2024-03-16
Attending: STUDENT IN AN ORGANIZED HEALTH CARE EDUCATION/TRAINING PROGRAM
Payer: MEDICARE

## 2024-03-16 DIAGNOSIS — K52.9 CHRONIC DIARRHEA: ICD-10-CM

## 2024-03-16 PROCEDURE — 87427 SHIGA-LIKE TOXIN AG IA: CPT

## 2024-03-16 PROCEDURE — 87493 C DIFF AMPLIFIED PROBE: CPT

## 2024-03-16 PROCEDURE — 87045 FECES CULTURE AEROBIC BACT: CPT

## 2024-03-16 PROCEDURE — 87046 STOOL CULTR AEROBIC BACT EA: CPT

## 2024-03-17 LAB — C DIFF TOX B STL QL: NEGATIVE

## 2024-03-18 ENCOUNTER — TELEPHONE (OUTPATIENT)
Dept: FAMILY MEDICINE CLINIC | Facility: CLINIC | Age: 79
End: 2024-03-18

## 2024-03-18 DIAGNOSIS — R10.9 RIGHT SIDED ABDOMINAL PAIN: Primary | ICD-10-CM

## 2024-03-18 NOTE — TELEPHONE ENCOUNTER
Pt states she has intermittent right sided nagging pain under her right ribs for past 2 weeks. 4/10 pain when it comes. She often gets pain in the morning. She is barely eating d/t diarrhea & loss of appetite. She denies fever, SOB, vomiting, & any urinary symptoms. She sees Dr. Kimball for chronic diarrhea. Pt is requesting orders for some type of imaging as she is concerned it is her gallbladder. Please advise on any orders or if you can add pt to your schedule this week.   LOV 3/6

## 2024-03-18 NOTE — TELEPHONE ENCOUNTER
[place] called back regarding the message you left.   I informed her that MD might not get back to her with the results until Friday due to surgery.   She was unhappy with this and states that she is in a lot of pain.   She then stated that she will wait for when he is available.    Ok to add on RUQ ultrasound.

## 2024-03-18 NOTE — TELEPHONE ENCOUNTER
Pt called she wants a call back from a nurse, pt was seen at the urgent care for diarrhea and and now having pain on her R side. Made an appt with [facility] for pain on R side. pt thinks it might be her gallbladder on 03/21/24 as it was first available appt.     Pt stated she has not been felling well.

## 2024-03-18 NOTE — PROGRESS NOTES
Please inform patient of results  -stool cx pending    ---    Drew Ochoa,    Your recent stool study shows NO signs of an infection known as C. Difficile.     Please let me know if you have any questions or concerns.     Sincerely,  Tirso Kimball MD

## 2024-03-19 ENCOUNTER — TELEPHONE (OUTPATIENT)
Dept: FAMILY MEDICINE CLINIC | Facility: CLINIC | Age: 79
End: 2024-03-19

## 2024-03-19 ENCOUNTER — APPOINTMENT (OUTPATIENT)
Dept: ULTRASOUND IMAGING | Age: 79
End: 2024-03-19
Attending: EMERGENCY MEDICINE
Payer: MEDICARE

## 2024-03-19 ENCOUNTER — HOSPITAL ENCOUNTER (EMERGENCY)
Age: 79
Discharge: HOME | End: 2024-03-19
Attending: EMERGENCY MEDICINE
Payer: MEDICARE

## 2024-03-19 VITALS
SYSTOLIC BLOOD PRESSURE: 136 MMHG | BODY MASS INDEX: 27.66 KG/M2 | OXYGEN SATURATION: 94 % | HEART RATE: 69 BPM | WEIGHT: 162 LBS | DIASTOLIC BLOOD PRESSURE: 69 MMHG | HEIGHT: 64 IN | RESPIRATION RATE: 17 BRPM | TEMPERATURE: 97 F

## 2024-03-19 DIAGNOSIS — K80.50 BILIARY COLIC: Primary | ICD-10-CM

## 2024-03-19 DIAGNOSIS — E03.9 HYPOTHYROIDISM, UNSPECIFIED TYPE: Primary | ICD-10-CM

## 2024-03-19 LAB
ALBUMIN SERPL-MCNC: 3.3 G/DL (ref 3.4–5)
ALBUMIN/GLOB SERPL: 0.8 {RATIO} (ref 1–2)
ALP LIVER SERPL-CCNC: 67 U/L
ALT SERPL-CCNC: 10 U/L
ANION GAP SERPL CALC-SCNC: 3 MMOL/L (ref 0–18)
AST SERPL-CCNC: 10 U/L (ref 15–37)
BASOPHILS # BLD AUTO: 0.08 X10(3) UL (ref 0–0.2)
BASOPHILS NFR BLD AUTO: 0.8 %
BILIRUB SERPL-MCNC: 0.3 MG/DL (ref 0.1–2)
BILIRUB UR QL STRIP.AUTO: NEGATIVE
BUN BLD-MCNC: 12 MG/DL (ref 9–23)
CALCIUM BLD-MCNC: 9.3 MG/DL (ref 8.5–10.1)
CHLORIDE SERPL-SCNC: 108 MMOL/L (ref 98–112)
CLARITY UR REFRACT.AUTO: CLEAR
CO2 SERPL-SCNC: 28 MMOL/L (ref 21–32)
COLOR UR AUTO: YELLOW
CREAT BLD-MCNC: 1.25 MG/DL
EGFRCR SERPLBLD CKD-EPI 2021: 44 ML/MIN/1.73M2 (ref 60–?)
EOSINOPHIL # BLD AUTO: 0.14 X10(3) UL (ref 0–0.7)
EOSINOPHIL NFR BLD AUTO: 1.3 %
ERYTHROCYTE [DISTWIDTH] IN BLOOD BY AUTOMATED COUNT: 14.9 %
GLOBULIN PLAS-MCNC: 4.2 G/DL (ref 2.8–4.4)
GLUCOSE BLD-MCNC: 115 MG/DL (ref 70–99)
GLUCOSE UR STRIP.AUTO-MCNC: NEGATIVE MG/DL
HCT VFR BLD AUTO: 42.1 %
HGB BLD-MCNC: 13.8 G/DL
IMM GRANULOCYTES # BLD AUTO: 0.05 X10(3) UL (ref 0–1)
IMM GRANULOCYTES NFR BLD: 0.5 %
KETONES UR STRIP.AUTO-MCNC: NEGATIVE MG/DL
LEUKOCYTE ESTERASE UR QL STRIP.AUTO: NEGATIVE
LIPASE SERPL-CCNC: 34 U/L (ref 13–75)
LYMPHOCYTES # BLD AUTO: 1.6 X10(3) UL (ref 1–4)
LYMPHOCYTES NFR BLD AUTO: 15.2 %
MCH RBC QN AUTO: 30.1 PG (ref 26–34)
MCHC RBC AUTO-ENTMCNC: 32.8 G/DL (ref 31–37)
MCV RBC AUTO: 91.7 FL
MONOCYTES # BLD AUTO: 1.16 X10(3) UL (ref 0.1–1)
MONOCYTES NFR BLD AUTO: 11 %
NEUTROPHILS # BLD AUTO: 7.49 X10 (3) UL (ref 1.5–7.7)
NEUTROPHILS # BLD AUTO: 7.49 X10(3) UL (ref 1.5–7.7)
NEUTROPHILS NFR BLD AUTO: 71.2 %
NITRITE UR QL STRIP.AUTO: NEGATIVE
OSMOLALITY SERPL CALC.SUM OF ELEC: 289 MOSM/KG (ref 275–295)
PH UR STRIP.AUTO: 5.5 [PH] (ref 5–8)
PLATELET # BLD AUTO: 399 10(3)UL (ref 150–450)
POTASSIUM SERPL-SCNC: 3.9 MMOL/L (ref 3.5–5.1)
PROT SERPL-MCNC: 7.5 G/DL (ref 6.4–8.2)
PROT UR STRIP.AUTO-MCNC: NEGATIVE MG/DL
RBC # BLD AUTO: 4.59 X10(6)UL
RBC UR QL AUTO: NEGATIVE
SODIUM SERPL-SCNC: 139 MMOL/L (ref 136–145)
SP GR UR STRIP.AUTO: 1.01 (ref 1–1.03)
UROBILINOGEN UR STRIP.AUTO-MCNC: 0.2 MG/DL
WBC # BLD AUTO: 10.5 X10(3) UL (ref 4–11)

## 2024-03-19 PROCEDURE — 99284 EMERGENCY DEPT VISIT MOD MDM: CPT

## 2024-03-19 PROCEDURE — 81003 URINALYSIS AUTO W/O SCOPE: CPT | Performed by: EMERGENCY MEDICINE

## 2024-03-19 PROCEDURE — 36415 COLL VENOUS BLD VENIPUNCTURE: CPT

## 2024-03-19 PROCEDURE — 85025 COMPLETE CBC W/AUTO DIFF WBC: CPT | Performed by: EMERGENCY MEDICINE

## 2024-03-19 PROCEDURE — 99285 EMERGENCY DEPT VISIT HI MDM: CPT

## 2024-03-19 PROCEDURE — 83690 ASSAY OF LIPASE: CPT | Performed by: EMERGENCY MEDICINE

## 2024-03-19 PROCEDURE — 76700 US EXAM ABDOM COMPLETE: CPT | Performed by: EMERGENCY MEDICINE

## 2024-03-19 PROCEDURE — 80053 COMPREHEN METABOLIC PANEL: CPT | Performed by: EMERGENCY MEDICINE

## 2024-03-19 RX ORDER — DICYCLOMINE HCL 20 MG
20 TABLET ORAL 3 TIMES DAILY
Qty: 20 TABLET | Refills: 0 | Status: SHIPPED | OUTPATIENT
Start: 2024-03-19

## 2024-03-19 RX ORDER — ONDANSETRON 4 MG/1
4 TABLET, ORALLY DISINTEGRATING ORAL EVERY 8 HOURS PRN
Qty: 20 TABLET | Refills: 0 | Status: SHIPPED | OUTPATIENT
Start: 2024-03-19 | End: 2024-03-26

## 2024-03-19 NOTE — DISCHARGE INSTRUCTIONS
Return to emergency room if increased pain, fever, vomiting    May take dicyclomine for pain    Call surgeon office today for outpatient cholecystectomy

## 2024-03-19 NOTE — TELEPHONE ENCOUNTER
Pt is requesting referral as she was seen at the ED for Biliary colic and was given instructions to follow up with Burt Wyatt MD  [address] [facility] [id number] [phone]     Pt is in need of a referral as her appt is on 03/21/24 and they wont see her without one.

## 2024-03-19 NOTE — ED PROVIDER NOTES
Patient Seen in: [facility] In [place]      History     Chief Complaint   Patient presents with    Abdomen/Flank Pain     Stated Complaint: right side pain    Subjective:   HPI    Patient is an 78-year-old female presents to emergency room for evaluation of abdominal pain.  Patient with abdominal pain intermittently for couple weeks.  Starts a couple hours after eating in the right upper quadrant radiates to her right flank.  Symptoms were more persistent yesterday.  She complains of slight nausea.  No vomiting or fever.  Denies urinary symptoms such as frequency, hematuria or dysuria.  No chest pain.  Patient has had prior hysterectomy.  No known history of gallstones.    Objective:   Past Medical History:   Diagnosis Date    Abdominal hernia     Anxiety     Arthritis     Back pain     Back problem     spinal stenosis    Bilateral pulmonary embolism (HCC) 07/06/2021    Bloating     Cancer (HCC)     lung    COPD (chronic obstructive pulmonary disease) (HCC)     not using oxygen    Diabetes (HCC)     diet controlled    Diabetes mellitus (HCC)     Diarrhea, unspecified     Disorder of thyroid     Easy bruising     Esophageal reflux     Exposure to medical diagnostic radiation     last tx 11/2023    Feeling lonely     Flatulence/gas pain/belching     Food intolerance     Hearing impairment     Hearing loss     Hemorrhoids     High blood pressure     High cholesterol     History of eating disorder     Muscle weakness     Neuropathy     Night sweats     Pain in joints     Pneumonia of both lungs due to infectious organism, unspecified part of lung 08/15/2022    Pulmonary emboli (HCC)     Pulmonary embolism (HCC)     RSV (acute bronchiolitis due to respiratory syncytial virus)     Shortness of breath     Stress     Thyroid disease     Uncomfortable fullness after meals     Visual impairment     glasses    Wears glasses               Past Surgical History:   Procedure Laterality Date    AMPUTATION  FINGER/THUMB+FLAPS      COLECTOMY      COLONOSCOPY      COLONOSCOPY N/A 1/15/2024    Procedure: COLONOSCOPY;  Surgeon: Tirso Kimball MD;  Location:  ENDOSCOPY    HYSTERECTOMY                  Social History     Socioeconomic History    Marital status:    Tobacco Use    Smoking status: Every Day     Packs/day: 0.25     Years: 63.00     Additional pack years: 0.00     Total pack years: 15.75     Types: Cigarettes     Start date: 2/24/1964    Smokeless tobacco: Never   Vaping Use    Vaping Use: Never used   Substance and Sexual Activity    Alcohol use: Not Currently     Comment: rare    Drug use: Never   Other Topics Concern    Caffeine Concern Yes     Comment: coffee/coca cola/Iced tea    Exercise No     Social Determinants of Health     Financial Resource Strain: Low Risk  (8/15/2023)    Financial Resource Strain     Difficulty of Paying Living Expenses: Not hard at all     Med Affordability: No   Food Insecurity: No Food Insecurity (8/15/2023)    Food Insecurity     Food Insecurity: Never true   Transportation Needs: Unmet Transportation Needs (8/15/2023)    Transportation Needs     Lack of Transportation: Yes   Physical Activity: Insufficiently Active (8/15/2023)    Exercise Vital Sign     Days of Exercise per Week: 2 days     Minutes of Exercise per Session: 30 min   Stress: No Stress Concern Present (8/15/2023)    Stress     Feeling of Stress : No   Social Connections: Socially Integrated (8/15/2023)    Social Connections     Frequency of Socialization with Friends and Family: 2   Housing Stability: Low Risk  (8/15/2023)    Housing Stability     Housing Instability: No              Review of Systems    Positive for stated complaint: right side pain  Other systems are as noted in HPI.  Constitutional and vital signs reviewed.      All other systems reviewed and negative except as noted above.    Physical Exam     ED Triage Vitals [03/19/24 1108]   /75   Pulse 67   Resp 16   Temp 97 °F (36.1 °C)    Temp src Temporal   SpO2 92 %   O2 Device None (Room air)       Current:/69   Pulse 69   Temp 97 °F (36.1 °C) (Temporal)   Resp 17   Ht 162.6 cm (5' 4\")   Wt 73.5 kg   SpO2 94%   BMI 27.81 kg/m²         Physical Exam    GENERAL: No acute distress, well appearing and non-toxic, Alert and oriented X 3   HEENT: Normocephalic, atraumatic.  Moist mucous membranes.  Pupils equal round reactive to light and accommodation, extraocular motion is intact, sclerae white, conjunctiva is pink.  Oropharynx is unremarkable, no exudate.  NECK: Supple, trachea midline, no lymphadenopathy.   LUNG: Lungs clear to auscultation bilaterally, no wheezing, no rales, no rhonchi.  CARDIOVASCULAR: Regular rate and rhythm.  Normal S1S2.  No S3S4 or murmur.  ABDOMEN: Bowel sounds are present. Soft. nondistended, no pulsatile masses.   MUSCULOSKELETAL: No calf tenderness.  Dorsalis and Posterior Tibial pulses present. No clubbing. No cyanosis.  No edema.   SKIN EXAMINATIoN: Warm and dry with normal appearance.  No rashes or lesions.  NEUROLOGICAL:  Motor strength intact all groups.  normal sensation, speech intact very mild right upper quadrant tenderness but no rebound, guarding or rigidity    ED Course     Labs Reviewed   COMP METABOLIC PANEL (14) - Abnormal; Notable for the following components:       Result Value    Glucose 115 (*)     Creatinine 1.25 (*)     eGFR-Cr 44 (*)     AST 10 (*)     ALT 10 (*)     Albumin 3.3 (*)     A/G Ratio 0.8 (*)     All other components within normal limits   CBC W/ DIFFERENTIAL - Abnormal; Notable for the following components:    Monocyte Absolute 1.16 (*)     All other components within normal limits   LIPASE - Normal   URINALYSIS WITH CULTURE REFLEX   CBC WITH DIFFERENTIAL WITH PLATELET    Narrative:     The following orders were created for panel order CBC With Differential With Platelet.  Procedure                               Abnormality         Status                     ---------                                -----------         ------                     CBC W/ DIFFERENTIAL[[id number]]          Abnormal            Final result                 Please view results for these tests on the individual orders.   Creatinine 1.25.  AST and ALT normal          US ABDOMEN COMPLETE (CPT=76700)    Result Date: 3/19/2024  PROCEDURE:  US ABDOMEN COMPLETE (CPT=76700)  COMPARISON:  None.  INDICATIONS:  right side pain  TECHNIQUE:  Real time gray-scale ultrasound was used to evaluate the abdomen.  The exam includes images of the liver, gallbladder, common bile duct, pancreas, spleen, kidneys, IVC, and aorta.  PATIENT STATED HISTORY: (As transcribed by Technologist)  Patient has a history of right upper abdominal pain with nausea for 2 weeks.    FINDINGS:   Multiple hepatic cysts, largest measuring 1.3 cm.  Common bile duct normal caliber for age, 7 mm.  Sludge and stones in the gallbladder, including sludge in a mobile stone gallbladder neck.  No Pitts sign.  Gallbladder shows no thickening of the wall, 2.2 mm thickness, and there is no dilation of the gallbladder lumen.  Right kidney 9.5 cm, left kidney 9.6 cm.  Cortical thinning and cysts involve both kidneys.  No pancreatic abnormalities are seen.  Complex cyst involving the spleen, 5.4 x 5.0 x 8.4 cm.  The cyst has either some layering debris or mural thickening.  The spleen itself is not enlarged, 8.0 x 4.4 cm.  Patent aorta and IVC, no signs of ascites or pleural effusion.            CONCLUSION:  Multiple abnormalities including hepatic and renal cysts, gallstones and sludge without gallbladder dilation or biliary ductal dilation, cortical thinning of the kidneys, and a complex cyst involving the spleen.  The cyst was described on previous CT report and may be chronic.  No ascites.   LOCATION:  [facility]    Dictated by (CST): Timmy Ceron MD on 3/19/2024 at 12:32 PM     Finalized by (CST): Timmy Ceron MD on 3/19/2024 at 12:37 PM               MDM       Patient is a 78-year-old female presents emergency room for evaluation of intermittent right upper quadrant pain.  Abdomen nonsurgical.  Differential includes hepatitis, pancreatitis, biliary colic, cholecystitis.  Patient laboratory test performed showing normal white count.  Normal LFTs and lipase.  Abdomen nonsurgical.  Ultrasound shows gallstones.  I believe her symptoms are secondary to biliary colic.  No evidence for cholecystitis.  No indication for admission at this time.  Patient informed of hepatic and renal cysts on ultrasound.  Chronic splenic cyst.  Case was discussed with general surgery, Dr. Wyatt who will follow her up in the office and set her up for outpatient cholecystectomy.  Patient be placed on Bentyl.  Advise return to ED if intractable pain, fever, vomiting.  Patient also given Zofran at home.  Recommend low-fat diet.      Patient was screened and evaluated during this visit.   As a treating physician attending to the patient, I determined, within reasonable clinical confidence and prior to discharge, that an emergency medical condition was not or was no longer present.  There was no indication for further evaluation, treatment or admission on an emergency basis.  Comprehensive verbal and written discharge and follow-up instructions were provided to help prevent relapse or worsening.  Patient was instructed to follow-up with her primary care provider for further evaluation and treatment, but to return immediately to the ER for worsening, concerning, new, changing or persisting symptoms.  I discussed the case with the patient and they had no questions, complaints, or concerns.  Patient felt comfortable going home.                                 Medical Decision Making      Disposition and Plan     Clinical Impression:  1. Biliary colic         Disposition:  Discharge  3/19/2024  2:08 pm    Follow-up:  Burt Wyatt MD  [address] [facility] [address]  [phone]    Follow  up            Medications Prescribed:  Discharge Medication List as of 3/19/2024  2:14 PM        START taking these medications    Details   dicyclomine 20 MG Oral Tab Take 1 tablet (20 mg total) by mouth 3 (three) times daily., Normal, Disp-20 tablet, R-0      ondansetron 4 MG Oral Tablet Dispersible Take 1 tablet (4 mg total) by mouth every 8 (eight) hours as needed for Nausea., Normal, Disp-20 tablet, R-0

## 2024-03-19 NOTE — TELEPHONE ENCOUNTER
----- Message from Ngozi Barreto MD sent at 3/17/2024  9:45 AM CDT -----  Results reviewed. Please inform patient mixed thyroid panel low TSH, but T4 wnl and T3 low - continue current dose and repeat in 6 weeks - if TSH cont to drop then 75mcg daily (instead of alternating with 88mcg).    +microalbuminuria (new). Cont losartan, may need to increase dose. Repeat in 3 mo with next A1c. Ovearall kidney function stable    Hyperlipidemia (severe) - was she taking rosuvastatin? If so - increase to 20mg

## 2024-03-19 NOTE — TELEPHONE ENCOUNTER
Stat referral to dr. Waytt placed in [facility] for pt's 3/21 ER f/u appt with him.

## 2024-03-19 NOTE — TELEPHONE ENCOUNTER
Left msg for pt with results/instructions.  Pt does have appt on 3/21 with provider and asked pt if she wants to wait and discuss with provider at upcoming appt she can and to let him know then about her cholesterol medication

## 2024-03-20 NOTE — TELEPHONE ENCOUNTER
Pt called to say she has not been taking her cholesterol medication. She said it gives her body pains. She said if she needs to she will start taking again after her gull bladder surgery.     She is asking for a call back to go over the medication issue

## 2024-03-20 NOTE — TELEPHONE ENCOUNTER
No further orders at this time - continue to work on improving diet, quitting smoking regular activity.

## 2024-03-20 NOTE — TELEPHONE ENCOUNTER
Spoke to patient and she said that when she was taking the crestor she was having body aches and when she stopped it about 1 week later the aches went away.  Pt states she has \"experimented\" with this med and every time it does the same thing     Any further suggestions?  Pt did cancel appt for tomorrow 3/21.  She is going to see general surgery tomorrow 3/21

## 2024-03-21 ENCOUNTER — OFFICE VISIT (OUTPATIENT)
Facility: LOCATION | Age: 79
End: 2024-03-21
Payer: MEDICARE

## 2024-03-21 ENCOUNTER — TELEPHONE (OUTPATIENT)
Facility: LOCATION | Age: 79
End: 2024-03-21

## 2024-03-21 VITALS — TEMPERATURE: 97 F | HEART RATE: 65 BPM | OXYGEN SATURATION: 93 %

## 2024-03-21 DIAGNOSIS — K80.10 CALCULUS OF GALLBLADDER WITH CHOLECYSTITIS WITHOUT BILIARY OBSTRUCTION, UNSPECIFIED CHOLECYSTITIS ACUITY: Primary | ICD-10-CM

## 2024-03-21 PROCEDURE — 1159F MED LIST DOCD IN RCRD: CPT | Performed by: STUDENT IN AN ORGANIZED HEALTH CARE EDUCATION/TRAINING PROGRAM

## 2024-03-21 PROCEDURE — 1160F RVW MEDS BY RX/DR IN RCRD: CPT | Performed by: STUDENT IN AN ORGANIZED HEALTH CARE EDUCATION/TRAINING PROGRAM

## 2024-03-21 PROCEDURE — 99204 OFFICE O/P NEW MOD 45 MIN: CPT | Performed by: STUDENT IN AN ORGANIZED HEALTH CARE EDUCATION/TRAINING PROGRAM

## 2024-03-21 NOTE — TELEPHONE ENCOUNTER
RADHA SHARMA Patient  Member ID  [id number]    Date of Birth  1945-07-07    Gender  Female    Eligibility Status  Active Coverage    Group Number  [id number]    Plan / Coverage Date  2024-01-01    Transaction Type  Outpatient Authorization/Referral    Organization  [facility]    Payer  [employer]    [employer] logo     Certificate Information  Reference Number  NA    Status  NO ACTION REQUIRED    [employer] Record Number  [id number]    Message  For Member contracts which cover this service, no prior authorization is necessary. Please contact [employer] at the number on the back of the Member ID card.  Member Information  Patient Name  RADHA SHARMA    Patient Date of Birth  1945-07-07    Patient Gender  Female    Member ID  [id number]    Relationship to Subscriber  Self    Subscriber Name  RADHA SHARMA    Requesting Provider     Name  NEEMAFLORENCE COXEMY    NPI  [id number]    Specialty  [id number]  Provider Role  Provider    Address  [address] [place] [address]    Phone  [phone]  Fax  [phone]    Contact Name  [facility]    Service Information  Service Type  2 - Surgical    Place of Service  22 - [facility]    Service From - To Date  2024-04-03 - 2024-06-26    Quantity  1 Units  Diagnosis Code 1   - Calculus of gallbladder w chronic cholecyst w/o obstruction    Procedure Code 1 (CPT/HCPCS)  16852 - LAPARO CHOLECYSTECTOMY/GRAPH    Quantity  1 Units    Rendering Provider/Facility     Provider 1  Name  DELLADARRELL SMITH    NPI  [id number]    Specialty  [id number]  Provider Role  Attending    Address  [address] [place] [address]    Phone  [phone]  Provider 2  Name  [facility]    NPI  [id number]    Provider Role  Facility    Address  [address] [place] [address]    Phone  [phone]

## 2024-03-22 NOTE — H&P
New Patient Visit Note       Active Problems      1. Calculus of gallbladder with cholecystitis without biliary obstruction, unspecified cholecystitis acuity        Chief Complaint   Chief Complaint   Patient presents with    New Patient     NP - ED Biliary colic 3/19/24 F/U, Gallbladder issue, discomfort right abdominal area, nausea - medication for treatment, pain medication for treatment, lack of appetite, diarrhea,  US ABDOMEN COMPLETE 3/19/24, no other symptoms.       History of Present Illness   This is a very nice 78-year-old female referred to me from the emergency room with concern for symptomatic cholelithiasis, possible chronic cholecystitis.  Patient was recently in the emergency room on 3/19/2024.  She was worked up with an abdominal ultrasound there showing sludge and stones in the gallbladder including sludge and a mobile stone in the gallbladder neck.  Negative sonographic Pitts sign and no thickening of the gallbladder wall or pericholecystic fluid.    Patient has been feeling unwell for around 2 months now.  She complains of abdominal discomfort and nausea after eating.  She has had a poor appetite during this time.  Her pain and discomfort is in the right upper quadrant.  She has had chills without any known fevers.  She denies any jaundice.  She has a history of chronic diarrhea and history of C. difficile infection.  She has had a recent colonoscopy with Dr. Kimball on 1/15/2024.  This revealed a descending colon tubular adenoma.  Random biopsies of the terminal ileum and colon were negative for pathologic change.  Patient has had multiple abdominal surgeries including a partial hysterectomy and oophorectomy and colectomy for what sounds like perforated diverticulitis around 15 years ago.  She does take clopidogrel for history of coronary artery disease.      Allergies  Gabriela is allergic to rosuvastatin, cephalosporins, and keflex [cephalexin].    Past Medical / Surgical / Social / Family  Requested Prescriptions   Pending Prescriptions Disp Refills     acyclovir (ZOVIRAX) 800 MG tablet [Pharmacy Med Name: ACYCLOVIR 800MG TABS]  Last Written Prescription Date:  11  Last Fill Quantity: 20/2017,  # refills: 350   Last Office Visit with G, P or [facility] prescribing provider:  11/28/2017   Future Office Visit:      35 tablet 0     Sig: TAKE ONE TABLET BY MOUTH FIVE TIMES A DAY    Antivirals for Herpes Protocol Passed    1/2/2018 11:37 AM       Passed - Patient is age 12 or older       Passed - Recent or future visit with authorizing provider's specialty    Patient had office visit in the last year or has a visit in the next 30 days with authorizing provider.  See chart review.              Passed - Normal serum creatinine on file in past 12 months    Recent Labs   Lab Test  06/23/17   0907   CR  0.95             Ricco RIOS (R)     History    The past medical and past surgical history have been reviewed by me today.    Past Medical History:   Diagnosis Date    Abdominal hernia     Anxiety     Arthritis     Back pain     Back problem     spinal stenosis    Bilateral pulmonary embolism (HCC) 07/06/2021    Bloating     Cancer (HCC)     lung    COPD (chronic obstructive pulmonary disease) (HCC)     not using oxygen    Diabetes (HCC)     diet controlled    Diabetes mellitus (HCC)     Diarrhea, unspecified     Disorder of thyroid     Easy bruising     Esophageal reflux     Exposure to medical diagnostic radiation     last tx 11/2023    Feeling lonely     Flatulence/gas pain/belching     Food intolerance     Hearing impairment     Hearing loss     Hemorrhoids     High blood pressure     High cholesterol     History of eating disorder     Muscle weakness     Neuropathy     Night sweats     Pain in joints     Pneumonia of both lungs due to infectious organism, unspecified part of lung 08/15/2022    Pulmonary emboli (HCC)     Pulmonary embolism (HCC)     RSV (acute bronchiolitis due to respiratory syncytial virus)     Shortness of breath     Stress     Thyroid disease     Uncomfortable fullness after meals     Visual impairment     glasses    Wears glasses      Past Surgical History:   Procedure Laterality Date    AMPUTATION FINGER/THUMB+FLAPS      COLECTOMY      COLONOSCOPY      COLONOSCOPY N/A 1/15/2024    Procedure: COLONOSCOPY;  Surgeon: Tirso Kimball MD;  Location:  ENDOSCOPY    HYSTERECTOMY         The family history and social history have been reviewed by me today.    Family History   Problem Relation Age of Onset    Heart Disorder Father     Stroke Father     Uterine Cancer Mother     Diabetes Mother     Cancer Mother         colon    Heart Attack Daughter     Diabetes Daughter     Colon Polyps Daughter     Colon Polyps Son      Social History     Socioeconomic History    Marital status:    Tobacco Use    Smoking status: Every Day      Packs/day: 0.25     Years: 63.00     Additional pack years: 0.00     Total pack years: 15.75     Types: Cigarettes     Start date: 2/24/1964    Smokeless tobacco: Never   Vaping Use    Vaping Use: Never used   Substance and Sexual Activity    Alcohol use: Not Currently     Comment: rare    Drug use: Never   Other Topics Concern    Caffeine Concern Yes     Comment: coffee/coca cola/Iced tea    Exercise No        Current Outpatient Medications:     dicyclomine 20 MG Oral Tab, Take 1 tablet (20 mg total) by mouth 3 (three) times daily., Disp: 20 tablet, Rfl: 0    ondansetron 4 MG Oral Tablet Dispersible, Take 1 tablet (4 mg total) by mouth every 8 (eight) hours as needed for Nausea., Disp: 20 tablet, Rfl: 0    busPIRone 10 MG Oral Tab, Take 1 tablet (10 mg total) by mouth 2 (two) times daily. (Patient not taking: Reported on 3/19/2024), Disp: 180 tablet, Rfl: 1    Potassium Chloride ER 20 MEQ Oral Tab CR, Take 1 tablet by mouth daily., Disp: 90 tablet, Rfl: 1    Esomeprazole Magnesium 40 MG Oral Capsule Delayed Release, Take 1 capsule (40 mg total) by mouth every morning before breakfast., Disp: 90 capsule, Rfl: 0    varenicline 1 MG Oral Tab, Take 1 tablet (1 mg total) by mouth 2 (two) times daily. (Patient not taking: Reported on 3/19/2024), Disp: 60 tablet, Rfl: 0    LOSARTAN 25 MG Oral Tab, TAKE 1 TABLET (25 MG TOTAL) BY MOUTH DAILY., Disp: 90 tablet, Rfl: 0    ACYCLOVIR 400 MG Oral Tab, TAKE 1 TABLET BY MOUTH TWICE A DAY, Disp: 180 tablet, Rfl: 0    cilostazol 50 MG Oral Tab, Take 1 tablet (50 mg total) by mouth 2 (two) times daily., Disp: 180 tablet, Rfl: 1    ELMIRON 100 MG Oral Cap, Take 1 capsule (100 mg total) by mouth daily., Disp: 90 capsule, Rfl: 1    ROSUVASTATIN 10 MG Oral Tab, TAKE 1 TABLET BY MOUTH EVERY DAY, Disp: 90 tablet, Rfl: 3    doxycycline 50 MG Oral Cap, Take 1 capsule (50 mg total) by mouth 2 (two) times daily., Disp: , Rfl:     levothyroxine (SYNTHROID) 88 MCG Oral Tab, Take 1 tablet (88 mcg  total) by mouth every other day., Disp: 45 tablet, Rfl: 3    levothyroxine (SYNTHROID) 75 MCG Oral Tab, Take 1 tablet (75 mcg total) by mouth every other day., Disp: 45 tablet, Rfl: 3    metoprolol tartrate 50 MG Oral Tab, Take 0.5 tablets (25 mg total) by mouth 2 (two) times daily., Disp: 90 tablet, Rfl: 1    ipratropium-albuterol 0.5-2.5 (3) MG/3ML Inhalation Solution, Take 3 mL by nebulization every 6 (six) hours as needed (shortness of breath, wheezing.)., Disp: 1080 mL, Rfl: 1    neomycin-polymyxin-dexamethasone 3.5-05039-3.1 Ophthalmic Ointment, APPLY TO AFFECTED EYELID TWICE DAILY FOR 2 WEEKS, Disp: , Rfl:     ALPRAZolam 0.5 MG Oral Tab, TAKE 1 TABLET BY MOUTH NIGHTLY SCHEDULED AND TAKE 1/2 TABLET BY MOUTH DAILY AS NEEDED FOR ANXIETY, Disp: 45 tablet, Rfl: 2    B Complex Vitamins (B COMPLEX-B12 OR), Take 1 tablet by mouth daily., Disp: , Rfl:     Turmeric 400 MG Oral Cap, Take 1 capsule by mouth daily., Disp: , Rfl:     Ascorbic Acid (VITAMIN C) 1000 MG Oral Tab, Take 1 tablet (1,000 mg total) by mouth daily., Disp: , Rfl:     zinc sulfate 220 (50 Zn) MG Oral Cap, Take 1 capsule (220 mg total) by mouth daily., Disp: , Rfl:     omega-3 fatty acids 1000 MG Oral Cap, Take 1,000 mg by mouth daily., Disp: , Rfl:     HYDROcodone-acetaminophen 5-325 MG Oral Tab, Take 1 tablet by mouth 2 (two) times daily as needed for Pain., Disp: , Rfl:     loratadine 10 MG Oral Tablet Dispersible, Take 1 tablet (10 mg total) by mouth daily., Disp: , Rfl:     Capsicum, Cayenne, (CAYENNE PEPPER OR), Take 1 tablet by mouth daily., Disp: , Rfl:     estradiol 0.1 MG/GM Vaginal Cream, Place 1 g vaginally daily. (Patient taking differently: Place 1 g vaginally twice a week.), Disp: 42.5 g, Rfl: 5    hydrocortisone 25 MG Rectal Suppos, Place 1 suppository (25 mg total) rectally as needed. (Patient not taking: Reported on 3/19/2024), Disp: , Rfl:     clindamycin 150 MG Oral Cap, Take 1 capsule (150 mg total) by mouth 2 (two) times daily.  (Patient not taking: Reported on 3/19/2024), Disp: , Rfl:     Nebulizers (VIOS AEROSOL DELIVERY SYSTEM) Does not apply Misc, Take 1 Bottle by mouth As Directed., Disp: , Rfl:     albuterol 108 (90 Base) MCG/ACT Inhalation Aero Soln, Inhale 2 puffs into the lungs every 6 (six) hours as needed for Wheezing or Shortness of Breath., Disp: 3 each, Rfl: 3    Respiratory Therapy Supplies (NEBULIZER/TUBING/MOUTHPIECE) Does not apply Kit, Use as directed, Disp: 1 each, Rfl: 0    TRELEGY ELLIPTA 100-62.5-25 MCG/ACT Inhalation Aerosol Powder, Breath Activated, Inhale 1 puff into the lungs daily., Disp: 180 each, Rfl: 3    FUROSEMIDE 20 MG Oral Tab, TAKE 1 TABLET BY MOUTH TWICE A DAY, Disp: 180 tablet, Rfl: 3    diclofenac 1 % External Gel, Apply 2 g topically 4 (four) times daily. (Patient taking differently: Apply 2 g topically 4 (four) times daily as needed.), Disp: 100 g, Rfl: 5    nystatin 100,000 Units/g External Cream, Apply topically as needed., Disp: , Rfl:     gabapentin 100 MG Oral Cap, Take 1 capsule by mouth in the morning, 1-2 capsules by mouth in the afternoon, and 3 capsules by mouth in the evening daily. (Patient taking differently: Take 1 capsule by mouth in the morning and 3 capsules by mouth in the evening daily.), Disp: 180 capsule, Rfl: 5    hydrocortisone 2.5 % External Cream, Apply 1 each topically 2 (two) times daily as needed (hemmorhoids)., Disp: 20 g, Rfl: 2    montelukast 10 MG Oral Tab, Take 1 tablet (10 mg total) by mouth nightly., Disp: , Rfl:     nicotine 21 MG/24HR Transdermal Patch 24 Hr, Place 1 patch onto the skin daily. (Patient not taking: Reported on 3/19/2024), Disp: 14 each, Rfl: 0    Vitamin D3, Cholecalciferol, 125 MCG (5000 UT) Oral Cap, Take 1 capsule (5,000 Units total) by mouth daily., Disp: , Rfl:       Review of Systems  A 10 point review of systems was performed and negative unless otherwise documented per HPI.    Physical Findings   Pulse 65   Temp 97.2 °F (36.2 °C)  (Temporal)   SpO2 93%   Physical Exam  Vitals and nursing note reviewed. Exam conducted with a chaperone present.   Constitutional:       General: She is not in acute distress.  HENT:      Head: Normocephalic and atraumatic.      Mouth/Throat:      Mouth: Mucous membranes are moist.   Cardiovascular:      Rate and Rhythm: Normal rate and regular rhythm.   Pulmonary:      Effort: Pulmonary effort is normal.   Abdominal:      General: There is no distension.      Palpations: Abdomen is soft.      Tenderness: There is abdominal tenderness (Right upper quadrant tenderness to palpation).      Comments: Well-healed lower midline scar   Musculoskeletal:         General: No deformity.   Skin:     General: Skin is warm and dry.   Neurological:      General: No focal deficit present.      Mental Status: She is alert.   Psychiatric:         Mood and Affect: Mood normal.             Assessment   1. Calculus of gallbladder with cholecystitis without biliary obstruction, unspecified cholecystitis acuity        This is a very nice 78-year-old female referred to me from the emergency room with concern for symptomatic cholelithiasis, possible chronic cholecystitis.  Patient was recently in the emergency room on 3/19/2024.  She was worked up with an abdominal ultrasound there showing sludge and stones in the gallbladder including sludge and a mobile stone in the gallbladder neck.  Negative sonographic Pitts sign and no thickening of the gallbladder wall or pericholecystic fluid.    Patient has been feeling unwell for around 2 months now.  She complains of abdominal discomfort and nausea after eating.  She has had a poor appetite during this time.  Her pain and discomfort is in the right upper quadrant.  She has had chills without any known fevers.  She denies any jaundice.  She has a history of chronic diarrhea and history of C. difficile infection.  She has had a recent colonoscopy with Dr. Kimball on 1/15/2024.  This revealed a  descending colon tubular adenoma.  Random biopsies of the terminal ileum and colon were negative for pathologic change.  Patient has had multiple abdominal surgeries including a partial hysterectomy and oophorectomy and colectomy for what sounds like perforated diverticulitis around 15 years ago.  She does take clopidogrel for history of coronary artery disease.    Patient appears generally well on exam.  She is afebrile and non-tachycardic.  She does have right upper quadrant tenderness.  Overall, I do suspect she has symptomatic cholelithiasis, possible chronic cholecystitis.    Plan  I offered patient elective robotic cholecystectomy with ICG, possible open.  The details of this procedure were discussed including the expected recovery time, risks, benefits and alternatives.  Specific risk discussed include but not limited to pain, bleeding, infection, damage to surrounding organs including common bile duct injury.  Patient expressed understanding and was agreeable to schedule surgery.  Surgery has been scheduled for 4/3/2024 pending medical and cardiac clearance.  I would recommend permission to hold Plavix for 7 days prior to surgery.    In the meantime, I suggest patient continues a low-fat diet.  Return precautions discussed and patient expressed understanding.     No orders of the defined types were placed in this encounter.      Imaging & Referrals   None    Follow Up  No follow-ups on file.    Burt Wyatt MD

## 2024-03-25 ENCOUNTER — TELEPHONE (OUTPATIENT)
Facility: LOCATION | Age: 79
End: 2024-03-25

## 2024-03-25 NOTE — TELEPHONE ENCOUNTER
Spoke with patient about time of scheduled surgery.  I gave her number to Pre-Admission surgery Testing [phone] for questions regarding time of surgery.  Patient verbalized understanding.

## 2024-03-25 NOTE — TELEPHONE ENCOUNTER
Pt calling needing to know what time she needs to be at the hospital for her procedure on 4/3.  I advised she will get a call the day before.  She said she needs to know before that as she needs to get a ride scheduled.    Call back number is [phone].

## 2024-03-26 ENCOUNTER — TELEPHONE (OUTPATIENT)
Dept: FAMILY MEDICINE CLINIC | Facility: CLINIC | Age: 79
End: 2024-03-26

## 2024-03-26 RX ORDER — DICYCLOMINE HCL 20 MG
20 TABLET ORAL 3 TIMES DAILY
Qty: 20 TABLET | Refills: 0 | Status: SHIPPED | OUTPATIENT
Start: 2024-03-26

## 2024-03-26 NOTE — TELEPHONE ENCOUNTER
Pt requesting refill of dicyclomine, given to her ED on 3/19, it helped her pain & diarrhea. Pt scheduled for lab gorge on 4/3. Please approve or deny pending Rx.

## 2024-03-26 NOTE — TELEPHONE ENCOUNTER
Patient went to urgent care and states she was given dicyclomine 20 MG Oral Tab for the gallbladder pain. Patient states she would always have diarrhea, but the medication also took it away. I mentioned to patient an appointment might be needed, but she does not want to come to office, please advise.     Pharmacy [id number]   LOV 3/6/24

## 2024-03-27 ENCOUNTER — OFFICE VISIT (OUTPATIENT)
Dept: FAMILY MEDICINE CLINIC | Facility: CLINIC | Age: 79
End: 2024-03-27
Payer: MEDICARE

## 2024-03-27 ENCOUNTER — LAB ENCOUNTER (OUTPATIENT)
Dept: LAB | Age: 79
End: 2024-03-27
Attending: NURSE PRACTITIONER
Payer: MEDICARE

## 2024-03-27 VITALS
HEART RATE: 76 BPM | HEIGHT: 64 IN | OXYGEN SATURATION: 98 % | SYSTOLIC BLOOD PRESSURE: 110 MMHG | RESPIRATION RATE: 16 BRPM | WEIGHT: 159 LBS | DIASTOLIC BLOOD PRESSURE: 80 MMHG | BODY MASS INDEX: 27.14 KG/M2

## 2024-03-27 DIAGNOSIS — Z01.818 PREOPERATIVE GENERAL PHYSICAL EXAMINATION: Primary | ICD-10-CM

## 2024-03-27 DIAGNOSIS — Z01.818 PREOPERATIVE GENERAL PHYSICAL EXAMINATION: ICD-10-CM

## 2024-03-27 LAB
ALBUMIN SERPL-MCNC: 3.1 G/DL (ref 3.4–5)
ALBUMIN/GLOB SERPL: 0.9 {RATIO} (ref 1–2)
ALP LIVER SERPL-CCNC: 60 U/L
ALT SERPL-CCNC: 9 U/L
ANION GAP SERPL CALC-SCNC: 3 MMOL/L (ref 0–18)
AST SERPL-CCNC: 11 U/L (ref 15–37)
BASOPHILS # BLD AUTO: 0.05 X10(3) UL (ref 0–0.2)
BASOPHILS NFR BLD AUTO: 0.5 %
BILIRUB SERPL-MCNC: 0.3 MG/DL (ref 0.1–2)
BUN BLD-MCNC: 14 MG/DL (ref 9–23)
CALCIUM BLD-MCNC: 9 MG/DL (ref 8.5–10.1)
CHLORIDE SERPL-SCNC: 112 MMOL/L (ref 98–112)
CO2 SERPL-SCNC: 28 MMOL/L (ref 21–32)
CREAT BLD-MCNC: 1.17 MG/DL
EGFRCR SERPLBLD CKD-EPI 2021: 47 ML/MIN/1.73M2 (ref 60–?)
EOSINOPHIL # BLD AUTO: 0.11 X10(3) UL (ref 0–0.7)
EOSINOPHIL NFR BLD AUTO: 1.2 %
ERYTHROCYTE [DISTWIDTH] IN BLOOD BY AUTOMATED COUNT: 14.4 %
FASTING STATUS PATIENT QL REPORTED: YES
GLOBULIN PLAS-MCNC: 3.4 G/DL (ref 2.8–4.4)
GLUCOSE BLD-MCNC: 106 MG/DL (ref 70–99)
HCT VFR BLD AUTO: 38.2 %
HGB BLD-MCNC: 12.5 G/DL
IMM GRANULOCYTES # BLD AUTO: 0.03 X10(3) UL (ref 0–1)
IMM GRANULOCYTES NFR BLD: 0.3 %
LYMPHOCYTES # BLD AUTO: 1.56 X10(3) UL (ref 1–4)
LYMPHOCYTES NFR BLD AUTO: 16.8 %
MCH RBC QN AUTO: 30 PG (ref 26–34)
MCHC RBC AUTO-ENTMCNC: 32.7 G/DL (ref 31–37)
MCV RBC AUTO: 91.8 FL
MONOCYTES # BLD AUTO: 0.92 X10(3) UL (ref 0.1–1)
MONOCYTES NFR BLD AUTO: 9.9 %
NEUTROPHILS # BLD AUTO: 6.59 X10 (3) UL (ref 1.5–7.7)
NEUTROPHILS # BLD AUTO: 6.59 X10(3) UL (ref 1.5–7.7)
NEUTROPHILS NFR BLD AUTO: 71.3 %
OSMOLALITY SERPL CALC.SUM OF ELEC: 297 MOSM/KG (ref 275–295)
PLATELET # BLD AUTO: 364 10(3)UL (ref 150–450)
POTASSIUM SERPL-SCNC: 3.4 MMOL/L (ref 3.5–5.1)
PROT SERPL-MCNC: 6.5 G/DL (ref 6.4–8.2)
RBC # BLD AUTO: 4.16 X10(6)UL
SODIUM SERPL-SCNC: 143 MMOL/L (ref 136–145)
WBC # BLD AUTO: 9.3 X10(3) UL (ref 4–11)

## 2024-03-27 PROCEDURE — 3008F BODY MASS INDEX DOCD: CPT | Performed by: NURSE PRACTITIONER

## 2024-03-27 PROCEDURE — 3074F SYST BP LT 130 MM HG: CPT | Performed by: NURSE PRACTITIONER

## 2024-03-27 PROCEDURE — 36415 COLL VENOUS BLD VENIPUNCTURE: CPT

## 2024-03-27 PROCEDURE — 85025 COMPLETE CBC W/AUTO DIFF WBC: CPT

## 2024-03-27 PROCEDURE — 1159F MED LIST DOCD IN RCRD: CPT | Performed by: NURSE PRACTITIONER

## 2024-03-27 PROCEDURE — 99214 OFFICE O/P EST MOD 30 MIN: CPT | Performed by: NURSE PRACTITIONER

## 2024-03-27 PROCEDURE — 80053 COMPREHEN METABOLIC PANEL: CPT

## 2024-03-27 PROCEDURE — 3079F DIAST BP 80-89 MM HG: CPT | Performed by: NURSE PRACTITIONER

## 2024-03-27 NOTE — PROGRESS NOTES
CC: Preop exam.    Gabriela Ndiaye is a 78 year old female who presents for a pre-operative physical exam  By Dr.Surgue mariscal. Patient is to have ROBOTIC LAPAROSCOPIC CHOLECYSTECTOMY POSSIBLE OPEN WITH INJECTION OF INDOCYANINE GREEN  to be on 4/3/24        Current Outpatient Medications   Medication Sig Dispense Refill    dicyclomine 20 MG Oral Tab Take 1 tablet (20 mg total) by mouth 3 (three) times daily. 20 tablet 0    ezetimibe 10 MG Oral Tab Take 1 tablet (10 mg total) by mouth daily. Every other day      busPIRone 10 MG Oral Tab Take 1 tablet (10 mg total) by mouth 2 (two) times daily. 180 tablet 1    Potassium Chloride ER 20 MEQ Oral Tab CR Take 1 tablet by mouth daily. 90 tablet 1    Esomeprazole Magnesium 40 MG Oral Capsule Delayed Release Take 1 capsule (40 mg total) by mouth every morning before breakfast. 90 capsule 0    varenicline 1 MG Oral Tab Take 1 tablet (1 mg total) by mouth 2 (two) times daily. 60 tablet 0    LOSARTAN 25 MG Oral Tab TAKE 1 TABLET (25 MG TOTAL) BY MOUTH DAILY. 90 tablet 0    ACYCLOVIR 400 MG Oral Tab TAKE 1 TABLET BY MOUTH TWICE A  tablet 0    cilostazol 50 MG Oral Tab Take 1 tablet (50 mg total) by mouth 2 (two) times daily. 180 tablet 1    ELMIRON 100 MG Oral Cap Take 1 capsule (100 mg total) by mouth daily. 90 capsule 1    ROSUVASTATIN 10 MG Oral Tab TAKE 1 TABLET BY MOUTH EVERY DAY 90 tablet 3    levothyroxine (SYNTHROID) 88 MCG Oral Tab Take 1 tablet (88 mcg total) by mouth every other day. 45 tablet 3    levothyroxine (SYNTHROID) 75 MCG Oral Tab Take 1 tablet (75 mcg total) by mouth every other day. 45 tablet 3    metoprolol tartrate 50 MG Oral Tab Take 0.5 tablets (25 mg total) by mouth 2 (two) times daily. 90 tablet 1    ipratropium-albuterol 0.5-2.5 (3) MG/3ML Inhalation Solution Take 3 mL by nebulization every 6 (six) hours as needed (shortness of breath, wheezing.). 1080 mL 1    neomycin-polymyxin-dexamethasone 3.5-85616-3.1 Ophthalmic Ointment APPLY TO  AFFECTED EYELID TWICE DAILY FOR 2 WEEKS      ALPRAZolam 0.5 MG Oral Tab TAKE 1 TABLET BY MOUTH NIGHTLY SCHEDULED AND TAKE 1/2 TABLET BY MOUTH DAILY AS NEEDED FOR ANXIETY 45 tablet 2    B Complex Vitamins (B COMPLEX-B12 OR) Take 1 tablet by mouth daily.      Turmeric 400 MG Oral Cap Take 1 capsule by mouth daily.      Ascorbic Acid (VITAMIN C) 1000 MG Oral Tab Take 1 tablet (1,000 mg total) by mouth daily.      zinc sulfate 220 (50 Zn) MG Oral Cap Take 1 capsule (220 mg total) by mouth daily.      omega-3 fatty acids 1000 MG Oral Cap Take 1,000 mg by mouth daily.      HYDROcodone-acetaminophen 5-325 MG Oral Tab Take 1 tablet by mouth 2 (two) times daily as needed for Pain.      loratadine 10 MG Oral Tablet Dispersible Take 1 tablet (10 mg total) by mouth daily.      Capsicum, Cayenne, (CAYENNE PEPPER OR) Take 1 tablet by mouth daily.      estradiol 0.1 MG/GM Vaginal Cream Place 1 g vaginally daily. (Patient taking differently: Place 1 g vaginally twice a week.) 42.5 g 5    hydrocortisone 25 MG Rectal Suppos Place 1 suppository (25 mg total) rectally as needed.      Nebulizers ([employer] AEROSOL DELIVERY SYSTEM) Does not apply Misc Take 1 Bottle by mouth As Directed.      albuterol 108 (90 Base) MCG/ACT Inhalation Aero Soln Inhale 2 puffs into the lungs every 6 (six) hours as needed for Wheezing or Shortness of Breath. 3 each 3    Respiratory Therapy Supplies (NEBULIZER/TUBING/MOUTHPIECE) Does not apply Kit Use as directed 1 each 0    TRELEGY ELLIPTA 100-62.5-25 MCG/ACT Inhalation Aerosol Powder, Breath Activated Inhale 1 puff into the lungs daily. 180 each 3    FUROSEMIDE 20 MG Oral Tab TAKE 1 TABLET BY MOUTH TWICE A  tablet 3    diclofenac 1 % External Gel Apply 2 g topically 4 (four) times daily. (Patient taking differently: Apply 2 g topically 4 (four) times daily as needed.) 100 g 5    nystatin 100,000 Units/g External Cream Apply topically as needed.      gabapentin 100 MG Oral Cap Take 1 capsule by mouth in  the morning, 1-2 capsules by mouth in the afternoon, and 3 capsules by mouth in the evening daily. (Patient taking differently: Take 1 capsule (100 mg total) by mouth 2 (two) times daily. Take 1 capsule by mouth in the morning and 3 capsules by mouth in the evening daily.) 180 capsule 5    hydrocortisone 2.5 % External Cream Apply 1 each topically 2 (two) times daily as needed (hemmorhoids). 20 g 2    montelukast 10 MG Oral Tab Take 1 tablet (10 mg total) by mouth nightly.      nicotine 21 MG/24HR Transdermal Patch 24 Hr Place 1 patch onto the skin daily. 14 each 0    Vitamin D3, Cholecalciferol, 125 MCG (5000 UT) Oral Cap Take 1 capsule (5,000 Units total) by mouth daily.        Allergies:   Allergies   Allergen Reactions    Rosuvastatin MYALGIA    Cephalosporins NAUSEA ONLY     Pt reports she tolerates this medication well, had nausea from taking it on empty stomach    Keflex [Cephalexin] RASH      Past Medical History:   Diagnosis Date    Abdominal hernia     Anxiety     Arthritis     Back pain     Back problem     spinal stenosis    Bilateral pulmonary embolism (HCC) 07/06/2021    Bloating     Cancer (HCC)     lung    COPD (chronic obstructive pulmonary disease) (HCC)     not using oxygen    Diabetes (HCC)     diet controlled    Diabetes mellitus (HCC)     Diarrhea, unspecified     Disorder of thyroid     Easy bruising     Esophageal reflux     Exposure to medical diagnostic radiation     last tx 11/2023    Feeling lonely     Flatulence/gas pain/belching     Food intolerance     Hearing impairment     Hearing loss     Hemorrhoids     High blood pressure     High cholesterol     History of eating disorder     Muscle weakness     Neuropathy     Night sweats     Pain in joints     Pneumonia of both lungs due to infectious organism, unspecified part of lung 08/15/2022    Pulmonary emboli (HCC)     Pulmonary embolism (HCC)     RSV (acute bronchiolitis due to respiratory syncytial virus)     Shortness of breath      Stress     Thyroid disease     Uncomfortable fullness after meals     Visual impairment     glasses    Wears glasses       Past Surgical History:   Procedure Laterality Date    AMPUTATION FINGER/THUMB+FLAPS      COLECTOMY      COLONOSCOPY      COLONOSCOPY N/A 1/15/2024    Procedure: COLONOSCOPY;  Surgeon: Tirso Kimball MD;  Location:  ENDOSCOPY    HYSTERECTOMY        Family History   Problem Relation Age of Onset    Heart Disorder Father     Stroke Father     Uterine Cancer Mother     Diabetes Mother     Cancer Mother         colon    Heart Attack Daughter     Diabetes Daughter     Colon Polyps Daughter     Colon Polyps Son       Social History:   Social History     Socioeconomic History    Marital status:    Tobacco Use    Smoking status: Every Day     Packs/day: 0.50     Years: 63.00     Additional pack years: 0.00     Total pack years: 31.50     Types: Cigarettes     Start date: 2/24/1964    Smokeless tobacco: Never   Vaping Use    Vaping Use: Never used   Substance and Sexual Activity    Alcohol use: Not Currently     Comment: rare    Drug use: Never   Other Topics Concern    Caffeine Concern Yes     Comment: coffee/coca cola/Iced tea    Exercise No     Social Determinants of Health     Financial Resource Strain: Low Risk  (8/15/2023)    Financial Resource Strain     Difficulty of Paying Living Expenses: Not hard at all     Med Affordability: No   Food Insecurity: No Food Insecurity (8/15/2023)    Food Insecurity     Food Insecurity: Never true   Transportation Needs: Unmet Transportation Needs (8/15/2023)    Transportation Needs     Lack of Transportation: Yes   Physical Activity: Insufficiently Active (8/15/2023)    Exercise Vital Sign     Days of Exercise per Week: 2 days     Minutes of Exercise per Session: 30 min   Stress: No Stress Concern Present (8/15/2023)    Stress     Feeling of Stress : No   Social Connections: Socially Integrated (8/15/2023)    Social Connections     Frequency of  Socialization with Friends and Family: 2   Housing Stability: Low Risk  (8/15/2023)    Housing Stability     Housing Instability: No           REVIEW OF SYSTEMS:   GENERAL: feels well otherwise  SKIN: denies any unusual skin lesions  EYES:denies blurred vision or double vision  HEENT: denies nasal congestion, sinus pain or ST  LUNGS: denies shortness of breath with exertion  CARDIOVASCULAR: denies chest pain on exertion  GI: denies abdominal pain,denies heartburn  : no abnormal discharge  MUSCULOSKELETAL: denies back pain  NEURO: denies headaches  PSYCHE: denies depression or anxiety  HEMATOLOGIC: denies hx of anemia  ENDOCRINE: denies thyroid history  ALL/ASTHMA: denies hx of allergy or asthma    EXAM:   /80   Pulse 76   Resp 16   Ht 5' 4\" (1.626 m)   Wt 159 lb (72.1 kg)   SpO2 98%   BMI 27.29 kg/m²   GENERAL: well developed, well nourished,in no apparent distress  SKIN: no rashes,no suspicious lesions  HEENT: atraumatic, normocephalic,ears and throat are clear  EYES:PERRLA, EOMI, normal optic disk,conjunctiva are clear  NECK: supple,no adenopathy,no bruits  CHEST: no chest tenderness  LUNGS: clear to auscultation  CARDIO: RRR without murmur  GI: good BS's,no masses, HSM or tenderness  MUSCULOSKELETAL: back is not tender,FROM of the back  EXTREMITIES: no cyanosis, clubbing or edema  NEURO: Oriented times three,cranial nerves are intact,motor and sensory are grossly intact    ASSESSMENT AND PLAN:   Gabriela Ndiaye is a 78 year old female who presents for a pre-operative physical exam. Labs stable , patient medically cleared for surgery   Diagnoses and all orders for this visit:    Preoperative general physical examination  -     CBC With Differential With Platelet; Future  -     Comp Metabolic Panel (14) [E]; Future  Stable   F/u for worsening symptoms

## 2024-03-28 ENCOUNTER — TELEPHONE (OUTPATIENT)
Dept: FAMILY MEDICINE CLINIC | Facility: CLINIC | Age: 79
End: 2024-03-28

## 2024-03-28 NOTE — TELEPHONE ENCOUNTER
----- Message from SADI Kessler sent at 3/28/2024  8:19 AM CDT -----  Potassium slightly low continue with Gatorade will sent potasium take it 40 meq one time

## 2024-03-28 NOTE — TELEPHONE ENCOUNTER
Called pt back and left directions to only take the potassium 40meq as a one time dose then resume her 20meq once a day.

## 2024-03-28 NOTE — TELEPHONE ENCOUNTER
FYI- Patient was seen for Pre-op, provider is just waiting for Cardiology clearance and stress test results.

## 2024-03-28 NOTE — TELEPHONE ENCOUNTER
Spoke to patient and her daughter with results/instructions and they both verbalized understanding

## 2024-04-02 ENCOUNTER — TELEPHONE (OUTPATIENT)
Facility: LOCATION | Age: 79
End: 2024-04-02

## 2024-04-02 ENCOUNTER — TELEPHONE (OUTPATIENT)
Dept: FAMILY MEDICINE CLINIC | Facility: CLINIC | Age: 79
End: 2024-04-02

## 2024-04-02 DIAGNOSIS — K80.10 CALCULUS OF GALLBLADDER WITH CHOLECYSTITIS WITHOUT BILIARY OBSTRUCTION, UNSPECIFIED CHOLECYSTITIS ACUITY: Primary | ICD-10-CM

## 2024-04-02 DIAGNOSIS — J43.9 PULMONARY EMPHYSEMA, UNSPECIFIED EMPHYSEMA TYPE (HCC): Primary | ICD-10-CM

## 2024-04-02 NOTE — TELEPHONE ENCOUNTER
Pt states she began coughing 2-3 days ago. Pt under went an angioplasty procedure yesterday Pt states her coughing is getting worse and is asking if  Can prescribe her medication. Pt states she is too weak from the surgery to leave the house and has declined an appt.

## 2024-04-02 NOTE — TELEPHONE ENCOUNTER
PT NEEDED TO BE CXL 4/3 W/JJS LAP YAJAIRA. PT HAD STENTS PLACED YESTERDAY (4/1) BY . PER  PATIENT NEEDS TO WAIT 3 MONTHS BEFORE BEING SCHEDULED FOR GALLBLADDER SX AGAIN.

## 2024-04-02 NOTE — TELEPHONE ENCOUNTER
Pt states she has had cough for the past week. She is afebrile. Some congestion in AM when she wakes up with white Phlegm. She feels a little more SOB with activity the past week. She had stent placement in RCA on 4/1 & she said they listened to her lungs yesterday & said they were clear. She thinks it is her COPD. She states it is difficult for her to get her & is asking if you can send something in to help her cough. ER precautions discussed with pt. Please advise on any orders.

## 2024-04-03 RX ORDER — DICYCLOMINE HCL 20 MG
20 TABLET ORAL 3 TIMES DAILY
Qty: 90 TABLET | Refills: 0 | Status: SHIPPED | OUTPATIENT
Start: 2024-04-03

## 2024-04-03 RX ORDER — METHYLPREDNISOLONE 4 MG/1
TABLET ORAL
Qty: 21 EACH | Refills: 0 | Status: SHIPPED | OUTPATIENT
Start: 2024-04-03

## 2024-04-03 RX ORDER — BENZONATATE 200 MG/1
200 CAPSULE ORAL 3 TIMES DAILY PRN
Qty: 30 CAPSULE | Refills: 0 | Status: SHIPPED | OUTPATIENT
Start: 2024-04-03

## 2024-04-03 NOTE — TELEPHONE ENCOUNTER
Called pt and was informed of below response from provider. Questions answered and pt verbalized understanding.     Pt then requested refill of dicyclomine 20 mg. LF: 03/26/24. Please approve or deny pending Rx. Thank you

## 2024-04-03 NOTE — TELEPHONE ENCOUNTER
Sent in RX for medrol dose pack, tessalon perles. Complete CXR if no improvement. Plesae review [facility] precautions. F/u in clinic next PRN

## 2024-04-05 ENCOUNTER — HOSPITAL ENCOUNTER (OUTPATIENT)
Dept: GENERAL RADIOLOGY | Age: 79
Discharge: HOME | End: 2024-04-05
Attending: FAMILY MEDICINE
Payer: MEDICARE

## 2024-04-05 DIAGNOSIS — J43.9 PULMONARY EMPHYSEMA, UNSPECIFIED EMPHYSEMA TYPE (HCC): ICD-10-CM

## 2024-04-05 PROCEDURE — 71046 X-RAY EXAM CHEST 2 VIEWS: CPT | Performed by: FAMILY MEDICINE

## 2024-04-11 ENCOUNTER — TELEPHONE (OUTPATIENT)
Dept: FAMILY MEDICINE CLINIC | Facility: CLINIC | Age: 79
End: 2024-04-11

## 2024-04-11 NOTE — TELEPHONE ENCOUNTER
New or ongoing issue: new    Brief description of symptoms: shakiness, comes and goes, sob (states it's from blood thinners) congested    Approximately how long? : weeks    Offered appointment: ( no appt's available w/dr. Coley for 60 min, nothing with ausra until next WED.      Appointment scheduled:                         Patient states she just doesn't feel well, but thinks its all related to medicine she takes.      Please Advise,    Thank you.

## 2024-04-11 NOTE — TELEPHONE ENCOUNTER
Called pt to obtain additional information. Pt c/o shakes and sob. Pt has had these symptoms since 04/01. Pt saw cardiology on 04/05 and was informed to continue with brilinta.  Pt states she is taking brilinta. Asked if prednisone that was prescribed by PCP helped and pt states she still feels the same. Pt is sob on the phone. Advised pt to go to ER but pt states she does not have anyone to take her. Pt asking what you want her to do and asking if she has pneumonia. Please advise. Thank you.

## 2024-04-11 NOTE — TELEPHONE ENCOUNTER
FYI: Called pt and informed her of provider response. Pt states she will try to get to ER but doesn't have a ride. Reinforced the importance of going to ER for evaluation. Pt then stated she might be able to go tomorrow.

## 2024-04-26 RX ORDER — DICYCLOMINE HCL 20 MG
20 TABLET ORAL 3 TIMES DAILY
Qty: 270 TABLET | Refills: 0 | Status: SHIPPED | OUTPATIENT
Start: 2024-04-26

## 2024-04-26 NOTE — TELEPHONE ENCOUNTER
Fax receive from [facility] pharmacy is requesting Varenicline 1 mg table  refill.     LF 3/11/2024 with 60 tabs + 0 refill   LOV 3/27/2024 with Ausra  RTC PRN

## 2024-04-26 NOTE — TELEPHONE ENCOUNTER
Fax received from [facility] pharmacy is requesting 90 days Dicyclomine 20 mg tab  , Pt currently taken 3 tab per day and refill was sent only with 90 tab to cover 1 month. Is Pt to continue taking ? Please approve if appropriate.

## 2024-04-29 DIAGNOSIS — F32.A ANXIETY AND DEPRESSION: ICD-10-CM

## 2024-04-29 DIAGNOSIS — F41.9 ANXIETY AND DEPRESSION: ICD-10-CM

## 2024-04-29 RX ORDER — VARENICLINE TARTRATE 1 MG/1
1 TABLET, FILM COATED ORAL 2 TIMES DAILY
Qty: 180 TABLET | Refills: 0 | Status: SHIPPED | OUTPATIENT
Start: 2024-04-29

## 2024-04-30 ENCOUNTER — PATIENT OUTREACH (OUTPATIENT)
Dept: CASE MANAGEMENT | Age: 79
End: 2024-04-30

## 2024-04-30 RX ORDER — ALPRAZOLAM 0.5 MG/1
TABLET ORAL
Qty: 45 TABLET | Refills: 2 | Status: SHIPPED | OUTPATIENT
Start: 2024-04-30

## 2024-05-06 DIAGNOSIS — I10 PRIMARY HYPERTENSION: Primary | ICD-10-CM

## 2024-05-07 ENCOUNTER — HOSPITAL ENCOUNTER (OUTPATIENT)
Dept: GENERAL RADIOLOGY | Age: 79
Discharge: HOME | End: 2024-05-07
Attending: NURSE PRACTITIONER
Payer: MEDICARE

## 2024-05-07 ENCOUNTER — OFFICE VISIT (OUTPATIENT)
Dept: FAMILY MEDICINE CLINIC | Facility: CLINIC | Age: 79
End: 2024-05-07
Payer: MEDICARE

## 2024-05-07 VITALS
HEART RATE: 68 BPM | HEIGHT: 64 IN | BODY MASS INDEX: 26.63 KG/M2 | DIASTOLIC BLOOD PRESSURE: 70 MMHG | OXYGEN SATURATION: 97 % | SYSTOLIC BLOOD PRESSURE: 122 MMHG | WEIGHT: 156 LBS | RESPIRATION RATE: 16 BRPM

## 2024-05-07 DIAGNOSIS — J18.9 PNEUMONIA DUE TO INFECTIOUS ORGANISM, UNSPECIFIED LATERALITY, UNSPECIFIED PART OF LUNG: ICD-10-CM

## 2024-05-07 DIAGNOSIS — J18.9 PNEUMONIA DUE TO INFECTIOUS ORGANISM, UNSPECIFIED LATERALITY, UNSPECIFIED PART OF LUNG: Primary | ICD-10-CM

## 2024-05-07 DIAGNOSIS — J43.9 PULMONARY EMPHYSEMA, UNSPECIFIED EMPHYSEMA TYPE (HCC): ICD-10-CM

## 2024-05-07 PROCEDURE — 3008F BODY MASS INDEX DOCD: CPT | Performed by: NURSE PRACTITIONER

## 2024-05-07 PROCEDURE — 1111F DSCHRG MED/CURRENT MED MERGE: CPT | Performed by: NURSE PRACTITIONER

## 2024-05-07 PROCEDURE — 99495 TRANSJ CARE MGMT MOD F2F 14D: CPT | Performed by: NURSE PRACTITIONER

## 2024-05-07 PROCEDURE — 3078F DIAST BP <80 MM HG: CPT | Performed by: NURSE PRACTITIONER

## 2024-05-07 PROCEDURE — 71046 X-RAY EXAM CHEST 2 VIEWS: CPT | Performed by: NURSE PRACTITIONER

## 2024-05-07 PROCEDURE — 1159F MED LIST DOCD IN RCRD: CPT | Performed by: NURSE PRACTITIONER

## 2024-05-07 PROCEDURE — 3074F SYST BP LT 130 MM HG: CPT | Performed by: NURSE PRACTITIONER

## 2024-05-07 RX ORDER — METHYLPREDNISOLONE 4 MG/1
TABLET ORAL
Qty: 21 EACH | Refills: 0 | Status: SHIPPED | OUTPATIENT
Start: 2024-05-07

## 2024-05-07 NOTE — PROGRESS NOTES
Chief Complaint   Patient presents with    Hospital F/U       HPI:   Gabriela Ndiaye is a 78 year old female who presents for pneumonia f/u from the hospital.     She was discharged from Inpatient hospital, [facility] to Home   Admit Date: 4/12/24  Discharge Date: 4/15/24  Hospital Discharge Diagnosis:   Patient was admitted to [facility] for Pneumonia . Reports still has shortness of breath from her blood thinners. Finished antibiotics. Seen by Cardiology on 5/6/24      During the visit, the following was completed:  Obtained and reviewed discharge summary, continuity of care documents, and Hospitalist notes  Reviewed Labs (CBC, CMP)  specialists already aware of assumption/resumption of care  Education given to patient on self-management, independent living, and activities of daily living.  Referrals as listed below in orders    Medication Reconciliation:  I am aware of an inpatient discharge within the last 30 days.  The discharge medication list has been reconciled with the patient's current medication list and reviewed by me.  See medication list for additions of new medication, and changes to current doses of medications and discontinued medications.            Current Outpatient Medications   Medication Sig Dispense Refill    ticagrelor 90 MG Oral Tab Take 1 tablet (90 mg total) by mouth every 12 (twelve) hours.      ALPRAZolam 0.5 MG Oral Tab Take 1 tablet by mouth nightly scheduled and take 1/2 tablet by mouth daily as needed for anxiety. 45 tablet 2    varenicline 1 MG Oral Tab Take 1 tablet (1 mg total) by mouth 2 (two) times daily. 180 tablet 0    dicyclomine 20 MG Oral Tab Take 1 tablet (20 mg total) by mouth 3 (three) times daily. 270 tablet 0    ezetimibe 10 MG Oral Tab Take 1 tablet (10 mg total) by mouth daily. Every other day      busPIRone 10 MG Oral Tab Take 1 tablet (10 mg total) by mouth 2 (two) times daily. 180 tablet 1    Potassium Chloride ER 20 MEQ Oral Tab CR Take 1  tablet by mouth daily. 90 tablet 1    Esomeprazole Magnesium 40 MG Oral Capsule Delayed Release Take 1 capsule (40 mg total) by mouth every morning before breakfast. 90 capsule 0    LOSARTAN 25 MG Oral Tab TAKE 1 TABLET (25 MG TOTAL) BY MOUTH DAILY. 90 tablet 0    ACYCLOVIR 400 MG Oral Tab TAKE 1 TABLET BY MOUTH TWICE A  tablet 0    cilostazol 50 MG Oral Tab Take 1 tablet (50 mg total) by mouth 2 (two) times daily. 180 tablet 1    ELMIRON 100 MG Oral Cap Take 1 capsule (100 mg total) by mouth daily. 90 capsule 1    ROSUVASTATIN 10 MG Oral Tab TAKE 1 TABLET BY MOUTH EVERY DAY 90 tablet 3    levothyroxine (SYNTHROID) 88 MCG Oral Tab Take 1 tablet (88 mcg total) by mouth every other day. 45 tablet 3    levothyroxine (SYNTHROID) 75 MCG Oral Tab Take 1 tablet (75 mcg total) by mouth every other day. 45 tablet 3    metoprolol tartrate 50 MG Oral Tab Take 0.5 tablets (25 mg total) by mouth 2 (two) times daily. 90 tablet 1    ipratropium-albuterol 0.5-2.5 (3) MG/3ML Inhalation Solution Take 3 mL by nebulization every 6 (six) hours as needed (shortness of breath, wheezing.). 1080 mL 1    neomycin-polymyxin-dexamethasone 3.5-69609-4.1 Ophthalmic Ointment APPLY TO AFFECTED EYELID TWICE DAILY FOR 2 WEEKS      B Complex Vitamins (B COMPLEX-B12 OR) Take 1 tablet by mouth daily.      Turmeric 400 MG Oral Cap Take 1 capsule by mouth daily.      Ascorbic Acid (VITAMIN C) 1000 MG Oral Tab Take 1 tablet (1,000 mg total) by mouth daily.      zinc sulfate 220 (50 Zn) MG Oral Cap Take 1 capsule (220 mg total) by mouth daily.      omega-3 fatty acids 1000 MG Oral Cap Take 1,000 mg by mouth daily.      HYDROcodone-acetaminophen 5-325 MG Oral Tab Take 1 tablet by mouth 2 (two) times daily as needed for Pain.      loratadine 10 MG Oral Tablet Dispersible Take 1 tablet (10 mg total) by mouth daily.      Capsicum, Cayenne, (CAYENNE PEPPER OR) Take 1 tablet by mouth daily.      estradiol 0.1 MG/GM Vaginal Cream Place 1 g vaginally daily.  (Patient taking differently: Place 1 g vaginally twice a week.) 42.5 g 5    hydrocortisone 25 MG Rectal Suppos Place 1 suppository (25 mg total) rectally as needed.      Nebulizers ([place] AEROSOL DELIVERY SYSTEM) Does not apply Misc Take 1 Bottle by mouth As Directed.      albuterol 108 (90 Base) MCG/ACT Inhalation Aero Soln Inhale 2 puffs into the lungs every 6 (six) hours as needed for Wheezing or Shortness of Breath. 3 each 3    Respiratory Therapy Supplies (NEBULIZER/TUBING/MOUTHPIECE) Does not apply Kit Use as directed 1 each 0    TRELEGY ELLIPTA 100-62.5-25 MCG/ACT Inhalation Aerosol Powder, Breath Activated Inhale 1 puff into the lungs daily. 180 each 3    FUROSEMIDE 20 MG Oral Tab TAKE 1 TABLET BY MOUTH TWICE A  tablet 3    diclofenac 1 % External Gel Apply 2 g topically 4 (four) times daily. (Patient taking differently: Apply 2 g topically 4 (four) times daily as needed.) 100 g 5    nystatin 100,000 Units/g External Cream Apply topically as needed.      gabapentin 100 MG Oral Cap Take 1 capsule by mouth in the morning, 1-2 capsules by mouth in the afternoon, and 3 capsules by mouth in the evening daily. (Patient taking differently: Take 1 capsule (100 mg total) by mouth 2 (two) times daily. Take 1 capsule by mouth in the morning and 3 capsules by mouth in the evening daily.) 180 capsule 5    hydrocortisone 2.5 % External Cream Apply 1 each topically 2 (two) times daily as needed (hemmorhoids). 20 g 2    montelukast 10 MG Oral Tab Take 1 tablet (10 mg total) by mouth nightly.      nicotine 21 MG/24HR Transdermal Patch 24 Hr Place 1 patch onto the skin daily. 14 each 0    Vitamin D3, Cholecalciferol, 125 MCG (5000 UT) Oral Cap Take 1 capsule (5,000 Units total) by mouth daily.        Past Medical History:    Abdominal hernia    Anxiety    Arthritis    Back pain    Back problem    spinal stenosis    Bilateral pulmonary embolism (HCC)    Bloating    Cancer (HCC)    lung    COPD (chronic obstructive  pulmonary disease) (HCC)    not using oxygen    Diabetes (HCC)    diet controlled    Diabetes mellitus (HCC)    Diarrhea, unspecified    Disorder of thyroid    Easy bruising    Esophageal reflux    Exposure to medical diagnostic radiation    last tx 11/2023    Feeling lonely    Flatulence/gas pain/belching    Food intolerance    Hearing impairment    Hearing loss    Hemorrhoids    High blood pressure    High cholesterol    History of eating disorder    Muscle weakness    Neuropathy    Night sweats    Pain in joints    Pneumonia of both lungs due to infectious organism, unspecified part of lung    Pulmonary emboli (HCC)    Pulmonary embolism (HCC)    RSV (acute bronchiolitis due to respiratory syncytial virus)    Shortness of breath    Stress    Thyroid disease    Uncomfortable fullness after meals    Visual impairment    glasses    Wears glasses      Past Surgical History:   Procedure Laterality Date    Amputation finger/thumb+flaps      Colectomy      Colonoscopy      Colonoscopy N/A 1/15/2024    Procedure: COLONOSCOPY;  Surgeon: Tirso Kimball MD;  Location:  ENDOSCOPY    Hysterectomy        Family History   Problem Relation Age of Onset    Heart Disorder Father     Stroke Father     Uterine Cancer Mother     Diabetes Mother     Cancer Mother         colon    Heart Attack Daughter     Diabetes Daughter     Colon Polyps Daughter     Colon Polyps Son       Social History     Socioeconomic History    Marital status:    Tobacco Use    Smoking status: Every Day     Current packs/day: 0.50     Average packs/day: 0.5 packs/day for 63.0 years (31.5 ttl pk-yrs)     Types: Cigarettes     Start date: 2/24/1964    Smokeless tobacco: Never   Vaping Use    Vaping status: Never Used   Substance and Sexual Activity    Alcohol use: Not Currently     Comment: rare    Drug use: Never   Other Topics Concern    Caffeine Concern Yes     Comment: coffee/coca cola/Iced tea    Exercise No     Social Determinants of Health      Financial Resource Strain: Low Risk  (8/15/2023)    Financial Resource Strain     Difficulty of Paying Living Expenses: Not hard at all     Med Affordability: No   Food Insecurity: No Food Insecurity (8/15/2023)    Food Insecurity     Food Insecurity: Never true   Transportation Needs: Unmet Transportation Needs (8/15/2023)    Transportation Needs     Lack of Transportation: Yes   Physical Activity: Insufficiently Active (8/15/2023)    Exercise Vital Sign     Days of Exercise per Week: 2 days     Minutes of Exercise per Session: 30 min   Stress: No Stress Concern Present (8/15/2023)    Stress     Feeling of Stress : No   Social Connections: Socially Integrated (8/15/2023)    Social Connections     Frequency of Socialization with Friends and Family: 2   Housing Stability: Low Risk  (8/15/2023)    Housing Stability     Housing Instability: No         REVIEW OF SYSTEMS:   GENERAL: no fever, no chills.  SKIN: no rashes, no hives.  EYES:denies blurred vision or double vision,   HEENT: no earache, sore throat, mild sinus congestion.  CHEST: no chest pains, no palpitations, no orthopnea.  LUNGS: denies shortness of breath with exertion or rest, no wheezing, cough.  CARDIOVASCULAR: denies chest pain on exertion  GI: no nausea or abdominal pain      EXAM:   /70   Pulse 68   Resp 16   Ht 5' 4\" (1.626 m)   Wt 156 lb (70.8 kg)   SpO2 97%   BMI 26.78 kg/m²   GENERAL: well developed, in no apparent distress  SKIN: no rashes,no suspicious lesions  EYES:PERRLA, EOMI, normal optic disk,conjunctiva are clear  HEENT: atraumatic, normocephalic,TM wnl clay, erythema of the throat.  NECK: supple,no adenopathy  LUNGS: normal respiratory rate, normal effort, dry cough,no increase expiration phase to inspiratory phase.No wheezing, no rales, no crackles.Normal on percussion.No decreased BS.Normal on palpation,normal vocal fremitus.  CARDIO: RRR without murmur  GI: good BS's,no masses, HSM or tenderness    ASSESSMENT AND PLAN:    Gabriela Ndiaye is a 78 year old female who presents with:     Diagnoses and all orders for this visit:    Pneumonia due to infectious organism, unspecified laterality, unspecified part of lung  -     XR CHEST PA + LAT CHEST (CPT=71046); Future  -     methylPREDNISolone (MEDROL) 4 MG Oral Tablet Therapy Pack; As directed.    Pulmonary emphysema, unspecified emphysema type (HCC)     Transitional Care Management Certification:  I certify that the following are true:  Communication with the patient was made within 2 business days of discharge on date above   Medical Decision Making- Based on service period of discharge to 30 days:   Number of Possible Diagnoses and/or Management Options: moderate  Amount and/or Complexity of Data to Be Reviewed: moderate  Risk of Significant Complications, Morbidity, and/or Mortality: moderate    Overall Risk:   moderate    Patient seen within 14 days from date of discharge.     SADI Kessler, 5/7/2024         The patient indicates understanding of these issues and agrees to the plan.  The patient is asked to return in one week if sx's persist or worsen.

## 2024-05-08 ENCOUNTER — MED REC SCAN ONLY (OUTPATIENT)
Dept: FAMILY MEDICINE CLINIC | Facility: CLINIC | Age: 79
End: 2024-05-08

## 2024-05-08 RX ORDER — LOSARTAN POTASSIUM 25 MG/1
25 TABLET ORAL DAILY
Qty: 90 TABLET | Refills: 0 | Status: SHIPPED | OUTPATIENT
Start: 2024-05-08

## 2024-05-21 NOTE — TELEPHONE ENCOUNTER
Patient called asking for this refill. She said her lung doctor used to fill this for her, but he left his practice. She said she has an appointment with a new Dr in June. Needs a fill until then.

## 2024-05-22 RX ORDER — MONTELUKAST SODIUM 10 MG/1
10 TABLET ORAL NIGHTLY
Qty: 90 TABLET | Refills: 3 | Status: SHIPPED | OUTPATIENT
Start: 2024-05-22

## 2024-05-23 ENCOUNTER — VIRTUAL PHONE E/M (OUTPATIENT)
Dept: FAMILY MEDICINE CLINIC | Facility: CLINIC | Age: 79
End: 2024-05-23

## 2024-05-23 DIAGNOSIS — J43.9 PULMONARY EMPHYSEMA, UNSPECIFIED EMPHYSEMA TYPE (HCC): ICD-10-CM

## 2024-05-23 DIAGNOSIS — I25.10 CORONARY ARTERY DISEASE INVOLVING NATIVE CORONARY ARTERY OF NATIVE HEART WITHOUT ANGINA PECTORIS: ICD-10-CM

## 2024-05-23 DIAGNOSIS — C34.11 CANCER OF BRONCHUS OF RIGHT UPPER LOBE (HCC): ICD-10-CM

## 2024-05-23 DIAGNOSIS — R06.02 SHORTNESS OF BREATH: Primary | ICD-10-CM

## 2024-05-23 PROCEDURE — 1160F RVW MEDS BY RX/DR IN RCRD: CPT | Performed by: FAMILY MEDICINE

## 2024-05-23 PROCEDURE — 99422 OL DIG E/M SVC 11-20 MIN: CPT | Performed by: FAMILY MEDICINE

## 2024-05-23 PROCEDURE — 1159F MED LIST DOCD IN RCRD: CPT | Performed by: FAMILY MEDICINE

## 2024-05-23 NOTE — PROGRESS NOTES
Virtual Telephone Check-In    Gabriela Ndiaye verbally consents to a Virtual/Telephone Check-In visit on 05/23/24.  Patient has been referred to the [facility] website at [email or web address] to review the yearly Consent to Treat document.    Patient understands and accepts financial responsibility for any deductible, co-insurance and/or co-pays associated with this service.    Duration of the service: 12 minutes      Summary of topics discussed:     HPI:  79yo F presents for follow up of shortness of breath. Known h/o lung cancer, COPD. Recently found to have 99% occlusion in RCA requiring MOO x2 on 04/01. She follows with Dr Connell, from [facility] cardiology. She was started on asa, brillinta. Initially had SOB w/ brillinta, switched to effient but sx worse so she was put back on brillinta. Main complaint is SOB worsening once started on Brillinta. Had recent hospitalization in 04/24 for CAP. Seen in our clinic w/ NP on 05/07. Repeat CXR showed resolution. She has been using her inhalers - trelegy, duonebs, albuterol prn. She has upcoming f/u with pulmonology. Advised on quitting smoking.        ROS: pertinent positives and negatives as per HPI     PE:   Gen: AAOx3  Resp: breathing well on exam, normal effort. Able to speak full sentences without any SOB  Psych: normal affect       Diagnoses and all orders for this visit:    Shortness of breath    Coronary artery disease involving native coronary artery of native heart without angina pectoris    Pulmonary emphysema, unspecified emphysema type (HCC)    Cancer of bronchus of right upper lobe (HCC)      Suspect SOB is multifactorial from brillinta, COPD/lung ca and recent PNA. She is requesting steroids however I advised that their is no indication for them at this time. She needs to f/u with pulmonology. Reviewed recent CXR findings with her. Advised on daily compliance with asa, brillinta - reviewed side effects of stent occlusion. She expressed understanding and  agreement. She will f/u with pulmonology. Advised to f/u in clinic within next 3 months.       August Coley MD

## 2024-05-30 ENCOUNTER — PATIENT OUTREACH (OUTPATIENT)
Dept: CASE MANAGEMENT | Age: 79
End: 2024-05-30

## 2024-05-30 NOTE — PROGRESS NOTES
Spoke to Gabriela for [facility].      Updates to patient care team/comments: UTD   Patient reported changes in medications: reports changes   Med Adherence  Comment: reports adherence       Health Maintenance:   Health Maintenance   Topic Date Due    Zoster Vaccines (1 of 2) Never done    DEXA Scan  Never done    COVID-19 Vaccine (2 - 2023-24 season) 09/01/2023    Diabetes Care Dilated Eye Exam  04/26/2024    Diabetes Care A1C  06/06/2024    Diabetes Care Foot Exam  07/26/2024    Diabetes Care: Microalb/Creat Ratio  03/15/2025    Diabetes Care: GFR  03/27/2025    Influenza Vaccine  Completed    MA Annual Health Assessment  Completed    Annual Depression Screening  Completed    Fall Risk Screening (Annual)  Completed    Tobacco Cessation Counseling  Completed    Pneumococcal Vaccine: 65+ Years  Completed       Patient updates/concerns:   Spoke with patient.  Patient relates doing ok. Mood good/stable.  Denies any recent falls. B/P and pulse stable. Followed by cardiology. CAD stent placed. Currently on a blood thinner and having issues with increased SOB. Followed up with pulm. Encouraged to quit smoking.  CT scheduled for tomorrow. Seeing PCP NP next week. Denies any urinary symptoms. Eating a low fat diet. No other questions or concerns.    Encouraged patient:   Self care: Take the time to do the things you love.   Nutrition:  Good nutrition helps us to maintain our weight, fight off infection, and help reduce the risk of developing other chronic issues.   Physical activity:  Physical activity is important to help maintain independence and improve quality of life.     Goals/Action Plan:    Active goal from previous outreach: Staying healthy, maintain independence, and stability.     Patient reported progress towards goals: progressing                - What: continues to follow up on health conditions            - Where/When/How: recently seen pulm and cardiology. Seeing NP next week.   Patient Reported Barriers and  Concerns: as per above.                   - Plan for overcoming barriers:    encouraged patient to call with any questions or concerns .  Care Managers Interventions: Continue to provide encouragement and education for healthy coping and diagnosis.      Future Appointments:   Future Appointments   Date Time Provider Department Center   6/3/2024 12:45 PM Rick Bernabe, SADI [facility] Care Manager Follow Up Date: 1 month     Reason For Follow Up: review progress and or barriers towards patient's goals.     Time Spent This Encounter Total: 16 min medical record review, telephone communication, care plan updates where needed, education, goals, and action plan recreation/update. Provided acknowledgment and validation to patient's concerns.   Monthly Minute Total including today: 16 min   Physical assessment, complete health history, and need for CCM established by August Coley MD.

## 2024-06-03 ENCOUNTER — OFFICE VISIT (OUTPATIENT)
Dept: FAMILY MEDICINE CLINIC | Facility: CLINIC | Age: 79
End: 2024-06-03
Payer: MEDICARE

## 2024-06-03 VITALS
DIASTOLIC BLOOD PRESSURE: 84 MMHG | HEART RATE: 65 BPM | RESPIRATION RATE: 20 BRPM | HEIGHT: 64 IN | SYSTOLIC BLOOD PRESSURE: 120 MMHG | WEIGHT: 156 LBS | OXYGEN SATURATION: 95 % | BODY MASS INDEX: 26.63 KG/M2

## 2024-06-03 DIAGNOSIS — J44.1 COPD WITH ACUTE EXACERBATION (HCC): Primary | ICD-10-CM

## 2024-06-03 DIAGNOSIS — B35.4 TINEA CORPORIS: ICD-10-CM

## 2024-06-03 PROCEDURE — 99214 OFFICE O/P EST MOD 30 MIN: CPT | Performed by: NURSE PRACTITIONER

## 2024-06-03 PROCEDURE — 3008F BODY MASS INDEX DOCD: CPT | Performed by: NURSE PRACTITIONER

## 2024-06-03 PROCEDURE — 1160F RVW MEDS BY RX/DR IN RCRD: CPT | Performed by: NURSE PRACTITIONER

## 2024-06-03 PROCEDURE — 1159F MED LIST DOCD IN RCRD: CPT | Performed by: NURSE PRACTITIONER

## 2024-06-03 PROCEDURE — 3074F SYST BP LT 130 MM HG: CPT | Performed by: NURSE PRACTITIONER

## 2024-06-03 PROCEDURE — 3079F DIAST BP 80-89 MM HG: CPT | Performed by: NURSE PRACTITIONER

## 2024-06-03 RX ORDER — AZITHROMYCIN 250 MG/1
TABLET, FILM COATED ORAL
Qty: 6 TABLET | Refills: 0 | Status: SHIPPED | OUTPATIENT
Start: 2024-06-03 | End: 2024-06-07

## 2024-06-03 RX ORDER — NYSTATIN 100000 U/G
1 CREAM TOPICAL 2 TIMES DAILY
Qty: 30 G | Refills: 2 | Status: SHIPPED | OUTPATIENT
Start: 2024-06-03 | End: 2024-06-13

## 2024-06-03 NOTE — PROGRESS NOTES
Chief Complaint   Patient presents with    Follow - Up     Pt c/o worsening allergies and SOB / she has cough in morning and evening mostly wants to make sure lungs are OK since she has COPD.        HPI:   Gabriela Ndiaye is a 78 year old female who presents for cough  for  7  days. Patient reports congestion, cough with yellow  colored sputum.Cough  tight, wheezy,deep, dry, worse at night, OTC cough syrups not helpful, getting worse.  Trigger for the cough:Sinus congestion , taking allergy medicine and mucinex  Recent CT on 5/31/24  Reports shortness of breath from blood thinners.       Current Outpatient Medications   Medication Sig Dispense Refill    azithromycin (ZITHROMAX Z-KATHERINE) 250 MG Oral Tab Take 2 tablets (500 mg total) by mouth daily for 1 day, THEN 1 tablet (250 mg total) daily for 4 days. 6 tablet 0    LOSARTAN 25 MG Oral Tab TAKE 1 TABLET (25 MG TOTAL) BY MOUTH DAILY. 90 tablet 0    MONTELUKAST 10 MG Oral Tab TAKE 1 TABLET BY MOUTH EVERY DAY AT NIGHT 90 tablet 3    ticagrelor 90 MG Oral Tab Take 1 tablet (90 mg total) by mouth every 12 (twelve) hours.      ALPRAZolam 0.5 MG Oral Tab Take 1 tablet by mouth nightly scheduled and take 1/2 tablet by mouth daily as needed for anxiety. 45 tablet 2    varenicline 1 MG Oral Tab Take 1 tablet (1 mg total) by mouth 2 (two) times daily. 180 tablet 0    dicyclomine 20 MG Oral Tab Take 1 tablet (20 mg total) by mouth 3 (three) times daily. 270 tablet 0    ezetimibe 10 MG Oral Tab Take 1 tablet (10 mg total) by mouth daily. Every other day      busPIRone 10 MG Oral Tab Take 1 tablet (10 mg total) by mouth 2 (two) times daily. 180 tablet 1    Potassium Chloride ER 20 MEQ Oral Tab CR Take 1 tablet by mouth daily. 90 tablet 1    Esomeprazole Magnesium 40 MG Oral Capsule Delayed Release Take 1 capsule (40 mg total) by mouth every morning before breakfast. 90 capsule 0    ACYCLOVIR 400 MG Oral Tab TAKE 1 TABLET BY MOUTH TWICE A  tablet 0    cilostazol 50 MG Oral  Tab Take 1 tablet (50 mg total) by mouth 2 (two) times daily. 180 tablet 1    ELMIRON 100 MG Oral Cap Take 1 capsule (100 mg total) by mouth daily. 90 capsule 1    ROSUVASTATIN 10 MG Oral Tab TAKE 1 TABLET BY MOUTH EVERY DAY 90 tablet 3    levothyroxine (SYNTHROID) 88 MCG Oral Tab Take 1 tablet (88 mcg total) by mouth every other day. 45 tablet 3    levothyroxine (SYNTHROID) 75 MCG Oral Tab Take 1 tablet (75 mcg total) by mouth every other day. 45 tablet 3    metoprolol tartrate 50 MG Oral Tab Take 0.5 tablets (25 mg total) by mouth 2 (two) times daily. 90 tablet 1    ipratropium-albuterol 0.5-2.5 (3) MG/3ML Inhalation Solution Take 3 mL by nebulization every 6 (six) hours as needed (shortness of breath, wheezing.). 1080 mL 1    neomycin-polymyxin-dexamethasone 3.5-18187-0.1 Ophthalmic Ointment APPLY TO AFFECTED EYELID TWICE DAILY FOR 2 WEEKS      B Complex Vitamins (B COMPLEX-B12 OR) Take 1 tablet by mouth daily.      Turmeric 400 MG Oral Cap Take 1 capsule by mouth daily.      Ascorbic Acid (VITAMIN C) 1000 MG Oral Tab Take 1 tablet (1,000 mg total) by mouth daily.      zinc sulfate 220 (50 Zn) MG Oral Cap Take 1 capsule (220 mg total) by mouth daily.      omega-3 fatty acids 1000 MG Oral Cap Take 1,000 mg by mouth daily.      HYDROcodone-acetaminophen 5-325 MG Oral Tab Take 1 tablet by mouth 2 (two) times daily as needed for Pain.      loratadine 10 MG Oral Tablet Dispersible Take 1 tablet (10 mg total) by mouth daily.      Capsicum, Cayenne, (CAYENNE PEPPER OR) Take 1 tablet by mouth daily.      estradiol 0.1 MG/GM Vaginal Cream Place 1 g vaginally daily. (Patient taking differently: Place 1 g vaginally twice a week.) 42.5 g 5    hydrocortisone 25 MG Rectal Suppos Place 1 suppository (25 mg total) rectally as needed.      Nebulizers ([employer] AEROSOL DELIVERY SYSTEM) Does not apply Misc Take 1 Bottle by mouth As Directed.      albuterol 108 (90 Base) MCG/ACT Inhalation Aero Soln Inhale 2 puffs into the lungs every 6  (six) hours as needed for Wheezing or Shortness of Breath. 3 each 3    Respiratory Therapy Supplies (NEBULIZER/TUBING/MOUTHPIECE) Does not apply Kit Use as directed 1 each 0    TRELEGY ELLIPTA 100-62.5-25 MCG/ACT Inhalation Aerosol Powder, Breath Activated Inhale 1 puff into the lungs daily. 180 each 3    FUROSEMIDE 20 MG Oral Tab TAKE 1 TABLET BY MOUTH TWICE A  tablet 3    diclofenac 1 % External Gel Apply 2 g topically 4 (four) times daily. (Patient taking differently: Apply 2 g topically 4 (four) times daily as needed.) 100 g 5    nystatin 100,000 Units/g External Cream Apply topically as needed.      gabapentin 100 MG Oral Cap Take 1 capsule by mouth in the morning, 1-2 capsules by mouth in the afternoon, and 3 capsules by mouth in the evening daily. (Patient taking differently: Take 1 capsule (100 mg total) by mouth 2 (two) times daily. Take 1 capsule by mouth in the morning and 3 capsules by mouth in the evening daily.) 180 capsule 5    hydrocortisone 2.5 % External Cream Apply 1 each topically 2 (two) times daily as needed (hemmorhoids). 20 g 2    nicotine 21 MG/24HR Transdermal Patch 24 Hr Place 1 patch onto the skin daily. 14 each 0    Vitamin D3, Cholecalciferol, 125 MCG (5000 UT) Oral Cap Take 1 capsule (5,000 Units total) by mouth daily.        Past Medical History:    Abdominal hernia    Anxiety    Arthritis    Back pain    Back problem    spinal stenosis    Bilateral pulmonary embolism (HCC)    Bloating    Cancer (HCC)    lung    COPD (chronic obstructive pulmonary disease) (HCC)    not using oxygen    Diabetes (HCC)    diet controlled    Diabetes mellitus (HCC)    Diarrhea, unspecified    Disorder of thyroid    Easy bruising    Esophageal reflux    Exposure to medical diagnostic radiation    last tx 11/2023    Feeling lonely    Flatulence/gas pain/belching    Food intolerance    Hearing impairment    Hearing loss    Hemorrhoids    High blood pressure    High cholesterol    History of eating  disorder    Muscle weakness    Neuropathy    Night sweats    Pain in joints    Pneumonia of both lungs due to infectious organism, unspecified part of lung    Pulmonary emboli (HCC)    Pulmonary embolism (HCC)    RSV (acute bronchiolitis due to respiratory syncytial virus)    Shortness of breath    Stress    Thyroid disease    Uncomfortable fullness after meals    Visual impairment    glasses    Wears glasses      Past Surgical History:   Procedure Laterality Date    Amputation finger/thumb+flaps      Colectomy      Colonoscopy      Colonoscopy N/A 1/15/2024    Procedure: COLONOSCOPY;  Surgeon: Tirso Kimball MD;  Location:  ENDOSCOPY    Hysterectomy        Family History   Problem Relation Age of Onset    Heart Disorder Father     Stroke Father     Uterine Cancer Mother     Diabetes Mother     Cancer Mother         colon    Heart Attack Daughter     Diabetes Daughter     Colon Polyps Daughter     Colon Polyps Son       Social History     Socioeconomic History    Marital status:    Tobacco Use    Smoking status: Every Day     Current packs/day: 0.50     Average packs/day: 0.5 packs/day for 63.0 years (31.5 ttl pk-yrs)     Types: Cigarettes     Start date: 2/24/1964    Smokeless tobacco: Never   Vaping Use    Vaping status: Never Used   Substance and Sexual Activity    Alcohol use: Not Currently     Comment: rare    Drug use: Never   Other Topics Concern    Caffeine Concern Yes     Comment: coffee/coca cola/Iced tea    Exercise No     Social Determinants of Health     Financial Resource Strain: Low Risk  (8/15/2023)    Financial Resource Strain     Difficulty of Paying Living Expenses: Not hard at all     Med Affordability: No   Food Insecurity: No Food Insecurity (8/15/2023)    Food Insecurity     Food Insecurity: Never true   Transportation Needs: Unmet Transportation Needs (8/15/2023)    Transportation Needs     Lack of Transportation: Yes   Physical Activity: Insufficiently Active (8/15/2023)    Exercise  Vital Sign     Days of Exercise per Week: 2 days     Minutes of Exercise per Session: 30 min   Stress: No Stress Concern Present (8/15/2023)    Stress     Feeling of Stress : No   Social Connections: Socially Integrated (8/15/2023)    Social Connections     Frequency of Socialization with Friends and Family: 2   Housing Stability: Low Risk  (8/15/2023)    Housing Stability     Housing Instability: No         REVIEW OF SYSTEMS:   GENERAL: no fever, no chills.  SKIN: no rashes, no hives.  EYES:denies blurred vision or double vision,   HEENT: no earache, sore throat, mild sinus congestion.  CHEST: no chest pains, no palpitations, no orthopnea.  LUNGS: denies shortness of breath with exertion or rest. Wheezing, cough.  CARDIOVASCULAR: denies chest pain on exertion  GI: no nausea or abdominal pain      EXAM:   /84   Pulse 65   Resp 20   Ht 5' 4\" (1.626 m)   Wt 156 lb (70.8 kg)   SpO2 95%   BMI 26.78 kg/m²   GENERAL: well developed, well nourished,in no apparent distress  SKIN: no rashes,no suspicious lesions  EYES:PERRL, EOMI, normal optic disk,conjunctiva are clear  HEENT: atraumatic, normocephalic,TM wnl clay, erythema of the throat.  Sinuses maxillary:  non- tender to percussion.   Nares: red mucosa   NECK: supple,no adenopathy  LUNGS: normal respiratory rate, normal effort, dry cough, increase expiration phase to inspiratory phase.Expiratory wheezing, no rales, no crackles.Normal on percussion.No decreased BS.Normal on palpation,normal vocal fremitus.  CARDIO: RRR without murmur  GI: good BS's,no masses, HSM or tenderness    ASSESSMENT AND PLAN:   Gabriela Ndiaye is a 78 year old female who presents with:    Diagnoses and all orders for this visit:    COPD with acute exacerbation (HCC)  -     azithromycin (ZITHROMAX Z-KATHERINE) 250 MG Oral Tab; Take 2 tablets (500 mg total) by mouth daily for 1 day, THEN 1 tablet (250 mg total) daily for 4 days.       Increase fluids, rest.Meds as below.  The patient indicates  understanding of these issues and agrees to the plan.  The patient is asked to return if sx's persist or worsen.

## 2024-06-28 ENCOUNTER — PATIENT OUTREACH (OUTPATIENT)
Dept: CASE MANAGEMENT | Age: 79
End: 2024-06-28

## 2024-06-28 DIAGNOSIS — I10 ESSENTIAL HYPERTENSION: ICD-10-CM

## 2024-06-28 RX ORDER — FUROSEMIDE 20 MG/1
20 TABLET ORAL 2 TIMES DAILY
Qty: 180 TABLET | Refills: 1 | Status: SHIPPED | OUTPATIENT
Start: 2024-06-28

## 2024-07-01 ENCOUNTER — TELEPHONE (OUTPATIENT)
Dept: FAMILY MEDICINE CLINIC | Facility: CLINIC | Age: 79
End: 2024-07-01

## 2024-07-01 DIAGNOSIS — B37.0 ORAL THRUSH: Primary | ICD-10-CM

## 2024-07-01 DIAGNOSIS — B37.9 YEAST INFECTION: Primary | ICD-10-CM

## 2024-07-01 RX ORDER — ESTRADIOL 0.1 MG/G
1 CREAM VAGINAL DAILY
Qty: 42.5 G | Refills: 5 | Status: SHIPPED | OUTPATIENT
Start: 2024-07-01

## 2024-07-01 NOTE — TELEPHONE ENCOUNTER
Patient went to [place] and used her Trelegy a lot and didn't rinse her mouth out well enough and now she has thrush.  Will you prescribe something for her?  Please send to [facility]/pharmacy #[id number] - [place], [place] - she said it's getting worse and eating and drinking is painful.  Please advise.

## 2024-07-01 NOTE — TELEPHONE ENCOUNTER
Gabriela Ndiaye requesting Medication Refill for:    Medication name and dose (copy and paste from medication list):   estradiol 0.1 MG/GM Vaginal Cream   If medication is not on medication list - transfer patient to RN queue for triage    Preferred Pharmacy: [facility]/PHARMACY #[id number] [place] [id number] [facility] [phone], [phone] [[id number]]     LOV: 3/6/2024   Last Refill date: 12/7/23  Next Scheduled appointment:

## 2024-07-09 ENCOUNTER — OFFICE VISIT (OUTPATIENT)
Dept: FAMILY MEDICINE CLINIC | Facility: CLINIC | Age: 79
End: 2024-07-09
Payer: MEDICARE

## 2024-07-09 VITALS
RESPIRATION RATE: 16 BRPM | HEART RATE: 68 BPM | BODY MASS INDEX: 26.32 KG/M2 | TEMPERATURE: 98 F | OXYGEN SATURATION: 98 % | SYSTOLIC BLOOD PRESSURE: 130 MMHG | WEIGHT: 154.19 LBS | HEIGHT: 64 IN | DIASTOLIC BLOOD PRESSURE: 70 MMHG

## 2024-07-09 DIAGNOSIS — Z20.822 SUSPECTED COVID-19 VIRUS INFECTION: ICD-10-CM

## 2024-07-09 DIAGNOSIS — J44.1 COPD EXACERBATION (HCC): Primary | ICD-10-CM

## 2024-07-09 LAB
OPERATOR ID: NORMAL
RAPID SARS-COV-2 BY PCR: NOT DETECTED

## 2024-07-09 PROCEDURE — 1159F MED LIST DOCD IN RCRD: CPT | Performed by: NURSE PRACTITIONER

## 2024-07-09 PROCEDURE — 99213 OFFICE O/P EST LOW 20 MIN: CPT | Performed by: NURSE PRACTITIONER

## 2024-07-09 PROCEDURE — 3008F BODY MASS INDEX DOCD: CPT | Performed by: NURSE PRACTITIONER

## 2024-07-09 PROCEDURE — 3078F DIAST BP <80 MM HG: CPT | Performed by: NURSE PRACTITIONER

## 2024-07-09 PROCEDURE — 1160F RVW MEDS BY RX/DR IN RCRD: CPT | Performed by: NURSE PRACTITIONER

## 2024-07-09 PROCEDURE — U0002 COVID-19 LAB TEST NON-CDC: HCPCS | Performed by: NURSE PRACTITIONER

## 2024-07-09 PROCEDURE — 3075F SYST BP GE 130 - 139MM HG: CPT | Performed by: NURSE PRACTITIONER

## 2024-07-09 RX ORDER — ASPIRIN 81 MG/1
81 TABLET, COATED ORAL DAILY
COMMUNITY
Start: 2024-06-29

## 2024-07-09 RX ORDER — METHYLPREDNISOLONE 4 MG/1
TABLET ORAL
Qty: 1 EACH | Refills: 0 | Status: SHIPPED | OUTPATIENT
Start: 2024-07-09

## 2024-07-10 NOTE — PATIENT INSTRUCTIONS
Go to ER for any worsening symptoms  Follow up with PCP next week for re-evaluation  Use nebulizer and take daily allergy medicine  Flonase nasal spray daily

## 2024-07-10 NOTE — PROGRESS NOTES
CHIEF COMPLAINT:     Chief Complaint   Patient presents with    Cough     S/s for 1 week.  Nasal congestion, shortness of breath and wet cough.  RX cough pearls taken today prescribed in Jan. 2023.       HPI:   Gabriela Ndiaye is a 79 year old female who presents for upper respiratory symptoms for  2 weeks. Patient reports they started prior to going to [place].  While she was in [place] for a few days she felt fine and then when she returned to [place] symptoms started again.  Patient reports dry cough, PND, nasal congestion in the morning, and dyspnea with exertion .  Treating symptoms with nebulizers.      Current Outpatient Medications   Medication Sig Dispense Refill    ASPIRIN LOW DOSE 81 MG Oral Tab EC Take 1 tablet (81 mg total) by mouth daily.      methylPREDNISolone (MEDROL) 4 MG Oral Tablet Therapy Pack As directed. 1 each 0    estradiol 0.1 MG/GM Vaginal Cream Place 1 g vaginally daily. 42.5 g 5    furosemide 20 MG Oral Tab Take 1 tablet (20 mg total) by mouth 2 (two) times daily. 180 tablet 1    MONTELUKAST 10 MG Oral Tab TAKE 1 TABLET BY MOUTH EVERY DAY AT NIGHT 90 tablet 3    LOSARTAN 25 MG Oral Tab TAKE 1 TABLET (25 MG TOTAL) BY MOUTH DAILY. 90 tablet 0    ticagrelor 90 MG Oral Tab Take 1 tablet (90 mg total) by mouth every 12 (twelve) hours.      ALPRAZolam 0.5 MG Oral Tab Take 1 tablet by mouth nightly scheduled and take 1/2 tablet by mouth daily as needed for anxiety. 45 tablet 2    varenicline 1 MG Oral Tab Take 1 tablet (1 mg total) by mouth 2 (two) times daily. 180 tablet 0    dicyclomine 20 MG Oral Tab Take 1 tablet (20 mg total) by mouth 3 (three) times daily. 270 tablet 0    ezetimibe 10 MG Oral Tab Take 1 tablet (10 mg total) by mouth daily. Every other day      busPIRone 10 MG Oral Tab Take 1 tablet (10 mg total) by mouth 2 (two) times daily. 180 tablet 1    Potassium Chloride ER 20 MEQ Oral Tab CR Take 1 tablet by mouth daily. 90 tablet 1    ACYCLOVIR 400 MG Oral Tab TAKE 1 TABLET BY MOUTH  TWICE A  tablet 0    cilostazol 50 MG Oral Tab Take 1 tablet (50 mg total) by mouth 2 (two) times daily. 180 tablet 1    ELMIRON 100 MG Oral Cap Take 1 capsule (100 mg total) by mouth daily. 90 capsule 1    ROSUVASTATIN 10 MG Oral Tab TAKE 1 TABLET BY MOUTH EVERY DAY 90 tablet 3    levothyroxine (SYNTHROID) 88 MCG Oral Tab Take 1 tablet (88 mcg total) by mouth every other day. 45 tablet 3    levothyroxine (SYNTHROID) 75 MCG Oral Tab Take 1 tablet (75 mcg total) by mouth every other day. 45 tablet 3    metoprolol tartrate 50 MG Oral Tab Take 0.5 tablets (25 mg total) by mouth 2 (two) times daily. 90 tablet 1    ipratropium-albuterol 0.5-2.5 (3) MG/3ML Inhalation Solution Take 3 mL by nebulization every 6 (six) hours as needed (shortness of breath, wheezing.). 1080 mL 1    neomycin-polymyxin-dexamethasone 3.5-08983-2.1 Ophthalmic Ointment APPLY TO AFFECTED EYELID TWICE DAILY FOR 2 WEEKS      B Complex Vitamins (B COMPLEX-B12 OR) Take 1 tablet by mouth daily.      Turmeric 400 MG Oral Cap Take 1 capsule by mouth daily.      Ascorbic Acid (VITAMIN C) 1000 MG Oral Tab Take 1 tablet (1,000 mg total) by mouth daily.      zinc sulfate 220 (50 Zn) MG Oral Cap Take 1 capsule (220 mg total) by mouth daily.      omega-3 fatty acids 1000 MG Oral Cap Take 1,000 mg by mouth daily.      HYDROcodone-acetaminophen 5-325 MG Oral Tab Take 1 tablet by mouth 2 (two) times daily as needed for Pain.      loratadine 10 MG Oral Tablet Dispersible Take 1 tablet (10 mg total) by mouth daily.      Capsicum, Cayenne, (CAYENNE PEPPER OR) Take 1 tablet by mouth daily.      hydrocortisone 25 MG Rectal Suppos Place 1 suppository (25 mg total) rectally as needed.      Nebulizers ([employer] AEROSOL DELIVERY SYSTEM) Does not apply Misc Take 1 Bottle by mouth As Directed.      albuterol 108 (90 Base) MCG/ACT Inhalation Aero Soln Inhale 2 puffs into the lungs every 6 (six) hours as needed for Wheezing or Shortness of Breath. 3 each 3    Respiratory  Therapy Supplies (NEBULIZER/TUBING/MOUTHPIECE) Does not apply Kit Use as directed 1 each 0    TRELEGY ELLIPTA 100-62.5-25 MCG/ACT Inhalation Aerosol Powder, Breath Activated Inhale 1 puff into the lungs daily. 180 each 3    diclofenac 1 % External Gel Apply 2 g topically 4 (four) times daily. (Patient taking differently: Apply 2 g topically 4 (four) times daily as needed.) 100 g 5    nystatin 100,000 Units/g External Cream Apply topically as needed.      gabapentin 100 MG Oral Cap Take 1 capsule by mouth in the morning, 1-2 capsules by mouth in the afternoon, and 3 capsules by mouth in the evening daily. (Patient taking differently: Take 1 capsule (100 mg total) by mouth 2 (two) times daily. Take 1 capsule by mouth in the morning and 3 capsules by mouth in the evening daily.) 180 capsule 5    hydrocortisone 2.5 % External Cream Apply 1 each topically 2 (two) times daily as needed (hemmorhoids). 20 g 2    nicotine 21 MG/24HR Transdermal Patch 24 Hr Place 1 patch onto the skin daily. 14 each 0    Vitamin D3, Cholecalciferol, 125 MCG (5000 UT) Oral Cap Take 1 capsule (5,000 Units total) by mouth daily.        Past Medical History:    Abdominal hernia    Anxiety    Arthritis    Back pain    Back problem    spinal stenosis    Bilateral pulmonary embolism (HCC)    Bloating    Cancer (HCC)    lung    COPD (chronic obstructive pulmonary disease) (HCC)    not using oxygen    Diabetes (HCC)    diet controlled    Diabetes mellitus (HCC)    Diarrhea, unspecified    Disorder of thyroid    Easy bruising    Esophageal reflux    Exposure to medical diagnostic radiation    last tx 11/2023    Feeling lonely    Flatulence/gas pain/belching    Food intolerance    Hearing impairment    Hearing loss    Hemorrhoids    High blood pressure    High cholesterol    History of eating disorder    Muscle weakness    Neuropathy    Night sweats    Pain in joints    Pneumonia of both lungs due to infectious organism, unspecified part of lung     Pulmonary emboli (HCC)    Pulmonary embolism (HCC)    RSV (acute bronchiolitis due to respiratory syncytial virus)    Shortness of breath    Stress    Thyroid disease    Uncomfortable fullness after meals    Visual impairment    glasses    Wears glasses      Past Surgical History:   Procedure Laterality Date    Amputation finger/thumb+flaps      Colectomy      Colonoscopy      Colonoscopy N/A 1/15/2024    Procedure: COLONOSCOPY;  Surgeon: Tirso Kimball MD;  Location:  ENDOSCOPY    Hysterectomy           Social History     Socioeconomic History    Marital status:    Tobacco Use    Smoking status: Every Day     Current packs/day: 0.50     Average packs/day: 0.5 packs/day for 63.0 years (31.5 ttl pk-yrs)     Types: Cigarettes     Start date: 2/24/1964    Smokeless tobacco: Never   Vaping Use    Vaping status: Never Used   Substance and Sexual Activity    Alcohol use: Not Currently     Comment: rare    Drug use: Never   Other Topics Concern    Caffeine Concern Yes     Comment: coffee/coca cola/Iced tea    Exercise No     Social Determinants of Health     Financial Resource Strain: Low Risk  (8/15/2023)    Financial Resource Strain     Difficulty of Paying Living Expenses: Not hard at all     Med Affordability: No   Food Insecurity: No Food Insecurity (8/15/2023)    Food Insecurity     Food Insecurity: Never true   Transportation Needs: Unmet Transportation Needs (8/15/2023)    Transportation Needs     Lack of Transportation: Yes   Physical Activity: Insufficiently Active (8/15/2023)    Exercise Vital Sign     Days of Exercise per Week: 2 days     Minutes of Exercise per Session: 30 min   Stress: No Stress Concern Present (8/15/2023)    Stress     Feeling of Stress : No   Social Connections: Socially Integrated (8/15/2023)    Social Connections     Frequency of Socialization with Friends and Family: 2   Housing Stability: Low Risk  (8/15/2023)    Housing Stability     Housing Instability: No         REVIEW OF  SYSTEMS:   GENERAL: decreased appetite, but that's not new  SKIN: no rashes or abnormal skin lesions  HEENT: See HPI  LUNGS: See HPI  CARDIOVASCULAR: denies chest pain or palpitations   GI: denies N/V/C or abdominal pain      EXAM:   /70   Pulse 68   Temp 98.2 °F (36.8 °C) (Oral)   Resp 16   Ht 5' 4\" (1.626 m)   Wt 154 lb 3.2 oz (69.9 kg)   SpO2 98%   BMI 26.47 kg/m²   GENERAL: well developed, well nourished,in no apparent distress  SKIN: no rashes,no suspicious lesions  HEAD: atraumatic, normocephalic.  mild tenderness on palpation of sinuses  EYES: conjunctiva clear, EOM intact  EARS: TM's without erythema, no bulging, noretraction,no fluid, bony landmarks visible  NOSE: Nostrils patent, clear nasal discharge, nasal mucosa mildly inflamed   THROAT: Oral mucosa pink, moist. Posterior pharynx is not erythematous. no exudates. + PND    NECK: Supple, non-tender  LUNGS: clear to auscultation bilaterally, no wheezes or rhonchi. Breathing is non labored.  CARDIO: RRR without murmur  EXTREMITIES: no cyanosis, clubbing or edema  LYMPH:  no lymphadenopathy.        ASSESSMENT AND PLAN:   Gabriela Ndiaye is a 79 year old female who presents with upper respiratory symptoms that are consistent with    ASSESSMENT:   Encounter Diagnoses   Name Primary?    Suspected COVID-19 virus infection     COPD exacerbation (HCC) Yes       PLAN: Meds as below.  Comfort care as described in Patient Instructions.  Discussed with patient that symptoms could be allergy related.  Advised ER for any worsening symptoms, otherwise follow up with PCP next week for re-evaluation.      Meds & Refills for this Visit:  Requested Prescriptions     Signed Prescriptions Disp Refills    methylPREDNISolone (MEDROL) 4 MG Oral Tablet Therapy Pack 1 each 0     Sig: As directed.     Risks, benefits, and side effects of medication explained and discussed.    The patient indicates understanding of these issues and agrees to the plan.  The patient is  asked to f/u with PCP if sx's persist or worsen.  Patient Instructions   Go to ER for any worsening symptoms  Follow up with PCP next week for re-evaluation  Use nebulizer and take daily allergy medicine  Flonase nasal spray daily

## 2024-07-11 DIAGNOSIS — G89.4 CHRONIC PAIN SYNDROME: ICD-10-CM

## 2024-07-11 RX ORDER — GABAPENTIN 100 MG/1
CAPSULE ORAL
Qty: 180 CAPSULE | Refills: 5 | Status: SHIPPED | OUTPATIENT
Start: 2024-07-11

## 2024-07-16 ENCOUNTER — TELEPHONE (OUTPATIENT)
Dept: FAMILY MEDICINE CLINIC | Facility: CLINIC | Age: 79
End: 2024-07-16

## 2024-07-16 NOTE — TELEPHONE ENCOUNTER
Called [facility] and refill is available on 7/28  Called patient to verify need of refill  Patient was using every 3 days originally and now she is using it every day due to burning and there is more than a gram in the application after she squeezes the medication in, asking if she can have an early refill  Please advise

## 2024-07-16 NOTE — TELEPHONE ENCOUNTER
Called [facility] to let them know it is ok to refill early.  Called patient and let he know that the refill is going to be refilled for her

## 2024-07-16 NOTE — TELEPHONE ENCOUNTER
Gabriela Ndiaye requesting Medication Refill for:    estradiol 0.1 MG/GM Vaginal Cream     Preferred Pharmacy: [facility]/pharmacy #[id number]     LOV: 3/6/2024   Last Refill date: 07/01/24

## 2024-07-26 ENCOUNTER — TELEPHONE (OUTPATIENT)
Dept: FAMILY MEDICINE CLINIC | Facility: CLINIC | Age: 79
End: 2024-07-26

## 2024-07-26 DIAGNOSIS — J44.1 COPD EXACERBATION (HCC): Primary | ICD-10-CM

## 2024-07-26 RX ORDER — BENZONATATE 200 MG/1
200 CAPSULE ORAL 3 TIMES DAILY PRN
Qty: 90 CAPSULE | Refills: 0 | Status: SHIPPED | OUTPATIENT
Start: 2024-07-26

## 2024-07-26 RX ORDER — PREDNISONE 10 MG/1
TABLET ORAL
Qty: 30 TABLET | Refills: 0 | Status: SHIPPED | OUTPATIENT
Start: 2024-07-26 | End: 2024-08-05

## 2024-07-26 NOTE — TELEPHONE ENCOUNTER
Patient just made an apt with [facility] for 07/31. She was seen at the walk in clinic 07/09 and is still not feeling good. She ha a cough with clear phlegm. Runny nose, and SOB. She is taking Benozantate to help with cough and has on pill left. [facility] did not prescribe this to her, she already had it and it helps.

## 2024-07-26 NOTE — TELEPHONE ENCOUNTER
Spoke with Dr. Coley and ordered benzonatate 200mg. For patient, ok to keep appointment with [facility]  Called patient and made aware of RX

## 2024-07-29 RX ORDER — DICYCLOMINE HCL 20 MG
20 TABLET ORAL 3 TIMES DAILY
Qty: 270 TABLET | Refills: 0 | Status: SHIPPED | OUTPATIENT
Start: 2024-07-29

## 2024-07-31 ENCOUNTER — OFFICE VISIT (OUTPATIENT)
Dept: FAMILY MEDICINE CLINIC | Facility: CLINIC | Age: 79
End: 2024-07-31
Payer: MEDICARE

## 2024-07-31 ENCOUNTER — PATIENT OUTREACH (OUTPATIENT)
Dept: CASE MANAGEMENT | Age: 79
End: 2024-07-31

## 2024-07-31 VITALS
OXYGEN SATURATION: 98 % | HEART RATE: 68 BPM | DIASTOLIC BLOOD PRESSURE: 80 MMHG | HEIGHT: 64 IN | SYSTOLIC BLOOD PRESSURE: 110 MMHG | RESPIRATION RATE: 16 BRPM | WEIGHT: 155 LBS | BODY MASS INDEX: 26.46 KG/M2

## 2024-07-31 DIAGNOSIS — R30.9 PAIN WITH URINATION: ICD-10-CM

## 2024-07-31 DIAGNOSIS — J44.1 COPD EXACERBATION (HCC): Primary | ICD-10-CM

## 2024-07-31 DIAGNOSIS — N30.10 INTERSTITIAL CYSTITIS: ICD-10-CM

## 2024-07-31 LAB
APPEARANCE: CLEAR
BILIRUBIN: NEGATIVE
GLUCOSE (URINE DIPSTICK): NEGATIVE MG/DL
KETONES (URINE DIPSTICK): NEGATIVE MG/DL
LEUKOCYTES: NEGATIVE
MULTISTIX LOT#: ABNORMAL NUMERIC
NITRITE, URINE: NEGATIVE
PH, URINE: 5.5 (ref 4.5–8)
PROTEIN (URINE DIPSTICK): NEGATIVE MG/DL
SPECIFIC GRAVITY: 1.01 (ref 1–1.03)
URINE-COLOR: YELLOW
UROBILINOGEN,SEMI-QN: 0.2 MG/DL (ref 0–1.9)

## 2024-07-31 PROCEDURE — 87086 URINE CULTURE/COLONY COUNT: CPT | Performed by: NURSE PRACTITIONER

## 2024-07-31 PROCEDURE — 81003 URINALYSIS AUTO W/O SCOPE: CPT | Performed by: NURSE PRACTITIONER

## 2024-07-31 PROCEDURE — 3079F DIAST BP 80-89 MM HG: CPT | Performed by: NURSE PRACTITIONER

## 2024-07-31 PROCEDURE — 99214 OFFICE O/P EST MOD 30 MIN: CPT | Performed by: NURSE PRACTITIONER

## 2024-07-31 PROCEDURE — 3008F BODY MASS INDEX DOCD: CPT | Performed by: NURSE PRACTITIONER

## 2024-07-31 PROCEDURE — 1159F MED LIST DOCD IN RCRD: CPT | Performed by: NURSE PRACTITIONER

## 2024-07-31 PROCEDURE — 3074F SYST BP LT 130 MM HG: CPT | Performed by: NURSE PRACTITIONER

## 2024-07-31 RX ORDER — SULFAMETHOXAZOLE AND TRIMETHOPRIM 800; 160 MG/1; MG/1
1 TABLET ORAL 2 TIMES DAILY
Qty: 6 TABLET | Refills: 0 | Status: SHIPPED | OUTPATIENT
Start: 2024-07-31 | End: 2024-08-03

## 2024-07-31 NOTE — PROGRESS NOTES
Chief Complaint   Patient presents with    Cough    UTI       HPI:   Gabriela Ndiaye is a 79 year old female who presents for cough  for  2  weeks. Patient reports congestion, dry cough.Cough is tight, wheezy,deep, dry, worse at night, OTC cough syrups not helpful, getting worse.  Trigger for the cough : post-nasal drainage   UTI: Onset one week. , frequency in urination. Denies hematuria or flank pain.         Current Outpatient Medications   Medication Sig Dispense Refill    DICYCLOMINE 20 MG Oral Tab TAKE 1 TABLET BY MOUTH THREE TIMES A  tablet 0    benzonatate 200 MG Oral Cap Take 1 capsule (200 mg total) by mouth 3 (three) times daily as needed for cough. 90 capsule 0    predniSONE 10 MG Oral Tab Take 4 tablets (40 mg total) by mouth daily for 4 days, THEN 3 tablets (30 mg total) daily for 3 days, THEN 2 tablets (20 mg total) daily for 2 days, THEN 1 tablet (10 mg total) daily for 1 day. 30 tablet 0    GABAPENTIN 100 MG Oral Cap TAKE 1 CAPSULE BY MOUTH IN THE MORNING, 1-2 CAPSULES BY MOUTH IN THE AFTERNOON, AND 3 CAPSULES BY MOUTH IN THE EVENING DAILY 180 capsule 5    ASPIRIN LOW DOSE 81 MG Oral Tab EC Take 1 tablet (81 mg total) by mouth daily.      methylPREDNISolone (MEDROL) 4 MG Oral Tablet Therapy Pack As directed. 1 each 0    estradiol 0.1 MG/GM Vaginal Cream Place 1 g vaginally daily. 42.5 g 5    furosemide 20 MG Oral Tab Take 1 tablet (20 mg total) by mouth 2 (two) times daily. 180 tablet 1    MONTELUKAST 10 MG Oral Tab TAKE 1 TABLET BY MOUTH EVERY DAY AT NIGHT 90 tablet 3    LOSARTAN 25 MG Oral Tab TAKE 1 TABLET (25 MG TOTAL) BY MOUTH DAILY. 90 tablet 0    ticagrelor 90 MG Oral Tab Take 1 tablet (90 mg total) by mouth every 12 (twelve) hours.      ALPRAZolam 0.5 MG Oral Tab Take 1 tablet by mouth nightly scheduled and take 1/2 tablet by mouth daily as needed for anxiety. 45 tablet 2    varenicline 1 MG Oral Tab Take 1 tablet (1 mg total) by mouth 2 (two) times daily. 180 tablet 0    ezetimibe 10  MG Oral Tab Take 1 tablet (10 mg total) by mouth daily. Every other day      busPIRone 10 MG Oral Tab Take 1 tablet (10 mg total) by mouth 2 (two) times daily. 180 tablet 1    Potassium Chloride ER 20 MEQ Oral Tab CR Take 1 tablet by mouth daily. 90 tablet 1    ACYCLOVIR 400 MG Oral Tab TAKE 1 TABLET BY MOUTH TWICE A  tablet 0    cilostazol 50 MG Oral Tab Take 1 tablet (50 mg total) by mouth 2 (two) times daily. 180 tablet 1    ELMIRON 100 MG Oral Cap Take 1 capsule (100 mg total) by mouth daily. 90 capsule 1    ROSUVASTATIN 10 MG Oral Tab TAKE 1 TABLET BY MOUTH EVERY DAY 90 tablet 3    levothyroxine (SYNTHROID) 88 MCG Oral Tab Take 1 tablet (88 mcg total) by mouth every other day. 45 tablet 3    levothyroxine (SYNTHROID) 75 MCG Oral Tab Take 1 tablet (75 mcg total) by mouth every other day. 45 tablet 3    metoprolol tartrate 50 MG Oral Tab Take 0.5 tablets (25 mg total) by mouth 2 (two) times daily. 90 tablet 1    neomycin-polymyxin-dexamethasone 3.5-41335-8.1 Ophthalmic Ointment APPLY TO AFFECTED EYELID TWICE DAILY FOR 2 WEEKS      B Complex Vitamins (B COMPLEX-B12 OR) Take 1 tablet by mouth daily.      Turmeric 400 MG Oral Cap Take 1 capsule by mouth daily.      Ascorbic Acid (VITAMIN C) 1000 MG Oral Tab Take 1 tablet (1,000 mg total) by mouth daily.      zinc sulfate 220 (50 Zn) MG Oral Cap Take 1 capsule (220 mg total) by mouth daily.      omega-3 fatty acids 1000 MG Oral Cap Take 1,000 mg by mouth daily.      HYDROcodone-acetaminophen 5-325 MG Oral Tab Take 1 tablet by mouth 2 (two) times daily as needed for Pain.      loratadine 10 MG Oral Tablet Dispersible Take 1 tablet (10 mg total) by mouth daily.      Capsicum, Cayenne, (CAYENNE PEPPER OR) Take 1 tablet by mouth daily.      hydrocortisone 25 MG Rectal Suppos Place 1 suppository (25 mg total) rectally as needed.      Nebulizers ([employer] Study Course AEROSOL DELIVERY SYSTEM) Does not apply Misc Take 1 Bottle by mouth As Directed.      albuterol 108 (90 Base)  MCG/ACT Inhalation Aero Soln Inhale 2 puffs into the lungs every 6 (six) hours as needed for Wheezing or Shortness of Breath. 3 each 3    Respiratory Therapy Supplies (NEBULIZER/TUBING/MOUTHPIECE) Does not apply Kit Use as directed 1 each 0    TRELEGY ELLIPTA 100-62.5-25 MCG/ACT Inhalation Aerosol Powder, Breath Activated Inhale 1 puff into the lungs daily. 180 each 3    diclofenac 1 % External Gel Apply 2 g topically 4 (four) times daily. (Patient taking differently: Apply 2 g topically 4 (four) times daily as needed.) 100 g 5    nystatin 100,000 Units/g External Cream Apply topically as needed.      hydrocortisone 2.5 % External Cream Apply 1 each topically 2 (two) times daily as needed (hemmorhoids). 20 g 2    nicotine 21 MG/24HR Transdermal Patch 24 Hr Place 1 patch onto the skin daily. 14 each 0    Vitamin D3, Cholecalciferol, 125 MCG (5000 UT) Oral Cap Take 1 capsule (5,000 Units total) by mouth daily.        Past Medical History:    Abdominal hernia    Anxiety    Arthritis    Back pain    Back problem    spinal stenosis    Bilateral pulmonary embolism (HCC)    Bloating    Cancer (HCC)    lung    COPD (chronic obstructive pulmonary disease) (HCC)    not using oxygen    Diabetes (HCC)    diet controlled    Diabetes mellitus (HCC)    Diarrhea, unspecified    Disorder of thyroid    Easy bruising    Esophageal reflux    Exposure to medical diagnostic radiation    last tx 11/2023    Feeling lonely    Flatulence/gas pain/belching    Food intolerance    Hearing impairment    Hearing loss    Hemorrhoids    High blood pressure    High cholesterol    History of eating disorder    Muscle weakness    Neuropathy    Night sweats    Pain in joints    Pneumonia of both lungs due to infectious organism, unspecified part of lung    Pulmonary emboli (HCC)    Pulmonary embolism (HCC)    RSV (acute bronchiolitis due to respiratory syncytial virus)    Shortness of breath    Stress    Thyroid disease    Uncomfortable fullness after  meals    Visual impairment    glasses    Wears glasses      Past Surgical History:   Procedure Laterality Date    Amputation finger/thumb+flaps      Colectomy      Colonoscopy      Colonoscopy N/A 1/15/2024    Procedure: COLONOSCOPY;  Surgeon: Tirso Kimball MD;  Location:  ENDOSCOPY    Hysterectomy        Family History   Problem Relation Age of Onset    Heart Disorder Father     Stroke Father     Uterine Cancer Mother     Diabetes Mother     Cancer Mother         colon    Heart Attack Daughter     Diabetes Daughter     Colon Polyps Daughter     Colon Polyps Son       Social History     Socioeconomic History    Marital status:    Tobacco Use    Smoking status: Every Day     Current packs/day: 0.50     Average packs/day: 0.5 packs/day for 63.0 years (31.5 ttl pk-yrs)     Types: Cigarettes     Start date: 2/24/1964    Smokeless tobacco: Never   Vaping Use    Vaping status: Never Used   Substance and Sexual Activity    Alcohol use: Not Currently     Comment: rare    Drug use: Never   Other Topics Concern    Caffeine Concern Yes     Comment: coffee/coca cola/Iced tea    Exercise No     Social Determinants of Health     Financial Resource Strain: Low Risk  (8/15/2023)    Financial Resource Strain     Difficulty of Paying Living Expenses: Not hard at all     Med Affordability: No   Food Insecurity: No Food Insecurity (8/15/2023)    Food Insecurity     Food Insecurity: Never true   Transportation Needs: Unmet Transportation Needs (8/15/2023)    Transportation Needs     Lack of Transportation: Yes   Physical Activity: Insufficiently Active (8/15/2023)    Exercise Vital Sign     Days of Exercise per Week: 2 days     Minutes of Exercise per Session: 30 min   Stress: No Stress Concern Present (8/15/2023)    Stress     Feeling of Stress : No   Social Connections: Socially Integrated (8/15/2023)    Social Connections     Frequency of Socialization with Friends and Family: 2   Housing Stability: Low Risk  (8/15/2023)     Housing Stability     Housing Instability: No         REVIEW OF SYSTEMS:   GENERAL: no fever, no chills.  SKIN: no rashes, no hives.  EYES:denies blurred vision or double vision,   HEENT: no earache, sore throat, mild sinus congestion.  CHEST: no chest pains, no palpitations, no orthopnea.  LUNGS: denies shortness of breath with exertion or rest. Wheezing, cough.  CARDIOVASCULAR: denies chest pain on exertion  GI: no nausea or abdominal pain      EXAM:   /80   Pulse 68   Resp 16   Ht 5' 4\" (1.626 m)   Wt 155 lb (70.3 kg)   SpO2 98%   BMI 26.61 kg/m²   GENERAL: well developed, well nourished,in no apparent distress  SKIN: no rashes,no suspicious lesions  EYES:PERRLA, EOMI, normal optic disk,conjunctiva are clear  HEENT: atraumatic, normocephalic,TM wnl clay, erythema of the throat.  Nares: red mucosa and thick yellow discharge, no septal deviations.   NECK: supple,no adenopathy  LUNGS: normal respiratory rate, normal effort, dry cough, increase expiration phase to inspiratory phase no expiratory wheezing, no rales, no crackles.Normal on percussion.No decreased BS.Normal on palpation,normal vocal fremitus.  CARDIO: RRR without murmur  GI: good BS's,no masses, HSM or tenderness No CVA tenderness     ASSESSMENT AND PLAN:   Gabriela Ndiaye is a 79 year old female who presents with  Diagnoses and all orders for this visit:    COPD exacerbation (HCC)  -     XR CHEST PA + LAT CHEST (CPT=71046); Future    Pain with urination    Interstitial cystitis  -     Urine Dip, auto without Micro  -     Urine Culture, Routine [E]; Future  -     sulfamethoxazole-trimethoprim DS (BACTRIM DS) 800-160 MG Oral Tab per tablet; Take 1 tablet by mouth 2 (two) times daily for 3 days. Take one tablet twice a day x 3 days       Increase fluids, rest.Meds as below.  The patient indicates understanding of these issues and agrees to the plan.  The patient is asked to return if sx's persist or worsen.

## 2024-07-31 NOTE — PROGRESS NOTES
JACQUELYNA verified for CCM monthly outreach.   Patient seen at PCP office today. Care everywhere updated.   I will follow up with patient at a later time.      Medical record reviewed including recent office visits and test results

## 2024-08-03 ENCOUNTER — HOSPITAL ENCOUNTER (OUTPATIENT)
Dept: GENERAL RADIOLOGY | Age: 79
Discharge: HOME | End: 2024-08-03
Attending: NURSE PRACTITIONER
Payer: MEDICARE

## 2024-08-03 DIAGNOSIS — J44.1 COPD EXACERBATION (HCC): ICD-10-CM

## 2024-08-03 PROCEDURE — 71046 X-RAY EXAM CHEST 2 VIEWS: CPT | Performed by: NURSE PRACTITIONER

## 2024-08-12 ENCOUNTER — OFFICE VISIT (OUTPATIENT)
Dept: FAMILY MEDICINE CLINIC | Facility: CLINIC | Age: 79
End: 2024-08-12
Payer: MEDICARE

## 2024-08-12 VITALS
TEMPERATURE: 98 F | BODY MASS INDEX: 26.46 KG/M2 | RESPIRATION RATE: 16 BRPM | WEIGHT: 155 LBS | HEART RATE: 71 BPM | SYSTOLIC BLOOD PRESSURE: 122 MMHG | DIASTOLIC BLOOD PRESSURE: 70 MMHG | HEIGHT: 64 IN | OXYGEN SATURATION: 97 %

## 2024-08-12 DIAGNOSIS — N89.8 VAGINAL IRRITATION: Primary | ICD-10-CM

## 2024-08-12 DIAGNOSIS — R39.9 UTI SYMPTOMS: ICD-10-CM

## 2024-08-12 DIAGNOSIS — K13.0 ANGULAR CHEILITIS: ICD-10-CM

## 2024-08-12 PROCEDURE — 87086 URINE CULTURE/COLONY COUNT: CPT | Performed by: NURSE PRACTITIONER

## 2024-08-12 NOTE — PROGRESS NOTES
Gabriela Ndiaye is a 79 year old female.  HPI:   Patient presents with symptoms of UTI, sores on outside of mouth and vaginal irritation for several days.   Complaining of urinary frequency, urgency, dysuria/ pt recently had a UTI and took bactrim. Pt is still having frequency and would like to make sure she doesn't have a UTI still. Pt also having irritation/burning on vaginal lips. Pt stopped using pads thinking that this was causing her irrigation. Pt also noted that she has cracks on the outside of her mouth.   Denies back pain, fever, hematuria.  Pt has strong history of UTI in past.    Current Outpatient Medications   Medication Sig Dispense Refill    DICYCLOMINE 20 MG Oral Tab TAKE 1 TABLET BY MOUTH THREE TIMES A  tablet 0    benzonatate 200 MG Oral Cap Take 1 capsule (200 mg total) by mouth 3 (three) times daily as needed for cough. 90 capsule 0    GABAPENTIN 100 MG Oral Cap TAKE 1 CAPSULE BY MOUTH IN THE MORNING, 1-2 CAPSULES BY MOUTH IN THE AFTERNOON, AND 3 CAPSULES BY MOUTH IN THE EVENING DAILY 180 capsule 5    ASPIRIN LOW DOSE 81 MG Oral Tab EC Take 1 tablet (81 mg total) by mouth daily.      methylPREDNISolone (MEDROL) 4 MG Oral Tablet Therapy Pack As directed. 1 each 0    estradiol 0.1 MG/GM Vaginal Cream Place 1 g vaginally daily. 42.5 g 5    furosemide 20 MG Oral Tab Take 1 tablet (20 mg total) by mouth 2 (two) times daily. 180 tablet 1    MONTELUKAST 10 MG Oral Tab TAKE 1 TABLET BY MOUTH EVERY DAY AT NIGHT 90 tablet 3    LOSARTAN 25 MG Oral Tab TAKE 1 TABLET (25 MG TOTAL) BY MOUTH DAILY. 90 tablet 0    ticagrelor 90 MG Oral Tab Take 1 tablet (90 mg total) by mouth every 12 (twelve) hours.      ALPRAZolam 0.5 MG Oral Tab Take 1 tablet by mouth nightly scheduled and take 1/2 tablet by mouth daily as needed for anxiety. 45 tablet 2    varenicline 1 MG Oral Tab Take 1 tablet (1 mg total) by mouth 2 (two) times daily. 180 tablet 0    ezetimibe 10 MG Oral Tab Take 1 tablet (10 mg total) by mouth  daily. Every other day      busPIRone 10 MG Oral Tab Take 1 tablet (10 mg total) by mouth 2 (two) times daily. 180 tablet 1    Potassium Chloride ER 20 MEQ Oral Tab CR Take 1 tablet by mouth daily. 90 tablet 1    ACYCLOVIR 400 MG Oral Tab TAKE 1 TABLET BY MOUTH TWICE A  tablet 0    cilostazol 50 MG Oral Tab Take 1 tablet (50 mg total) by mouth 2 (two) times daily. 180 tablet 1    ELMIRON 100 MG Oral Cap Take 1 capsule (100 mg total) by mouth daily. 90 capsule 1    ROSUVASTATIN 10 MG Oral Tab TAKE 1 TABLET BY MOUTH EVERY DAY 90 tablet 3    levothyroxine (SYNTHROID) 88 MCG Oral Tab Take 1 tablet (88 mcg total) by mouth every other day. 45 tablet 3    levothyroxine (SYNTHROID) 75 MCG Oral Tab Take 1 tablet (75 mcg total) by mouth every other day. 45 tablet 3    metoprolol tartrate 50 MG Oral Tab Take 0.5 tablets (25 mg total) by mouth 2 (two) times daily. 90 tablet 1    neomycin-polymyxin-dexamethasone 3.5-68000-8.1 Ophthalmic Ointment APPLY TO AFFECTED EYELID TWICE DAILY FOR 2 WEEKS      B Complex Vitamins (B COMPLEX-B12 OR) Take 1 tablet by mouth daily.      Turmeric 400 MG Oral Cap Take 1 capsule by mouth daily.      Ascorbic Acid (VITAMIN C) 1000 MG Oral Tab Take 1 tablet (1,000 mg total) by mouth daily.      zinc sulfate 220 (50 Zn) MG Oral Cap Take 1 capsule (220 mg total) by mouth daily.      omega-3 fatty acids 1000 MG Oral Cap Take 1,000 mg by mouth daily.      HYDROcodone-acetaminophen 5-325 MG Oral Tab Take 1 tablet by mouth 2 (two) times daily as needed for Pain.      loratadine 10 MG Oral Tablet Dispersible Take 1 tablet (10 mg total) by mouth daily.      Capsicum, Cayenne, (CAYENNE PEPPER OR) Take 1 tablet by mouth daily.      hydrocortisone 25 MG Rectal Suppos Place 1 suppository (25 mg total) rectally as needed.      Nebulizers (Lesson Prep AEROSOL DELIVERY SYSTEM) Does not apply Misc Take 1 Bottle by mouth As Directed.      albuterol 108 (90 Base) MCG/ACT Inhalation Aero Soln Inhale 2 puffs into the  lungs every 6 (six) hours as needed for Wheezing or Shortness of Breath. 3 each 3    Respiratory Therapy Supplies (NEBULIZER/TUBING/MOUTHPIECE) Does not apply Kit Use as directed 1 each 0    TRELEGY ELLIPTA 100-62.5-25 MCG/ACT Inhalation Aerosol Powder, Breath Activated Inhale 1 puff into the lungs daily. 180 each 3    diclofenac 1 % External Gel Apply 2 g topically 4 (four) times daily. (Patient taking differently: Apply 2 g topically 4 (four) times daily as needed.) 100 g 5    nystatin 100,000 Units/g External Cream Apply topically as needed.      hydrocortisone 2.5 % External Cream Apply 1 each topically 2 (two) times daily as needed (hemmorhoids). 20 g 2    nicotine 21 MG/24HR Transdermal Patch 24 Hr Place 1 patch onto the skin daily. 14 each 0    Vitamin D3, Cholecalciferol, 125 MCG (5000 UT) Oral Cap Take 1 capsule (5,000 Units total) by mouth daily.       No current facility-administered medications for this visit.      Past Medical History:    Abdominal hernia    Anxiety    Arthritis    Back pain    Back problem    spinal stenosis    Bilateral pulmonary embolism (HCC)    Bloating    Cancer (HCC)    lung    COPD (chronic obstructive pulmonary disease) (HCC)    not using oxygen    Diabetes (HCC)    diet controlled    Diabetes mellitus (HCC)    Diarrhea, unspecified    Disorder of thyroid    Easy bruising    Esophageal reflux    Exposure to medical diagnostic radiation    last tx 11/2023    Feeling lonely    Flatulence/gas pain/belching    Food intolerance    Hearing impairment    Hearing loss    Hemorrhoids    High blood pressure    High cholesterol    History of eating disorder    Muscle weakness    Neuropathy    Night sweats    Pain in joints    Pneumonia of both lungs due to infectious organism, unspecified part of lung    Pulmonary emboli (HCC)    Pulmonary embolism (HCC)    RSV (acute bronchiolitis due to respiratory syncytial virus)    Shortness of breath    Stress    Thyroid disease    Uncomfortable  fullness after meals    Visual impairment    glasses    Wears glasses      Social History:  Social History     Socioeconomic History    Marital status:    Tobacco Use    Smoking status: Every Day     Current packs/day: 0.50     Average packs/day: 0.5 packs/day for 63.0 years (31.5 ttl pk-yrs)     Types: Cigarettes     Start date: 2/24/1964    Smokeless tobacco: Never   Vaping Use    Vaping status: Never Used   Substance and Sexual Activity    Alcohol use: Not Currently     Comment: rare    Drug use: Never   Other Topics Concern    Caffeine Concern Yes     Comment: coffee/coca cola/Iced tea    Exercise No     Social Determinants of Health     Financial Resource Strain: Low Risk  (8/15/2023)    Financial Resource Strain     Difficulty of Paying Living Expenses: Not hard at all     Med Affordability: No   Food Insecurity: No Food Insecurity (8/15/2023)    Food Insecurity     Food Insecurity: Never true   Transportation Needs: Unmet Transportation Needs (8/15/2023)    Transportation Needs     Lack of Transportation: Yes   Physical Activity: Insufficiently Active (8/15/2023)    Exercise Vital Sign     Days of Exercise per Week: 2 days     Minutes of Exercise per Session: 30 min   Stress: No Stress Concern Present (8/15/2023)    Stress     Feeling of Stress : No   Social Connections: Socially Integrated (8/15/2023)    Social Connections     Frequency of Socialization with Friends and Family: 2   Housing Stability: Low Risk  (8/15/2023)    Housing Stability     Housing Instability: No         REVIEW OF SYSTEMS:   GENERAL HEALTH: no  fever/chills or fatigue  SKIN: denies any unusual skin lesions or rashes  RESPIRATORY: no shortness of breath with exertion  CARDIOVASCULAR: denies chest pain on exertion and palpitations.  GI: no nausea or vomiting  : as above.  NEURO: denies headaches or dizziness.    EXAM:   /70   Pulse 71   Temp 98 °F (36.7 °C) (Oral)   Resp 16   Ht 5' 4\" (1.626 m)   Wt 155 lb (70.3 kg)    SpO2 97%   BMI 26.61 kg/m²   GENERAL: well developed, well nourished,in no apparent distress, pleasant pt.  SKIN: no rashes,no suspicious lesions. Corner of lips cracked with minimal surrounding erythema.   LUNG: Clear to auscultation bilaterally. No W/R/R.  HEART: RRR, no murmur.  GI: normoactive BS x4, no masses, HSM; no suprapubic tenderness, no CVAT    RESULTS:      Recent Results (from the past 24 hour(s))   Urine Dip, auto without Micro    Collection Time: 08/12/24  4:19 PM   Result Value Ref Range    Glucose Urine Negative Negative mg/dL    Bilirubin Urine Negative Negative    Ketones, UA Negative Negative - Trace mg/dL    Spec Gravity 1.015 1.005 - 1.030    Blood Urine Trace-intact (A) Negative    PH Urine 5.5 5.0 - 8.0    Protein Urine Negative Negative - Trace mg/dL    Urobilinogen Urine 0.2 0.2 - 1.0 mg/dL    Nitrite Urine Negative Negative    Leukocyte Esterase Urine Negative Negative    APPEARANCE Clear Clear    Color Urine Yellow Yellow    Multistix Lot# [id number] Numeric    Multistix Expiration Date 12/31/2024 Date       ASSESSMENT AND PLAN:   Vaginal irritation  Uti symptoms  Angular chelitis    Educated on using vasinline on outside vaginal area where pad rubs  Educated on skin care and changing pads, underwear frequently   Educated on sending urine culture- will start medication IF positive  Educated on supportive measures: ibuprofen/tylenol, hydration,   Educated on s/s of worsening sx and when to seek higher level of care  Follow up with pcp if not improving    There are no Patient Instructions on file for this visit.  The patient indicates understanding of these issues and agrees to the plan.

## 2024-08-21 DIAGNOSIS — F41.9 ANXIETY AND DEPRESSION: ICD-10-CM

## 2024-08-21 DIAGNOSIS — F32.A ANXIETY AND DEPRESSION: ICD-10-CM

## 2024-08-22 RX ORDER — ALPRAZOLAM 0.5 MG
TABLET ORAL
Qty: 45 TABLET | Refills: 2 | Status: SHIPPED | OUTPATIENT
Start: 2024-08-22

## 2024-08-25 DIAGNOSIS — I10 ESSENTIAL HYPERTENSION: ICD-10-CM

## 2024-08-26 RX ORDER — METOPROLOL TARTRATE 50 MG
25 TABLET ORAL 2 TIMES DAILY
Qty: 90 TABLET | Refills: 1 | Status: SHIPPED | OUTPATIENT
Start: 2024-08-26

## 2024-08-30 ENCOUNTER — PATIENT OUTREACH (OUTPATIENT)
Dept: CASE MANAGEMENT | Age: 79
End: 2024-08-30

## 2024-08-30 ENCOUNTER — TELEPHONE (OUTPATIENT)
Dept: FAMILY MEDICINE CLINIC | Facility: CLINIC | Age: 79
End: 2024-08-30

## 2024-08-30 DIAGNOSIS — E11.42 TYPE 2 DIABETES MELLITUS WITH DIABETIC POLYNEUROPATHY, WITHOUT LONG-TERM CURRENT USE OF INSULIN (HCC): Primary | ICD-10-CM

## 2024-08-30 DIAGNOSIS — F33.9 DEPRESSION, RECURRENT (HCC): ICD-10-CM

## 2024-08-30 DIAGNOSIS — J43.9 PULMONARY EMPHYSEMA, UNSPECIFIED EMPHYSEMA TYPE (HCC): ICD-10-CM

## 2024-08-30 DIAGNOSIS — C34.11 CANCER OF BRONCHUS OF RIGHT UPPER LOBE (HCC): ICD-10-CM

## 2024-08-30 DIAGNOSIS — I73.9 PERIPHERAL VASCULAR DISEASE (HCC): ICD-10-CM

## 2024-08-30 RX ORDER — DULOXETIN HYDROCHLORIDE 30 MG/1
30 CAPSULE, DELAYED RELEASE ORAL DAILY
COMMUNITY
Start: 2024-06-01

## 2024-08-30 NOTE — PROGRESS NOTES
Spoke to Gabriela for Chronic Care Management.      Updates to patient care team/comments: UTD   Patient reported changes in medications: reports changes   Med Adherence  Comment: reports adherence     Health Maintenance:   Health Maintenance   Topic Date Due    Zoster Vaccines (1 of 2) Never done    DEXA Scan  Never done    COVID-19 Vaccine (2 - 2023-24 season) 09/01/2023    Diabetes Care Dilated Eye Exam  04/26/2024    Diabetes Care A1C  06/06/2024    Diabetes Care Foot Exam  07/26/2024    Influenza Vaccine (1) 10/01/2024    Diabetes Care: Microalb/Creat Ratio  03/15/2025    Diabetes Care: GFR  03/27/2025    MA Annual Health Assessment  Completed    Annual Depression Screening  Completed    Fall Risk Screening (Annual)  Completed    Tobacco Cessation Counseling  Completed    Pneumococcal Vaccine: 65+ Years  Completed       Patient updates/concerns:   Spoke with patient.  Patient relates doing ok. Mood good/stable  Currently has a gallstone attack. Was scheduled to have gallbladder removed but had to reschedule. Currently stable. Controls the pain by not eating. TE sent to PCP. Denies any recent falls. B/P and pulse stable. Followed by cardiology. Currently on a blood thinner. Followed up with pulm.yesterday. sinus and allergies flared up. Encouraged to quit smoking.  CT scheduled for fall. Denies any urinary symptoms. Eating a low fat diet. No other questions or concerns.    Encouraged patient:   Self care: Take the time to do the things you love.   Nutrition:  Good nutrition helps us to maintain our weight, fight off infection, and help reduce the risk of developing other chronic issues.   Physical activity:  Physical activity is important to help maintain independence and improve quality of life.       Goals/Action Plan:    Active goal from previous outreach: Staying healthy, maintain independence, and stability.     Patient reported progress towards goals: progressing                - What: continues to follow  up on health conditions            - Where/When/How: seeing specialist and PCP  Patient Reported Barriers and Concerns: as per above                    - Plan for overcoming barriers: encouraged patient to call with any questions or concerns     Care Managers Interventions: Continue to provide encouragement and education for healthy coping and diagnosis.      Future Appointments:   Future Appointments   Date Time Provider Department Center   9/4/2024  1:20 PM August Coley MD [facility]   9/30/2024 11:00 AM Osiris Castro MD [id number]         Next Care Manager Follow Up Date: 1 month     Reason For Follow Up: review progress and or barriers towards patient's goals.     Time Spent This Encounter Total: 21 min medical record review, telephone communication, care plan updates where needed, education, goals, and action plan recreation/update. Provided acknowledgment and validation to patient's concerns.   Monthly Minute Total including today: 21 min   Physical assessment, complete health history, and need for CCM established by August Coley MD.

## 2024-08-30 NOTE — TELEPHONE ENCOUNTER
Spoke to patient for monthly CCM.    Patient reports gallstone pain with loose stools. States she gets relief by not eating for days at a time. Was supposed to have removed in spring but cardiac complications pushed it off. Has to have another consult and is scheduled for October. Wants to know if any recommendations for helping or if consult can be moved up.     Please contact patient to advise    Thank you

## 2024-09-03 NOTE — TELEPHONE ENCOUNTER
Called pt and was informed to keep scheduled appt with PCP on 09/04. Advised pt to drink ensure and eat a BRAT diet. Questions answered and pt verbalized understanding.

## 2024-09-04 ENCOUNTER — LAB ENCOUNTER (OUTPATIENT)
Dept: LAB | Age: 79
End: 2024-09-04
Attending: FAMILY MEDICINE
Payer: MEDICARE

## 2024-09-04 ENCOUNTER — OFFICE VISIT (OUTPATIENT)
Dept: FAMILY MEDICINE CLINIC | Facility: CLINIC | Age: 79
End: 2024-09-04
Payer: MEDICARE

## 2024-09-04 VITALS
HEIGHT: 64 IN | DIASTOLIC BLOOD PRESSURE: 82 MMHG | OXYGEN SATURATION: 93 % | HEART RATE: 95 BPM | WEIGHT: 149 LBS | SYSTOLIC BLOOD PRESSURE: 120 MMHG | BODY MASS INDEX: 25.44 KG/M2

## 2024-09-04 DIAGNOSIS — K80.50 BILIARY COLIC: Primary | ICD-10-CM

## 2024-09-04 DIAGNOSIS — E03.9 HYPOTHYROIDISM, UNSPECIFIED TYPE: ICD-10-CM

## 2024-09-04 DIAGNOSIS — J43.9 PULMONARY EMPHYSEMA, UNSPECIFIED EMPHYSEMA TYPE (HCC): ICD-10-CM

## 2024-09-04 DIAGNOSIS — E11.42 TYPE 2 DIABETES MELLITUS WITH DIABETIC POLYNEUROPATHY, WITHOUT LONG-TERM CURRENT USE OF INSULIN (HCC): ICD-10-CM

## 2024-09-04 DIAGNOSIS — F41.9 ANXIETY DISORDER, UNSPECIFIED TYPE: ICD-10-CM

## 2024-09-04 DIAGNOSIS — N18.31 STAGE 3A CHRONIC KIDNEY DISEASE (HCC): ICD-10-CM

## 2024-09-04 DIAGNOSIS — I25.10 CORONARY ARTERY DISEASE INVOLVING NATIVE CORONARY ARTERY OF NATIVE HEART WITHOUT ANGINA PECTORIS: ICD-10-CM

## 2024-09-04 DIAGNOSIS — C34.11 CANCER OF BRONCHUS OF RIGHT UPPER LOBE (HCC): ICD-10-CM

## 2024-09-04 LAB
ALBUMIN SERPL-MCNC: 4 G/DL (ref 3.2–4.8)
ALBUMIN/GLOB SERPL: 1.4 {RATIO} (ref 1–2)
ALP LIVER SERPL-CCNC: 68 U/L
ALT SERPL-CCNC: <7 U/L
ANION GAP SERPL CALC-SCNC: 6 MMOL/L (ref 0–18)
AST SERPL-CCNC: 13 U/L (ref ?–34)
BILIRUB SERPL-MCNC: 0.2 MG/DL (ref 0.2–1.1)
BUN BLD-MCNC: 14 MG/DL (ref 9–23)
CALCIUM BLD-MCNC: 9.7 MG/DL (ref 8.7–10.4)
CHLORIDE SERPL-SCNC: 106 MMOL/L (ref 98–112)
CHOLEST SERPL-MCNC: 242 MG/DL (ref ?–200)
CO2 SERPL-SCNC: 28 MMOL/L (ref 21–32)
CREAT BLD-MCNC: 1.17 MG/DL
EGFRCR SERPLBLD CKD-EPI 2021: 47 ML/MIN/1.73M2 (ref 60–?)
EST. AVERAGE GLUCOSE BLD GHB EST-MCNC: 146 MG/DL (ref 68–126)
FASTING PATIENT LIPID ANSWER: YES
FASTING STATUS PATIENT QL REPORTED: YES
GLOBULIN PLAS-MCNC: 2.9 G/DL (ref 2–3.5)
GLUCOSE BLD-MCNC: 124 MG/DL (ref 70–99)
HBA1C MFR BLD: 6.7 % (ref ?–5.7)
HDLC SERPL-MCNC: 60 MG/DL (ref 40–59)
LDLC SERPL CALC-MCNC: 153 MG/DL (ref ?–100)
NONHDLC SERPL-MCNC: 182 MG/DL (ref ?–130)
OSMOLALITY SERPL CALC.SUM OF ELEC: 292 MOSM/KG (ref 275–295)
POTASSIUM SERPL-SCNC: 4 MMOL/L (ref 3.5–5.1)
PROT SERPL-MCNC: 6.9 G/DL (ref 5.7–8.2)
SODIUM SERPL-SCNC: 140 MMOL/L (ref 136–145)
T4 FREE SERPL-MCNC: 1.7 NG/DL (ref 0.8–1.7)
TRIGL SERPL-MCNC: 160 MG/DL (ref 30–149)
TSI SER-ACNC: 0.05 MIU/ML (ref 0.55–4.78)
VLDLC SERPL CALC-MCNC: 31 MG/DL (ref 0–30)

## 2024-09-04 PROCEDURE — 80053 COMPREHEN METABOLIC PANEL: CPT

## 2024-09-04 PROCEDURE — 36415 COLL VENOUS BLD VENIPUNCTURE: CPT

## 2024-09-04 PROCEDURE — 3079F DIAST BP 80-89 MM HG: CPT | Performed by: FAMILY MEDICINE

## 2024-09-04 PROCEDURE — 99214 OFFICE O/P EST MOD 30 MIN: CPT | Performed by: FAMILY MEDICINE

## 2024-09-04 PROCEDURE — 83036 HEMOGLOBIN GLYCOSYLATED A1C: CPT

## 2024-09-04 PROCEDURE — 80061 LIPID PANEL: CPT

## 2024-09-04 PROCEDURE — G2211 COMPLEX E/M VISIT ADD ON: HCPCS | Performed by: FAMILY MEDICINE

## 2024-09-04 PROCEDURE — 84443 ASSAY THYROID STIM HORMONE: CPT

## 2024-09-04 PROCEDURE — 3074F SYST BP LT 130 MM HG: CPT | Performed by: FAMILY MEDICINE

## 2024-09-04 PROCEDURE — 84481 FREE ASSAY (FT-3): CPT

## 2024-09-04 PROCEDURE — 3008F BODY MASS INDEX DOCD: CPT | Performed by: FAMILY MEDICINE

## 2024-09-04 PROCEDURE — 84439 ASSAY OF FREE THYROXINE: CPT

## 2024-09-04 RX ORDER — BENZONATATE 200 MG/1
200 CAPSULE ORAL 3 TIMES DAILY PRN
Qty: 30 CAPSULE | Refills: 2 | Status: SHIPPED | OUTPATIENT
Start: 2024-09-04

## 2024-09-04 RX ORDER — PRAVASTATIN SODIUM 10 MG
10 TABLET ORAL NIGHTLY
Qty: 90 TABLET | Refills: 1 | Status: SHIPPED | OUTPATIENT
Start: 2024-09-04

## 2024-09-04 NOTE — PROGRESS NOTES
[facility] Office Note  Chief Complaint:   Chief Complaint   Patient presents with    Follow - Up     Patient is here because she is having gal bladder issues  Also has had a nasty cough for 4 weeks no other symptoms but a cough with fleam        HPI:   This is a 79 year old female coming in for follow up    Biliary colic - has been increasingly symptomatic. She was to get scheduled laparscopic cholecystectomy on 04/03 however it was cancelled d/t recent cardiac cath w/ PCI w/ MOO to RCA. She was advised to weight 3 months before surgery however she never rescheduled this. She has been having increasing RUQ abd pain, gnawing quality. No fevers, chills, color changes.     COPD/cancer of bronchus - following with pulmonology. S/p empiric RT 11/2023. Has upcoming repeat CT. Reports ongoing productive cough. No fevers. No hemoptysis. No wheezing/dyspnea.     T2DM - due for labs today. Has been diet controlled.     Hypothyroidism - due for labs. Compliant with levothyroxine.     Past Medical History:    Abdominal hernia    Anxiety    Arthritis    Back pain    Back problem    spinal stenosis    Bilateral pulmonary embolism (HCC)    Bloating    Cancer (HCC)    LUNG    COPD (chronic obstructive pulmonary disease) (HCC)    NO HOME O2    Coronary atherosclerosis    STENT X1    Diabetes (HCC)    DIET CONTROLLED    Diabetes mellitus (HCC)    Diarrhea, unspecified    Disorder of thyroid    Easy bruising    Esophageal reflux    Exposure to medical diagnostic radiation    last tx 11/2023    Feeling lonely    Flatulence/gas pain/belching    Food intolerance    Hearing impairment    PT DENIES, NO AIDS    Hearing loss    Hemorrhoids    High blood pressure    High cholesterol    History of blood transfusion    ABOUT 20 YEARS AGO, NO ADVERSE REACTION    History of eating disorder    Muscle weakness    Neuropathy    Night sweats    Osteoarthritis    Pain in joints    Pneumonia of both lungs due to infectious  organism, unspecified part of lung    Pulmonary emboli (HCC)    Pulmonary embolism (HCC)    Renal disorder    CKD RECONCILLED FROM OUTSIDE PROBLEM LIST    RSV (acute bronchiolitis due to respiratory syncytial virus)    Shortness of breath    Stress    Thyroid disease    Uncomfortable fullness after meals    Visual impairment    GLASSES    Wears glasses     Past Surgical History:   Procedure Laterality Date    Amputation finger/thumb+flaps      Colectomy      Colonoscopy      Colonoscopy N/A 1/15/2024    Procedure: COLONOSCOPY;  Surgeon: Tirso Kimball MD;  Location:  ENDOSCOPY    Hysterectomy       Social History:  Social History     Socioeconomic History    Marital status:    Tobacco Use    Smoking status: Every Day     Current packs/day: 0.50     Average packs/day: 0.5 packs/day for 63.1 years (31.6 ttl pk-yrs)     Types: Cigarettes     Start date: 2/24/1964    Smokeless tobacco: Never   Vaping Use    Vaping status: Never Used   Substance and Sexual Activity    Alcohol use: Not Currently     Comment: RARE    Drug use: Never   Other Topics Concern    Caffeine Concern Yes     Comment: coffee/coca cola/Iced tea    Exercise No     Social Drivers of Health     Financial Resource Strain: Low Risk  (8/15/2023)    Financial Resource Strain     Difficulty of Paying Living Expenses: Not hard at all     Med Affordability: No   Food Insecurity: No Food Insecurity (8/15/2023)    Food Insecurity     Food Insecurity: Never true   Transportation Needs: Unmet Transportation Needs (8/15/2023)    Transportation Needs     Lack of Transportation: Yes   Physical Activity: Insufficiently Active (8/15/2023)    Exercise Vital Sign     Days of Exercise per Week: 2 days     Minutes of Exercise per Session: 30 min   Stress: No Stress Concern Present (8/15/2023)    Stress     Feeling of Stress : No   Social Connections: Socially Integrated (8/15/2023)    Social Connections     Frequency of Socialization with Friends and Family: 2    Housing Stability: Low Risk  (8/15/2023)    Housing Stability     Housing Instability: No     Family History:  Family History   Problem Relation Age of Onset    Heart Disorder Father     Stroke Father     Uterine Cancer Mother     Diabetes Mother     Cancer Mother         colon    Heart Attack Daughter     Diabetes Daughter     Colon Polyps Daughter     Colon Polyps Son      Allergies:  Allergies   Allergen Reactions    Rosuvastatin MYALGIA    Cephalosporins NAUSEA ONLY     NAUSEA ONLY WHEN TAKEN ON AN EMPTY STOMACH, OTHERWISE CAN TAKE    Keflex [Cephalexin] RASH     Current Meds:  Current Outpatient Medications   Medication Sig Dispense Refill    benzonatate 200 MG Oral Cap Take 1 capsule (200 mg total) by mouth 3 (three) times daily as needed for cough. 30 capsule 2    pravastatin 10 MG Oral Tab Take 1 tablet (10 mg total) by mouth nightly. 90 tablet 1    ESOMEPRAZOLE MAGNESIUM 20 MG Oral Capsule Delayed Release TAKE 1 CAPSULE BY MOUTH EVERY DAY IN THE MORNING BEFORE BREAKFAST 90 capsule 1    METOPROLOL TARTRATE 50 MG Oral Tab TAKE 0.5 TABLETS BY MOUTH 2 TIMES DAILY. 90 tablet 1    ALPRAZOLAM 0.5 MG Oral Tab TAKE 1 TABLET BY MOUTH NIGHTLY SCHEDULED AND TAKE 1/2 TABLET BY MOUTH DAILY AS NEEDED FOR ANXIETY. 45 tablet 2    DICYCLOMINE 20 MG Oral Tab TAKE 1 TABLET BY MOUTH THREE TIMES A  tablet 0    GABAPENTIN 100 MG Oral Cap TAKE 1 CAPSULE BY MOUTH IN THE MORNING, 1-2 CAPSULES BY MOUTH IN THE AFTERNOON, AND 3 CAPSULES BY MOUTH IN THE EVENING DAILY (Patient taking differently: Take 3 capsules (300 mg total) by mouth nightly.) 180 capsule 5    ASPIRIN LOW DOSE 81 MG Oral Tab EC Take 1 tablet (81 mg total) by mouth daily.      estradiol 0.1 MG/GM Vaginal Cream Place 1 g vaginally daily. 42.5 g 5    furosemide 20 MG Oral Tab Take 1 tablet (20 mg total) by mouth 2 (two) times daily. 180 tablet 1    MONTELUKAST 10 MG Oral Tab TAKE 1 TABLET BY MOUTH EVERY DAY AT NIGHT 90 tablet 3    ticagrelor 90 MG Oral Tab Take 1  tablet (90 mg total) by mouth every 12 (twelve) hours.      Potassium Chloride ER 20 MEQ Oral Tab CR Take 1 tablet by mouth daily. 90 tablet 1    ACYCLOVIR 400 MG Oral Tab TAKE 1 TABLET BY MOUTH TWICE A  tablet 0    cilostazol 50 MG Oral Tab Take 1 tablet (50 mg total) by mouth 2 (two) times daily. 180 tablet 1    B Complex Vitamins (B COMPLEX-B12 OR) Take 1 tablet by mouth daily.      Turmeric 400 MG Oral Cap Take 1 capsule by mouth daily.      Ascorbic Acid (VITAMIN C) 1000 MG Oral Tab Take 1 tablet (1,000 mg total) by mouth daily.      omega-3 fatty acids 1000 MG Oral Cap Take 1,000 mg by mouth daily.      loratadine 10 MG Oral Tablet Dispersible Take 1 tablet (10 mg total) by mouth daily.      Capsicum, Cayenne, (CAYENNE PEPPER OR) Take 1 tablet by mouth daily.      Nebulizers ([employer] AEROSOL DELIVERY SYSTEM) Does not apply Misc Take 1 Bottle by mouth As Directed.      albuterol 108 (90 Base) MCG/ACT Inhalation Aero Soln Inhale 2 puffs into the lungs every 6 (six) hours as needed for Wheezing or Shortness of Breath. 3 each 3    Respiratory Therapy Supplies (NEBULIZER/TUBING/MOUTHPIECE) Does not apply Kit Use as directed 1 each 0    TRELEGY ELLIPTA 100-62.5-25 MCG/ACT Inhalation Aerosol Powder, Breath Activated Inhale 1 puff into the lungs daily. 180 each 3    diclofenac 1 % External Gel Apply 2 g topically 4 (four) times daily. 100 g 5    Vitamin D3, Cholecalciferol, 125 MCG (5000 UT) Oral Cap Take 1 capsule (5,000 Units total) by mouth daily.      nystatin 100,000 Units/g External Cream Apply 1 Application topically as needed. 30 g 0    sertraline (ZOLOFT) 25 MG Oral Tab Take 1 tablet (25 mg total) by mouth daily. 30 tablet 0    methylPREDNISolone (MEDROL) 4 MG Oral Tablet Therapy Pack As directed. 21 each 0    CLOTRIMAZOLE 1 % External Cream APPLY 1 APPLICATION TOPICALLY 2 (TWO) TIMES DAILY FOR 14 DAYS 60 g 0    losartan 25 MG Oral Tab Take 1 tablet (25 mg total) by mouth daily. 90 tablet 1     HYDROcodone-acetaminophen 5-325 MG Oral Tab Take 1 tablet by mouth 2 (two) times daily as needed for Pain.      ELMIRON 100 MG Oral Cap TAKE 1 CAPSULE BY MOUTH EVERY DAY 90 capsule 1    levothyroxine (SYNTHROID) 75 MCG Oral Tab Take 1 tablet (75 mcg total) by mouth daily. 30 tablet 3      Ready to quit: Not Answered  Counseling given: Not Answered       REVIEW OF SYSTEMS:   Review of Systems   A comprehensive 10 point review of systems was completed.  Pertinent positives and negatives noted in the the HPI.    EXAM:   /82   Pulse 95   Ht 5' 4\" (1.626 m)   Wt 149 lb (67.6 kg)   SpO2 93%   BMI 25.58 kg/m²  Estimated body mass index is 25.58 kg/m² as calculated from the following:    Height as of this encounter: 5' 4\" (1.626 m).    Weight as of this encounter: 149 lb (67.6 kg).   Vital signs reviewed.Appears stated age, well groomed.  Physical Exam  Constitutional:       Appearance: Normal appearance.   HENT:      Head: Normocephalic and atraumatic.   Eyes:      Pupils: Pupils are equal, round, and reactive to light.   Cardiovascular:      Rate and Rhythm: Normal rate and regular rhythm.      Pulses: Normal pulses.      Heart sounds: Normal heart sounds. No murmur heard.  Pulmonary:      Effort: Pulmonary effort is normal. No respiratory distress.      Breath sounds: Rhonchi present. No wheezing.   Abdominal:      General: Abdomen is flat. Bowel sounds are normal. There is no distension.      Palpations: Abdomen is soft. There is no mass.      Tenderness: There is no abdominal tenderness.      Hernia: No hernia is present.   Musculoskeletal:      Right lower leg: No edema.      Left lower leg: No edema.   Skin:     Findings: No rash.   Neurological:      General: No focal deficit present.      Mental Status: She is alert and oriented to person, place, and time.          ASSESSMENT AND PLAN:   1. Biliary colic  F/u with surgery.     2. Type 2 diabetes mellitus with diabetic polyneuropathy, without long-term  current use of insulin (HCC)  Due for labs. Has been diet controlled, CPM. Due for retinopathy exam.   - Diabetic Retinopathy Exam  OU - Both Eyes; Future  - Hemoglobin A1C; Future  - Comp Metabolic Panel (14); Future  - Lipid Panel; Future    3. Pulmonary emphysema, unspecified emphysema type (HCC)  Will start on tessalon perles to help with cough. Does not appear to be a COPD exacerbation or respiratory infection but advised to monitor symptoms closely. Continue inhaler regiment. F/u pulmonology. F/u 1wk PRN.   - benzonatate 200 MG Oral Cap; Take 1 capsule (200 mg total) by mouth 3 (three) times daily as needed for cough.  Dispense: 30 capsule; Refill: 2    4. Hypothyroidism, unspecified type  Due for TSH. CPM.   - TSH W Reflex To Free T4; Future    5. Cancer of bronchus of right upper lobe (HCC)  Following with pulmonology, oncology. Has f/u CT in 02/2025.     6. Coronary artery disease involving native coronary artery of native heart without angina pectoris  Stable, no anginal sx. CPM  - pravastatin 10 MG Oral Tab; Take 1 tablet (10 mg total) by mouth nightly.  Dispense: 90 tablet; Refill: 1    7. Stage 3a chronic kidney disease (HCC)  Stable, due for labs. Continue to stay well hydrated, avoid nephrotoxic meds.   - Comp Metabolic Panel (14); Future    8. Anxiety disorder, unspecified type  Stable, CPM.       Meds & Refills for this Visit:  Requested Prescriptions     Signed Prescriptions Disp Refills    benzonatate 200 MG Oral Cap 30 capsule 2     Sig: Take 1 capsule (200 mg total) by mouth 3 (three) times daily as needed for cough.    pravastatin 10 MG Oral Tab 90 tablet 1     Sig: Take 1 tablet (10 mg total) by mouth nightly.       Health Maintenance:  Health Maintenance Due   Topic Date Due    Zoster Vaccines (1 of 2) Never done    DEXA Scan  Never done    Diabetes Care Dilated Eye Exam  04/26/2024    Diabetes Care A1C  06/06/2024    Diabetes Care Foot Exam  07/26/2024    COVID-19 Vaccine (2 - 2023-24  season) 09/01/2024       Patient/Caregiver Education: Patient/Caregiver Education: There are no barriers to learning. Medical education done.   Outcome: Patient verbalizes understanding. Patient is notified to call with any questions, complications, allergies, or worsening or changing symptoms.  Patient is to call with any side effects or complications from the treatments as a result of today.     Problem List:  Patient Active Problem List   Diagnosis    Pulmonary emphysema (HCC)    Type 2 diabetes mellitus with diabetic polyneuropathy, without long-term current use of insulin (HCC)    Chronic midline low back pain without sciatica    Nicotine dependence    Depression, recurrent (HCC)    Hypothyroidism    Liver cyst    Renal cyst    Splenic cyst    Neuropathy    Stage 3a chronic kidney disease (HCC)    Primary hypertension    Clostridioides difficile carrier    Adenomatous polyp    Ampullary adenoma    Atrophic vaginitis    Chronic interstitial cystitis    Dyslipidemia    Grade IV hemorrhoids    Hearing loss    IBS (irritable bowel syndrome)    Nonallergic rhinitis    Ptosis of right eyelid    Regular astigmatism of both eyes    Tinnitus    Vitreous membrane    Anxiety disorder    Chronic midline low back pain with bilateral sciatica    Fibromyalgia    Peripheral vascular disease (HCC)    Cancer of bronchus of right upper lobe (HCC)    Coronary artery disease involving native coronary artery of native heart without angina pectoris    Mixed hyperlipidemia

## 2024-09-04 NOTE — PATIENT INSTRUCTIONS
Can try coenzyme q10 if having muscle aches with pravastatin.  Follow up with surgery  Complete blood work  Complete diabetic eye exam.   Ask cardiologist about if you can take Cilostazol (for your peripheral vascular disease)?

## 2024-09-05 LAB — T3FREE SERPL-MCNC: 3.19 PG/ML (ref 2.4–4.2)

## 2024-09-09 ENCOUNTER — OFFICE VISIT (OUTPATIENT)
Facility: LOCATION | Age: 79
End: 2024-09-09
Payer: MEDICARE

## 2024-09-09 ENCOUNTER — TELEPHONE (OUTPATIENT)
Dept: FAMILY MEDICINE CLINIC | Facility: CLINIC | Age: 79
End: 2024-09-09

## 2024-09-09 VITALS — TEMPERATURE: 97 F | HEART RATE: 63 BPM | OXYGEN SATURATION: 95 %

## 2024-09-09 DIAGNOSIS — E78.5 DYSLIPIDEMIA: Primary | ICD-10-CM

## 2024-09-09 DIAGNOSIS — K80.10 CALCULUS OF GALLBLADDER WITH CHOLECYSTITIS WITHOUT BILIARY OBSTRUCTION, UNSPECIFIED CHOLECYSTITIS ACUITY: Primary | ICD-10-CM

## 2024-09-09 DIAGNOSIS — Z76.0 ENCOUNTER FOR MEDICATION REFILL: ICD-10-CM

## 2024-09-09 DIAGNOSIS — E11.42 TYPE 2 DIABETES MELLITUS WITH DIABETIC POLYNEUROPATHY, WITHOUT LONG-TERM CURRENT USE OF INSULIN (HCC): ICD-10-CM

## 2024-09-09 DIAGNOSIS — E03.9 HYPOTHYROIDISM, UNSPECIFIED TYPE: ICD-10-CM

## 2024-09-09 RX ORDER — LEVOTHYROXINE SODIUM 75 UG/1
75 TABLET ORAL DAILY
Qty: 30 TABLET | Refills: 3 | Status: SHIPPED | OUTPATIENT
Start: 2024-09-09

## 2024-09-09 NOTE — TELEPHONE ENCOUNTER
----- Message from August Coley sent at 9/9/2024  4:02 PM CDT -----  1. TSH remains low - can go down to levothyroxine 75mcg daily. Stop levothyroxine 88mcg. Repeat TSH in 3mo.   2. DM controlled - continue present mgmt. Continue diabetic diet, regular activity. Due for diabetic ey eexam.   3. Hyperlipidemia - has not been compliant with statin, recently RX for pravastatin sent. Advised to stay consistent if tolerating well. Follow low fat diet. Recheck lipids/cmp/A1c in 3mo

## 2024-09-09 NOTE — TELEPHONE ENCOUNTER
Spoke to patient with results and instructions and patient verbalized understanding.  Labs in system     Patient also states that for the past 4-5 months she has had hand shakes and she is wanting to know if that could be from the thyroid being low?

## 2024-09-09 NOTE — PROGRESS NOTES
Follow Up Visit Note       Active Problems      1. Calculus of gallbladder with cholecystitis without biliary obstruction, unspecified cholecystitis acuity          Chief Complaint   Chief Complaint   Patient presents with    Abdominal Pain     EP - Worsening gallbladder symptoms       History of Present Illness  This is a very nice 79-year-old female with multiple medical problems including but not limited to coronary artery disease status post recent coronary stent placement in June of this year and COPD who returns for follow-up today at the request of her primary care physician, Dr. Coley, with concern for increasingly symptomatic cholelithiasis.    I actually met the patient on 3/21/2024 when she presented to me with right upper quadrant pain, nausea and poor appetite.  She had an ultrasound performed in the emergency room on 3/19/2024 which revealed sludge and stones in the gallbladder including sludge and a mobile stone in the gallbladder neck. Negative sonographic Pitts sign and no thickening of the gallbladder wall or pericholecystic fluid.  I recommended scheduling an elective cholecystectomy pending cardiology clearance.  Patient ended up having a positive stress test and ultimately underwent cardiac catheterization with coronary artery stent placement in June of this year.  She is currently on aspirin and Brilinta.  Her cardiologist is Dr. Jana Connell with [facility] cardiology.    Patient returns to follow-up with her son.  Unfortunately, the patient's daughter recently passed away yesterday.  Patient continues to complain of near constant right upper quadrant abdominal pain that radiates to her back.  The pain is associated with nausea.  Patient denies any fevers, jaundice or vomiting.  Patient does have chronic diarrhea.  Pain is worse with eating and patient states she cannot eat well due to the pain.  She has lost around 8 pounds lately.  Overall, patient feels her symptoms are quite debilitating and  life-limiting at this point.    She has had a recent colonoscopy with Dr. Kimball on 1/15/2024.  This revealed a descending colon tubular adenoma.  Random biopsies of the terminal ileum and colon were negative for pathologic change.  Patient has had multiple abdominal surgeries including a partial hysterectomy and oophorectomy and colectomy for what sounds like perforated diverticulitis around 15 years ago.      Allergies  Gabriela is allergic to rosuvastatin, cephalosporins, and keflex [cephalexin].    Past Medical / Surgical / Social / Family History    The past medical and past surgical history have been reviewed by me today.    Past Medical History:    Abdominal hernia    Anxiety    Arthritis    Back pain    Back problem    spinal stenosis    Bilateral pulmonary embolism (HCC)    Bloating    Cancer (HCC)    lung    COPD (chronic obstructive pulmonary disease) (HCC)    not using oxygen    Diabetes (HCC)    diet controlled    Diabetes mellitus (HCC)    Diarrhea, unspecified    Disorder of thyroid    Easy bruising    Esophageal reflux    Exposure to medical diagnostic radiation    last tx 11/2023    Feeling lonely    Flatulence/gas pain/belching    Food intolerance    Hearing impairment    Hearing loss    Hemorrhoids    High blood pressure    High cholesterol    History of eating disorder    Muscle weakness    Neuropathy    Night sweats    Pain in joints    Pneumonia of both lungs due to infectious organism, unspecified part of lung    Pulmonary emboli (HCC)    Pulmonary embolism (HCC)    RSV (acute bronchiolitis due to respiratory syncytial virus)    Shortness of breath    Stress    Thyroid disease    Uncomfortable fullness after meals    Visual impairment    glasses    Wears glasses     Past Surgical History:   Procedure Laterality Date    Amputation finger/thumb+flaps      Colectomy      Colonoscopy      Colonoscopy N/A 1/15/2024    Procedure: COLONOSCOPY;  Surgeon: Tirso Kimball MD;  Location:  ENDOSCOPY     Hysterectomy         The family history and social history have been reviewed by me today.    Family History   Problem Relation Age of Onset    Heart Disorder Father     Stroke Father     Uterine Cancer Mother     Diabetes Mother     Cancer Mother         colon    Heart Attack Daughter     Diabetes Daughter     Colon Polyps Daughter     Colon Polyps Son      Social History     Socioeconomic History    Marital status:    Tobacco Use    Smoking status: Every Day     Current packs/day: 0.50     Average packs/day: 0.5 packs/day for 63.0 years (31.5 ttl pk-yrs)     Types: Cigarettes     Start date: 2/24/1964    Smokeless tobacco: Never   Vaping Use    Vaping status: Never Used   Substance and Sexual Activity    Alcohol use: Not Currently     Comment: rare    Drug use: Never   Other Topics Concern    Caffeine Concern Yes     Comment: coffee/coca cola/Iced tea    Exercise No        Current Outpatient Medications:     benzonatate 200 MG Oral Cap, Take 1 capsule (200 mg total) by mouth 3 (three) times daily as needed for cough., Disp: 30 capsule, Rfl: 2    pravastatin 10 MG Oral Tab, Take 1 tablet (10 mg total) by mouth nightly., Disp: 90 tablet, Rfl: 1    DULoxetine 30 MG Oral Cap DR Particles, Take 1 capsule (30 mg total) by mouth daily., Disp: , Rfl:     ESOMEPRAZOLE MAGNESIUM 20 MG Oral Capsule Delayed Release, TAKE 1 CAPSULE BY MOUTH EVERY DAY IN THE MORNING BEFORE BREAKFAST, Disp: 90 capsule, Rfl: 1    METOPROLOL TARTRATE 50 MG Oral Tab, TAKE 0.5 TABLETS BY MOUTH 2 TIMES DAILY., Disp: 90 tablet, Rfl: 1    ALPRAZOLAM 0.5 MG Oral Tab, TAKE 1 TABLET BY MOUTH NIGHTLY SCHEDULED AND TAKE 1/2 TABLET BY MOUTH DAILY AS NEEDED FOR ANXIETY., Disp: 45 tablet, Rfl: 2    DICYCLOMINE 20 MG Oral Tab, TAKE 1 TABLET BY MOUTH THREE TIMES A DAY, Disp: 270 tablet, Rfl: 0    GABAPENTIN 100 MG Oral Cap, TAKE 1 CAPSULE BY MOUTH IN THE MORNING, 1-2 CAPSULES BY MOUTH IN THE AFTERNOON, AND 3 CAPSULES BY MOUTH IN THE EVENING DAILY, Disp:  180 capsule, Rfl: 5    ASPIRIN LOW DOSE 81 MG Oral Tab EC, Take 1 tablet (81 mg total) by mouth daily., Disp: , Rfl:     estradiol 0.1 MG/GM Vaginal Cream, Place 1 g vaginally daily., Disp: 42.5 g, Rfl: 5    furosemide 20 MG Oral Tab, Take 1 tablet (20 mg total) by mouth 2 (two) times daily., Disp: 180 tablet, Rfl: 1    MONTELUKAST 10 MG Oral Tab, TAKE 1 TABLET BY MOUTH EVERY DAY AT NIGHT, Disp: 90 tablet, Rfl: 3    LOSARTAN 25 MG Oral Tab, TAKE 1 TABLET (25 MG TOTAL) BY MOUTH DAILY., Disp: 90 tablet, Rfl: 0    ticagrelor 90 MG Oral Tab, Take 1 tablet (90 mg total) by mouth every 12 (twelve) hours., Disp: , Rfl:     busPIRone 10 MG Oral Tab, Take 1 tablet (10 mg total) by mouth 2 (two) times daily., Disp: 180 tablet, Rfl: 1    Potassium Chloride ER 20 MEQ Oral Tab CR, Take 1 tablet by mouth daily., Disp: 90 tablet, Rfl: 1    ACYCLOVIR 400 MG Oral Tab, TAKE 1 TABLET BY MOUTH TWICE A DAY, Disp: 180 tablet, Rfl: 0    cilostazol 50 MG Oral Tab, Take 1 tablet (50 mg total) by mouth 2 (two) times daily., Disp: 180 tablet, Rfl: 1    ELMIRON 100 MG Oral Cap, Take 1 capsule (100 mg total) by mouth daily., Disp: 90 capsule, Rfl: 1    levothyroxine (SYNTHROID) 88 MCG Oral Tab, Take 1 tablet (88 mcg total) by mouth every other day., Disp: 45 tablet, Rfl: 3    levothyroxine (SYNTHROID) 75 MCG Oral Tab, Take 1 tablet (75 mcg total) by mouth every other day., Disp: 45 tablet, Rfl: 3    B Complex Vitamins (B COMPLEX-B12 OR), Take 1 tablet by mouth daily., Disp: , Rfl:     Turmeric 400 MG Oral Cap, Take 1 capsule by mouth daily., Disp: , Rfl:     Ascorbic Acid (VITAMIN C) 1000 MG Oral Tab, Take 1 tablet (1,000 mg total) by mouth daily., Disp: , Rfl:     omega-3 fatty acids 1000 MG Oral Cap, Take 1,000 mg by mouth daily., Disp: , Rfl:     loratadine 10 MG Oral Tablet Dispersible, Take 1 tablet (10 mg total) by mouth daily., Disp: , Rfl:     Capsicum, Cayenne, (CAYENNE PEPPER OR), Take 1 tablet by mouth daily., Disp: , Rfl:      Nebulizers (VIOS AEROSOL DELIVERY SYSTEM) Does not apply Misc, Take 1 Bottle by mouth As Directed., Disp: , Rfl:     albuterol 108 (90 Base) MCG/ACT Inhalation Aero Soln, Inhale 2 puffs into the lungs every 6 (six) hours as needed for Wheezing or Shortness of Breath., Disp: 3 each, Rfl: 3    Respiratory Therapy Supplies (NEBULIZER/TUBING/MOUTHPIECE) Does not apply Kit, Use as directed, Disp: 1 each, Rfl: 0    TRELEGY ELLIPTA 100-62.5-25 MCG/ACT Inhalation Aerosol Powder, Breath Activated, Inhale 1 puff into the lungs daily., Disp: 180 each, Rfl: 3    diclofenac 1 % External Gel, Apply 2 g topically 4 (four) times daily. (Patient taking differently: Apply 2 g topically 4 (four) times daily as needed.), Disp: 100 g, Rfl: 5    nystatin 100,000 Units/g External Cream, Apply topically as needed., Disp: , Rfl:     Vitamin D3, Cholecalciferol, 125 MCG (5000 UT) Oral Cap, Take 1 capsule (5,000 Units total) by mouth daily., Disp: , Rfl:      Review of Systems  A 10 point review of systems was performed and negative unless otherwise documented per HPI.     Physical Findings   Pulse 63   Temp 97.4 °F (36.3 °C) (Temporal)   SpO2 95%   Physical Exam  Vitals and nursing note reviewed. Exam conducted with a chaperone present.   Constitutional:       General: She is not in acute distress.  HENT:      Head: Normocephalic and atraumatic.      Mouth/Throat:      Mouth: Mucous membranes are moist.   Cardiovascular:      Rate and Rhythm: Normal rate and regular rhythm.   Pulmonary:      Effort: Pulmonary effort is normal.   Abdominal:      General: There is no distension.      Palpations: Abdomen is soft. There is no mass.      Tenderness: There is abdominal tenderness (Mild right upper quadrant tenderness).      Hernia: No hernia is present.      Comments: Well-healed lower midline scar   Musculoskeletal:         General: No deformity.   Skin:     General: Skin is warm and dry.   Neurological:      General: No focal deficit present.       Mental Status: She is alert.   Psychiatric:         Mood and Affect: Mood normal.          Assessment   1. Calculus of gallbladder with cholecystitis without biliary obstruction, unspecified cholecystitis acuity      This is a very nice 79-year-old female with multiple medical problems including but not limited to coronary artery disease status post recent coronary stent placement in June of this year and COPD who returns for follow-up today at the request of her primary care physician, Dr. Coley, with concern for increasingly symptomatic cholelithiasis.    I actually met the patient on 3/21/2024 when she presented to me with right upper quadrant pain, nausea and poor appetite.  She had an ultrasound performed in the emergency room on 3/19/2024 which revealed sludge and stones in the gallbladder including sludge and a mobile stone in the gallbladder neck. Negative sonographic Pitts sign and no thickening of the gallbladder wall or pericholecystic fluid.  I recommended scheduling an elective cholecystectomy pending cardiology clearance.  Patient ended up having a positive stress test and ultimately underwent cardiac catheterization with coronary artery stent placement in June of this year.  She is currently on aspirin and Brilinta.  Her cardiologist is Dr. Jana Connell with [facility]ology.    Patient returns to follow-up with her son.  Unfortunately, the patient's daughter recently passed away yesterday.  Patient continues to complain of near constant right upper quadrant abdominal pain that radiates to her back.  The pain is associated with nausea.  Patient denies any fevers, jaundice or vomiting.  Patient does have chronic diarrhea.  Pain is worse with eating and patient states she cannot eat well due to the pain.  She has lost around 8 pounds lately.  Overall, patient feels her symptoms are quite debilitating and life-limiting at this point.    She has had a recent colonoscopy with Dr. Kimball on 1/15/2024.   This revealed a descending colon tubular adenoma.  Random biopsies of the terminal ileum and colon were negative for pathologic change.  Patient has had multiple abdominal surgeries including a partial hysterectomy and oophorectomy and colectomy for what sounds like perforated diverticulitis around 15 years ago.    Patient appears generally well on exam today though does have some mild right upper quadrant tenderness.  Overall, I suspect the patient continues to have symptomatic cholelithiasis, possible chronic cholecystitis.    Plan   I recommend planning for laparoscopic cholecystectomy with intraoperative cholangiogram, possible open. The details of this procedure were discussed including the expected recovery time, risks, benefits and alternatives.  Specific risks discussed include but not limited to pain, bleeding, infection, damage to surrounding organs including common bile duct injury.  Patient expressed understanding and wished to schedule surgery as soon as reasonably possible.  Surgery has been scheduled for 9/27/2024 pending cardiac clearance.  I would recommend permission to hold brilinta for 5 days prior to surgery.  Patient can continue with the daily aspirin perioperatively.     In the meantime, I suggest patient continues a low-fat diet.  I advised patient to go to the emergency room with severe abdominal pain, persistent vomiting, jaundice, fevers or any other new/worsening symptoms.     No orders of the defined types were placed in this encounter.      Imaging & Referrals   None    Follow Up  No follow-ups on file.    Burt Wyatt MD

## 2024-09-13 ENCOUNTER — TELEPHONE (OUTPATIENT)
Facility: LOCATION | Age: 79
End: 2024-09-13

## 2024-09-13 NOTE — TELEPHONE ENCOUNTER
RADHA SHARMA Patient  Member ID  [id number]    Date of Birth  1945-07-07    Gender  Female    Eligibility Status  Active Coverage    Group Number  [id number]    Plan / Coverage Date  2024-01-01    Transaction Type  Outpatient Authorization/Referral    Organization  [facility]    Payer  [employer]    [employer] logo     Certificate Information  Reference Number  NA    Status  NO ACTION REQUIRED    [employer] Record Number  [id number]    Message  For Member contracts which cover this service, no prior authorization is necessary. Please contact [employer] at the number on the back of the Member ID card.  Member Information  Patient Name  RADHA SHARMA    Patient Date of Birth  1945-07-07    Patient Gender  Female    Member ID  [id number]    Relationship to Subscriber  Self    Subscriber Name  RADHA SHARMA    Requesting Provider     Name  DARRELL FRAZIER  [id number]    Specialty  [id number]  Provider Role  Provider    Address  [address] [place] [address]    Phone  [phone]  Fax  [phone]    Contact Name  ALYSSA WARD    Service Information  Service Type  2 - Surgical    Place of Service  22 - [facility]    Service From - To Date  2024-09-27 - 2024-12-31    Quantity  1 Units  Procedure Code 1 (CPT/HCPCS)  28269 - LAPARO CHOLECYSTECTOMY/GRAPH    Quantity  1 Units

## 2024-09-16 ENCOUNTER — TELEPHONE (OUTPATIENT)
Facility: LOCATION | Age: 79
End: 2024-09-16

## 2024-09-16 ENCOUNTER — TELEPHONE (OUTPATIENT)
Dept: FAMILY MEDICINE CLINIC | Facility: CLINIC | Age: 79
End: 2024-09-16

## 2024-09-16 PROBLEM — E78.2 MIXED HYPERLIPIDEMIA: Status: ACTIVE | Noted: 2024-04-05

## 2024-09-16 NOTE — TELEPHONE ENCOUNTER
Left message for patient to call office regarding appointment for 9/17/24,    Patient was seen and cleared for pre-op with Cardiologist, was an EKG done recently 9/9/2024 [facility]?     Patient called back and confirmed EKG was not done

## 2024-09-16 NOTE — TELEPHONE ENCOUNTER
Received Cardiac Clearance and Anticoagulation management from Jana Connell MD.  Per note \"This patient may proceed with surgery at an acceptable cardiovascular risk may hold Brilinta up to 7 days advised to continue aspirin.\"  Faxed to Pre Admission testing, placed in binder and spoke with patient to confirm stop date. Verbalized understanding.

## 2024-09-17 ENCOUNTER — LABORATORY ENCOUNTER (OUTPATIENT)
Dept: LAB | Age: 79
End: 2024-09-17
Payer: MEDICARE

## 2024-09-17 ENCOUNTER — OFFICE VISIT (OUTPATIENT)
Dept: FAMILY MEDICINE CLINIC | Facility: CLINIC | Age: 79
End: 2024-09-17
Payer: MEDICARE

## 2024-09-17 VITALS
HEART RATE: 76 BPM | OXYGEN SATURATION: 97 % | WEIGHT: 148 LBS | RESPIRATION RATE: 16 BRPM | DIASTOLIC BLOOD PRESSURE: 80 MMHG | BODY MASS INDEX: 25.27 KG/M2 | SYSTOLIC BLOOD PRESSURE: 110 MMHG | HEIGHT: 64 IN

## 2024-09-17 DIAGNOSIS — Z01.818 PRE-OP TESTING: ICD-10-CM

## 2024-09-17 DIAGNOSIS — J44.9 CHRONIC OBSTRUCTIVE PULMONARY DISEASE, UNSPECIFIED COPD TYPE (HCC): ICD-10-CM

## 2024-09-17 DIAGNOSIS — N30.10 INTERSTITIAL CYSTITIS: Primary | ICD-10-CM

## 2024-09-17 LAB
APPEARANCE: CLEAR
BILIRUBIN: NEGATIVE
CHLORIDE SERPL-SCNC: 111 MMOL/L (ref 98–112)
CO2 SERPL-SCNC: 23 MMOL/L (ref 21–32)
ERYTHROCYTE [DISTWIDTH] IN BLOOD BY AUTOMATED COUNT: 15.3 %
GLUCOSE (URINE DIPSTICK): NEGATIVE MG/DL
HCT VFR BLD AUTO: 40.1 %
HGB BLD-MCNC: 13 G/DL
KETONES (URINE DIPSTICK): NEGATIVE MG/DL
LEUKOCYTES: NEGATIVE
MCH RBC QN AUTO: 30.6 PG (ref 26–34)
MCHC RBC AUTO-ENTMCNC: 32.4 G/DL (ref 31–37)
MCV RBC AUTO: 94.4 FL
MULTISTIX LOT#: NORMAL NUMERIC
NITRITE, URINE: NEGATIVE
OCCULT BLOOD: NEGATIVE
PH, URINE: 5.5 (ref 4.5–8)
PLATELET # BLD AUTO: 398 10(3)UL (ref 150–450)
POTASSIUM SERPL-SCNC: 3.9 MMOL/L (ref 3.5–5.1)
PROTEIN (URINE DIPSTICK): NEGATIVE MG/DL
RBC # BLD AUTO: 4.25 X10(6)UL
SODIUM SERPL-SCNC: 141 MMOL/L (ref 136–145)
SPECIFIC GRAVITY: 1.01 (ref 1–1.03)
URINE-COLOR: YELLOW
UROBILINOGEN,SEMI-QN: 0.2 MG/DL (ref 0–1.9)
WBC # BLD AUTO: 12.3 X10(3) UL (ref 4–11)

## 2024-09-17 PROCEDURE — 87635 SARS-COV-2 COVID-19 AMP PRB: CPT

## 2024-09-17 PROCEDURE — 99214 OFFICE O/P EST MOD 30 MIN: CPT | Performed by: NURSE PRACTITIONER

## 2024-09-17 PROCEDURE — 85027 COMPLETE CBC AUTOMATED: CPT

## 2024-09-17 PROCEDURE — 87086 URINE CULTURE/COLONY COUNT: CPT | Performed by: NURSE PRACTITIONER

## 2024-09-17 PROCEDURE — 1159F MED LIST DOCD IN RCRD: CPT | Performed by: NURSE PRACTITIONER

## 2024-09-17 PROCEDURE — 3074F SYST BP LT 130 MM HG: CPT | Performed by: NURSE PRACTITIONER

## 2024-09-17 PROCEDURE — 3008F BODY MASS INDEX DOCD: CPT | Performed by: NURSE PRACTITIONER

## 2024-09-17 PROCEDURE — 81003 URINALYSIS AUTO W/O SCOPE: CPT | Performed by: NURSE PRACTITIONER

## 2024-09-17 PROCEDURE — 36415 COLL VENOUS BLD VENIPUNCTURE: CPT

## 2024-09-17 PROCEDURE — 3079F DIAST BP 80-89 MM HG: CPT | Performed by: NURSE PRACTITIONER

## 2024-09-17 PROCEDURE — 80051 ELECTROLYTE PANEL: CPT

## 2024-09-17 RX ORDER — SULFAMETHOXAZOLE/TRIMETHOPRIM 800-160 MG
1 TABLET ORAL 2 TIMES DAILY
Qty: 6 TABLET | Refills: 0 | Status: SHIPPED | OUTPATIENT
Start: 2024-09-17 | End: 2024-09-20

## 2024-09-17 NOTE — PROGRESS NOTES
CHIEF COMPLAINT:     Chief Complaint   Patient presents with    Cough     Cough and congestion        HPI:   Gabriela Ndiaye is a 79 year old female who presents with symptoms of UTI. Complaining of urinary frequency, urgency, dysuria for last 1 days. Symptoms have been same since onset.  Treatments tried: none .  Associated symptoms: .   Denies flank pain, fever, hematuria, nausea, or vomiting.   Cough: mostly in the morning , white phlegm, denies any fever, still a smoker     Current Outpatient Medications   Medication Sig Dispense Refill    levothyroxine (SYNTHROID) 75 MCG Oral Tab Take 1 tablet (75 mcg total) by mouth daily. 30 tablet 3    benzonatate 200 MG Oral Cap Take 1 capsule (200 mg total) by mouth 3 (three) times daily as needed for cough. 30 capsule 2    pravastatin 10 MG Oral Tab Take 1 tablet (10 mg total) by mouth nightly. 90 tablet 1    DULoxetine 30 MG Oral Cap DR Particles Take 1 capsule (30 mg total) by mouth daily.      ESOMEPRAZOLE MAGNESIUM 20 MG Oral Capsule Delayed Release TAKE 1 CAPSULE BY MOUTH EVERY DAY IN THE MORNING BEFORE BREAKFAST (Patient taking differently: Take 2 capsules (40 mg total) by mouth before breakfast.) 90 capsule 1    METOPROLOL TARTRATE 50 MG Oral Tab TAKE 0.5 TABLETS BY MOUTH 2 TIMES DAILY. 90 tablet 1    ALPRAZOLAM 0.5 MG Oral Tab TAKE 1 TABLET BY MOUTH NIGHTLY SCHEDULED AND TAKE 1/2 TABLET BY MOUTH DAILY AS NEEDED FOR ANXIETY. 45 tablet 2    DICYCLOMINE 20 MG Oral Tab TAKE 1 TABLET BY MOUTH THREE TIMES A  tablet 0    GABAPENTIN 100 MG Oral Cap TAKE 1 CAPSULE BY MOUTH IN THE MORNING, 1-2 CAPSULES BY MOUTH IN THE AFTERNOON, AND 3 CAPSULES BY MOUTH IN THE EVENING DAILY (Patient taking differently: Take 3 capsules (300 mg total) by mouth nightly.) 180 capsule 5    ASPIRIN LOW DOSE 81 MG Oral Tab EC Take 1 tablet (81 mg total) by mouth daily.      estradiol 0.1 MG/GM Vaginal Cream Place 1 g vaginally daily. 42.5 g 5    furosemide 20 MG Oral Tab Take 1 tablet (20  mg total) by mouth 2 (two) times daily. 180 tablet 1    MONTELUKAST 10 MG Oral Tab TAKE 1 TABLET BY MOUTH EVERY DAY AT NIGHT 90 tablet 3    LOSARTAN 25 MG Oral Tab TAKE 1 TABLET (25 MG TOTAL) BY MOUTH DAILY. 90 tablet 0    ticagrelor 90 MG Oral Tab Take 1 tablet (90 mg total) by mouth every 12 (twelve) hours.      busPIRone 10 MG Oral Tab Take 1 tablet (10 mg total) by mouth 2 (two) times daily. 180 tablet 1    Potassium Chloride ER 20 MEQ Oral Tab CR Take 1 tablet by mouth daily. 90 tablet 1    ACYCLOVIR 400 MG Oral Tab TAKE 1 TABLET BY MOUTH TWICE A  tablet 0    cilostazol 50 MG Oral Tab Take 1 tablet (50 mg total) by mouth 2 (two) times daily. 180 tablet 1    ELMIRON 100 MG Oral Cap Take 1 capsule (100 mg total) by mouth daily. 90 capsule 1    B Complex Vitamins (B COMPLEX-B12 OR) Take 1 tablet by mouth daily.      Turmeric 400 MG Oral Cap Take 1 capsule by mouth daily.      Ascorbic Acid (VITAMIN C) 1000 MG Oral Tab Take 1 tablet (1,000 mg total) by mouth daily.      omega-3 fatty acids 1000 MG Oral Cap Take 1,000 mg by mouth daily.      loratadine 10 MG Oral Tablet Dispersible Take 1 tablet (10 mg total) by mouth daily.      Capsicum, Cayenne, (CAYENNE PEPPER OR) Take 1 tablet by mouth daily.      Nebulizers (S5 Wireless AEROSOL DELIVERY SYSTEM) Does not apply Misc Take 1 Bottle by mouth As Directed.      albuterol 108 (90 Base) MCG/ACT Inhalation Aero Soln Inhale 2 puffs into the lungs every 6 (six) hours as needed for Wheezing or Shortness of Breath. 3 each 3    Respiratory Therapy Supplies (NEBULIZER/TUBING/MOUTHPIECE) Does not apply Kit Use as directed 1 each 0    TRELEGY ELLIPTA 100-62.5-25 MCG/ACT Inhalation Aerosol Powder, Breath Activated Inhale 1 puff into the lungs daily. 180 each 3    diclofenac 1 % External Gel Apply 2 g topically 4 (four) times daily. (Patient taking differently: Apply 2 g topically 4 (four) times daily as needed.) 100 g 5    nystatin 100,000 Units/g External Cream Apply topically  as needed.      Vitamin D3, Cholecalciferol, 125 MCG (5000 UT) Oral Cap Take 1 capsule (5,000 Units total) by mouth daily.        Past Medical History:    Abdominal hernia    Anxiety    Arthritis    Back pain    Back problem    spinal stenosis    Bilateral pulmonary embolism (HCC)    Bloating    Cancer (HCC)    LUNG    COPD (chronic obstructive pulmonary disease) (HCC)    NO HOME O2    Coronary atherosclerosis    STENT X1    Diabetes (HCC)    DIET CONTROLLED    Diabetes mellitus (HCC)    Diarrhea, unspecified    Disorder of thyroid    Easy bruising    Esophageal reflux    Exposure to medical diagnostic radiation    last tx 11/2023    Feeling lonely    Flatulence/gas pain/belching    Food intolerance    Hearing impairment    PT DENIES, NO AIDS    Hearing loss    Hemorrhoids    High blood pressure    High cholesterol    History of blood transfusion    ABOUT 20 YEARS AGO, NO ADVERSE REACTION    History of eating disorder    Muscle weakness    Neuropathy    Night sweats    Osteoarthritis    Pain in joints    Pneumonia of both lungs due to infectious organism, unspecified part of lung    Pulmonary emboli (HCC)    Pulmonary embolism (HCC)    Renal disorder    CKD RECONCILLED FROM OUTSIDE PROBLEM LIST    RSV (acute bronchiolitis due to respiratory syncytial virus)    Shortness of breath    Stress    Thyroid disease    Uncomfortable fullness after meals    Visual impairment    GLASSES    Wears glasses      Social History:  Social History     Socioeconomic History    Marital status:    Tobacco Use    Smoking status: Every Day     Current packs/day: 0.50     Average packs/day: 0.5 packs/day for 63.1 years (31.5 ttl pk-yrs)     Types: Cigarettes     Start date: 2/24/1964    Smokeless tobacco: Never   Vaping Use    Vaping status: Never Used   Substance and Sexual Activity    Alcohol use: Not Currently     Comment: RARE    Drug use: Never   Other Topics Concern    Caffeine Concern Yes     Comment: coffee/coca cola/Iced  tea    Exercise No     Social Determinants of Health     Financial Resource Strain: Low Risk  (8/15/2023)    Financial Resource Strain     Difficulty of Paying Living Expenses: Not hard at all     Med Affordability: No   Food Insecurity: No Food Insecurity (8/15/2023)    Food Insecurity     Food Insecurity: Never true   Transportation Needs: Unmet Transportation Needs (8/15/2023)    Transportation Needs     Lack of Transportation: Yes   Physical Activity: Insufficiently Active (8/15/2023)    Exercise Vital Sign     Days of Exercise per Week: 2 days     Minutes of Exercise per Session: 30 min   Stress: No Stress Concern Present (8/15/2023)    Stress     Feeling of Stress : No   Social Connections: Socially Integrated (8/15/2023)    Social Connections     Frequency of Socialization with Friends and Family: 2   Housing Stability: Low Risk  (8/15/2023)    Housing Stability     Housing Instability: No         REVIEW OF SYSTEMS:   GENERAL: Denies fever, chills, or body aches  SKIN: no rashes, no skin wounds or ulcers.  CARDIOVASCULAR: denies chest pain or palpitations  LUNGS: denies shortness of breath, cough, or wheezing  GI: See HPI. No N/V/C/D.   : See HPI.  Musculo: No back pain     EXAM:   /80   Pulse 76   Resp 16   Ht 5' 4\" (1.626 m)   Wt 148 lb (67.1 kg)   SpO2 97%   BMI 25.40 kg/m²   GENERAL: well developed, well nourished,in no apparent distress  CARDIO: RRR, no murmurs  LUNGS: clear to ausculation bilaterally, no wheezing or rhonchi  ABD: BS present x 4.  No tenderness to palpation, No hepatosplenomegaly   : no  suprapubic tenderness, bladder distention, or CVA tenderness           ASSESSMENT AND PLAN:    Diagnoses and all orders for this visit:    Interstitial cystitis  -     Urine Dip, auto without Micro  -     Urine Culture, Routine [E]; Future  -     sulfamethoxazole-trimethoprim DS (BACTRIM DS) 800-160 MG Oral Tab per tablet; Take 1 tablet by mouth 2 (two) times daily for 3 days. Take one  tablet twice a day x 3 days    Chronic obstructive pulmonary disease, unspecified COPD type (HCC)  -     XR CHEST PA + LAT CHEST (CPT=71046); Future     Stable   F/u for worsening symptoms

## 2024-09-18 ENCOUNTER — HOSPITAL ENCOUNTER (OUTPATIENT)
Dept: GENERAL RADIOLOGY | Age: 79
Discharge: HOME | End: 2024-09-18
Attending: NURSE PRACTITIONER
Payer: MEDICARE

## 2024-09-18 DIAGNOSIS — J44.9 CHRONIC OBSTRUCTIVE PULMONARY DISEASE, UNSPECIFIED COPD TYPE (HCC): ICD-10-CM

## 2024-09-18 LAB — SARS-COV-2 RNA RESP QL NAA+PROBE: NOT DETECTED

## 2024-09-18 PROCEDURE — 71046 X-RAY EXAM CHEST 2 VIEWS: CPT | Performed by: NURSE PRACTITIONER

## 2024-09-19 ENCOUNTER — TELEPHONE (OUTPATIENT)
Dept: FAMILY MEDICINE CLINIC | Facility: CLINIC | Age: 79
End: 2024-09-19

## 2024-09-19 DIAGNOSIS — J44.9 CHRONIC OBSTRUCTIVE PULMONARY DISEASE, UNSPECIFIED COPD TYPE (HCC): Primary | ICD-10-CM

## 2024-09-19 NOTE — TELEPHONE ENCOUNTER
LOV 9/17/24     Pt called. Looking for xray results. Notified chest xray negative. Pt stated she is worried about her upcoming gallbladder surgery 9/27. Pt stated she doesn't feel well still. Cough is productive. Denies fever. On abx for UTI. Taking tessalon pearls and cough drops for cough. Pt surprised to hear her xray is normal. Pt wondering what she should do about her surgery.     Routing condition update to Talat Bernabe

## 2024-09-20 ENCOUNTER — TELEPHONE (OUTPATIENT)
Dept: FAMILY MEDICINE CLINIC | Facility: CLINIC | Age: 79
End: 2024-09-20

## 2024-09-21 RX ORDER — AZITHROMYCIN 500 MG/1
500 TABLET, FILM COATED ORAL DAILY
Qty: 5 TABLET | Refills: 0 | Status: SHIPPED | OUTPATIENT
Start: 2024-09-21 | End: 2024-09-26

## 2024-09-21 NOTE — TELEPHONE ENCOUNTER
She has COPD and with smoking the cough is expected , Chest Xray is normal sent Azithromycin she can take before surgery

## 2024-09-23 ENCOUNTER — OFFICE VISIT (OUTPATIENT)
Dept: FAMILY MEDICINE CLINIC | Facility: CLINIC | Age: 79
End: 2024-09-23
Payer: MEDICARE

## 2024-09-23 VITALS
HEIGHT: 64 IN | BODY MASS INDEX: 25.61 KG/M2 | RESPIRATION RATE: 22 BRPM | HEART RATE: 61 BPM | OXYGEN SATURATION: 95 % | SYSTOLIC BLOOD PRESSURE: 122 MMHG | DIASTOLIC BLOOD PRESSURE: 66 MMHG | WEIGHT: 150 LBS

## 2024-09-23 DIAGNOSIS — I10 PRIMARY HYPERTENSION: ICD-10-CM

## 2024-09-23 DIAGNOSIS — K80.50 BILIARY COLIC: Primary | ICD-10-CM

## 2024-09-23 DIAGNOSIS — J06.9 UPPER RESPIRATORY TRACT INFECTION, UNSPECIFIED TYPE: ICD-10-CM

## 2024-09-23 DIAGNOSIS — J44.1 CHRONIC OBSTRUCTIVE PULMONARY DISEASE WITH ACUTE EXACERBATION (HCC): ICD-10-CM

## 2024-09-23 PROCEDURE — 3078F DIAST BP <80 MM HG: CPT | Performed by: FAMILY MEDICINE

## 2024-09-23 PROCEDURE — 99213 OFFICE O/P EST LOW 20 MIN: CPT | Performed by: FAMILY MEDICINE

## 2024-09-23 PROCEDURE — 3008F BODY MASS INDEX DOCD: CPT | Performed by: FAMILY MEDICINE

## 2024-09-23 PROCEDURE — 3074F SYST BP LT 130 MM HG: CPT | Performed by: FAMILY MEDICINE

## 2024-09-23 PROCEDURE — 1160F RVW MEDS BY RX/DR IN RCRD: CPT | Performed by: FAMILY MEDICINE

## 2024-09-23 PROCEDURE — 1159F MED LIST DOCD IN RCRD: CPT | Performed by: FAMILY MEDICINE

## 2024-09-23 RX ORDER — HYDROCODONE BITARTRATE AND ACETAMINOPHEN 5; 325 MG/1; MG/1
1 TABLET ORAL 2 TIMES DAILY PRN
COMMUNITY
Start: 2024-09-11

## 2024-09-23 RX ORDER — LOSARTAN POTASSIUM 25 MG/1
25 TABLET ORAL DAILY
Qty: 90 TABLET | Refills: 0 | OUTPATIENT
Start: 2024-09-23

## 2024-09-23 RX ORDER — LOSARTAN POTASSIUM 25 MG/1
25 TABLET ORAL DAILY
Qty: 90 TABLET | Refills: 1 | Status: SHIPPED | OUTPATIENT
Start: 2024-09-23

## 2024-09-23 RX ORDER — PENTOSAN POLYSULFATE SODIUM 100 MG/1
100 CAPSULE, GELATIN COATED ORAL DAILY
Qty: 90 CAPSULE | Refills: 1 | Status: SHIPPED | OUTPATIENT
Start: 2024-09-23

## 2024-09-23 NOTE — TELEPHONE ENCOUNTER
RN to pt call, left detailed VM of message below on identified mailbox per phone consent.  Asked pt to callback the office if she has any questions.  Pt is not active on  (LL 4/5/24).

## 2024-09-23 NOTE — PROGRESS NOTES
Gabriela Ndiaye is a 79 year old female who presents for a pre-operative physical exam.   HPI related to surgery:   Gabriela Ndiaye is scheduled for a laparoscopic cholecystectomy procedure at  on 09/27 to be performed by Dr Wyatt.     Indication: biliary colic, cholelithiasis    Patient reports previous anesthesia:  Yes.    Previous complications: No    Has received clearance from cardiology.   Preop EKG normal - NSR, no acute ST segment changes.   She has been experiencing URI symptoms for the past 2 weeks. She has had CXR which was unremarkable. She reports continued productive cough w/ clear phlegm. She has h/o COPD. She denies any fevers. Reports dyspnea. She has been using her inhalers. No chest pains, tightness, wheezing. Sx are improving.     Social History     Socioeconomic History    Marital status:    Tobacco Use    Smoking status: Every Day     Current packs/day: 0.50     Average packs/day: 0.5 packs/day for 63.1 years (31.5 ttl pk-yrs)     Types: Cigarettes     Start date: 2/24/1964    Smokeless tobacco: Never   Vaping Use    Vaping status: Never Used   Substance and Sexual Activity    Alcohol use: Not Currently     Comment: RARE    Drug use: Never   Other Topics Concern    Caffeine Concern Yes     Comment: coffee/coca cola/Iced tea    Exercise No     Social Determinants of Health     Financial Resource Strain: Low Risk  (8/15/2023)    Financial Resource Strain     Difficulty of Paying Living Expenses: Not hard at all     Med Affordability: No   Food Insecurity: No Food Insecurity (8/15/2023)    Food Insecurity     Food Insecurity: Never true   Transportation Needs: Unmet Transportation Needs (8/15/2023)    Transportation Needs     Lack of Transportation: Yes   Physical Activity: Insufficiently Active (8/15/2023)    Exercise Vital Sign     Days of Exercise per Week: 2 days     Minutes of Exercise per Session: 30 min   Stress: No Stress Concern Present (8/15/2023)    Stress     Feeling of  Stress : No   Social Connections: Socially Integrated (8/15/2023)    Social Connections     Frequency of Socialization with Friends and Family: 2   Housing Stability: Low Risk  (8/15/2023)    Housing Stability     Housing Instability: No      Past Medical History:    Abdominal hernia    Anxiety    Arthritis    Back pain    Back problem    spinal stenosis    Bilateral pulmonary embolism (HCC)    Bloating    Cancer (HCC)    LUNG    COPD (chronic obstructive pulmonary disease) (HCC)    NO HOME O2    Coronary atherosclerosis    STENT X1    Diabetes (HCC)    DIET CONTROLLED    Diabetes mellitus (HCC)    Diarrhea, unspecified    Disorder of thyroid    Easy bruising    Esophageal reflux    Exposure to medical diagnostic radiation    last tx 11/2023    Feeling lonely    Flatulence/gas pain/belching    Food intolerance    Hearing impairment    PT DENIES, NO AIDS    Hearing loss    Hemorrhoids    High blood pressure    High cholesterol    History of blood transfusion    ABOUT 20 YEARS AGO, NO ADVERSE REACTION    History of eating disorder    Muscle weakness    Neuropathy    Night sweats    Osteoarthritis    Pain in joints    Pneumonia of both lungs due to infectious organism, unspecified part of lung    Pulmonary emboli (HCC)    Pulmonary embolism (HCC)    Renal disorder    CKD RECONCILLED FROM OUTSIDE PROBLEM LIST    RSV (acute bronchiolitis due to respiratory syncytial virus)    Shortness of breath    Stress    Thyroid disease    Uncomfortable fullness after meals    Visual impairment    GLASSES    Wears glasses     Past Surgical History:   Procedure Laterality Date    Amputation finger/thumb+flaps      Colectomy      Colonoscopy      Colonoscopy N/A 1/15/2024    Procedure: COLONOSCOPY;  Surgeon: Tirso Kimball MD;  Location:  ENDOSCOPY    Hysterectomy       Allergies   Allergen Reactions    Rosuvastatin MYALGIA    Cephalosporins NAUSEA ONLY     NAUSEA ONLY WHEN TAKEN ON AN EMPTY STOMACH, OTHERWISE CAN TAKE    Keflex  [Cephalexin] RASH     Current Outpatient Medications   Medication Sig Dispense Refill    losartan 25 MG Oral Tab Take 1 tablet (25 mg total) by mouth daily. 90 tablet 1    HYDROcodone-acetaminophen 5-325 MG Oral Tab Take 1 tablet by mouth 2 (two) times daily as needed for Pain.      levothyroxine (SYNTHROID) 75 MCG Oral Tab Take 1 tablet (75 mcg total) by mouth daily. 30 tablet 3    benzonatate 200 MG Oral Cap Take 1 capsule (200 mg total) by mouth 3 (three) times daily as needed for cough. 30 capsule 2    pravastatin 10 MG Oral Tab Take 1 tablet (10 mg total) by mouth nightly. 90 tablet 1    DULoxetine 30 MG Oral Cap DR Particles Take 1 capsule (30 mg total) by mouth daily.      METOPROLOL TARTRATE 50 MG Oral Tab TAKE 0.5 TABLETS BY MOUTH 2 TIMES DAILY. 90 tablet 1    ALPRAZOLAM 0.5 MG Oral Tab TAKE 1 TABLET BY MOUTH NIGHTLY SCHEDULED AND TAKE 1/2 TABLET BY MOUTH DAILY AS NEEDED FOR ANXIETY. 45 tablet 2    DICYCLOMINE 20 MG Oral Tab TAKE 1 TABLET BY MOUTH THREE TIMES A  tablet 0    GABAPENTIN 100 MG Oral Cap TAKE 1 CAPSULE BY MOUTH IN THE MORNING, 1-2 CAPSULES BY MOUTH IN THE AFTERNOON, AND 3 CAPSULES BY MOUTH IN THE EVENING DAILY (Patient taking differently: Take 3 capsules (300 mg total) by mouth nightly.) 180 capsule 5    ASPIRIN LOW DOSE 81 MG Oral Tab EC Take 1 tablet (81 mg total) by mouth daily.      estradiol 0.1 MG/GM Vaginal Cream Place 1 g vaginally daily. 42.5 g 5    furosemide 20 MG Oral Tab Take 1 tablet (20 mg total) by mouth 2 (two) times daily. 180 tablet 1    MONTELUKAST 10 MG Oral Tab TAKE 1 TABLET BY MOUTH EVERY DAY AT NIGHT 90 tablet 3    ticagrelor 90 MG Oral Tab Take 1 tablet (90 mg total) by mouth every 12 (twelve) hours.      busPIRone 10 MG Oral Tab Take 1 tablet (10 mg total) by mouth 2 (two) times daily. 180 tablet 1    Potassium Chloride ER 20 MEQ Oral Tab CR Take 1 tablet by mouth daily. 90 tablet 1    ACYCLOVIR 400 MG Oral Tab TAKE 1 TABLET BY MOUTH TWICE A  tablet 0     cilostazol 50 MG Oral Tab Take 1 tablet (50 mg total) by mouth 2 (two) times daily. 180 tablet 1    loratadine 10 MG Oral Tablet Dispersible Take 1 tablet (10 mg total) by mouth daily.      albuterol 108 (90 Base) MCG/ACT Inhalation Aero Soln Inhale 2 puffs into the lungs every 6 (six) hours as needed for Wheezing or Shortness of Breath. 3 each 3    Respiratory Therapy Supplies (NEBULIZER/TUBING/MOUTHPIECE) Does not apply Kit Use as directed 1 each 0    TRELEGY ELLIPTA 100-62.5-25 MCG/ACT Inhalation Aerosol Powder, Breath Activated Inhale 1 puff into the lungs daily. 180 each 3    nystatin 100,000 Units/g External Cream Apply topically as needed.      ELMIRON 100 MG Oral Cap TAKE 1 CAPSULE BY MOUTH EVERY DAY 90 capsule 1    azithromycin 500 MG Oral Tab Take 1 tablet (500 mg total) by mouth daily for 5 days. (Patient not taking: Reported on 9/23/2024) 5 tablet 0    ESOMEPRAZOLE MAGNESIUM 20 MG Oral Capsule Delayed Release TAKE 1 CAPSULE BY MOUTH EVERY DAY IN THE MORNING BEFORE BREAKFAST (Patient not taking: Reported on 9/23/2024) 90 capsule 1    B Complex Vitamins (B COMPLEX-B12 OR) Take 1 tablet by mouth daily. (Patient not taking: Reported on 9/23/2024)      Turmeric 400 MG Oral Cap Take 1 capsule by mouth daily. (Patient not taking: Reported on 9/23/2024)      Ascorbic Acid (VITAMIN C) 1000 MG Oral Tab Take 1 tablet (1,000 mg total) by mouth daily. (Patient not taking: Reported on 9/23/2024)      omega-3 fatty acids 1000 MG Oral Cap Take 1,000 mg by mouth daily. (Patient not taking: Reported on 9/23/2024)      Capsicum, Cayenne, (CAYENNE PEPPER OR) Take 1 tablet by mouth daily. (Patient not taking: Reported on 9/23/2024)      Nebulizers ([employer] AEROSOL DELIVERY SYSTEM) Does not apply Misc Take 1 Bottle by mouth As Directed. (Patient not taking: Reported on 9/23/2024)      diclofenac 1 % External Gel Apply 2 g topically 4 (four) times daily. (Patient not taking: Reported on 9/23/2024) 100 g 5    Vitamin D3,  Cholecalciferol, 125 MCG (5000 UT) Oral Cap Take 1 capsule (5,000 Units total) by mouth daily. (Patient not taking: Reported on 9/23/2024)          REVIEW OF SYSTEMS:     A comprehensive 10 point review of systems was completed.  Pertinent positives and negatives noted in the the HPI.    PHYSICAL EXAM:   /66   Pulse 61   Resp 22   Ht 5' 4\" (1.626 m)   Wt 150 lb (68 kg)   SpO2 95%   BMI 25.75 kg/m²    Vital signs: Blood pressure 122/66, pulse 61, resp. rate 22, height 5' 4\" (1.626 m), weight 150 lb (68 kg), SpO2 95%.    General: No acute distress. Alert and oriented x 3.  HEENT: Moist mucous membranes. EOM-I. PERRL  Neck: No lymphadenopathy.   Respiratory: Clear to auscultation bilaterally.  No wheezes. No rhonchi.  Cardiovascular: S1, S2.  Regular rate and rhythm.  No murmurs. Equal pulses   Abdomen: Soft, nontender, nondistended.  Positive bowel sounds. No rebound tenderness  Neurologic: No focal neurological deficits.  Musculoskeletal: Full range of motion of all extremities.  No swelling noted.  Integument: No lesions. No erythema.  Psychiatric: Appropriate mood and affect.    LABORATORY DATA:   None     ASSESSMENT AND PLAN:   Gabriela Ndiaye presents for preop examination. She has significant cardiac history of CAD, PVD. She has received clearance from cardiology. She is asymptomatic without any anginal symptoms. She has h/o COPD which has been controlled on trelegy. She does have URI but appears improving from this. Reviewed CXR with patient, it was unremarkable. Her physical exam was stable; her O2 sat was stable as well. Her functional capacity is limited by her COPD. RCRI class 2 risk. She is at moderate surgical risk. This consult was sent back the referring physician, Dr. Wyatt.    Assessment:  Encounter Diagnoses   Name Primary?    Biliary colic Yes    Upper respiratory tract infection, unspecified type          Plan   No orders of the defined types were placed in this encounter.        August  Juni Coley MD

## 2024-09-25 ENCOUNTER — TELEPHONE (OUTPATIENT)
Facility: LOCATION | Age: 79
End: 2024-09-25

## 2024-09-25 ENCOUNTER — TELEPHONE (OUTPATIENT)
Dept: FAMILY MEDICINE CLINIC | Facility: CLINIC | Age: 79
End: 2024-09-25

## 2024-09-25 ENCOUNTER — NURSE TRIAGE (OUTPATIENT)
Dept: FAMILY MEDICINE CLINIC | Facility: CLINIC | Age: 79
End: 2024-09-25

## 2024-09-25 DIAGNOSIS — N76.0 ACUTE VAGINITIS: ICD-10-CM

## 2024-09-25 DIAGNOSIS — Z91.89 AT RISK FOR CLOSTRIDIUM DIFFICILE INFECTION: Primary | ICD-10-CM

## 2024-09-25 DIAGNOSIS — K80.10 CALCULUS OF GALLBLADDER WITH CHOLECYSTITIS WITHOUT BILIARY OBSTRUCTION, UNSPECIFIED CHOLECYSTITIS ACUITY: Primary | ICD-10-CM

## 2024-09-25 RX ORDER — VANCOMYCIN HYDROCHLORIDE 125 MG/1
125 CAPSULE ORAL DAILY
Qty: 10 CAPSULE | Refills: 0 | Status: SHIPPED | OUTPATIENT
Start: 2024-09-25 | End: 2024-10-05

## 2024-09-25 RX ORDER — FLUCONAZOLE 150 MG/1
150 TABLET ORAL ONCE
Qty: 2 TABLET | Refills: 0 | Status: SHIPPED | OUTPATIENT
Start: 2024-09-25 | End: 2024-09-25

## 2024-09-25 NOTE — TELEPHONE ENCOUNTER
Called patient back to discuss new plan. Patient confirmed she will stop/not take anymore Bactrim and will take all Azithromycin/complete Zpak as prescribed.    Patient reported she is on her way to pickup up Vancomycin now. She states she will resume taking her brilinta today when she gets home and then follow Pre-Op instructions to stop it 7 days prior to surgery.    Patient has New request:    Patient reports having a yeast infection for about 1 week, she believes started on Tue 9/17. Patient complains of burning, itching, redness in groin area. Patient states she has had yeast infections before and that whatever medication you have prescribed her in the past worked well (patient unable to remember name of med).    Are you able to prescribe patient anything for her complaint?    Please advise. Thank you.

## 2024-09-25 NOTE — TELEPHONE ENCOUNTER
Where did she get the azithromycin as I saw her on Monday and did not prescribe it.     She often has c diff therefore needs c diff ppx when using antibiotics. Okay for vancomycin    There is no such thing as a loperamide 125mg dose, there is a loperamide-simethicone 2-125mg dosage and she has been on vancomycin 125mg before for c diff prophylaxis so I'm not sure what she is referring to. Okay for loperamide refill if needed but this is available OTC.     Her surgery has been postponed, please see TE from surgery.

## 2024-09-25 NOTE — TELEPHONE ENCOUNTER
See TE from 9/25/24.    Update:    Patient called asking for an rx of prednisone.     The azithromycin was given by Ausra NP.    Patient is asking if she can go back on blood thinners now since her surgery was postponed to 10/18/24.

## 2024-09-25 NOTE — TELEPHONE ENCOUNTER
She can stop bactrim as urine culture showed no UTI.   Complete zpak as prescribed.   Yes instructions to hold blood thinners (brillinta, asa) will apply so stop 7 days before surgery. She can resume brillinta for now.

## 2024-09-25 NOTE — TELEPHONE ENCOUNTER
Called patient to discuss treatment plan and ask questions regarding previous medication.    1)  sulfamethoxazole-trimethoprim DS (BACTRIM DS) was ordered by SADI Cabrera on 9/17/24, (take 1 tablet by mouth 2 (two) times daily for 3 days. Take one tablet twice a day x 3 days). According to the patient this was prescribed for a UTI and Rick was aware patient would only take this med once a day to minimize diarrhea. Patient reports starting this med on Fri /20 and only taking 3 tabs (has 3 left).    2) azithromycin was ordered by SADI Cabrera on 9/21/24, (Take 1 tablet (500 mg total) by mouth daily for 5 days). According to the patient this was prescribed for a cough. Patient reports starting this med on Mon 9/23 and has only taking 2 tabs (has 3 left). Patient states she stopped taking the Bactrim DS on Mon 9/23/24 when she started the Azithromycin.    Patient reports plan to take current antibiotics is:  Bactrim DS in morning  Azithromycin in evening    3) vancomycin ordered by Dr. Coley today 9/25/24, (take 1 capsule (125 mg total) by mouth daily for 10 days). Patient understands this was prescribed to treat C.diff. Patient states she will go to pharmacy to pickup the Vancomycin and get OTC loperamide today/at this time. Patient instructed to speak to pharmacist to ask about any potential drug-interactions and/or recommendations. Patient agreed with that plan.    Please advise on how this patient should be taking these antibiotics. (Complete all? Stop one or two?)    Patient is aware that her surgery was postponed for 2 weeks. She was informed that it has already been rescheduled for Fri 10/18/24. Patient is wondering if instructions to hold Brilinta/ticagrelor will be the same (stop 7 days prior to surgery). Also, patient is asking if she should resume taking the brilinta now, until that time. Please advise. Thank you.

## 2024-09-25 NOTE — TELEPHONE ENCOUNTER
Action Requested: Summary for Provider     []  Critical Lab, Recommendations Needed  [x] Need Additional Advice  []   FYI    []   Need Orders  [x] Need Medications Sent to Pharmacy  []  Other     SUMMARY:   1. Pt with diarrhea from recent Azithromycin rx, requesting Loperamide HCL to verified pharmacy.  2. Pt with productive cough (thick white phlegm) that she did not have at 9/23 OV, is asking if OK to proceed with lap gorge surgery 9/27 or cancel.  OV to eval? Please advise.    Reason for call: Diarrhea  Onset: Tuesday    Notes:  - Seen for OV Monday, cleared for lap gorge surgery 9/27.  Diarrhea from Azithromycin started yesterday, 1-3 x's/day, mild, loose BMs.  - Requesting rx for anti-diarrheal, Immodium has not helped.  Has taken Loperamide HCL 125mg in past.  - Pt with wet productive cough, + thick white phlegm, unsure if she should be proceeding with surgery.    - Denies difficulty breathing, fever, wheezing, blood in stool.  - Able to stay hydrated/tolerate PO food intake.  - Requesting callback today so she can notify surgeon office if need for cancel.    Reason for Disposition   MILD diarrhea and taking antibiotics    Answer Assessment - Initial Assessment Questions  1. DIARRHEA SEVERITY: \"How bad is the diarrhea?\" \"How many more stools have you had in the past 24 hours than normal?\"     - NO DIARRHEA (SCALE 0)    - MILD (SCALE 1-3): Few loose or mushy BMs; increase of 1-3 stools over normal daily number of stools; mild increase in ostomy output.    -  MODERATE (SCALE 4-7): Increase of 4-6 stools daily over normal; moderate increase in ostomy output.    -  SEVERE (SCALE 8-10; OR \"WORST POSSIBLE\"): Increase of 7 or more stools daily over normal; moderate increase in ostomy output; incontinence.      Mild  2. ONSET: \"When did the diarrhea begin?\"       Yesterday  3. BM CONSISTENCY: \"How loose or watery is the diarrhea?\"       Loose  4. VOMITING: \"Are you also vomiting?\" If Yes, ask: \"How many times in the  past 24 hours?\"       Denies  5. ABDOMEN PAIN: \"Are you having any abdomen pain?\" If Yes, ask: \"What does it feel like?\" (e.g., crampy, dull, intermittent, constant)       Cramps prior to diarrhea, otherwise negative.  6. ABDOMEN PAIN SEVERITY: If present, ask: \"How bad is the pain?\"  (e.g., Scale 1-10; mild, moderate, or severe)    - MILD (1-3): doesn't interfere with normal activities, abdomen soft and not tender to touch     - MODERATE (4-7): interferes with normal activities or awakens from sleep, abdomen tender to touch     - SEVERE (8-10): excruciating pain, doubled over, unable to do any normal activities        Denies  7. ORAL INTAKE: If vomiting, \"Have you been able to drink liquids?\" \"How much liquids have you had in the past 24 hours?\"      Denies- able to stay hydrated.  8. HYDRATION: \"Any signs of dehydration?\" (e.g., dry mouth [not just dry lips], too weak to stand, dizziness, new weight loss) \"When did you last urinate?\"      Denies  9. EXPOSURE: \"Have you traveled to a foreign country recently?\" \"Have you been exposed to anyone with diarrhea?\" \"Could you have eaten any food that was spoiled?\"      Denies  10. ANTIBIOTIC USE: \"Are you taking antibiotics now or have you taken antibiotics in the past 2 months?\"        Azithromycin  11. OTHER SYMPTOMS: \"Do you have any other symptoms?\" (e.g., fever, blood in stool)        Denies  12. PREGNANCY: \"Is there any chance you are pregnant?\" \"When was your last menstrual period?\"        N/A    Protocols used: Diarrhea-A-[place]

## 2024-09-25 NOTE — TELEPHONE ENCOUNTER
Called patient to let her know Dr. Coley sent her a prescription for her yeast infection. Patient said she was at the pharmacy now and would pick it up.    Patient instructed to call to make an OV appointment for any other questions/concerns/request. Patient agreed to make an appointment if necessary.

## 2024-09-25 NOTE — TELEPHONE ENCOUNTER
Hi, I just received a call from [facility] stating that this patient has a cough that is constant and is on antibiotics. I sent a message to dr. Rust but I know that he is currently in surgery. Please advise so we can let the OR know. Thank you!

## 2024-09-26 ENCOUNTER — TELEPHONE (OUTPATIENT)
Facility: LOCATION | Age: 79
End: 2024-09-26

## 2024-09-26 DIAGNOSIS — K80.00 CALCULUS OF GALLBLADDER WITH ACUTE CHOLECYSTITIS WITHOUT OBSTRUCTION: Primary | ICD-10-CM

## 2024-09-30 ENCOUNTER — PATIENT OUTREACH (OUTPATIENT)
Dept: CASE MANAGEMENT | Age: 79
End: 2024-09-30

## 2024-10-07 ENCOUNTER — NURSE TRIAGE (OUTPATIENT)
Dept: FAMILY MEDICINE CLINIC | Facility: CLINIC | Age: 79
End: 2024-10-07

## 2024-10-07 DIAGNOSIS — R19.7 DIARRHEA OF PRESUMED INFECTIOUS ORIGIN: Primary | ICD-10-CM

## 2024-10-07 NOTE — TELEPHONE ENCOUNTER
Reason for Disposition   MODERATE diarrhea (e.g., 4-6 times / day more than normal) and present > 48 hours (2 days)    Protocols used: Diarrhea-A-[place]    Action Requested: Summary for Provider     []  Critical Lab, Recommendations Needed  [] Need Additional Advice  []   FYI    []   Need Orders  [] Need Medications Sent to Pharmacy  []  Other     SUMMARY: LOV 9/23/24. Pt called. Stated she is on abx for URI. Also given vanco for hx of Cdiff. Pt stated since Friday has been having diarrhea. %x diarrhea yesterday and 2x today already. Stated it's not watery, but loose stool. Denies fever and abd pain. Some pain but also due to gallbladder. Surgery rescheduled 11/1/24. Appetite ok. Has blood with wiping due to hemorrhoids. Denies blood in stool. Saw [facility] last week, on Prednisone. States cough is much better. Seeing pulm again before surgery 10/29/24.     Reason for call: Diarrhea  Onset: Friday       Dr. Coley - Do you want to order cdiff testing? Order addl vanco course? Please advise. Do you want to see her this week?

## 2024-10-08 RX ORDER — CLOTRIMAZOLE 1 %
1 CREAM (GRAM) TOPICAL 2 TIMES DAILY
Qty: 60 G | Refills: 0 | Status: SHIPPED | OUTPATIENT
Start: 2024-10-08 | End: 2024-10-22

## 2024-10-08 NOTE — TELEPHONE ENCOUNTER
"08/22/2019      MEDICARE ANNUAL WELLNESS VISIT     Maria Fernanda Gaviria is a 89 y.o. female who presents for Medicare Wellness-Initial Visit (4 mo f/u); Hypertension; Hyperlipidemia; and Hypothyroidism      Patient's general assessment of her health since a year ago:     - Compared to one year ago, she feels her physical health is {JK SL BETTER/WORSE:52745}.    - Compared to one year ago, she feels her mental health is {JK SL BETTER/WORSE:16420}.      HPI for other active medical problems:     ***      * The required components of Health Risk Assessment (HRA) that were completed by the patient and/or my staff are contained within this note and in the scanned documents titled \"Health Risk Assessment\" within the media section of the patient's chart in [employer].       HISTORY    Recent Hospitalizations:    Recent hospitalization?:     If YES, location, date, and diagnoses:     · Location:   · Date:   · Principle Discharge Dx:   · Secondary Dx:       Patient Care Team:    Patient Care Team:  Henok Salcido MD as PCP - Christiano Harmon MD as Consulting Physician (Obstetrics and Gynecology)  Rodolfo Shah MD as Consulting Physician (Allergy)  Michael Mendoza MD as Consulting Physician (Dermatology)  Jluis Bain MD as Consulting Physician (Otolaryngology)  Lucas Rueda OD (Optometry)  Ousmane Rhodes MD as Consulting Physician (Gastroenterology)      Allergies:  Aspirin and Buffered aspirin    Medications:  Current Outpatient Medications   Medication Sig Dispense Refill   • acetaminophen (TYLENOL) 650 MG 8 hr tablet Take  by mouth daily.     • cetirizine (zyrTEC) 10 MG tablet Take 10 mg by mouth Daily.     • Cholecalciferol (VITAMIN D3) 2000 UNITS tablet Take  by mouth daily.     • Glucosamine-Chondroitin 250-200 MG tablet Take  by mouth.     • lansoprazole (PREVACID) 30 MG capsule TAKE 1 CAPSULE EVERY MORNING BEFORE BREAKFAST 90 capsule 3   • levothyroxine (SYNTHROID, LEVOTHROID) 50 MCG tablet TAKE 1 " Called pt. No answer. Left message to call back or read Dasher message.   TABLET EVERY MORNING 90 tablet 3   • losartan (COZAAR) 50 MG tablet Take 1 tablet by mouth Daily. 30 tablet 3   • Misc Natural Products (OSTEO BI-FLEX JOINT SHIELD PO) Take  by mouth.     • montelukast (SINGULAIR) 10 MG tablet Take 10 mg by mouth daily.     • Triamcinolone Acetonide (NASACORT) 55 MCG/ACT nasal inhaler 2 sprays into each nostril Daily.       No current facility-administered medications for this visit.         PFSH:     The following portions of the patient's history were reviewed and updated as appropriate: Allergies / Current Medications / Past Medical History / Surgical History / Social History / Family History    Problem List:  Patient Active Problem List   Diagnosis   • Osteoarthritis of lumbar spine   • Osteoarthritis   • Sciatic leg pain   • Asthma   • Stenosis of carotid artery   • Dyslipidemia   • Gastroesophageal reflux disease   • Hypertension   • Hypothyroidism   • Osteopenia   • Restless legs syndrome   • Thrombocytopenia (CMS/HCC)   • Altered mental status   • DNR (do not resuscitate)   • Non-seasonal allergic rhinitis       Past Medical History:  Past Medical History:   Diagnosis Date   • Arthritis    • Cancer (CMS/HCC)     skin   • Disease of thyroid gland    • GERD (gastroesophageal reflux disease)    • Hyperlipidemia    • Hypertension    • Osteoporosis        Past Surgical History:  Past Surgical History:   Procedure Laterality Date   • REPLACEMENT TOTAL KNEE Right    • THYROID SURGERY     • TUBAL ABDOMINAL LIGATION         Social History:  Social History     Socioeconomic History   • Marital status:      Spouse name: Not on file   • Number of children: Not on file   • Years of education: Not on file   • Highest education level: Not on file   Tobacco Use   • Smoking status: Former Smoker   • Smokeless tobacco: Never Used   Substance and Sexual Activity   • Alcohol use: Yes     Comment: wine at night   • Drug use: No   • Sexual activity: Defer       Family History:  Family  "History   Problem Relation Age of Onset   • Hypertension Mother    • Heart disease Mother    • Thyroid disease Mother    • Hypertension Father    • Cancer Father    • Heart disease Brother    • Hypertension Daughter    • Thyroid disease Daughter    • Lung disease Daughter    • Depression Daughter    • Migraines Daughter    • Arthritis Daughter    • Heart disease Paternal Grandmother    • Stroke Paternal Grandfather    • Kidney disease Paternal Grandfather          PATIENT ASSESSMENT    Vitals:  /76   Pulse 98   Temp 97.1 °F (36.2 °C)   Ht 149.9 cm (59\")   Wt 50.2 kg (110 lb 9.6 oz)   SpO2 95%   BMI 22.34 kg/m²   BP Readings from Last 3 Encounters:   08/22/19 126/76   07/17/19 136/70   07/09/19 159/87     Wt Readings from Last 3 Encounters:   08/22/19 50.2 kg (110 lb 9.6 oz)   07/17/19 51 kg (112 lb 6.4 oz)   07/06/19 52.1 kg (114 lb 14.4 oz)      Body mass index is 22.34 kg/m².    Pain Score    08/22/19 1043   PainSc:   2   PainLoc: Hip         Review of Systems:    Review of Systems  Constitutional neg  Resp neg  CV neg   GI neg   neg     Physical Exam:    Physical Exam  Constitutional  No distress  Cardiovascular Rate  normal . Rhythm: regular . Heart sounds:  normal  Pulmonary/Chest  Effort normal. Breath sounds:  normal  Psychiatric  Alert. Judgment and thought content normal. Mood normal     Reviewed Data:    Labs:   Lab Results   Component Value Date     07/17/2019    K 4.4 07/17/2019    CALCIUM 9.0 07/17/2019    AST 57 (H) 07/17/2019    ALT 51 (H) 07/17/2019    BUN 12 07/17/2019    CREATININE 0.78 07/17/2019    CREATININE 0.68 07/08/2019    CREATININE 0.75 07/07/2019    EGFRIFNONA 68 07/17/2019    EGFRIFAFRI 83 07/17/2019       Lab Results   Component Value Date    GLU 75 07/17/2019    GLU 93 04/09/2019    GLU 83 04/27/2018       Lab Results   Component Value Date     (H) 04/09/2019    LDL 97 05/17/2017     (H) 02/08/2016    HDL 54 04/09/2019    TRIG 132 04/09/2019    " "CHOLHDLRATIO 3.41 04/09/2019       Lab Results   Component Value Date    TSH 1.240 04/09/2019    FREET4 1.24 04/09/2019          Lab Results   Component Value Date    WBC 6.81 07/17/2019    HGB 14.1 07/17/2019    HGB 13.6 07/08/2019    HGB 12.0 07/07/2019     07/17/2019                 No results found for: PSA    Imaging:          Medical Tests:          Screening for Glaucoma:  Previous screening for glaucoma?: {LEVI MCKNIGHT YES/NO (default yes):40383::\"N/A\"}      Hearing Loss Screen:  Finger Rub Hearing Test (right ear): {Pass/Fail/Bord:83341::\"passed\"}  Finger Rub Hearing Test (left ear): {Pass/Fail/Bord:18991::\"passed\"}      Urinary Incontinence Screen:  Episodes of urinary incontinence? : {LEVI MCKNIGHT YES/NO (default no):65323}      Depression Screen:  PHQ-2/PHQ-9 Depression Screening 8/22/2019   Little interest or pleasure in doing things 0   Feeling down, depressed, or hopeless 0   Total Score 0        PHQ-2: {[facility] PHQ-2 SCORE:04133}    PHQ-9: {[facility] PHQ-9 SCORE:96494}       FUNCTIONAL, FALL RISK, & COGNITIVE SCREENING (Components below):    DATA:    Functional & Cognitive Status 8/22/2019   Do you have difficulty preparing food and eating? No   Do you have difficulty bathing yourself, getting dressed or grooming yourself? No   Do you have difficulty using the toilet? No   Do you have difficulty moving around from place to place? No   Do you have trouble with steps or getting out of a bed or a chair? No   Current Diet Well Balanced Diet   Dental Exam Up to date   Eye Exam Not up to date   Exercise (times per week) 7 times per week   Current Exercise Activities Include Walking   Do you need help using the phone?  No   Are you deaf or do you have serious difficulty hearing?  No   Do you need help with transportation? No   Do you need help shopping? No   Do you need help preparing meals?  No   Do you need help with housework?  No   Do you need help with laundry? No   Do you need help taking your medications? No   Do " "you need help managing money? No   Do you ever drive or ride in a car without wearing a seat belt? No   Do you have difficulty concentrating, remembering or making decisions? No         A) Assessment of Functional Ability:  (Assessment of ability to perform ADL's (showering/bathing, using toilet, dressing, feeding self, moving self around) and IADL's (use telephone, shop, prepare food, housekeep, do laundry, transport independently, take medications independently, and handle finances)    Degree of functional impairment: {severity:19690} (based on assessment noted above)      B) Assessment of Fall Risk:  Fall Risk Assessment was completed, and patient is at {LOW/MODERATE/HIGH:48316} risk for falls.       Need for further evaluation of gait, strength, and balance? : {LEVI MCKNIGHT YES/NO:99281}    Timed Up and Go (TUG):   (>= 12 seconds indicates high risk for falling)    Observable abnormalities included: {LEVI MCKNIGHT GAIT (Optional):40884}       C. Assessment of Cognitive Function:    Mini-Cog Test:     1) Registration (3 objects): {LEVI MCKNIGHT YES/NO:87935}   2) Number of objects recalled: {LEVI MCKNIGHT NUMBER 0-3:95770}   3) Clock Draw: Passed? : {LEVI SL YES/NO (default NA:83872::\"N/A\"}       Further evaluation required? : {JK SL YES/NO (default no):50464}      COUNSELING    A. Identification of Health Risk Factors:    Risk factors include: {LEVI MCKNIGHT AWV WELLNESS RISK FACTORS:41854}      B. Age-Appropriate Screening Schedule:  (Refer to the list below for future screening recommendations based on patient's age, sex and/or medical conditions. Orders for these recommended tests are listed in the plan section. The patient has been provided with a written plan)    Health Maintenance Topics  Health Maintenance   Topic Date Due   • INFLUENZA VACCINE  08/01/2019   • DXA SCAN  12/12/2019   • MAMMOGRAM  12/20/2019   • LIPID PANEL  04/09/2020   • TDAP/TD VACCINES (2 - Td) 09/18/2028   • PNEUMOCOCCAL VACCINES (65+ LOW/MEDIUM RISK)  Completed   • ZOSTER " VACCINE  Completed       Health Maintenance Topics Due or Over-Due  Health Maintenance Due   Topic Date Due   • INFLUENZA VACCINE  2019         C. Advanced Care Planning:    {Advanced Directive Status:48004}      D. Patient Self-Management and Personalized Health Advice:    She has been provided with personalized counseling/information (including brochures/handouts) about:     -- {LEVI MORA PERSONALIZED HEALTH ADVICE:52558}      She has been recommended for the following preventative services which has been performed today, will be ordered today or ordered/performed on upcoming follow-up visit:     -- {[facility] PREVENTATIVE SERVICES:40937}      E. Miscellaneous Items:    -Aspirin use counseling: {[facility] ASPIRIN COUNSELIN}    -Discussed BMI with her. The BMI {[facility] BMI:74292}    -Reviewed use of high risk medication in the elderly: YES    -Reviewed for potential of harmful drug interactions in the elderly: YES      IV. WRAP-UP    Assessment & Plan:  ***  1) MEDICARE ANNUAL WELLNESS VISIT    2) OTHER MEDICAL CONDITIONS ADDRESSED TODAY:              Problem List Items Addressed This Visit        High    Hypertension - Primary (Chronic)    Overview     Continue losartan 50 mg qd.          Relevant Medications    losartan (COZAAR) 50 MG tablet       Medium    Dyslipidemia (Chronic)    Current Assessment & Plan     Hold off atorvastatin due to prior acute hepatitis. Discussed risks vs benefits.          Hypothyroidism (Chronic)    Overview     Stable. Continue current dose of levothyroxine 50 mcg.         Relevant Medications    levothyroxine (SYNTHROID, LEVOTHROID) 50 MCG tablet                  No orders of the defined types were placed in this encounter.      Discussion / Summary:        Medications as of TODAY:              Current Outpatient Medications   Medication Sig Dispense Refill   • acetaminophen (TYLENOL) 650 MG 8 hr tablet Take  by mouth daily.     • cetirizine (zyrTEC) 10 MG tablet Take 10  mg by mouth Daily.     • Cholecalciferol (VITAMIN D3) 2000 UNITS tablet Take  by mouth daily.     • Glucosamine-Chondroitin 250-200 MG tablet Take  by mouth.     • lansoprazole (PREVACID) 30 MG capsule TAKE 1 CAPSULE EVERY MORNING BEFORE BREAKFAST 90 capsule 3   • levothyroxine (SYNTHROID, LEVOTHROID) 50 MCG tablet TAKE 1 TABLET EVERY MORNING 90 tablet 3   • losartan (COZAAR) 50 MG tablet Take 1 tablet by mouth Daily. 30 tablet 3   • Misc Natural Products (OSTEO BI-FLEX JOINT SHIELD PO) Take  by mouth.     • montelukast (SINGULAIR) 10 MG tablet Take 10 mg by mouth daily.     • Triamcinolone Acetonide (NASACORT) 55 MCG/ACT nasal inhaler 2 sprays into each nostril Daily.       No current facility-administered medications for this visit.          FOLLOW-UP:            No Follow-up on file.              Future Appointments   Date Time Provider Department Center   12/17/2019 10:15 AM [facility] None   12/17/2019 10:30 AM [facility] None   12/17/2019 11:00 AM Christiano Dewitt MD [facility] None         ______________________________________________________________________      A printed After Visit Summary (AVS) including the Personalized Prevention  Plan Services (PPPS) was given to the patient at check-out today.     Educational Handouts    Strong & Stable / Balance / Physical Activity / Pain / Depression /  Forgetfulness / Healthy Eating / Alcohol / Smoking /  Medicine / Sexuality / Scams     **Dragon Disclaimer:   Much of this encounter note is an electronic transcription/translation of spoken language to printed text. The electronic translation of spoken language may permit erroneous, or at times, nonsensical words or phrases to be inadvertently transcribed. Although I have reviewed the note for such errors, some may still exist.     This template was created by Clayton Salcido MD.

## 2024-10-08 NOTE — TELEPHONE ENCOUNTER
Patient called stating she is going to  stool specimen container from lab tomorrow. She is finishing last antibiotic and is feeling \"a tiny bit better\".   Patient states she has been taking Imodium daily for a week. Advised patient we typically recommend holding Imodium for a week prior to C Diff culture.     Dr Coley - ok to proceed with C Diff culture even thought patient has been taking Imodium for a week?

## 2024-10-08 NOTE — TELEPHONE ENCOUNTER
Lets order C diff testing for her to complete today, if remains positive will send in additional course of vancomycin.

## 2024-10-09 NOTE — TELEPHONE ENCOUNTER
MCM not read from yesterday still.     Attempted to call pt and alert her ok to proceed with stool testing. Left VM to call back so we can note that she is aware of the recommendations.

## 2024-10-09 NOTE — TELEPHONE ENCOUNTER
Pt returned call. Notified pt Dr. Coley states ok to still test for cdiff although she has been using Imodium. Pt stated she will go  supplies today. Agreeable to plan.

## 2024-10-14 ENCOUNTER — LAB ENCOUNTER (OUTPATIENT)
Dept: LAB | Facility: HOSPITAL | Age: 79
End: 2024-10-14
Attending: FAMILY MEDICINE
Payer: MEDICARE

## 2024-10-14 PROCEDURE — 87493 C DIFF AMPLIFIED PROBE: CPT

## 2024-10-15 ENCOUNTER — LAB ENCOUNTER (OUTPATIENT)
Dept: LAB | Age: 79
End: 2024-10-15
Attending: FAMILY MEDICINE
Payer: MEDICARE

## 2024-10-15 ENCOUNTER — OFFICE VISIT (OUTPATIENT)
Dept: FAMILY MEDICINE CLINIC | Facility: CLINIC | Age: 79
End: 2024-10-15
Payer: MEDICARE

## 2024-10-15 VITALS
OXYGEN SATURATION: 98 % | HEIGHT: 64 IN | DIASTOLIC BLOOD PRESSURE: 80 MMHG | HEART RATE: 68 BPM | WEIGHT: 146 LBS | BODY MASS INDEX: 24.92 KG/M2 | RESPIRATION RATE: 16 BRPM | SYSTOLIC BLOOD PRESSURE: 140 MMHG

## 2024-10-15 DIAGNOSIS — J44.1 COPD EXACERBATION (HCC): ICD-10-CM

## 2024-10-15 DIAGNOSIS — F32.1 CURRENT MODERATE EPISODE OF MAJOR DEPRESSIVE DISORDER WITHOUT PRIOR EPISODE (HCC): ICD-10-CM

## 2024-10-15 DIAGNOSIS — Z01.818 PRE-OP TESTING: ICD-10-CM

## 2024-10-15 DIAGNOSIS — R19.7 DIARRHEA OF PRESUMED INFECTIOUS ORIGIN: Primary | ICD-10-CM

## 2024-10-15 DIAGNOSIS — R19.7 DIARRHEA OF PRESUMED INFECTIOUS ORIGIN: ICD-10-CM

## 2024-10-15 DIAGNOSIS — N89.8 VAGINAL IRRITATION: ICD-10-CM

## 2024-10-15 LAB
APPEARANCE: CLEAR
BILIRUBIN: NEGATIVE
CHLORIDE SERPL-SCNC: 108 MMOL/L (ref 98–112)
CO2 SERPL-SCNC: 27 MMOL/L (ref 21–32)
ERYTHROCYTE [DISTWIDTH] IN BLOOD BY AUTOMATED COUNT: 15.1 %
GLUCOSE (URINE DIPSTICK): NEGATIVE MG/DL
HCT VFR BLD AUTO: 43.7 %
HGB BLD-MCNC: 14.1 G/DL
KETONES (URINE DIPSTICK): NEGATIVE MG/DL
LEUKOCYTES: NEGATIVE
MCH RBC QN AUTO: 30.1 PG (ref 26–34)
MCHC RBC AUTO-ENTMCNC: 32.3 G/DL (ref 31–37)
MCV RBC AUTO: 93.4 FL
MULTISTIX LOT#: NORMAL NUMERIC
NITRITE, URINE: NEGATIVE
OCCULT BLOOD: NEGATIVE
PH, URINE: 5.5 (ref 4.5–8)
PLATELET # BLD AUTO: 422 10(3)UL (ref 150–450)
POTASSIUM SERPL-SCNC: 4.4 MMOL/L (ref 3.5–5.1)
PROTEIN (URINE DIPSTICK): NEGATIVE MG/DL
RBC # BLD AUTO: 4.68 X10(6)UL
SODIUM SERPL-SCNC: 138 MMOL/L (ref 136–145)
SPECIFIC GRAVITY: 1.01 (ref 1–1.03)
URINE-COLOR: YELLOW
UROBILINOGEN,SEMI-QN: 0.2 MG/DL (ref 0–1.9)
WBC # BLD AUTO: 13.7 X10(3) UL (ref 4–11)

## 2024-10-15 PROCEDURE — 1159F MED LIST DOCD IN RCRD: CPT | Performed by: NURSE PRACTITIONER

## 2024-10-15 PROCEDURE — 87015 SPECIMEN INFECT AGNT CONCNTJ: CPT

## 2024-10-15 PROCEDURE — 87046 STOOL CULTR AEROBIC BACT EA: CPT

## 2024-10-15 PROCEDURE — 87045 FECES CULTURE AEROBIC BACT: CPT

## 2024-10-15 PROCEDURE — 87086 URINE CULTURE/COLONY COUNT: CPT | Performed by: NURSE PRACTITIONER

## 2024-10-15 PROCEDURE — 36415 COLL VENOUS BLD VENIPUNCTURE: CPT

## 2024-10-15 PROCEDURE — 81514 NFCT DS BV&VAGINITIS DNA ALG: CPT | Performed by: NURSE PRACTITIONER

## 2024-10-15 PROCEDURE — 87427 SHIGA-LIKE TOXIN AG IA: CPT

## 2024-10-15 PROCEDURE — 3077F SYST BP >= 140 MM HG: CPT | Performed by: NURSE PRACTITIONER

## 2024-10-15 PROCEDURE — 3008F BODY MASS INDEX DOCD: CPT | Performed by: NURSE PRACTITIONER

## 2024-10-15 PROCEDURE — 99214 OFFICE O/P EST MOD 30 MIN: CPT | Performed by: NURSE PRACTITIONER

## 2024-10-15 PROCEDURE — 81003 URINALYSIS AUTO W/O SCOPE: CPT | Performed by: NURSE PRACTITIONER

## 2024-10-15 PROCEDURE — 80051 ELECTROLYTE PANEL: CPT

## 2024-10-15 PROCEDURE — 3079F DIAST BP 80-89 MM HG: CPT | Performed by: NURSE PRACTITIONER

## 2024-10-15 PROCEDURE — 85027 COMPLETE CBC AUTOMATED: CPT

## 2024-10-15 RX ORDER — NYSTATIN 100000 U/G
1 CREAM TOPICAL AS NEEDED
Qty: 30 G | Refills: 0 | Status: SHIPPED | OUTPATIENT
Start: 2024-10-15 | End: 2024-10-25

## 2024-10-15 RX ORDER — METHYLPREDNISOLONE 4 MG/1
TABLET ORAL
Qty: 21 EACH | Refills: 0 | Status: SHIPPED | OUTPATIENT
Start: 2024-10-15

## 2024-10-15 RX ORDER — FLUCONAZOLE 150 MG/1
150 TABLET ORAL DAILY
Qty: 2 TABLET | Refills: 0 | Status: SHIPPED | OUTPATIENT
Start: 2024-10-15 | End: 2024-10-17

## 2024-10-15 RX ORDER — SERTRALINE HYDROCHLORIDE 25 MG/1
25 TABLET, FILM COATED ORAL DAILY
Qty: 30 TABLET | Refills: 0 | Status: SHIPPED | OUTPATIENT
Start: 2024-10-15 | End: 2024-11-14

## 2024-10-15 NOTE — PROGRESS NOTES
CHIEF COMPLAINT:     Chief Complaint   Patient presents with    Painful Urination       HPI:   Gabriela Ndiaye is a 79 year old female who presents with symptoms of UTI. Complaining of urinary frequency, urgency, dysuria for last month. Symptoms have been worsening since onset.   Associated symptoms: burning after urination and polyuria.  She denies hematuria. She admits diarrhea and blood in the stool from the rectum. She reports having this for a long time. Prep-H and this helps. She  admits back pain unrelated to the urinary symptoms. Denies flank pain, fever, hematuria, nausea, or vomiting.  Denies unusual vaginal discharge.     She has been using a topical estrogen for a few years. She has been using nystatin and clotrimazole cream for a couple months on the vulva. She did have itching on e week ago but started a probiotic and took a fluconazole pill, itching has stopped. She reports no longer using a pad to prevent.         Current Outpatient Medications   Medication Sig Dispense Refill    CLOTRIMAZOLE 1 % External Cream APPLY 1 APPLICATION TOPICALLY 2 (TWO) TIMES DAILY FOR 14 DAYS 60 g 0    losartan 25 MG Oral Tab Take 1 tablet (25 mg total) by mouth daily. 90 tablet 1    HYDROcodone-acetaminophen 5-325 MG Oral Tab Take 1 tablet by mouth 2 (two) times daily as needed for Pain.      ELMIRON 100 MG Oral Cap TAKE 1 CAPSULE BY MOUTH EVERY DAY 90 capsule 1    levothyroxine (SYNTHROID) 75 MCG Oral Tab Take 1 tablet (75 mcg total) by mouth daily. 30 tablet 3    benzonatate 200 MG Oral Cap Take 1 capsule (200 mg total) by mouth 3 (three) times daily as needed for cough. 30 capsule 2    pravastatin 10 MG Oral Tab Take 1 tablet (10 mg total) by mouth nightly. 90 tablet 1    DULoxetine 30 MG Oral Cap DR Particles Take 1 capsule (30 mg total) by mouth daily.      ESOMEPRAZOLE MAGNESIUM 20 MG Oral Capsule Delayed Release TAKE 1 CAPSULE BY MOUTH EVERY DAY IN THE MORNING BEFORE BREAKFAST 90 capsule 1    METOPROLOL  TARTRATE 50 MG Oral Tab TAKE 0.5 TABLETS BY MOUTH 2 TIMES DAILY. 90 tablet 1    ALPRAZOLAM 0.5 MG Oral Tab TAKE 1 TABLET BY MOUTH NIGHTLY SCHEDULED AND TAKE 1/2 TABLET BY MOUTH DAILY AS NEEDED FOR ANXIETY. 45 tablet 2    DICYCLOMINE 20 MG Oral Tab TAKE 1 TABLET BY MOUTH THREE TIMES A  tablet 0    GABAPENTIN 100 MG Oral Cap TAKE 1 CAPSULE BY MOUTH IN THE MORNING, 1-2 CAPSULES BY MOUTH IN THE AFTERNOON, AND 3 CAPSULES BY MOUTH IN THE EVENING DAILY (Patient taking differently: Take 3 capsules (300 mg total) by mouth nightly.) 180 capsule 5    ASPIRIN LOW DOSE 81 MG Oral Tab EC Take 1 tablet (81 mg total) by mouth daily.      estradiol 0.1 MG/GM Vaginal Cream Place 1 g vaginally daily. 42.5 g 5    furosemide 20 MG Oral Tab Take 1 tablet (20 mg total) by mouth 2 (two) times daily. 180 tablet 1    MONTELUKAST 10 MG Oral Tab TAKE 1 TABLET BY MOUTH EVERY DAY AT NIGHT 90 tablet 3    ticagrelor 90 MG Oral Tab Take 1 tablet (90 mg total) by mouth every 12 (twelve) hours.      busPIRone 10 MG Oral Tab Take 1 tablet (10 mg total) by mouth 2 (two) times daily. 180 tablet 1    Potassium Chloride ER 20 MEQ Oral Tab CR Take 1 tablet by mouth daily. 90 tablet 1    ACYCLOVIR 400 MG Oral Tab TAKE 1 TABLET BY MOUTH TWICE A  tablet 0    cilostazol 50 MG Oral Tab Take 1 tablet (50 mg total) by mouth 2 (two) times daily. 180 tablet 1    B Complex Vitamins (B COMPLEX-B12 OR) Take 1 tablet by mouth daily.      Turmeric 400 MG Oral Cap Take 1 capsule by mouth daily.      Ascorbic Acid (VITAMIN C) 1000 MG Oral Tab Take 1 tablet (1,000 mg total) by mouth daily.      omega-3 fatty acids 1000 MG Oral Cap Take 1,000 mg by mouth daily.      loratadine 10 MG Oral Tablet Dispersible Take 1 tablet (10 mg total) by mouth daily.      Capsicum, Cayenne, (CAYENNE PEPPER OR) Take 1 tablet by mouth daily.      Nebulizers (MODLOFT AEROSOL DELIVERY SYSTEM) Does not apply Misc Take 1 Bottle by mouth As Directed.      albuterol 108 (90 Base) MCG/ACT  Inhalation Aero Soln Inhale 2 puffs into the lungs every 6 (six) hours as needed for Wheezing or Shortness of Breath. 3 each 3    Respiratory Therapy Supplies (NEBULIZER/TUBING/MOUTHPIECE) Does not apply Kit Use as directed 1 each 0    TRELEGY ELLIPTA 100-62.5-25 MCG/ACT Inhalation Aerosol Powder, Breath Activated Inhale 1 puff into the lungs daily. 180 each 3    diclofenac 1 % External Gel Apply 2 g topically 4 (four) times daily. 100 g 5    nystatin 100,000 Units/g External Cream Apply topically as needed.      Vitamin D3, Cholecalciferol, 125 MCG (5000 UT) Oral Cap Take 1 capsule (5,000 Units total) by mouth daily.        Past Medical History:    Abdominal hernia    Anxiety    Arthritis    Back pain    Back problem    spinal stenosis    Bilateral pulmonary embolism (HCC)    Bloating    Cancer (HCC)    LUNG    COPD (chronic obstructive pulmonary disease) (HCC)    NO HOME O2    Coronary atherosclerosis    STENT X1    Diabetes (HCC)    DIET CONTROLLED    Diabetes mellitus (HCC)    Diarrhea, unspecified    Disorder of thyroid    Easy bruising    Esophageal reflux    Exposure to medical diagnostic radiation    last tx 11/2023    Feeling lonely    Flatulence/gas pain/belching    Food intolerance    Hearing impairment    PT DENIES, NO AIDS    Hearing loss    Hemorrhoids    High blood pressure    High cholesterol    History of blood transfusion    ABOUT 20 YEARS AGO, NO ADVERSE REACTION    History of eating disorder    Muscle weakness    Neuropathy    Night sweats    Osteoarthritis    Pain in joints    Pneumonia of both lungs due to infectious organism, unspecified part of lung    Pulmonary emboli (HCC)    Pulmonary embolism (HCC)    Renal disorder    CKD RECONCILLED FROM OUTSIDE PROBLEM LIST    RSV (acute bronchiolitis due to respiratory syncytial virus)    Shortness of breath    Stress    Thyroid disease    Uncomfortable fullness after meals    Visual impairment    GLASSES    Wears glasses      Social History:  Social  History     Socioeconomic History    Marital status:    Tobacco Use    Smoking status: Every Day     Current packs/day: 0.50     Average packs/day: 0.5 packs/day for 63.1 years (31.6 ttl pk-yrs)     Types: Cigarettes     Start date: 2/24/1964    Smokeless tobacco: Never   Vaping Use    Vaping status: Never Used   Substance and Sexual Activity    Alcohol use: Not Currently     Comment: RARE    Drug use: Never   Other Topics Concern    Caffeine Concern Yes     Comment: coffee/coca cola/Iced tea    Exercise No     Social Drivers of Health     Financial Resource Strain: Low Risk  (8/15/2023)    Financial Resource Strain     Difficulty of Paying Living Expenses: Not hard at all     Med Affordability: No   Food Insecurity: No Food Insecurity (8/15/2023)    Food Insecurity     Food Insecurity: Never true   Transportation Needs: Unmet Transportation Needs (8/15/2023)    Transportation Needs     Lack of Transportation: Yes   Physical Activity: Insufficiently Active (8/15/2023)    Exercise Vital Sign     Days of Exercise per Week: 2 days     Minutes of Exercise per Session: 30 min   Stress: No Stress Concern Present (8/15/2023)    Stress     Feeling of Stress : No   Social Connections: Socially Integrated (8/15/2023)    Social Connections     Frequency of Socialization with Friends and Family: 2   Housing Stability: Low Risk  (8/15/2023)    Housing Stability     Housing Instability: No         REVIEW OF SYSTEMS:   GENERAL: Denies fever, chills, or body aches  SKIN: no rashes, no skin wounds or ulcers.  CARDIOVASCULAR: denies chest pain or palpitations  LUNGS: denies shortness of breath, cough, or wheezing  GI: See HPI. No N/V/C/D.   : See HPI.  Musculo: No back pain     EXAM:   /80   Pulse 68   Resp 16   Ht 5' 4\" (1.626 m)   Wt 146 lb (66.2 kg)   SpO2 98%   BMI 25.06 kg/m²   GENERAL: well developed, well nourished,in no apparent distress  CARDIO: RRR, no murmurs  LUNGS: clear to ausculation bilaterally,  no wheezing or rhonchi  ABD: BS present x 4.  No tenderness to palpation, No hepatosplenomegaly   : no suprapubic tenderness, bladder distention, or CVA tenderness           ASSESSMENT AND PLAN:    Diagnoses and all orders for this visit:    Diarrhea of presumed infectious origin    Vaginal irritation  -     nystatin 100,000 Units/g External Cream; Apply 1 Application topically as needed.  -     Vaginitis Vaginosis PCR Panel; Future  -     fluconazole (DIFLUCAN) 150 MG Oral Tab; Take 1 tablet (150 mg total) by mouth daily for 2 days.    Current moderate episode of major depressive disorder without prior episode (HCC)  -     sertraline (ZOLOFT) 25 MG Oral Tab; Take 1 tablet (25 mg total) by mouth daily.    COPD exacerbation (HCC)  -     methylPREDNISolone (MEDROL) 4 MG Oral Tablet Therapy Pack; As directed.     - call if you are requiring quick reliever medications more than two times per week or more than two nights per month  -be sure to always have your quick reliever medications available in case of an attack.  -call your healthcare provider for worsening symptoms .  -stop smoking . For assistance with quiting call 1-800 quitnow. Avoid exposure to second hand smoke .  -go to ER  Immediately if you have severe dificulty breathing.

## 2024-10-16 LAB
BV BACTERIA DNA VAG QL NAA+PROBE: NEGATIVE
C GLABRATA DNA VAG QL NAA+PROBE: NEGATIVE
C KRUSEI DNA VAG QL NAA+PROBE: NEGATIVE
CANDIDA DNA VAG QL NAA+PROBE: NEGATIVE
T VAGINALIS DNA VAG QL NAA+PROBE: NEGATIVE

## 2024-10-18 LAB — C DIFF TOX B STL QL: NEGATIVE

## 2024-10-19 DIAGNOSIS — J44.9 CHRONIC OBSTRUCTIVE PULMONARY DISEASE, UNSPECIFIED COPD TYPE (HCC): ICD-10-CM

## 2024-10-19 RX ORDER — ALBUTEROL SULFATE 90 UG/1
2 INHALANT RESPIRATORY (INHALATION) EVERY 6 HOURS PRN
Qty: 25.5 EACH | Refills: 3 | Status: SHIPPED | OUTPATIENT
Start: 2024-10-19

## 2024-10-19 NOTE — TELEPHONE ENCOUNTER
Medication(s) to Refill:   Requested Prescriptions     Pending Prescriptions Disp Refills    ALBUTEROL 108 (90 Base) MCG/ACT Inhalation Aero Soln [Pharmacy Med Name: ALBUTEROL HFA (PROAIR) INHALER] 25.5 each 3     Sig: TAKE 2 PUFFS BY MOUTH EVERY 6 HOURS AS NEEDED FOR WHEEZE OR SHORTNESS OF BREATH         Reason for Medication Refill being sent to Provider / Reason Protocol Failed:  [] 90 day refill has already been granted  [] Blood Pressure out of range  [] Labs Abnormal/over due  [] Medication not previously prescribed by Provider  [] Non-Protocol Medication  [] Controlled Substance   [] Due for appointment- no future appointment scheduled  [] No Follow up specified      Last Time Medication was Filled:  10/25/23      Last Office Visit with PCP: 10/15/24    When Patient was Due Back to the Office:    (from when PCP last addressed condition)    Future Appointments:  Future Appointments   Date Time Provider Department Center   11/11/2024 12:45 PM Rick Bernabe APRN [facility]   1/6/2025  1:20 PM August Coley MD [facility]         Last Blood Pressures:  BP Readings from Last 2 Encounters:   10/15/24 140/80   09/23/24 122/66         Action taken:  [] Refill approved per protocol  [] Routing to provider for approval

## 2024-10-25 RX ORDER — DICYCLOMINE HCL 20 MG
20 TABLET ORAL 3 TIMES DAILY
Qty: 270 TABLET | Refills: 0 | Status: SHIPPED | OUTPATIENT
Start: 2024-10-25

## 2024-10-28 ENCOUNTER — PATIENT OUTREACH (OUTPATIENT)
Dept: CASE MANAGEMENT | Age: 79
End: 2024-10-28

## 2024-11-01 ENCOUNTER — APPOINTMENT (OUTPATIENT)
Dept: GENERAL RADIOLOGY | Facility: HOSPITAL | Age: 79
End: 2024-11-01
Attending: STUDENT IN AN ORGANIZED HEALTH CARE EDUCATION/TRAINING PROGRAM
Payer: MEDICARE

## 2024-11-01 ENCOUNTER — HOSPITAL ENCOUNTER (OUTPATIENT)
Facility: HOSPITAL | Age: 79
Setting detail: HOSPITAL OUTPATIENT SURGERY
Discharge: HOME | End: 2024-11-01
Attending: STUDENT IN AN ORGANIZED HEALTH CARE EDUCATION/TRAINING PROGRAM | Admitting: STUDENT IN AN ORGANIZED HEALTH CARE EDUCATION/TRAINING PROGRAM
Payer: MEDICARE

## 2024-11-01 ENCOUNTER — ANESTHESIA (OUTPATIENT)
Dept: PERIOP | Facility: HOSPITAL | Age: 79
End: 2024-11-01
Payer: MEDICARE

## 2024-11-01 ENCOUNTER — ANESTHESIA EVENT (OUTPATIENT)
Dept: PERIOP | Facility: HOSPITAL | Age: 79
End: 2024-11-01
Payer: MEDICARE

## 2024-11-01 VITALS
SYSTOLIC BLOOD PRESSURE: 96 MMHG | RESPIRATION RATE: 16 BRPM | TEMPERATURE: 98 F | BODY MASS INDEX: 25.78 KG/M2 | HEART RATE: 51 BPM | OXYGEN SATURATION: 94 % | HEIGHT: 64 IN | WEIGHT: 151 LBS | DIASTOLIC BLOOD PRESSURE: 49 MMHG

## 2024-11-01 DIAGNOSIS — Z01.818 PRE-OP TESTING: Primary | ICD-10-CM

## 2024-11-01 PROCEDURE — 82962 GLUCOSE BLOOD TEST: CPT

## 2024-11-01 RX ORDER — HYDROMORPHONE HYDROCHLORIDE 1 MG/ML
0.2 INJECTION, SOLUTION INTRAMUSCULAR; INTRAVENOUS; SUBCUTANEOUS EVERY 5 MIN PRN
Status: DISCONTINUED | OUTPATIENT
Start: 2024-11-01 | End: 2024-11-01

## 2024-11-01 RX ORDER — HYDROCODONE BITARTRATE AND ACETAMINOPHEN 5; 325 MG/1; MG/1
1 TABLET ORAL ONCE AS NEEDED
Status: DISCONTINUED | OUTPATIENT
Start: 2024-11-01 | End: 2024-11-01

## 2024-11-01 RX ORDER — HYDROMORPHONE HYDROCHLORIDE 1 MG/ML
0.6 INJECTION, SOLUTION INTRAMUSCULAR; INTRAVENOUS; SUBCUTANEOUS EVERY 5 MIN PRN
Status: DISCONTINUED | OUTPATIENT
Start: 2024-11-01 | End: 2024-11-01

## 2024-11-01 RX ORDER — NICOTINE POLACRILEX 4 MG
15 LOZENGE BUCCAL
Status: DISCONTINUED | OUTPATIENT
Start: 2024-11-01 | End: 2024-11-01

## 2024-11-01 RX ORDER — SODIUM CHLORIDE, SODIUM LACTATE, POTASSIUM CHLORIDE, CALCIUM CHLORIDE 600; 310; 30; 20 MG/100ML; MG/100ML; MG/100ML; MG/100ML
INJECTION, SOLUTION INTRAVENOUS CONTINUOUS
Status: DISCONTINUED | OUTPATIENT
Start: 2024-11-01 | End: 2024-11-01

## 2024-11-01 RX ORDER — METOCLOPRAMIDE HYDROCHLORIDE 5 MG/ML
10 INJECTION INTRAMUSCULAR; INTRAVENOUS EVERY 8 HOURS PRN
Status: DISCONTINUED | OUTPATIENT
Start: 2024-11-01 | End: 2024-11-01

## 2024-11-01 RX ORDER — NICOTINE POLACRILEX 4 MG
30 LOZENGE BUCCAL
Status: DISCONTINUED | OUTPATIENT
Start: 2024-11-01 | End: 2024-11-01

## 2024-11-01 RX ORDER — MIDAZOLAM HYDROCHLORIDE 1 MG/ML
1 INJECTION INTRAMUSCULAR; INTRAVENOUS EVERY 5 MIN PRN
Status: DISCONTINUED | OUTPATIENT
Start: 2024-11-01 | End: 2024-11-01

## 2024-11-01 RX ORDER — ACETAMINOPHEN 500 MG
1000 TABLET ORAL ONCE
Status: DISCONTINUED | OUTPATIENT
Start: 2024-11-01 | End: 2024-11-01

## 2024-11-01 RX ORDER — HYDROCODONE BITARTRATE AND ACETAMINOPHEN 5; 325 MG/1; MG/1
2 TABLET ORAL ONCE AS NEEDED
Status: DISCONTINUED | OUTPATIENT
Start: 2024-11-01 | End: 2024-11-01

## 2024-11-01 RX ORDER — HYDROMORPHONE HYDROCHLORIDE 1 MG/ML
0.4 INJECTION, SOLUTION INTRAMUSCULAR; INTRAVENOUS; SUBCUTANEOUS EVERY 5 MIN PRN
Status: DISCONTINUED | OUTPATIENT
Start: 2024-11-01 | End: 2024-11-01

## 2024-11-01 RX ORDER — NALOXONE HYDROCHLORIDE 0.4 MG/ML
0.08 INJECTION, SOLUTION INTRAMUSCULAR; INTRAVENOUS; SUBCUTANEOUS AS NEEDED
Status: DISCONTINUED | OUTPATIENT
Start: 2024-11-01 | End: 2024-11-01

## 2024-11-01 RX ORDER — MEPERIDINE HYDROCHLORIDE 25 MG/ML
12.5 INJECTION INTRAMUSCULAR; INTRAVENOUS; SUBCUTANEOUS AS NEEDED
Status: DISCONTINUED | OUTPATIENT
Start: 2024-11-01 | End: 2024-11-01

## 2024-11-01 RX ORDER — ACETAMINOPHEN 500 MG
1000 TABLET ORAL ONCE AS NEEDED
Status: DISCONTINUED | OUTPATIENT
Start: 2024-11-01 | End: 2024-11-01

## 2024-11-01 RX ORDER — ONDANSETRON 2 MG/ML
4 INJECTION INTRAMUSCULAR; INTRAVENOUS EVERY 6 HOURS PRN
Status: DISCONTINUED | OUTPATIENT
Start: 2024-11-01 | End: 2024-11-01

## 2024-11-01 RX ORDER — DEXTROSE MONOHYDRATE 25 G/50ML
50 INJECTION, SOLUTION INTRAVENOUS
Status: DISCONTINUED | OUTPATIENT
Start: 2024-11-01 | End: 2024-11-01

## 2024-11-01 NOTE — DISCHARGE INSTRUCTIONS
Post-Surgical Discharge Instructions    Burt Wyatt MD    Pain: You may take acetaminophen (Tylenol) up to 650 mg every 6 hours as needed for mild-moderate pain. You may take ibuprofen (Motrin) up to 600 mg every 6 hours as needed for mild-moderate pain. Take narcotic pain medication as needed for severe pain per your prescription. Do not drive while taking narcotic pain medication. Many patients take a stool softener as narcotics are known to cause constipation. Okay to use ice packs over abdomen    Diet:  No restrictions.    Activity:  No heavy lifting greater than 10 lbs and no exercising for a total of 6 weeks from the date of surgery.  You may walk and climb stairs with moderation. If your abdomen is more uncomfortable than the day before, you need to be less active as you may be pulling on your stitches.    Bathing:  You may shower as often as you would like, but no submerging the incisions under water for a total of 2 weeks from the date of surgery.  This includes no swimming, no baths, and no hot-tubs.      Wound:  Keep the wound dry.  No dressing is necessary unless there is drainage coming from the wound.  If the drainage is excessive or looks like pus, please call our office.    Driving: You may drive provided that you are no longer taking your narcotic pain medication.      Bowel Function:  After surgery, your bowel movements will be irregular.  It is normal to have up to 3 bowel movements a day or as low as 1 bowel movement every 3 days.  Occasionally, your movements may be even more irregular than this.  As long as you are not vomiting or have a fever over 100.3, you don’t need to be overly concerned.      Return to Work:  Most patients return to work after 1-2 weeks from the date of their surgery.  You will still have a lifting restriction of no greater than 10 lbs from the date of your surgery until 6 weeks.  If your work requires heavy lifting, you will need to stay off work for 6 weeks.  When  you are ready to return to work, please call the office and we will send a work release to your employer.      Appointment: Please call our office for an appointment in 10-14 days after surgery, unless otherwise instructed.  This will allow ample time for the swelling and soreness to resolve before your wound is examined. If you have fevers, chills, or if you are concerned about your wound, please call us immediately at [phone].      Thank you for entrusting us with your care.

## 2024-11-04 ENCOUNTER — NURSE TRIAGE (OUTPATIENT)
Dept: FAMILY MEDICINE CLINIC | Facility: CLINIC | Age: 79
End: 2024-11-04

## 2024-11-04 ENCOUNTER — TELEPHONE (OUTPATIENT)
Dept: FAMILY MEDICINE CLINIC | Facility: CLINIC | Age: 79
End: 2024-11-04

## 2024-11-04 LAB — GLUCOSE BLD-MCNC: 86 MG/DL (ref 70–99)

## 2024-11-04 NOTE — TELEPHONE ENCOUNTER
Spoke with patient, patient c/o weakness and diarrhea. Patient was instructed to go to UC for evaluation and possibly receive some fluids. Patient states understanding and did not have any further questions.

## 2024-11-04 NOTE — TELEPHONE ENCOUNTER
Action Requested: Summary for Provider     []  Critical Lab, Recommendations Needed  [] Need Additional Advice  []   FYI    []   Need Orders  [] Need Medications Sent to Pharmacy  []  Other     SUMMARY: appt today at 3 pm    Reason for call: Weakness  Onset: 2 weeks     Patient called stating she was to have surgery on Friday but did not however while she was waiting to have surgery she notes the nurse told her her blood pressure was low, she does not remember the number, she states she has a finger probe and her HR runs between 49-54, she complains of weakness, states this has been for about 2 weeks now, she also notes that she has dizziness and this has been going on for a couple of months and Dr. Coley is aware. Advised it would be best for her to be seen, appt open with Rick, patient agreeable.     Reason for Disposition   MODERATE dizziness (e.g., interferes with normal activities)  (Exception: Dizziness caused by heat exposure, sudden standing, or poor fluid intake.)    Protocols used: Dizziness-A-OH    Future Appointments   Date Time Provider Department Center   11/4/2024  3:00 PM Rick Bernabe APRN [facility]   11/11/2024 12:45 PM Rick Bernabe APRN [facility]   1/6/2025  1:20 PM August Coley MD [facility] 17 [facility]

## 2024-11-06 ENCOUNTER — TELEPHONE (OUTPATIENT)
Dept: FAMILY MEDICINE CLINIC | Facility: CLINIC | Age: 79
End: 2024-11-06

## 2024-11-06 DIAGNOSIS — F32.1 CURRENT MODERATE EPISODE OF MAJOR DEPRESSIVE DISORDER WITHOUT PRIOR EPISODE (HCC): ICD-10-CM

## 2024-11-06 RX ORDER — NYSTATIN 100000 U/G
CREAM TOPICAL
COMMUNITY
Start: 2024-10-15

## 2024-11-06 RX ORDER — EZETIMIBE 10 MG/1
10 TABLET ORAL DAILY
COMMUNITY
Start: 2024-09-26

## 2024-11-06 RX ORDER — SERTRALINE HYDROCHLORIDE 25 MG/1
25 TABLET, FILM COATED ORAL DAILY
Qty: 90 TABLET | Refills: 3 | Status: SHIPPED | OUTPATIENT
Start: 2024-11-06

## 2024-11-06 NOTE — TELEPHONE ENCOUNTER
[facility] #[id number] requesting Medication Refill for:  Sertraline HCL  25 MG Oral Tab   Quantity: 90  Take 1 tablet (25 mg total) by mouth daily.         LOV: 9/23/2024   Last Refill date: 10/15/24  Next Scheduled appointment: 11/7/2024

## 2024-11-07 ENCOUNTER — OFFICE VISIT (OUTPATIENT)
Dept: FAMILY MEDICINE CLINIC | Facility: CLINIC | Age: 79
End: 2024-11-07
Payer: MEDICARE

## 2024-11-07 ENCOUNTER — APPOINTMENT (OUTPATIENT)
Dept: FAMILY MEDICINE CLINIC | Facility: CLINIC | Age: 79
End: 2024-11-07
Payer: MEDICARE

## 2024-11-07 VITALS
HEIGHT: 64 IN | DIASTOLIC BLOOD PRESSURE: 72 MMHG | BODY MASS INDEX: 25.44 KG/M2 | RESPIRATION RATE: 18 BRPM | SYSTOLIC BLOOD PRESSURE: 108 MMHG | HEART RATE: 60 BPM | WEIGHT: 149 LBS | OXYGEN SATURATION: 95 %

## 2024-11-07 DIAGNOSIS — I25.10 CORONARY ARTERY DISEASE INVOLVING NATIVE CORONARY ARTERY OF NATIVE HEART WITHOUT ANGINA PECTORIS: ICD-10-CM

## 2024-11-07 DIAGNOSIS — E11.42 TYPE 2 DIABETES MELLITUS WITH DIABETIC POLYNEUROPATHY, WITHOUT LONG-TERM CURRENT USE OF INSULIN (HCC): ICD-10-CM

## 2024-11-07 DIAGNOSIS — I10 PRIMARY HYPERTENSION: Primary | ICD-10-CM

## 2024-11-07 DIAGNOSIS — C34.11 CANCER OF BRONCHUS OF RIGHT UPPER LOBE (HCC): ICD-10-CM

## 2024-11-07 DIAGNOSIS — N18.31 STAGE 3A CHRONIC KIDNEY DISEASE (HCC): ICD-10-CM

## 2024-11-07 DIAGNOSIS — Z78.0 POSTMENOPAUSAL STATE: ICD-10-CM

## 2024-11-07 DIAGNOSIS — M54.16 LUMBAR RADICULOPATHY: ICD-10-CM

## 2024-11-07 PROCEDURE — 3074F SYST BP LT 130 MM HG: CPT | Performed by: FAMILY MEDICINE

## 2024-11-07 PROCEDURE — 99214 OFFICE O/P EST MOD 30 MIN: CPT | Performed by: FAMILY MEDICINE

## 2024-11-07 PROCEDURE — 3008F BODY MASS INDEX DOCD: CPT | Performed by: FAMILY MEDICINE

## 2024-11-07 PROCEDURE — 3078F DIAST BP <80 MM HG: CPT | Performed by: FAMILY MEDICINE

## 2024-11-07 PROCEDURE — 1111F DSCHRG MED/CURRENT MED MERGE: CPT | Performed by: FAMILY MEDICINE

## 2024-11-07 PROCEDURE — 92229 IMG RTA DETC/MNTR DS POC ALY: CPT | Performed by: FAMILY MEDICINE

## 2024-11-07 NOTE — PATIENT INSTRUCTIONS
Stop losartan. Monitor blood pressure. Continue metoprolol   Follow up with cardiologist  Start Metamucil fiber supplementation. 1 scoop daily.  Follow up with surgeon for gall bladder surgery.

## 2024-11-08 ENCOUNTER — TELEPHONE (OUTPATIENT)
Facility: LOCATION | Age: 79
End: 2024-11-08

## 2024-11-08 DIAGNOSIS — K80.10 CALCULUS OF GALLBLADDER WITH CHOLECYSTITIS WITHOUT BILIARY OBSTRUCTION, UNSPECIFIED CHOLECYSTITIS ACUITY: Primary | ICD-10-CM

## 2024-11-08 NOTE — TELEPHONE ENCOUNTER
RADHA SHARMA Patient  Member ID  [id number]    Date of Birth  1945-07-07    Gender  Female    Eligibility Status  Active Coverage    Group Number  [id number]    Plan / Coverage Date  2024-01-01    Transaction Type  Outpatient Authorization/Referral    Organization  [facility]    Payer  [employer]    [employer] logo     Certificate Information  Reference Number  NA    Status  NO ACTION REQUIRED    [employer] Record Number  [id number]    Message  For Member contracts which cover this service, no prior authorization is necessary. Please contact [employer] at the number on the back of the Member ID card.  Member Information  Patient Name  RADHA SHARMA    Patient Date of Birth  1945-07-07    Patient Gender  Female    Member ID  [id number]    Relationship to Subscriber  Self    Subscriber Name  RADHA SHARMA    Requesting Provider     Name  HASMUKH SANDRA  [id number]    Specialty  [id number]    Provider Role  Provider    Address  [address] [place] [id number]    Phone  [phone]    Fax  [phone]    Contact Name  ALYSSA WARD    Service Information  Service Type  2 - Surgical    Place of Service  22 - [facility]    Service From - To Date  2024-11-22 - 2024-12-31    Quantity  1 Units  Procedure Code 1 (CPT/HCPCS)  20975 - LAPARO CHOLECYSTECTOMY/GRAPH    Quantity  1 Units

## 2024-11-12 ENCOUNTER — TELEPHONE (OUTPATIENT)
Dept: FAMILY MEDICINE CLINIC | Facility: CLINIC | Age: 79
End: 2024-11-12

## 2024-11-12 NOTE — TELEPHONE ENCOUNTER
Patient was seen 5 days ago and advised to adjust medication due to low BP.  Stop losartan. Monitor blood pressure. Continue metoprolol   Follow up with cardiologist    Patient saw Cards/Dr Connell. Patient's BP at visit was 158/88 and he stated patient's BP was better while on Losartan. He did not recommend adjusting medication but deferred back to PCP.  Patient has surgery scheduled on 11/22, has BP check on 11/21.     She is wondering if she needs to be seen sooner for BP check, if she needs to see provider again, or if she needs further adjustment to BP meds.

## 2024-11-13 RX ORDER — LOSARTAN POTASSIUM 25 MG/1
25 TABLET ORAL DAILY
COMMUNITY

## 2024-11-13 NOTE — TELEPHONE ENCOUNTER
Patient called back stating she called yesterday and no one call back.   Current B/P 168/86  Advised on instructions to start losartan 12.5mg     Patient is still getting when she stands up, cardiologist is aware  Upcoming surgery 11/22    Patient asking if you can talk to her cardiologist regarding this.     Educated on standing up slowly to prevent dizziness.   Denies lightheadedness, chest pain

## 2024-11-13 NOTE — TELEPHONE ENCOUNTER
Ok to start losartan 12.5mg daily. Can cut 25mg losartan tablet in half - take daily. Keep BP check on 11/21

## 2024-11-14 ENCOUNTER — TELEPHONE (OUTPATIENT)
Facility: LOCATION | Age: 79
End: 2024-11-14

## 2024-11-14 ENCOUNTER — LABORATORY ENCOUNTER (OUTPATIENT)
Dept: LAB | Age: 79
End: 2024-11-14
Attending: STUDENT IN AN ORGANIZED HEALTH CARE EDUCATION/TRAINING PROGRAM
Payer: MEDICARE

## 2024-11-14 DIAGNOSIS — K80.20 CALCULUS OF GALLBLADDER: ICD-10-CM

## 2024-11-14 LAB
ALBUMIN SERPL-MCNC: 3.8 G/DL (ref 3.2–4.8)
ALBUMIN/GLOB SERPL: 1.2 {RATIO} (ref 1–2)
ALP LIVER SERPL-CCNC: 57 U/L
ALT SERPL-CCNC: <7 U/L
ANION GAP SERPL CALC-SCNC: 6 MMOL/L (ref 0–18)
AST SERPL-CCNC: 12 U/L (ref ?–34)
BILIRUB SERPL-MCNC: 0.3 MG/DL (ref 0.2–1.1)
BUN BLD-MCNC: 13 MG/DL (ref 9–23)
CALCIUM BLD-MCNC: 9.7 MG/DL (ref 8.7–10.4)
CHLORIDE SERPL-SCNC: 105 MMOL/L (ref 98–112)
CO2 SERPL-SCNC: 28 MMOL/L (ref 21–32)
CREAT BLD-MCNC: 1.12 MG/DL
EGFRCR SERPLBLD CKD-EPI 2021: 49 ML/MIN/1.73M2 (ref 60–?)
ERYTHROCYTE [DISTWIDTH] IN BLOOD BY AUTOMATED COUNT: 14.7 %
FASTING STATUS PATIENT QL REPORTED: YES
GLOBULIN PLAS-MCNC: 3.2 G/DL (ref 2–3.5)
GLUCOSE BLD-MCNC: 97 MG/DL (ref 70–99)
HCT VFR BLD AUTO: 42.5 %
HGB BLD-MCNC: 13.3 G/DL
MCH RBC QN AUTO: 30.2 PG (ref 26–34)
MCHC RBC AUTO-ENTMCNC: 31.3 G/DL (ref 31–37)
MCV RBC AUTO: 96.6 FL
OSMOLALITY SERPL CALC.SUM OF ELEC: 288 MOSM/KG (ref 275–295)
PLATELET # BLD AUTO: 418 10(3)UL (ref 150–450)
POTASSIUM SERPL-SCNC: 3.9 MMOL/L (ref 3.5–5.1)
PROT SERPL-MCNC: 7 G/DL (ref 5.7–8.2)
RBC # BLD AUTO: 4.4 X10(6)UL
SODIUM SERPL-SCNC: 139 MMOL/L (ref 136–145)
WBC # BLD AUTO: 9.2 X10(3) UL (ref 4–11)

## 2024-11-14 PROCEDURE — 80053 COMPREHEN METABOLIC PANEL: CPT

## 2024-11-14 PROCEDURE — 36415 COLL VENOUS BLD VENIPUNCTURE: CPT

## 2024-11-14 PROCEDURE — 85027 COMPLETE CBC AUTOMATED: CPT

## 2024-11-14 NOTE — TELEPHONE ENCOUNTER
Received fax for cardiac clearance. Patient advised to stop Brilinta indefinitely. Ok to stop Aspirin for 1 week total including pre and post op if absolutely necessary. Faxed to Pre admission testing.

## 2024-11-18 DIAGNOSIS — N89.8 OTHER SPECIFIED NONINFLAMMATORY DISORDERS OF VAGINA: ICD-10-CM

## 2024-11-18 DIAGNOSIS — B35.4 TINEA CORPORIS: ICD-10-CM

## 2024-11-18 RX ORDER — NYSTATIN 100000 U/G
1 CREAM TOPICAL 2 TIMES DAILY
Qty: 30 G | Refills: 2 | Status: SHIPPED | OUTPATIENT
Start: 2024-11-18 | End: 2024-11-28

## 2024-11-18 RX ORDER — NYSTATIN 100000 U/G
CREAM TOPICAL
Qty: 30 G | Refills: 0 | OUTPATIENT
Start: 2024-11-18

## 2024-11-18 NOTE — TELEPHONE ENCOUNTER
Pt called requesting update on Rx request. Stating she will be having surgery on 11/22/24 and wants to be able to  Rx prior to surgery as she won't be able to  after.      Per pt this really does help with the burning.   Please advice

## 2024-11-19 ENCOUNTER — MED REC SCAN ONLY (OUTPATIENT)
Dept: FAMILY MEDICINE CLINIC | Facility: CLINIC | Age: 79
End: 2024-11-19

## 2024-11-20 ENCOUNTER — OFFICE VISIT (OUTPATIENT)
Dept: SURGERY | Facility: CLINIC | Age: 79
End: 2024-11-20
Payer: MEDICARE

## 2024-11-20 VITALS — RESPIRATION RATE: 18 BRPM | TEMPERATURE: 97 F | OXYGEN SATURATION: 98 % | HEART RATE: 57 BPM

## 2024-11-20 DIAGNOSIS — K80.20 SYMPTOMATIC CHOLELITHIASIS: Primary | ICD-10-CM

## 2024-11-20 PROCEDURE — 1159F MED LIST DOCD IN RCRD: CPT | Performed by: STUDENT IN AN ORGANIZED HEALTH CARE EDUCATION/TRAINING PROGRAM

## 2024-11-20 PROCEDURE — 1160F RVW MEDS BY RX/DR IN RCRD: CPT | Performed by: STUDENT IN AN ORGANIZED HEALTH CARE EDUCATION/TRAINING PROGRAM

## 2024-11-20 PROCEDURE — 99202 OFFICE O/P NEW SF 15 MIN: CPT | Performed by: STUDENT IN AN ORGANIZED HEALTH CARE EDUCATION/TRAINING PROGRAM

## 2024-11-21 NOTE — H&P
Patient ID: Gabriela Ndiaye is a 79 year old female.    Chief Complaint   Patient presents with    New Patient     NP- Pre-op 11/22 LAPAROSCOPIC CHOLECYSTECTOMY POSSIBLE OPEN WITH CHOLANGIOGRAM (C-ARM)       HPI: Gabriela Ndiaye is a 79 year old female presents to clinic for evaluation.  Patient was first seen in the outpatient setting by Dr. Wyatt in March of this year.  She was in the hospital for right upper quadrant abdominal pain.  She was found to have cholelithiasis without any signs of acute cholecystitis on ultrasound imaging.  She was set up for elective cholecystectomy but needed medical and cardiac clearance.  She did not pass the cardiac clearance and underwent multiple testing.  Patient has since been cleared medically as well as by cardiology for surgery in December.  However, patient is having worsening abdominal pain and is hoping to have surgery sooner.  Today, patient reports some improvement in her right upper quadrant pain.  Last episode of tenderness was approximately 1 week ago.  She continues to have abdominal bloating but denies any nausea or vomiting.  She denies any fevers or chills.    She has a significant medical history for CAD and underwent recent stent placement.  She was on Brilinta but this has been stopped.    Workup:   3/19/2024 abdominal ultrasound  FINDINGS:       Multiple hepatic cysts, largest measuring 1.3 cm.     Common bile duct normal caliber for age, 7 mm.  Sludge and stones in the gallbladder, including sludge in a mobile stone gallbladder neck.  No Pitts sign.  Gallbladder shows no thickening of the wall, 2.2 mm thickness, and there is no dilation of the  gallbladder lumen.     Right kidney 9.5 cm, left kidney 9.6 cm.  Cortical thinning and cysts involve both kidneys.     No pancreatic abnormalities are seen.     Complex cyst involving the spleen, 5.4 x 5.0 x 8.4 cm.  The cyst has either some layering debris or mural thickening.  The spleen itself is not enlarged,  8.0 x 4.4 cm.     Patent aorta and IVC, no signs of ascites or pleural effusion.    Impression   CONCLUSION:  Multiple abnormalities including hepatic and renal cysts, gallstones and sludge without gallbladder dilation or biliary ductal dilation, cortical thinning of the kidneys, and a complex cyst involving the spleen.  The cyst was described on  previous CT report and may be chronic.  No ascites.         Past Medical History  Past Medical History:    Abdominal hernia    Anxiety    Arthritis    Back pain    Back problem    spinal stenosis    Bilateral pulmonary embolism (HCC)    Bloating    Cancer (HCC)    LUNG    COPD (chronic obstructive pulmonary disease) (HCC)    NO HOME O2    Coronary atherosclerosis    STENT X1    Diabetes (HCC)    DIET CONTROLLED    Diabetes mellitus (HCC)    Diarrhea, unspecified    Disorder of thyroid    Easy bruising    Esophageal reflux    Exposure to medical diagnostic radiation    last tx 11/2023    Feeling lonely    Flatulence/gas pain/belching    Food intolerance    Hearing impairment    PT DENIES, NO AIDS    Hearing loss    Hemorrhoids    High blood pressure    High cholesterol    History of blood transfusion    ABOUT 20 YEARS AGO, NO ADVERSE REACTION    History of eating disorder    Muscle weakness    Neuropathy    Night sweats    Osteoarthritis    Pain in joints    Pneumonia of both lungs due to infectious organism, unspecified part of lung    Pulmonary emboli (HCC)    Pulmonary embolism (HCC)    Renal disorder    CKD RECONCILLED FROM OUTSIDE PROBLEM LIST    RSV (acute bronchiolitis due to respiratory syncytial virus)    Shortness of breath    Stress    Thyroid disease    Uncomfortable fullness after meals    Visual impairment    GLASSES    Wears glasses       Past Surgical History  Past Surgical History:   Procedure Laterality Date    Amputation finger/thumb+flaps      Colectomy      Colonoscopy      Colonoscopy N/A 1/15/2024    Procedure: COLONOSCOPY;  Surgeon: Tirso Kimball MD;   Location:  ENDOSCOPY    Hysterectomy         Medications  Current Outpatient Medications   Medication Sig Dispense Refill    nystatin 100,000 Units/g External Cream Apply 1 Application topically 2 (two) times daily for 10 days. 30 g 2    losartan 25 MG Oral Tab Take 1 tablet (25 mg total) by mouth daily.      ezetimibe 10 MG Oral Tab Take 1 tablet (10 mg total) by mouth daily.      nystatin 100,000 Units/g External Cream APPLY TOPICALLY 1 APPLICATION AS NEEDED      sertraline (ZOLOFT) 25 MG Oral Tab Take 1 tablet (25 mg total) by mouth daily. (Patient taking differently: Take 1 tablet (25 mg total) by mouth daily. Takes 1/2 tablet) 90 tablet 3    DICYCLOMINE 20 MG Oral Tab TAKE 1 TABLET BY MOUTH THREE TIMES A DAY (Patient taking differently: Take 1 tablet (20 mg total) by mouth as needed.) 270 tablet 0    ALBUTEROL 108 (90 Base) MCG/ACT Inhalation Aero Soln TAKE 2 PUFFS BY MOUTH EVERY 6 HOURS AS NEEDED FOR WHEEZE OR SHORTNESS OF BREATH 25.5 each 3    HYDROcodone-acetaminophen 5-325 MG Oral Tab Take 1 tablet by mouth 2 (two) times daily as needed for Pain.      ELMIRON 100 MG Oral Cap TAKE 1 CAPSULE BY MOUTH EVERY DAY 90 capsule 1    levothyroxine (SYNTHROID) 75 MCG Oral Tab Take 1 tablet (75 mcg total) by mouth daily. 30 tablet 3    benzonatate 200 MG Oral Cap Take 1 capsule (200 mg total) by mouth 3 (three) times daily as needed for cough. 30 capsule 2    pravastatin 10 MG Oral Tab Take 1 tablet (10 mg total) by mouth nightly. 90 tablet 1    ESOMEPRAZOLE MAGNESIUM 20 MG Oral Capsule Delayed Release TAKE 1 CAPSULE BY MOUTH EVERY DAY IN THE MORNING BEFORE BREAKFAST (Patient taking differently: Take 2 capsules (40 mg total) by mouth before breakfast.) 90 capsule 1    METOPROLOL TARTRATE 50 MG Oral Tab TAKE 0.5 TABLETS BY MOUTH 2 TIMES DAILY. 90 tablet 1    ALPRAZOLAM 0.5 MG Oral Tab TAKE 1 TABLET BY MOUTH NIGHTLY SCHEDULED AND TAKE 1/2 TABLET BY MOUTH DAILY AS NEEDED FOR ANXIETY. 45 tablet 2    GABAPENTIN 100 MG  Oral Cap TAKE 1 CAPSULE BY MOUTH IN THE MORNING, 1-2 CAPSULES BY MOUTH IN THE AFTERNOON, AND 3 CAPSULES BY MOUTH IN THE EVENING DAILY (Patient taking differently: Take 3 capsules (300 mg total) by mouth nightly.) 180 capsule 5    ASPIRIN LOW DOSE 81 MG Oral Tab EC Take 1 tablet (81 mg total) by mouth daily.      estradiol 0.1 MG/GM Vaginal Cream Place 1 g vaginally daily. (Patient taking differently: Place 1 g vaginally twice a week.) 42.5 g 5    furosemide 20 MG Oral Tab Take 1 tablet (20 mg total) by mouth 2 (two) times daily. 180 tablet 1    MONTELUKAST 10 MG Oral Tab TAKE 1 TABLET BY MOUTH EVERY DAY AT NIGHT 90 tablet 3    Potassium Chloride ER 20 MEQ Oral Tab CR Take 1 tablet by mouth daily. 90 tablet 1    ACYCLOVIR 400 MG Oral Tab TAKE 1 TABLET BY MOUTH TWICE A  tablet 0    B Complex Vitamins (B COMPLEX-B12 OR) Take 1 tablet by mouth daily.      Turmeric 400 MG Oral Cap Take 1 capsule by mouth daily.      Ascorbic Acid (VITAMIN C) 1000 MG Oral Tab Take 1 tablet (1,000 mg total) by mouth daily.      omega-3 fatty acids 1000 MG Oral Cap Take 1,000 mg by mouth daily.      loratadine 10 MG Oral Tablet Dispersible Take 1 tablet (10 mg total) by mouth daily.      Capsicum, Cayenne, (CAYENNE PEPPER OR) Take 1 tablet by mouth daily.      Nebulizers (Actifi AEROSOL DELIVERY SYSTEM) Does not apply Misc Take 1 Bottle by mouth As Directed.      Respiratory Therapy Supplies (NEBULIZER/TUBING/MOUTHPIECE) Does not apply Kit Use as directed 1 each 0    TRELEGY ELLIPTA 100-62.5-25 MCG/ACT Inhalation Aerosol Powder, Breath Activated Inhale 1 puff into the lungs daily. 180 each 3    diclofenac 1 % External Gel Apply 2 g topically 4 (four) times daily. 100 g 5    Vitamin D3, Cholecalciferol, 125 MCG (5000 UT) Oral Cap Take 1 capsule (5,000 Units total) by mouth daily.         Allergies  Allergies[1]    Social History  History   Smoking Status    Every Day    Types: Cigarettes   Smokeless Tobacco    Never     History    Alcohol Use Not Currently     Comment: RARE     History   Drug Use Unknown       Family History  Family History   Problem Relation Age of Onset    Heart Disorder Father     Stroke Father     Uterine Cancer Mother     Diabetes Mother     Cancer Mother         colon    Heart Attack Daughter     Diabetes Daughter     Colon Polyps Daughter     Colon Polyps Son        Review of Systems  Review of Systems   Constitutional: Negative.    Respiratory: Negative.     Cardiovascular: Negative.    Gastrointestinal:  Positive for abdominal distention, abdominal pain and diarrhea. Negative for constipation, nausea and vomiting.       Exam  Vitals:    11/20/24 1011   Pulse: 57   Resp: 18   Temp: 97.4 °F (36.3 °C)     Physical Exam  Constitutional:       Appearance: Normal appearance.   Cardiovascular:      Rate and Rhythm: Normal rate.   Pulmonary:      Effort: Pulmonary effort is normal.   Abdominal:      General: Abdomen is flat. There is no distension.      Palpations: Abdomen is soft.      Tenderness: There is no abdominal tenderness.   Skin:     General: Skin is warm and dry.   Neurological:      Mental Status: She is alert and oriented to person, place, and time.           Assessment/Plan  Assessment   Problem List Items Addressed This Visit          Gastrointestinal and Abdominal    Symptomatic cholelithiasis - Primary       Gabriela Ndiaye is a 79 year old female with symptomatic cholelithiasis    Ultrasound images reviewed personally by me and with the patient  Ultrasound shows marked cholelithiasis of the gallbladder  On physical exam, patient is nontender but began to have right upper quadrant pain during interview  She has been cleared by medicine and cardiology for surgery  I recommend proceeding with cholecystectomy  We will plan for laparoscopic cholecystectomy with intraoperative cholangiogram  Procedure explained to the patient  Risks including bleeding, pain, infection, bile leak, injury to the common bile duct,  and need for further intervention all discussed with the patient  Postoperative restrictions also discussed, these include no heavy lifting greater than 15 to 20 pounds for 4-6 weeks  Patient acknowledged understanding and wishes to proceed    If she has any questions or concerns she is to contact the office      Osiris Castro MD  General Surgery  [facility]     CC:  August Coley MD         [1]   Allergies  Allergen Reactions    Keflex [Cephalexin] HIVES    Rosuvastatin MYALGIA    Cephalosporins NAUSEA ONLY     NAUSEA ONLY WHEN TAKEN ON AN EMPTY STOMACH, OTHERWISE CAN TAKE

## 2024-11-22 ENCOUNTER — ANESTHESIA (OUTPATIENT)
Dept: SURGERY | Facility: HOSPITAL | Age: 79
End: 2024-11-22
Payer: MEDICARE

## 2024-11-22 ENCOUNTER — HOSPITAL ENCOUNTER (INPATIENT)
Facility: HOSPITAL | Age: 79
LOS: 1 days | Discharge: HOME | End: 2024-11-23
Attending: STUDENT IN AN ORGANIZED HEALTH CARE EDUCATION/TRAINING PROGRAM | Admitting: STUDENT IN AN ORGANIZED HEALTH CARE EDUCATION/TRAINING PROGRAM
Payer: MEDICARE

## 2024-11-22 ENCOUNTER — ANESTHESIA EVENT (OUTPATIENT)
Dept: SURGERY | Facility: HOSPITAL | Age: 79
End: 2024-11-22
Payer: MEDICARE

## 2024-11-22 ENCOUNTER — APPOINTMENT (OUTPATIENT)
Dept: GENERAL RADIOLOGY | Facility: HOSPITAL | Age: 79
End: 2024-11-22
Attending: STUDENT IN AN ORGANIZED HEALTH CARE EDUCATION/TRAINING PROGRAM
Payer: MEDICARE

## 2024-11-22 ENCOUNTER — HOSPITAL ENCOUNTER (OUTPATIENT)
Facility: HOSPITAL | Age: 79
Discharge: HOME | End: 2024-11-23
Attending: STUDENT IN AN ORGANIZED HEALTH CARE EDUCATION/TRAINING PROGRAM | Admitting: STUDENT IN AN ORGANIZED HEALTH CARE EDUCATION/TRAINING PROGRAM
Payer: MEDICARE

## 2024-11-22 DIAGNOSIS — K80.20 SYMPTOMATIC CHOLELITHIASIS: Primary | ICD-10-CM

## 2024-11-22 DIAGNOSIS — K80.10 CALCULUS OF GALLBLADDER WITH CHOLECYSTITIS WITHOUT BILIARY OBSTRUCTION, UNSPECIFIED CHOLECYSTITIS ACUITY: ICD-10-CM

## 2024-11-22 DIAGNOSIS — K80.20 CALCULUS OF GALLBLADDER: ICD-10-CM

## 2024-11-22 DIAGNOSIS — G89.18 POST-OPERATIVE PAIN: ICD-10-CM

## 2024-11-22 LAB
GLUCOSE BLD-MCNC: 115 MG/DL (ref 70–99)
GLUCOSE BLD-MCNC: 139 MG/DL (ref 70–99)
GLUCOSE BLD-MCNC: 249 MG/DL (ref 70–99)

## 2024-11-22 PROCEDURE — 0FT44ZZ RESECTION OF GALLBLADDER, PERCUTANEOUS ENDOSCOPIC APPROACH: ICD-10-PCS | Performed by: STUDENT IN AN ORGANIZED HEALTH CARE EDUCATION/TRAINING PROGRAM

## 2024-11-22 PROCEDURE — 3074F SYST BP LT 130 MM HG: CPT | Performed by: HOSPITALIST

## 2024-11-22 PROCEDURE — 99215 OFFICE O/P EST HI 40 MIN: CPT | Performed by: HOSPITALIST

## 2024-11-22 PROCEDURE — 3078F DIAST BP <80 MM HG: CPT | Performed by: HOSPITALIST

## 2024-11-22 PROCEDURE — 3008F BODY MASS INDEX DOCD: CPT | Performed by: HOSPITALIST

## 2024-11-22 PROCEDURE — 1159F MED LIST DOCD IN RCRD: CPT | Performed by: HOSPITALIST

## 2024-11-22 RX ORDER — PRAVASTATIN SODIUM 10 MG
10 TABLET ORAL NIGHTLY
Status: DISCONTINUED | OUTPATIENT
Start: 2024-11-22 | End: 2024-11-23

## 2024-11-22 RX ORDER — HYDROMORPHONE HYDROCHLORIDE 1 MG/ML
0.4 INJECTION, SOLUTION INTRAMUSCULAR; INTRAVENOUS; SUBCUTANEOUS EVERY 2 HOUR PRN
Status: DISCONTINUED | OUTPATIENT
Start: 2024-11-22 | End: 2024-11-23

## 2024-11-22 RX ORDER — PHENYLEPHRINE HCL 10 MG/ML
VIAL (ML) INJECTION AS NEEDED
Status: DISCONTINUED | OUTPATIENT
Start: 2024-11-22 | End: 2024-11-22 | Stop reason: SURG

## 2024-11-22 RX ORDER — SODIUM CHLORIDE, SODIUM LACTATE, POTASSIUM CHLORIDE, CALCIUM CHLORIDE 600; 310; 30; 20 MG/100ML; MG/100ML; MG/100ML; MG/100ML
INJECTION, SOLUTION INTRAVENOUS CONTINUOUS
Status: DISCONTINUED | OUTPATIENT
Start: 2024-11-22 | End: 2024-11-22 | Stop reason: HOSPADM

## 2024-11-22 RX ORDER — ACETAMINOPHEN 500 MG
1000 TABLET ORAL ONCE AS NEEDED
Status: DISCONTINUED | OUTPATIENT
Start: 2024-11-22 | End: 2024-11-22 | Stop reason: HOSPADM

## 2024-11-22 RX ORDER — HYDROCODONE BITARTRATE AND ACETAMINOPHEN 5; 325 MG/1; MG/1
1 TABLET ORAL ONCE AS NEEDED
Status: DISCONTINUED | OUTPATIENT
Start: 2024-11-22 | End: 2024-11-22 | Stop reason: HOSPADM

## 2024-11-22 RX ORDER — SODIUM CHLORIDE, SODIUM LACTATE, POTASSIUM CHLORIDE, CALCIUM CHLORIDE 600; 310; 30; 20 MG/100ML; MG/100ML; MG/100ML; MG/100ML
INJECTION, SOLUTION INTRAVENOUS CONTINUOUS
Status: DISCONTINUED | OUTPATIENT
Start: 2024-11-22 | End: 2024-11-23

## 2024-11-22 RX ORDER — DEXTROSE MONOHYDRATE 25 G/50ML
50 INJECTION, SOLUTION INTRAVENOUS
Status: DISCONTINUED | OUTPATIENT
Start: 2024-11-22 | End: 2024-11-22 | Stop reason: HOSPADM

## 2024-11-22 RX ORDER — ONDANSETRON 2 MG/ML
4 INJECTION INTRAMUSCULAR; INTRAVENOUS EVERY 6 HOURS PRN
Status: DISCONTINUED | OUTPATIENT
Start: 2024-11-22 | End: 2024-11-22 | Stop reason: HOSPADM

## 2024-11-22 RX ORDER — ONDANSETRON 2 MG/ML
4 INJECTION INTRAMUSCULAR; INTRAVENOUS EVERY 6 HOURS PRN
Status: DISCONTINUED | OUTPATIENT
Start: 2024-11-22 | End: 2024-11-23

## 2024-11-22 RX ORDER — BUPIVACAINE HYDROCHLORIDE 2.5 MG/ML
INJECTION, SOLUTION EPIDURAL; INFILTRATION; INTRACAUDAL AS NEEDED
Status: DISCONTINUED | OUTPATIENT
Start: 2024-11-22 | End: 2024-11-22 | Stop reason: HOSPADM

## 2024-11-22 RX ORDER — NICOTINE POLACRILEX 4 MG
15 LOZENGE BUCCAL
Status: DISCONTINUED | OUTPATIENT
Start: 2024-11-22 | End: 2024-11-23

## 2024-11-22 RX ORDER — HYDROMORPHONE HYDROCHLORIDE 1 MG/ML
0.2 INJECTION, SOLUTION INTRAMUSCULAR; INTRAVENOUS; SUBCUTANEOUS EVERY 5 MIN PRN
Status: DISCONTINUED | OUTPATIENT
Start: 2024-11-22 | End: 2024-11-22 | Stop reason: HOSPADM

## 2024-11-22 RX ORDER — ONDANSETRON 2 MG/ML
INJECTION INTRAMUSCULAR; INTRAVENOUS AS NEEDED
Status: DISCONTINUED | OUTPATIENT
Start: 2024-11-22 | End: 2024-11-22 | Stop reason: SURG

## 2024-11-22 RX ORDER — LIDOCAINE HYDROCHLORIDE 40 MG/ML
SOLUTION TOPICAL AS NEEDED
Status: DISCONTINUED | OUTPATIENT
Start: 2024-11-22 | End: 2024-11-22 | Stop reason: SURG

## 2024-11-22 RX ORDER — ASPIRIN 81 MG/1
81 TABLET ORAL DAILY
Status: DISCONTINUED | OUTPATIENT
Start: 2024-11-23 | End: 2024-11-23

## 2024-11-22 RX ORDER — INDOCYANINE GREEN AND WATER 25 MG
KIT INJECTION
Status: COMPLETED
Start: 2024-11-22 | End: 2024-11-22

## 2024-11-22 RX ORDER — NICOTINE POLACRILEX 4 MG
30 LOZENGE BUCCAL
Status: DISCONTINUED | OUTPATIENT
Start: 2024-11-22 | End: 2024-11-22 | Stop reason: HOSPADM

## 2024-11-22 RX ORDER — METOCLOPRAMIDE HYDROCHLORIDE 5 MG/ML
10 INJECTION INTRAMUSCULAR; INTRAVENOUS EVERY 8 HOURS PRN
Status: DISCONTINUED | OUTPATIENT
Start: 2024-11-22 | End: 2024-11-23

## 2024-11-22 RX ORDER — HYDROMORPHONE HYDROCHLORIDE 1 MG/ML
0.6 INJECTION, SOLUTION INTRAMUSCULAR; INTRAVENOUS; SUBCUTANEOUS EVERY 5 MIN PRN
Status: DISCONTINUED | OUTPATIENT
Start: 2024-11-22 | End: 2024-11-22 | Stop reason: HOSPADM

## 2024-11-22 RX ORDER — ROCURONIUM BROMIDE 10 MG/ML
INJECTION, SOLUTION INTRAVENOUS AS NEEDED
Status: DISCONTINUED | OUTPATIENT
Start: 2024-11-22 | End: 2024-11-22 | Stop reason: SURG

## 2024-11-22 RX ORDER — LIDOCAINE HYDROCHLORIDE AND EPINEPHRINE 10; 10 MG/ML; UG/ML
INJECTION, SOLUTION INFILTRATION; PERINEURAL AS NEEDED
Status: DISCONTINUED | OUTPATIENT
Start: 2024-11-22 | End: 2024-11-22 | Stop reason: HOSPADM

## 2024-11-22 RX ORDER — KETOROLAC TROMETHAMINE 15 MG/ML
15 INJECTION, SOLUTION INTRAMUSCULAR; INTRAVENOUS EVERY 6 HOURS
Status: DISCONTINUED | OUTPATIENT
Start: 2024-11-22 | End: 2024-11-23

## 2024-11-22 RX ORDER — ASPIRIN 81 MG/1
81 TABLET, COATED ORAL DAILY
Status: SHIPPED | COMMUNITY
Start: 2024-11-23

## 2024-11-22 RX ORDER — HYDROCODONE BITARTRATE AND ACETAMINOPHEN 5; 325 MG/1; MG/1
2 TABLET ORAL ONCE AS NEEDED
Status: DISCONTINUED | OUTPATIENT
Start: 2024-11-22 | End: 2024-11-22 | Stop reason: HOSPADM

## 2024-11-22 RX ORDER — TRAMADOL HYDROCHLORIDE 50 MG/1
50 TABLET ORAL EVERY 6 HOURS SCHEDULED
Status: DISCONTINUED | OUTPATIENT
Start: 2024-11-22 | End: 2024-11-23

## 2024-11-22 RX ORDER — DEXTROSE MONOHYDRATE 25 G/50ML
50 INJECTION, SOLUTION INTRAVENOUS
Status: DISCONTINUED | OUTPATIENT
Start: 2024-11-22 | End: 2024-11-23

## 2024-11-22 RX ORDER — NICOTINE POLACRILEX 4 MG
15 LOZENGE BUCCAL
Status: DISCONTINUED | OUTPATIENT
Start: 2024-11-22 | End: 2024-11-22 | Stop reason: HOSPADM

## 2024-11-22 RX ORDER — TRAMADOL HYDROCHLORIDE 50 MG/1
50 TABLET ORAL EVERY 6 HOURS PRN
Qty: 20 TABLET | Refills: 0 | Status: SHIPPED | OUTPATIENT
Start: 2024-11-22 | End: 2024-11-23

## 2024-11-22 RX ORDER — INDOCYANINE GREEN AND WATER 25 MG
5 KIT INJECTION ONCE
Status: COMPLETED | OUTPATIENT
Start: 2024-11-22 | End: 2024-11-22

## 2024-11-22 RX ORDER — EPHEDRINE SULFATE 50 MG/ML
INJECTION INTRAVENOUS AS NEEDED
Status: DISCONTINUED | OUTPATIENT
Start: 2024-11-22 | End: 2024-11-22 | Stop reason: SURG

## 2024-11-22 RX ORDER — DEXAMETHASONE SODIUM PHOSPHATE 4 MG/ML
VIAL (ML) INJECTION AS NEEDED
Status: DISCONTINUED | OUTPATIENT
Start: 2024-11-22 | End: 2024-11-22 | Stop reason: SURG

## 2024-11-22 RX ORDER — EZETIMIBE 10 MG/1
10 TABLET ORAL DAILY
Status: DISCONTINUED | OUTPATIENT
Start: 2024-11-23 | End: 2024-11-23

## 2024-11-22 RX ORDER — SERTRALINE HYDROCHLORIDE 25 MG/1
25 TABLET, FILM COATED ORAL DAILY
Status: DISCONTINUED | OUTPATIENT
Start: 2024-11-23 | End: 2024-11-23

## 2024-11-22 RX ORDER — METOPROLOL TARTRATE 25 MG/1
25 TABLET, FILM COATED ORAL 2 TIMES DAILY
Status: DISCONTINUED | OUTPATIENT
Start: 2024-11-22 | End: 2024-11-23

## 2024-11-22 RX ORDER — ACETAMINOPHEN 500 MG
1000 TABLET ORAL ONCE
Status: DISCONTINUED | OUTPATIENT
Start: 2024-11-22 | End: 2024-11-22 | Stop reason: HOSPADM

## 2024-11-22 RX ORDER — ALPRAZOLAM 0.25 MG/1
0.25 TABLET ORAL DAILY PRN
Status: DISCONTINUED | OUTPATIENT
Start: 2024-11-22 | End: 2024-11-23

## 2024-11-22 RX ORDER — FUROSEMIDE 20 MG/1
20 TABLET ORAL 2 TIMES DAILY
Status: DISCONTINUED | OUTPATIENT
Start: 2024-11-22 | End: 2024-11-23

## 2024-11-22 RX ORDER — LEVOTHYROXINE SODIUM 75 UG/1
75 TABLET ORAL
Status: DISCONTINUED | OUTPATIENT
Start: 2024-11-23 | End: 2024-11-23

## 2024-11-22 RX ORDER — HYDROMORPHONE HYDROCHLORIDE 1 MG/ML
INJECTION, SOLUTION INTRAMUSCULAR; INTRAVENOUS; SUBCUTANEOUS
Status: COMPLETED
Start: 2024-11-22 | End: 2024-11-22

## 2024-11-22 RX ORDER — ACETAMINOPHEN 500 MG
1000 TABLET ORAL EVERY 8 HOURS SCHEDULED
Status: DISCONTINUED | OUTPATIENT
Start: 2024-11-22 | End: 2024-11-23

## 2024-11-22 RX ORDER — GABAPENTIN 300 MG/1
300 CAPSULE ORAL NIGHTLY
Status: DISCONTINUED | OUTPATIENT
Start: 2024-11-22 | End: 2024-11-23

## 2024-11-22 RX ORDER — HYDROMORPHONE HYDROCHLORIDE 1 MG/ML
0.4 INJECTION, SOLUTION INTRAMUSCULAR; INTRAVENOUS; SUBCUTANEOUS EVERY 5 MIN PRN
Status: DISCONTINUED | OUTPATIENT
Start: 2024-11-22 | End: 2024-11-22 | Stop reason: HOSPADM

## 2024-11-22 RX ORDER — LOSARTAN POTASSIUM 25 MG/1
25 TABLET ORAL DAILY
Status: DISCONTINUED | OUTPATIENT
Start: 2024-11-23 | End: 2024-11-23

## 2024-11-22 RX ORDER — LIDOCAINE HYDROCHLORIDE 10 MG/ML
INJECTION, SOLUTION EPIDURAL; INFILTRATION; INTRACAUDAL; PERINEURAL AS NEEDED
Status: DISCONTINUED | OUTPATIENT
Start: 2024-11-22 | End: 2024-11-22 | Stop reason: SURG

## 2024-11-22 RX ORDER — ONDANSETRON 4 MG/1
4 TABLET, ORALLY DISINTEGRATING ORAL EVERY 4 HOURS PRN
Qty: 10 TABLET | Refills: 0 | Status: SHIPPED | OUTPATIENT
Start: 2024-11-22 | End: 2024-11-23

## 2024-11-22 RX ORDER — NICOTINE POLACRILEX 4 MG
30 LOZENGE BUCCAL
Status: DISCONTINUED | OUTPATIENT
Start: 2024-11-22 | End: 2024-11-23

## 2024-11-22 RX ORDER — MONTELUKAST SODIUM 10 MG/1
10 TABLET ORAL NIGHTLY
Status: DISCONTINUED | OUTPATIENT
Start: 2024-11-22 | End: 2024-11-23

## 2024-11-22 RX ORDER — ALBUTEROL SULFATE 90 UG/1
2 INHALANT RESPIRATORY (INHALATION) EVERY 6 HOURS PRN
Status: DISCONTINUED | OUTPATIENT
Start: 2024-11-22 | End: 2024-11-23

## 2024-11-22 RX ORDER — ACYCLOVIR 400 MG/1
400 TABLET ORAL 2 TIMES DAILY
Status: DISCONTINUED | OUTPATIENT
Start: 2024-11-22 | End: 2024-11-23

## 2024-11-22 RX ORDER — HYDROMORPHONE HYDROCHLORIDE 1 MG/ML
0.2 INJECTION, SOLUTION INTRAMUSCULAR; INTRAVENOUS; SUBCUTANEOUS EVERY 2 HOUR PRN
Status: DISCONTINUED | OUTPATIENT
Start: 2024-11-22 | End: 2024-11-23

## 2024-11-22 RX ORDER — HYDROMORPHONE HYDROCHLORIDE 1 MG/ML
0.8 INJECTION, SOLUTION INTRAMUSCULAR; INTRAVENOUS; SUBCUTANEOUS EVERY 2 HOUR PRN
Status: DISCONTINUED | OUTPATIENT
Start: 2024-11-22 | End: 2024-11-23

## 2024-11-22 RX ORDER — FLUTICASONE PROPIONATE AND SALMETEROL 250; 50 UG/1; UG/1
1 POWDER RESPIRATORY (INHALATION) 2 TIMES DAILY
Status: DISCONTINUED | OUTPATIENT
Start: 2024-11-22 | End: 2024-11-23

## 2024-11-22 RX ORDER — ENOXAPARIN SODIUM 100 MG/ML
40 INJECTION SUBCUTANEOUS DAILY
Status: DISCONTINUED | OUTPATIENT
Start: 2024-11-23 | End: 2024-11-23

## 2024-11-22 RX ORDER — CEFAZOLIN SODIUM 1 G/3ML
INJECTION, POWDER, FOR SOLUTION INTRAMUSCULAR; INTRAVENOUS AS NEEDED
Status: DISCONTINUED | OUTPATIENT
Start: 2024-11-22 | End: 2024-11-22 | Stop reason: SURG

## 2024-11-22 RX ORDER — NALOXONE HYDROCHLORIDE 0.4 MG/ML
0.08 INJECTION, SOLUTION INTRAMUSCULAR; INTRAVENOUS; SUBCUTANEOUS AS NEEDED
Status: DISCONTINUED | OUTPATIENT
Start: 2024-11-22 | End: 2024-11-22 | Stop reason: HOSPADM

## 2024-11-22 RX ORDER — METOCLOPRAMIDE HYDROCHLORIDE 5 MG/ML
10 INJECTION INTRAMUSCULAR; INTRAVENOUS EVERY 8 HOURS PRN
Status: DISCONTINUED | OUTPATIENT
Start: 2024-11-22 | End: 2024-11-22 | Stop reason: HOSPADM

## 2024-11-22 RX ADMIN — CEFAZOLIN SODIUM 2 G: 1 INJECTION, POWDER, FOR SOLUTION INTRAMUSCULAR; INTRAVENOUS at 14:48:00

## 2024-11-22 RX ADMIN — LIDOCAINE HYDROCHLORIDE 50 MG: 10 INJECTION, SOLUTION EPIDURAL; INFILTRATION; INTRACAUDAL; PERINEURAL at 14:44:00

## 2024-11-22 RX ADMIN — LIDOCAINE HYDROCHLORIDE 4 ML: 40 SOLUTION TOPICAL at 14:46:00

## 2024-11-22 RX ADMIN — ONDANSETRON 4 MG: 2 INJECTION INTRAMUSCULAR; INTRAVENOUS at 15:37:00

## 2024-11-22 RX ADMIN — DEXAMETHASONE SODIUM PHOSPHATE 4 MG: 4 MG/ML VIAL (ML) INJECTION at 14:50:00

## 2024-11-22 RX ADMIN — SODIUM CHLORIDE, SODIUM LACTATE, POTASSIUM CHLORIDE, CALCIUM CHLORIDE: 600; 310; 30; 20 INJECTION, SOLUTION INTRAVENOUS at 14:39:00

## 2024-11-22 RX ADMIN — ROCURONIUM BROMIDE 50 MG: 10 INJECTION, SOLUTION INTRAVENOUS at 14:44:00

## 2024-11-22 RX ADMIN — PHENYLEPHRINE HCL 100 MCG: 10 MG/ML VIAL (ML) INJECTION at 14:50:00

## 2024-11-22 RX ADMIN — EPHEDRINE SULFATE 10 MG: 50 INJECTION INTRAVENOUS at 14:49:00

## 2024-11-22 NOTE — ANESTHESIA POSTPROCEDURE EVALUATION
[facility]    Gabriela Ndiaye Patient Status:  Hospital Outpatient Surgery   Age/Gender 79 year old female MRN [id number]   Location [facility] SURGERY Attending Osiris Castro MD   Hosp Day # 0 PCP August Coley MD       Anesthesia Post-op Note    LAPAROSCOPIC CHOLECYSTECTOMY, WITH INDOCYANINE GREEN    Procedure Summary       Date: 11/22/24 Room / Location:  MAIN OR 11 / [facility] MAIN OR    Anesthesia Start: 1439 Anesthesia Stop: 1559    Procedure: LAPAROSCOPIC CHOLECYSTECTOMY, WITH INDOCYANINE GREEN (Abdomen) Diagnosis:       Calculus of gallbladder with cholecystitis without biliary obstruction, unspecified cholecystitis acuity      (Calculus of gallbladder with cholecystitis without biliary obstruction, unspecified cholecystitis acuity [K80.10])    Surgeons: Osiris Castro MD Anesthesiologist: Keith Abreu MD    Anesthesia Type: general ASA Status: 3            Anesthesia Type: general    Vitals Value Taken Time   /73 11/22/24 1559   Temp 97.7 11/22/24 1559   Pulse 61 11/22/24 1559   Resp 14 11/22/24 1559   SpO2 95 11/22/24 1559       Patient Location: PACU    Anesthesia Type: general    Airway Patency: patent and extubated    Postop Pain Control: adequate    Mental Status: preanesthetic baseline    Nausea/Vomiting: none    Cardiopulmonary/Hydration status: stable euvolemic    Complications: no apparent anesthesia related complications    Postop vital signs: stable    Dental Exam: Unchanged from Preop    Patient to be discharged from PACU when criteria met.           PAST MEDICAL HISTORY:  No pertinent past medical history

## 2024-11-22 NOTE — ANESTHESIA PREPROCEDURE EVALUATION
PRE-OP EVALUATION    Patient Name: Gabriela Ndiaye    Admit Diagnosis: Calculus of gallbladder with cholecystitis without biliary obstruction, unspecified cholecystitis acuity [K80.10]    Pre-op Diagnosis: Calculus of gallbladder with cholecystitis without biliary obstruction, unspecified cholecystitis acuity [K80.10]    LAPAROSCOPIC CHOLECYSTECTOMY POSSIBLE OPEN WITH CHOLANGIOGRAM (C-ARM) WITH INDOCYANINE GREEN    Anesthesia Procedure: LAPAROSCOPIC CHOLECYSTECTOMY POSSIBLE OPEN WITH CHOLANGIOGRAM (C-ARM) WITH INDOCYANINE GREEN    Surgeons and Role:     * Osiris Castro MD - Primary    Pre-op vitals reviewed.  Temp: 97.8 °F (36.6 °C)  Pulse: 50  Resp: 16  BP: 120/69  SpO2: 100 %  Body mass index is 25.56 kg/m².    Current medications reviewed.  Hospital Medications:   [Transfer Hold] acetaminophen (Tylenol Extra Strength) tab 1,000 mg  1,000 mg Oral Once    lactated ringers infusion   Intravenous Continuous    vancomycin (Vancocin) 1,000 mg in sodium chloride 0.9% 250 mL IVPB-ADDV  15 mg/kg Intravenous Once    And    gentamicin (Garamycin) 300 mg in sodium chloride 0.9% 100 mL IVPB  5 mg/kg (Adjusted) Intravenous Once    [COMPLETED] indocyanine green (IC-Green) injection 5 mg  5 mg Intravenous Once       Outpatient Medications:   Prescriptions Prior to Admission[1]    Allergies: Keflex [cephalexin], Rosuvastatin, and Cephalosporins      Anesthesia Evaluation    Patient summary reviewed.    Anesthetic Complications           GI/Hepatic/Renal      (+) GERD          (+) liver disease                 Cardiovascular                  (+) hypertension     (+) CAD                                Endo/Other      (+) diabetes                            Pulmonary        (+) COPD       (+) shortness of breath            Neuro/Psych                 (+) neuromuscular disease                     Past Surgical History:   Procedure Laterality Date    Amputation finger/thumb+flaps      Colectomy      Colonoscopy      Colonoscopy  N/A 1/15/2024    Procedure: COLONOSCOPY;  Surgeon: Tirso Kimball MD;  Location:  ENDOSCOPY    Hysterectomy       Social History     Socioeconomic History    Marital status:    Tobacco Use    Smoking status: Every Day     Current packs/day: 0.50     Average packs/day: 0.5 packs/day for 63.2 years (31.6 ttl pk-yrs)     Types: Cigarettes     Start date: 2/24/1964    Smokeless tobacco: Never   Vaping Use    Vaping status: Never Used   Substance and Sexual Activity    Alcohol use: Not Currently     Comment: RARE    Drug use: Never   Other Topics Concern    Caffeine Concern Yes     Comment: coffee/coca cola/Iced tea    Exercise No     History   Drug Use Unknown     Available pre-op labs reviewed.  Lab Results   Component Value Date    WBC 9.2 11/14/2024    RBC 4.40 11/14/2024    HGB 13.3 11/14/2024    HCT 42.5 11/14/2024    MCV 96.6 11/14/2024    MCH 30.2 11/14/2024    MCHC 31.3 11/14/2024    RDW 14.7 11/14/2024    .0 11/14/2024     Lab Results   Component Value Date     11/14/2024    K 3.9 11/14/2024     11/14/2024    CO2 28.0 11/14/2024    BUN 13 11/14/2024    CREATSERUM 1.12 (H) 11/14/2024    GLU 97 11/14/2024    CA 9.7 11/14/2024            Airway      Mallampati: II  Mouth opening: >3 FB  TM distance: > 6 cm  Neck ROM: full Cardiovascular    Cardiovascular exam normal.  Rhythm: regular  Rate: normal     Dental      Dental appliance(s): lower dentures and upper dentures       Pulmonary    Pulmonary exam normal.                 Other findings              ASA: 3   Plan: general  NPO status verified and patient meets guidelines.          Plan/risks discussed with: patient and significant other                Present on Admission:  **None**             [1]   Medications Prior to Admission   Medication Sig Dispense Refill Last Dose/Taking    nystatin 100,000 Units/g External Cream Apply 1 Application topically 2 (two) times daily for 10 days. 30 g 2 Past Week    losartan 25 MG Oral Tab Take 1  tablet (25 mg total) by mouth daily.   11/21/2024 Evening    ezetimibe 10 MG Oral Tab Take 1 tablet (10 mg total) by mouth daily.   11/21/2024    nystatin 100,000 Units/g External Cream APPLY TOPICALLY 1 APPLICATION AS NEEDED   Past Week    sertraline (ZOLOFT) 25 MG Oral Tab Take 1 tablet (25 mg total) by mouth daily. (Patient taking differently: Take 1 tablet (25 mg total) by mouth daily. Takes 1/2 tablet) 90 tablet 3 11/20/2024    DICYCLOMINE 20 MG Oral Tab TAKE 1 TABLET BY MOUTH THREE TIMES A DAY (Patient taking differently: Take 1 tablet (20 mg total) by mouth as needed.) 270 tablet 0 Past Week    ALBUTEROL 108 (90 Base) MCG/ACT Inhalation Aero Soln TAKE 2 PUFFS BY MOUTH EVERY 6 HOURS AS NEEDED FOR WHEEZE OR SHORTNESS OF BREATH 25.5 each 3 Past Week    ELMIRON 100 MG Oral Cap TAKE 1 CAPSULE BY MOUTH EVERY DAY 90 capsule 1 11/21/2024    levothyroxine (SYNTHROID) 75 MCG Oral Tab Take 1 tablet (75 mcg total) by mouth daily. 30 tablet 3 11/22/2024    benzonatate 200 MG Oral Cap Take 1 capsule (200 mg total) by mouth 3 (three) times daily as needed for cough. 30 capsule 2 Past Month    pravastatin 10 MG Oral Tab Take 1 tablet (10 mg total) by mouth nightly. 90 tablet 1 Past Month    ESOMEPRAZOLE MAGNESIUM 20 MG Oral Capsule Delayed Release TAKE 1 CAPSULE BY MOUTH EVERY DAY IN THE MORNING BEFORE BREAKFAST (Patient taking differently: Take 2 capsules (40 mg total) by mouth before breakfast.) 90 capsule 1 11/22/2024    METOPROLOL TARTRATE 50 MG Oral Tab TAKE 0.5 TABLETS BY MOUTH 2 TIMES DAILY. 90 tablet 1 11/22/2024 Morning    ALPRAZOLAM 0.5 MG Oral Tab TAKE 1 TABLET BY MOUTH NIGHTLY SCHEDULED AND TAKE 1/2 TABLET BY MOUTH DAILY AS NEEDED FOR ANXIETY. 45 tablet 2 11/22/2024 Morning    GABAPENTIN 100 MG Oral Cap TAKE 1 CAPSULE BY MOUTH IN THE MORNING, 1-2 CAPSULES BY MOUTH IN THE AFTERNOON, AND 3 CAPSULES BY MOUTH IN THE EVENING DAILY (Patient taking differently: Take 3 capsules (300 mg total) by mouth nightly.) 180  capsule 5 11/21/2024    ASPIRIN LOW DOSE 81 MG Oral Tab EC Take 1 tablet (81 mg total) by mouth daily.   11/21/2024 Morning    estradiol 0.1 MG/GM Vaginal Cream Place 1 g vaginally daily. (Patient taking differently: Place 1 g vaginally twice a week.) 42.5 g 5 11/21/2024    furosemide 20 MG Oral Tab Take 1 tablet (20 mg total) by mouth 2 (two) times daily. 180 tablet 1 11/21/2024    MONTELUKAST 10 MG Oral Tab TAKE 1 TABLET BY MOUTH EVERY DAY AT NIGHT 90 tablet 3 11/21/2024    Potassium Chloride ER 20 MEQ Oral Tab CR Take 1 tablet by mouth daily. 90 tablet 1 11/21/2024    ACYCLOVIR 400 MG Oral Tab TAKE 1 TABLET BY MOUTH TWICE A  tablet 0 11/21/2024    loratadine 10 MG Oral Tablet Dispersible Take 1 tablet (10 mg total) by mouth daily.   11/21/2024    Capsicum, Cayenne, (CAYENNE PEPPER OR) Take 1 tablet by mouth daily.   Past Week    Nebulizers ([employer] AEROSOL DELIVERY SYSTEM) Does not apply Misc Take 1 Bottle by mouth As Directed.   11/22/2024    TRELEGY ELLIPTA 100-62.5-25 MCG/ACT Inhalation Aerosol Powder, Breath Activated Inhale 1 puff into the lungs daily. 180 each 3 11/22/2024    diclofenac 1 % External Gel Apply 2 g topically 4 (four) times daily. 100 g 5 Past Week    HYDROcodone-acetaminophen 5-325 MG Oral Tab Take 1 tablet by mouth 2 (two) times daily as needed for Pain.   11/20/2024    B Complex Vitamins (B COMPLEX-B12 OR) Take 1 tablet by mouth daily.   11/1/2024    Turmeric 400 MG Oral Cap Take 1 capsule by mouth daily.   11/1/2024    Ascorbic Acid (VITAMIN C) 1000 MG Oral Tab Take 1 tablet (1,000 mg total) by mouth daily.   11/1/2024    omega-3 fatty acids 1000 MG Oral Cap Take 1,000 mg by mouth daily.   11/1/2024    Respiratory Therapy Supplies (NEBULIZER/TUBING/MOUTHPIECE) Does not apply Kit Use as directed 1 each 0     Vitamin D3, Cholecalciferol, 125 MCG (5000 UT) Oral Cap Take 1 capsule (5,000 Units total) by mouth daily.   11/1/2024

## 2024-11-22 NOTE — DISCHARGE INSTRUCTIONS
Cholecystectomy  Dr. Osiris Castro    MEDICATIONS  For post-operative pain control, the medications are usually tramadol.  This is a narcotic and is best taken by starting with one tablet and repeating every four to six hours as needed.  If the patient does not feel they need the narcotics they shouldn’t take them.  If the pain is severe the patient may take up to two pills every six hours.  The patient can take tylenol 1g three times a day and ibuprofen 400-600mg in between up to 4 times a day as needed for pain as well.  The patient can take zofran 4mg every 6 hours as needed for nausea. The patient can also take miralax or colace as needed for constipation. Please ask your surgeon before resuming blood thinners such as aspirin, Plavix or Coumadin.  All other home medications may be resumed as scheduled.  With severe cholecystitis, antibiotics will also be prescribed.  With antibiotics, please watch for rash, facial swelling or severe diarrhea.    DIET  The patient may resume a general diet immediately.  This is not a good time to eat excessively.  The patient should eat in moderation and stick with foods the patient feels are easy to digest.  Avoid high fat foods in the immediate post-operative time period as this may still cause some problems.  The patient may eat anything in small amounts but foods rich in dairy products, fatty foods or fried foods may cause an upset stomach for up to six weeks after surgery.  There should be no alcohol consumption in the immediate recovery time period or within six hours of taking narcotics.    WOUND CARE  The dressing is usually a dissolvable glue which protects the wound. The patient can take a shower starting the day after surgery, but please, no baths or soaking. Please do not put any creams or ointments on the surgical incisions. If the patient does have a top dressing with clear tape and gauze, this dressing may be removed in 2 days. Do not remove the steri-strips or  butterfly tapes that are white and adherent to the skin.  The steri-strips will eventually peel up at the ends and at this point they may be removed.  This is usually seven to ten days after surgery.  When showering, soap can get on the wounds but do not scrub over the wounds.  No hair dye or chemicals of any kind should get in the wounds.  Avoid tub baths, swimming or sitting in a hot tub for two weeks.  If visible sutures or staples are present they will be removed in the office by the surgeon or nurse.  Most wounds will be closed with dissolving suture underneath the skin.  These sutures will dissolve on their own.  If a drain is present make sure the patient receives drain care instructions from the nurse prior to discharge.  Most patients will not have a drain.    ACTIVITY  Every day the patient should be up, showered and dressed.  Each day the patient should be up and around the house.  The patient should not lie in bed and should not stay in pajamas.  We count on the patient being up, coughing, walking and deep breathing to avoid pneumonia and blood clots in the legs.  Once a day the patient should get out of the house and go shopping, go to the mall, the [employer] store, the [employer] or a restaurant.  The patient may ride in a car but should not drive the car for at least one week.  Patients should be off narcotics for at least 8 hours prior to being a .  The average time off work is 10 to 14 days; most adults will be seeing the surgeon prior to returning to work.  Students will return to school within 1-5 days after discharge from the hospital but will be off gym, sports, and indoors for recess for one month.  Patients may go up and down stairs and lift up to five pounds but no bending, pushing or pulling.  Nothing called work or exercise until the follow up visit.  No ‘stair-master’, power walking, jogging or workouts until the follow up visit.  Patients should seek further activity limits at the time  of their appointment.    APPOINTMENT  Please call our office for an appointment within five to ten days of discharge.  Any fever greater than 100.5, chills, nausea, vomiting, or severe diarrhea please call our office.  If the wound turns red, hot, swollen, becomes increasingly painful, or drains pus call us immediately at [phone].  For life threatening emergencies call 911.  For non-emergent care please call our office after 8:30 a.m. Monday through Friday.  The number listed above is our office number; our phone automatically switches to our answering service if we are not there.  Please do not use the emergency room for non-urgent care.    Thank you for entrusting us with your care.  [facility]--General Surgery

## 2024-11-22 NOTE — H&P
The above referenced H&P was reviewed by Osiris Castro MD on 11/22/2024, the patient was examined and no significant changes have occurred in the patient's condition since the H&P was performed.  Risks, benefits, alternative treatments and consequences of no treatment were discussed.  We will proceed with procedure as planned.    No new issues, will proceed as scheduled. All questions answered.    Osiris Castro MD  [facility]  11/22/2024  2:20 PM          Patient ID: Gabriela Ndiaye is a 79 year old female.    Chief Complaint   Patient presents with    New Patient     NP- Pre-op 11/22 LAPAROSCOPIC CHOLECYSTECTOMY POSSIBLE OPEN WITH CHOLANGIOGRAM (C-ARM)       HPI: Gabriela Ndiaye is a 79 year old female presents to clinic for evaluation.  Patient was first seen in the outpatient setting by Dr. Wyatt in March of this year.  She was in the hospital for right upper quadrant abdominal pain.  She was found to have cholelithiasis without any signs of acute cholecystitis on ultrasound imaging.  She was set up for elective cholecystectomy but needed medical and cardiac clearance.  She did not pass the cardiac clearance and underwent multiple testing.  Patient has since been cleared medically as well as by cardiology for surgery in December.  However, patient is having worsening abdominal pain and is hoping to have surgery sooner.  Today, patient reports some improvement in her right upper quadrant pain.  Last episode of tenderness was approximately 1 week ago.  She continues to have abdominal bloating but denies any nausea or vomiting.  She denies any fevers or chills.    She has a significant medical history for CAD and underwent recent stent placement.  She was on Brilinta but this has been stopped.    Workup:   3/19/2024 abdominal ultrasound  FINDINGS:       Multiple hepatic cysts, largest measuring 1.3 cm.     Common bile duct normal caliber for age, 7 mm.  Sludge and stones in the gallbladder, including  sludge in a mobile stone gallbladder neck.  No Pitts sign.  Gallbladder shows no thickening of the wall, 2.2 mm thickness, and there is no dilation of the  gallbladder lumen.     Right kidney 9.5 cm, left kidney 9.6 cm.  Cortical thinning and cysts involve both kidneys.     No pancreatic abnormalities are seen.     Complex cyst involving the spleen, 5.4 x 5.0 x 8.4 cm.  The cyst has either some layering debris or mural thickening.  The spleen itself is not enlarged, 8.0 x 4.4 cm.     Patent aorta and IVC, no signs of ascites or pleural effusion.    Impression   CONCLUSION:  Multiple abnormalities including hepatic and renal cysts, gallstones and sludge without gallbladder dilation or biliary ductal dilation, cortical thinning of the kidneys, and a complex cyst involving the spleen.  The cyst was described on  previous CT report and may be chronic.  No ascites.         Past Medical History  Past Medical History:    Abdominal hernia    Anxiety    Arthritis    Back pain    Back problem    spinal stenosis    Bilateral pulmonary embolism (HCC)    Bloating    Cancer (HCC)    LUNG    COPD (chronic obstructive pulmonary disease) (HCC)    NO HOME O2    Coronary atherosclerosis    STENT X1    Diabetes (HCC)    DIET CONTROLLED    Diabetes mellitus (HCC)    Diarrhea, unspecified    Disorder of thyroid    Easy bruising    Esophageal reflux    Exposure to medical diagnostic radiation    last tx 11/2023    Feeling lonely    Flatulence/gas pain/belching    Food intolerance    Hearing impairment    PT DENIES, NO AIDS    Hearing loss    Hemorrhoids    High blood pressure    High cholesterol    History of blood transfusion    ABOUT 20 YEARS AGO, NO ADVERSE REACTION    History of eating disorder    Muscle weakness    Neuropathy    Night sweats    Osteoarthritis    Pain in joints    Pneumonia of both lungs due to infectious organism, unspecified part of lung    Pulmonary emboli (HCC)    Pulmonary embolism (HCC)    Renal disorder    CKD  RECONCILLED FROM OUTSIDE PROBLEM LIST    RSV (acute bronchiolitis due to respiratory syncytial virus)    Shortness of breath    Stress    Thyroid disease    Uncomfortable fullness after meals    Visual impairment    GLASSES    Wears glasses       Past Surgical History  Past Surgical History:   Procedure Laterality Date    Amputation finger/thumb+flaps      Colectomy      Colonoscopy      Colonoscopy N/A 1/15/2024    Procedure: COLONOSCOPY;  Surgeon: Tirso Kimball MD;  Location:  ENDOSCOPY    Hysterectomy         Medications  Current Outpatient Medications   Medication Sig Dispense Refill    nystatin 100,000 Units/g External Cream Apply 1 Application topically 2 (two) times daily for 10 days. 30 g 2    losartan 25 MG Oral Tab Take 1 tablet (25 mg total) by mouth daily.      ezetimibe 10 MG Oral Tab Take 1 tablet (10 mg total) by mouth daily.      nystatin 100,000 Units/g External Cream APPLY TOPICALLY 1 APPLICATION AS NEEDED      sertraline (ZOLOFT) 25 MG Oral Tab Take 1 tablet (25 mg total) by mouth daily. (Patient taking differently: Take 1 tablet (25 mg total) by mouth daily. Takes 1/2 tablet) 90 tablet 3    DICYCLOMINE 20 MG Oral Tab TAKE 1 TABLET BY MOUTH THREE TIMES A DAY (Patient taking differently: Take 1 tablet (20 mg total) by mouth as needed.) 270 tablet 0    ALBUTEROL 108 (90 Base) MCG/ACT Inhalation Aero Soln TAKE 2 PUFFS BY MOUTH EVERY 6 HOURS AS NEEDED FOR WHEEZE OR SHORTNESS OF BREATH 25.5 each 3    HYDROcodone-acetaminophen 5-325 MG Oral Tab Take 1 tablet by mouth 2 (two) times daily as needed for Pain.      ELMIRON 100 MG Oral Cap TAKE 1 CAPSULE BY MOUTH EVERY DAY 90 capsule 1    levothyroxine (SYNTHROID) 75 MCG Oral Tab Take 1 tablet (75 mcg total) by mouth daily. 30 tablet 3    benzonatate 200 MG Oral Cap Take 1 capsule (200 mg total) by mouth 3 (three) times daily as needed for cough. 30 capsule 2    pravastatin 10 MG Oral Tab Take 1 tablet (10 mg total) by mouth nightly. 90 tablet 1     ESOMEPRAZOLE MAGNESIUM 20 MG Oral Capsule Delayed Release TAKE 1 CAPSULE BY MOUTH EVERY DAY IN THE MORNING BEFORE BREAKFAST (Patient taking differently: Take 2 capsules (40 mg total) by mouth before breakfast.) 90 capsule 1    METOPROLOL TARTRATE 50 MG Oral Tab TAKE 0.5 TABLETS BY MOUTH 2 TIMES DAILY. 90 tablet 1    ALPRAZOLAM 0.5 MG Oral Tab TAKE 1 TABLET BY MOUTH NIGHTLY SCHEDULED AND TAKE 1/2 TABLET BY MOUTH DAILY AS NEEDED FOR ANXIETY. 45 tablet 2    GABAPENTIN 100 MG Oral Cap TAKE 1 CAPSULE BY MOUTH IN THE MORNING, 1-2 CAPSULES BY MOUTH IN THE AFTERNOON, AND 3 CAPSULES BY MOUTH IN THE EVENING DAILY (Patient taking differently: Take 3 capsules (300 mg total) by mouth nightly.) 180 capsule 5    ASPIRIN LOW DOSE 81 MG Oral Tab EC Take 1 tablet (81 mg total) by mouth daily.      estradiol 0.1 MG/GM Vaginal Cream Place 1 g vaginally daily. (Patient taking differently: Place 1 g vaginally twice a week.) 42.5 g 5    furosemide 20 MG Oral Tab Take 1 tablet (20 mg total) by mouth 2 (two) times daily. 180 tablet 1    MONTELUKAST 10 MG Oral Tab TAKE 1 TABLET BY MOUTH EVERY DAY AT NIGHT 90 tablet 3    Potassium Chloride ER 20 MEQ Oral Tab CR Take 1 tablet by mouth daily. 90 tablet 1    ACYCLOVIR 400 MG Oral Tab TAKE 1 TABLET BY MOUTH TWICE A  tablet 0    B Complex Vitamins (B COMPLEX-B12 OR) Take 1 tablet by mouth daily.      Turmeric 400 MG Oral Cap Take 1 capsule by mouth daily.      Ascorbic Acid (VITAMIN C) 1000 MG Oral Tab Take 1 tablet (1,000 mg total) by mouth daily.      omega-3 fatty acids 1000 MG Oral Cap Take 1,000 mg by mouth daily.      loratadine 10 MG Oral Tablet Dispersible Take 1 tablet (10 mg total) by mouth daily.      Capsicum, Cayenne, (CAYENNE PEPPER OR) Take 1 tablet by mouth daily.      Nebulizers ([employer] AEROSOL DELIVERY SYSTEM) Does not apply Misc Take 1 Bottle by mouth As Directed.      Respiratory Therapy Supplies (NEBULIZER/TUBING/MOUTHPIECE) Does not apply Kit Use as directed 1 each 0     TRELEGY ELLIPTA 100-62.5-25 MCG/ACT Inhalation Aerosol Powder, Breath Activated Inhale 1 puff into the lungs daily. 180 each 3    diclofenac 1 % External Gel Apply 2 g topically 4 (four) times daily. 100 g 5    Vitamin D3, Cholecalciferol, 125 MCG (5000 UT) Oral Cap Take 1 capsule (5,000 Units total) by mouth daily.         Allergies  [Allergies]    [Allergies]  Allergen Reactions    Keflex [Cephalexin] HIVES    Rosuvastatin MYALGIA    Cephalosporins NAUSEA ONLY     NAUSEA ONLY WHEN TAKEN ON AN EMPTY STOMACH, OTHERWISE CAN TAKE       Social History  History   Smoking Status    Every Day    Types: Cigarettes   Smokeless Tobacco    Never     History   Alcohol Use Not Currently     Comment: RARE     History   Drug Use Unknown       Family History  Family History   Problem Relation Age of Onset    Heart Disorder Father     Stroke Father     Uterine Cancer Mother     Diabetes Mother     Cancer Mother         colon    Heart Attack Daughter     Diabetes Daughter     Colon Polyps Daughter     Colon Polyps Son        Review of Systems  Review of Systems   Constitutional: Negative.    Respiratory: Negative.     Cardiovascular: Negative.    Gastrointestinal:  Positive for abdominal distention, abdominal pain and diarrhea. Negative for constipation, nausea and vomiting.       Exam  Vitals:    11/20/24 1011   Pulse: 57   Resp: 18   Temp: 97.4 °F (36.3 °C)     Physical Exam  Constitutional:       Appearance: Normal appearance.   Cardiovascular:      Rate and Rhythm: Normal rate.   Pulmonary:      Effort: Pulmonary effort is normal.   Abdominal:      General: Abdomen is flat. There is no distension.      Palpations: Abdomen is soft.      Tenderness: There is no abdominal tenderness.   Skin:     General: Skin is warm and dry.   Neurological:      Mental Status: She is alert and oriented to person, place, and time.           Assessment/Plan  Assessment   Problem List Items Addressed This Visit          Gastrointestinal and Abdominal     Symptomatic cholelithiasis - Primary       Gabriela Ndiaye is a 79 year old female with symptomatic cholelithiasis    Ultrasound images reviewed personally by me and with the patient  Ultrasound shows marked cholelithiasis of the gallbladder  On physical exam, patient is nontender but began to have right upper quadrant pain during interview  She has been cleared by medicine and cardiology for surgery  I recommend proceeding with cholecystectomy  We will plan for laparoscopic cholecystectomy with intraoperative cholangiogram  Procedure explained to the patient  Risks including bleeding, pain, infection, bile leak, injury to the common bile duct, and need for further intervention all discussed with the patient  Postoperative restrictions also discussed, these include no heavy lifting greater than 15 to 20 pounds for 4-6 weeks  Patient acknowledged understanding and wishes to proceed    If she has any questions or concerns she is to contact the office      Osiris Castro MD  General Surgery  [facility]     CC:  August Coley MD

## 2024-11-22 NOTE — ANESTHESIA PROCEDURE NOTES
Airway  Date/Time: 11/22/2024 2:46 PM  Urgency: elective    Airway not difficult    General Information and Staff    Patient location during procedure: OR  Anesthesiologist: Keith Abreu MD  Resident/CRNA: Johnnie Vines CRNA  Performed: CRNA   Performed by: Johnnie Vines CRNA  Authorized by: Keith Abreu MD      Indications and Patient Condition  Indications for airway management: anesthesia  Spontaneous Ventilation: absent  Sedation level: deep  Preoxygenated: yes  Patient position: sniffing  MILS maintained throughout  Mask difficulty assessment: 2 - vent by mask + OA or adjuvant +/- NMBA  Planned trial extubation    Final Airway Details  Final airway type: endotracheal airway      Successful airway: ETT  Cuffed: yes   Successful intubation technique: direct laryngoscopy  Facilitating devices/methods: intubating stylet  Endotracheal tube insertion site: oral  Blade: Cody  Blade size: #3  ETT size (mm): 7.0    Cormack-Lehane Classification: grade I - full view of glottis  Placement verified by: capnometry   Cuff volume (mL): 6  Measured from: lips  ETT to lips (cm): 21  Number of attempts at approach: 1  Number of other approaches attempted: 0

## 2024-11-22 NOTE — OPERATIVE REPORT
OPERATIVE NOTE    Gabriela Ndiaye Location: OR   DRE [id number] MRN [id number]   Admission Date 11/22/2024 Operation Date 11/22/2024   Attending Physician Osiris Castro MD Operating Physician Osiris Castro MD     PREOPERATIVE DIAGNOSIS  Symptomatic cholelithiasis  CAD with recent stent placement    POSTOPERATIVE DIAGNOSIS  Same    PROCEDURE PERFORMED  Laparoscopic cholecystectomy with ICG guidance  Transversus abdominis plane block    SURGEON  Osiris Castro MD    ASSISTANTS  RACIEL Estes her assistance was necessary for the count of this case especially in the careful dissection around the hilum and removal of the gallbladder    ANESTHESIA  General    INDICATIONS   79 year old female presented to the outpatient setting with right upper quadrant abdominal pain.  Workup demonstrated cholelithiasis.  Patient  has a significant past medical history for CAD.  Patient was scheduled for surgery previously but did not pass her cardiac clearance.  She underwent angiogram and was found to have occlusion requiring stent placement.  She was placed on Brilinta for anticoagulation but this has since been stopped.  Patient presents today for cholecystectomy.  She has been cleared medically as well as by the cardiologist.  The procedure and all of the risks were discussed with the patient and she consented.    FINDINGS  Soft and uninflamed gallbladder with clear identification of the cystic duct and common bile duct via ICG    FINDINGS/DESCRIPTION OF PROCEDURE  After informed consent, patient was brought to the operating room and placed in supine position with arms out. Bilateral SCDs were placed and pre-operative antibiotics administered. Anesthesia was induced without any complication. Patient was prepped and draped in the usual sterile fashion. Time out was performed confirming patient, procedure, and site.    The Veress needle was used to gain pneumoperitoneum. Using blade, a curvilinear supraumbilical incision  was made. Veress needle was inserted just under the umbilical stalk with audible clicks. On aspiration, there was no bowel contents.  Saline flowed easily confirming presence in the abdomen.Pneumoperitoneum was created with CO2. An 11mm port was inserted. Upon entrance, no injury was noted to omentum or bowel at site of entrance.  Three 5mm ports were placed under direct visualization; two on the right lateral aspect of the abdomen and the third subxiphoid, triangulating toward the location of the gallbladder. Transversus abdominis plane block was performed.    Upon inspection of the abdomen, patient had a single adhesion to the anterior abdominal wall limiting visualization of the gallbladder.  The gallbladder was soft and uninflamed.  Gallbladder was grasped and retracted above the liver. Dissection was started at the infundibulum. Cystic duct and artery were identified, clearly seeing the critical view of safety.  Patient was administered indocyanine green dye preoperatively.  Indocyanine green dye was noted in the gallbladder, cystic duct, and into the common bile duct.  Common bile duct did not look distended nor did the cystic duct. The cystic duct and artery were then clipped and divided.  The cystic duct was big and was ligated with an Endoloop.  The gallbladder was then resected from the fossa and placed in endocatch bag. The gallbladder fossa was irrigated until clear. Hemostasis achieved. Gallbladder was extracted from abdominal cavity and sent for pathology.  Using Keenan Lopez, fascia at umbilicus was closed with o vicryl. Pneumoperitoneum was evacuated. Skin incisions were closed with 4-o monocryl. Incisions were washed and dressed with dermabond.     At the end of the case, all counts were correct. Patient tolerated procedure well. Patient was awakened and transferred to PACU in a hemodynamically stable condition.     SPECIMENS REMOVED  Gallbladder    ESTIMATED BLOOD LOSS  15  ml    COMPLICATIONS  None    Osiris Castro MD

## 2024-11-23 VITALS
RESPIRATION RATE: 16 BRPM | TEMPERATURE: 98 F | HEART RATE: 64 BPM | SYSTOLIC BLOOD PRESSURE: 96 MMHG | OXYGEN SATURATION: 91 % | WEIGHT: 148 LBS | HEIGHT: 64 IN | BODY MASS INDEX: 25.27 KG/M2 | DIASTOLIC BLOOD PRESSURE: 74 MMHG

## 2024-11-23 PROBLEM — E78.5 HYPERLIPIDEMIA: Status: ACTIVE | Noted: 2024-04-05

## 2024-11-23 PROBLEM — I73.9 PAD (PERIPHERAL ARTERY DISEASE) (HCC): Status: ACTIVE | Noted: 2023-10-25

## 2024-11-23 PROBLEM — I25.10 CORONARY ARTERY DISEASE INVOLVING NATIVE CORONARY ARTERY OF NATIVE HEART: Status: ACTIVE | Noted: 2024-09-04

## 2024-11-23 PROBLEM — I73.9 PAD (PERIPHERAL ARTERY DISEASE): Status: ACTIVE | Noted: 2023-10-25

## 2024-11-23 LAB — GLUCOSE BLD-MCNC: 118 MG/DL (ref 70–99)

## 2024-11-23 PROCEDURE — 99214 OFFICE O/P EST MOD 30 MIN: CPT | Performed by: STUDENT IN AN ORGANIZED HEALTH CARE EDUCATION/TRAINING PROGRAM

## 2024-11-23 RX ORDER — ONDANSETRON 4 MG/1
4 TABLET, ORALLY DISINTEGRATING ORAL EVERY 4 HOURS PRN
Qty: 12 TABLET | Refills: 0 | Status: SHIPPED | OUTPATIENT
Start: 2024-11-23

## 2024-11-23 RX ORDER — TRAMADOL HYDROCHLORIDE 50 MG/1
50 TABLET ORAL EVERY 6 HOURS PRN
Status: DISCONTINUED | OUTPATIENT
Start: 2024-11-23 | End: 2024-11-23

## 2024-11-23 RX ORDER — TRAMADOL HYDROCHLORIDE 50 MG/1
50 TABLET ORAL AS NEEDED
Qty: 15 TABLET | Refills: 0 | Status: SHIPPED | OUTPATIENT
Start: 2024-11-23

## 2024-11-23 RX ORDER — ROSUVASTATIN CALCIUM 20 MG/1
20 TABLET, COATED ORAL NIGHTLY
Qty: 30 TABLET | Refills: 2 | Status: SHIPPED | OUTPATIENT
Start: 2024-11-23 | End: 2025-02-21

## 2024-11-23 RX ORDER — CILOSTAZOL 50 MG/1
50 TABLET ORAL 2 TIMES DAILY
Qty: 60 TABLET | Refills: 0 | Status: SHIPPED | OUTPATIENT
Start: 2024-11-23 | End: 2024-12-23

## 2024-11-23 NOTE — PROGRESS NOTES
[facility]  Progress Note    Gabriela Ndiaye Patient Status:  Inpatient    1945 MRN [id number]   Location [facility] 3NW-A Attending Osiris Castro MD   Hosp Day # 1 PCP August Coley MD     Subjective:  The patient was seen and examined at bedside. She reports that her abdomen is sore at her incision sites. She is tolerating a diet without nausea or  vomiting. She is passing flatus.     She reports low O2 saturations overnight, but currently is on room air and O2 sats are 95.    Objective/Physical Exam:  BP 96/74 (BP Location: Right arm)   Pulse (!) 127   Temp 98.3 °F (36.8 °C) (Oral)   Resp 16   Ht 64\"   Wt 148 lb (67.1 kg)   SpO2 (!) 88%   BMI 25.40 kg/m²     Intake/Output Summary (Last 24 hours) at 2024 0802  Last data filed at 2024 0753  Gross per 24 hour   Intake 720 ml   Output 240 ml   Net 480 ml         General: Alert, oriented x3. No acute distress.  HEENT: Normocephalic, atraumatic. No scleral icterus.  Pulmonary: No respiratory distress, effort normal.   Abdomen: Non-distended, without tympany to percussion. Soft, appropriate incisional site tenderness to palpation. No rebound or guarding. No peritoneal signs.   Incision: Laparoscopic incision sites are clean, dry, intact without surrounding erythema or cellulitis.  Skin glue is in place.  Extremities: No lower extremity edema. No clubbing or cyanosis.   Skin: Warm, dry. No jaundice.       Labs:      Lab Results   Component Value Date    INR 0.92 2021             Assessment:  Patient Active Problem List   Diagnosis    Pulmonary emphysema (HCC)    Type 2 diabetes mellitus with diabetic polyneuropathy, without long-term current use of insulin (HCC)    Chronic midline low back pain without sciatica    Nicotine dependence    Depression, recurrent (HCC)    Hypothyroidism    Liver cyst    Renal cyst    Splenic cyst    Neuropathy    Stage 3a chronic kidney disease (HCC)    Primary hypertension     Clostridioides difficile carrier    Adenomatous polyp    Ampullary adenoma    Atrophic vaginitis    Chronic interstitial cystitis    Dyslipidemia    Grade IV hemorrhoids    Hearing loss    IBS (irritable bowel syndrome)    Nonallergic rhinitis    Ptosis of right eyelid    Regular astigmatism of both eyes    Tinnitus    Vitreous membrane    Anxiety disorder    Chronic midline low back pain with bilateral sciatica    Fibromyalgia    Peripheral vascular disease (HCC)    Cancer of bronchus of right upper lobe (HCC)    Coronary artery disease involving native coronary artery of native heart without angina pectoris    Mixed hyperlipidemia    Symptomatic cholelithiasis     POD 1 laparoscopic cholecystectomy  Hypoxia    Plan:  Overall, the patient is doing well postoperatively.  Plan for discharge home today  Diet as tolerated  Recommend ibuprofen Tylenol primarily for pain management.  Will prescribe tramadol for breakthrough pain  No lifting.  Activity restrictions reviewed  The patient may shower  Okay to resume all at home medications  DVT prophylaxis with Lovenox  [facility]-up with [facility] in 2 weeks.  Okay to follow-up sooner if needed.      My total face time with this patient was 25 minutes. Greater than half of the visit was spent in counseling the patient on the above listed diagnoses and treatment options.     Maryam Trejo PA-C  11/23/2024  8:02 AM

## 2024-11-23 NOTE — PROGRESS NOTES
NURSING DISCHARGE NOTE    Discharged Home via Wheelchair.  Accompanied by Support staff  Belongings Taken by patient/family.    Discharge education provided. Reviewed medications and follow up appts. All questions answered and concerns addressed, pt verbalized understanding. IV and telemetry removed. Pt dc in stable condition. Patient left unit via wheelchair at 1205

## 2024-11-23 NOTE — PROGRESS NOTES
Alert & oriented x4. Oxygen 89-91% on room air. IS encouraged. Voids but lower urine output. Tolerating cardiac diet. Ambulates with standby assist and walker. Denies nausea/chest pain/SOB. Pain controlled with prn medications and ice pack. Lap sites x3 with skin glue WALTER. Patient and grand-daughter updated on plan of care. Questions and concerns addressed. Frequent rounding performed

## 2024-11-23 NOTE — PLAN OF CARE
A&Ox4. With short term memory loss noted VSS. 2L O2 at night . Denies chest pain and SOB.   Telemetry: NSR.   GI: Abdomen soft, rounded. Denies Passing gas.   Denies nausea.   : Voids.   Pain controlled with scheduled tramadol, Toradol   Up with standby assist and walker  Incisions: 3 lap sites with skin glue (C/D/I)- abdominal binder on for comfort  Diet: Cardiac  Saline locked  QID accuchek   All appropriate safety measures in place. All questions and concerns addressed.

## 2024-11-23 NOTE — PLAN OF CARE
Pt presented onto unit from PACU with son in law at bedside   Here for overnight observation/pain management     A&Ox4. VSS. 2L oxygen for comfort post-op. . Denies chest pain and SOB.   Tele orders in place   GI: Abdomen soft, nondistended. Due to pass gas, no belching reported.   Denies nausea.   : DTV by 2300  Pain controlled with PRN pain medications.   Up with standby assist.   Drains: None  Incisions: x3 abd lap sites with derma-bond. CDI, abdominal binder in place for additional support.   Diet: Cardiac diet   IVF running per order. All appropriate safety measures in place. All questions and concerns addressed.    Two person skin check completed with ANA Childers   Left vang bruising from hitting herself on car door   Blanchable redness on bottom/tailbone - picture added into media tab   Mepilex dressing in place - pt educated on importance of frequent turns/mobility/use of mepilex dressing   Surgical incisions - CDI

## 2024-11-23 NOTE — CONSULTS
[place] [facility]OSPITAL[facility]  CONSULT     Gabriela Ndiaye Patient Status:  Inpatient    1945 MRN [id number]   Location [facility] 3NW-A Attending Osiris Castro MD   Hosp Day # 0 PCP August Coley MD     Reason for consult: CAD, HTN, COPD, DM    Requested by: Dr. Castro     Subjective:   History of Present Illness:     Gabriela Ndiaye is a 79 year old female with medical history of regarding her disease, COPD, hypertension, hyperlipidemia who presented for surgical procedure.  Patient seen post surgical procedure, her pain is tolerable.  Denies any nausea vomiting, no fevers or chills.  No recent cough, congestion.  She has no other acute complaints at this time.    History/Other:    Past Medical History:  Past Medical History:    Abdominal hernia    Anxiety    Arthritis    Back pain    Back problem    spinal stenosis    Bilateral pulmonary embolism (HCC)    Bloating    Cancer (HCC)    LUNG    COPD (chronic obstructive pulmonary disease) (HCC)    NO HOME O2    Coronary atherosclerosis    STENT X1    Diabetes (HCC)    DIET CONTROLLED    Diabetes mellitus (HCC)    Diarrhea, unspecified    Disorder of thyroid    Easy bruising    Esophageal reflux    Exposure to medical diagnostic radiation    last tx 2023    Feeling lonely    Flatulence/gas pain/belching    Food intolerance    Hearing impairment    PT DENIES, NO AIDS    Hearing loss    Hemorrhoids    High blood pressure    High cholesterol    History of blood transfusion    ABOUT 20 YEARS AGO, NO ADVERSE REACTION    History of eating disorder    Muscle weakness    Neuropathy    Night sweats    Osteoarthritis    Pain in joints    Pneumonia of both lungs due to infectious organism, unspecified part of lung    Pulmonary emboli (HCC)    Pulmonary embolism (HCC)    Renal disorder    CKD RECONCILLED FROM OUTSIDE PROBLEM LIST    RSV (acute bronchiolitis due to respiratory syncytial virus)    Shortness of breath    Stress    Thyroid disease     Uncomfortable fullness after meals    Visual impairment    GLASSES    Wears glasses     Past Surgical History:   Past Surgical History:   Procedure Laterality Date    Amputation finger/thumb+flaps      Colectomy      Colonoscopy      Colonoscopy N/A 1/15/2024    Procedure: COLONOSCOPY;  Surgeon: Tirso Kimball MD;  Location:  ENDOSCOPY    Hysterectomy        Family History:   Family History   Problem Relation Age of Onset    Heart Disorder Father     Stroke Father     Uterine Cancer Mother     Diabetes Mother     Cancer Mother         colon    Heart Attack Daughter     Diabetes Daughter     Colon Polyps Daughter     Colon Polyps Son      Social History:    reports that she has been smoking cigarettes. She started smoking about 60 years ago. She has a 31.6 pack-year smoking history. She has never used smokeless tobacco. She reports that she does not currently use alcohol. She reports that she does not use drugs.     Allergies: Allergies[1]    Medications:  Medications Ordered Prior to Encounter[2]    Review of Systems:   A comprehensive review of systems was completed.    Pertinent positives and negatives noted in the HPI.    Objective:   Physical Exam:    /55 (BP Location: Left arm)   Pulse 63   Temp 98 °F (36.7 °C) (Oral)   Resp 18   Ht 5' 4\" (1.626 m)   Wt 148 lb (67.1 kg)   SpO2 97%   BMI 25.40 kg/m²   General: No acute distress, Alert, elderly, pleasant   Respiratory: No rhonchi, no wheezes  Cardiovascular: S1, S2. Regular rate and rhythm  Abdomen: Soft, NT/ND, +BS  Neuro: No new focal deficits  Extremities: No edema    Results:    Labs:      Labs Last 24 Hours:  No results for input(s): \"WBC\", \"HGB\", \"MCV\", \"PLT\", \"BAND\", \"INR\" in the last 168 hours.    Invalid input(s): \"LYM#\", \"MONO#\", \"BASOS#\", \"EOSIN#\"    No results for input(s): \"GLU\", \"BUN\", \"CREATSERUM\", \"GFRAA\", \"GFRNAA\", \"CA\", \"ALB\", \"NA\", \"K\", \"CL\", \"CO2\", \"ALKPHO\", \"AST\", \"ALT\", \"BILT\", \"TP\" in the last 168 hours.    No results for  input(s): \"PTP\", \"INR\" in the last 168 hours.    No results for input(s): \"TROP\", \"CK\" in the last 168 hours.      Imaging: Imaging data reviewed in Epic.    Assessment & Plan:      #Laparoscopic cholecystectomy  Surgery on 11/22 for symptomatic cholelithiasis  Diet advanced  Pain control, anti-emetics  Lovenox for dvt proph    #CAD s/p stent x2  Patient off Brilinta, did not tolerated plavix or prasugrel  Resume aspirin in am  Continue statin, BB, ARB    #PAD   Her cilostazole has been on hold  ASA, statin    #Essential HTN  Losartan, Lasix    #HLD  Ezetimibe     #DM Type 2  ISS    #Peripheral neuropathy  Gabapentin     #COPD  No wheeze on exam  Trelegy, Singulair, Albuterol prn    #Hypothyroidism  Synthroid     #GERD - PPI    #Depression   Zoloft, xanax prn         Plan of care discussed with patient, nurse, family     Benedicto Abdalla MD  11/22/2024    The 21st Century Cures Act makes medical notes like these available to patients in the interest of transparency. Please be advised this is a medical document. Medical documents are intended to carry relevant information, facts as evident, and the clinical opinion of the practitioner. The medical note is intended as peer to peer communication and may appear blunt or direct. It is written in medical language and may contain abbreviations or verbiage that are unfamiliar.            [1]   Allergies  Allergen Reactions    Keflex [Cephalexin] HIVES    Rosuvastatin MYALGIA    Cephalosporins NAUSEA ONLY     NAUSEA ONLY WHEN TAKEN ON AN EMPTY STOMACH, OTHERWISE CAN TAKE   [2]   No current facility-administered medications on file prior to encounter.     Current Outpatient Medications on File Prior to Encounter   Medication Sig Dispense Refill    [START ON 11/23/2024] ASPIRIN LOW DOSE 81 MG Oral Tab EC Take 1 tablet (81 mg total) by mouth daily.      losartan 25 MG Oral Tab Take 1 tablet (25 mg total) by mouth daily.      ezetimibe 10 MG Oral Tab Take 1 tablet (10 mg total) by  mouth daily.      nystatin 100,000 Units/g External Cream APPLY TOPICALLY 1 APPLICATION AS NEEDED      sertraline (ZOLOFT) 25 MG Oral Tab Take 1 tablet (25 mg total) by mouth daily. (Patient taking differently: Take 1 tablet (25 mg total) by mouth daily. Takes 1/2 tablet) 90 tablet 3    DICYCLOMINE 20 MG Oral Tab TAKE 1 TABLET BY MOUTH THREE TIMES A DAY (Patient taking differently: Take 1 tablet (20 mg total) by mouth as needed.) 270 tablet 0    ALBUTEROL 108 (90 Base) MCG/ACT Inhalation Aero Soln TAKE 2 PUFFS BY MOUTH EVERY 6 HOURS AS NEEDED FOR WHEEZE OR SHORTNESS OF BREATH 25.5 each 3    HYDROcodone-acetaminophen 5-325 MG Oral Tab Take 1 tablet by mouth 2 (two) times daily as needed for Pain.      ELMIRON 100 MG Oral Cap TAKE 1 CAPSULE BY MOUTH EVERY DAY 90 capsule 1    levothyroxine (SYNTHROID) 75 MCG Oral Tab Take 1 tablet (75 mcg total) by mouth daily. 30 tablet 3    benzonatate 200 MG Oral Cap Take 1 capsule (200 mg total) by mouth 3 (three) times daily as needed for cough. 30 capsule 2    pravastatin 10 MG Oral Tab Take 1 tablet (10 mg total) by mouth nightly. 90 tablet 1    ESOMEPRAZOLE MAGNESIUM 20 MG Oral Capsule Delayed Release TAKE 1 CAPSULE BY MOUTH EVERY DAY IN THE MORNING BEFORE BREAKFAST (Patient taking differently: Take 2 capsules (40 mg total) by mouth before breakfast.) 90 capsule 1    METOPROLOL TARTRATE 50 MG Oral Tab TAKE 0.5 TABLETS BY MOUTH 2 TIMES DAILY. 90 tablet 1    ALPRAZOLAM 0.5 MG Oral Tab TAKE 1 TABLET BY MOUTH NIGHTLY SCHEDULED AND TAKE 1/2 TABLET BY MOUTH DAILY AS NEEDED FOR ANXIETY. 45 tablet 2    GABAPENTIN 100 MG Oral Cap TAKE 1 CAPSULE BY MOUTH IN THE MORNING, 1-2 CAPSULES BY MOUTH IN THE AFTERNOON, AND 3 CAPSULES BY MOUTH IN THE EVENING DAILY (Patient taking differently: Take 3 capsules (300 mg total) by mouth nightly.) 180 capsule 5    estradiol 0.1 MG/GM Vaginal Cream Place 1 g vaginally daily. (Patient taking differently: Place 1 g vaginally twice a week.) 42.5 g 5     furosemide 20 MG Oral Tab Take 1 tablet (20 mg total) by mouth 2 (two) times daily. 180 tablet 1    MONTELUKAST 10 MG Oral Tab TAKE 1 TABLET BY MOUTH EVERY DAY AT NIGHT 90 tablet 3    Potassium Chloride ER 20 MEQ Oral Tab CR Take 1 tablet by mouth daily. 90 tablet 1    ACYCLOVIR 400 MG Oral Tab TAKE 1 TABLET BY MOUTH TWICE A  tablet 0    Ascorbic Acid (VITAMIN C) 1000 MG Oral Tab Take 1 tablet (1,000 mg total) by mouth daily.      loratadine 10 MG Oral Tablet Dispersible Take 1 tablet (10 mg total) by mouth daily.      Capsicum, Cayenne, (CAYENNE PEPPER OR) Take 1 tablet by mouth daily.      Nebulizers (Reduce Data AEROSOL DELIVERY SYSTEM) Does not apply Misc Take 1 Bottle by mouth As Directed.      TRELEGY ELLIPTA 100-62.5-25 MCG/ACT Inhalation Aerosol Powder, Breath Activated Inhale 1 puff into the lungs daily. 180 each 3    diclofenac 1 % External Gel Apply 2 g topically 4 (four) times daily. 100 g 5    Vitamin D3, Cholecalciferol, 125 MCG (5000 UT) Oral Cap Take 1 capsule (5,000 Units total) by mouth daily.      B Complex Vitamins (B COMPLEX-B12 OR) Take 1 tablet by mouth daily.      Turmeric 400 MG Oral Cap Take 1 capsule by mouth daily.      omega-3 fatty acids 1000 MG Oral Cap Take 1,000 mg by mouth daily.      Respiratory Therapy Supplies (NEBULIZER/TUBING/MOUTHPIECE) Does not apply Kit Use as directed 1 each 0

## 2024-11-23 NOTE — PROGRESS NOTES
[facility]   part of [facility]     Hospitalist Progress Note     Gabriela Ndiaye Patient Status:  Inpatient    1945 MRN [id number]   Location [facility] 3NW-A Attending Osiris Castro MD   Hosp Day # 1 PCP August Coley MD     Chief Complaint: Medical management    Subjective:      - Passing gas, abd pain controlled with current meds   - AF; VSS   - Pt notes she was taken off brillinta d/t dyspnea, however, she now notes pain in LE from PAD worsening. She states she would like to be back on medication to treat this, discussed that we could restart cilostizole and f/u with cardiology/PCP    - Also discussed significantly elevated lipid labs, pt states she was non-complaint with statin d/t myalgias, though unclear if that was related to PAD instead, now willing to trial it again     Objective:    Review of Systems:   A comprehensive review of systems was completed; pertinent positive and negatives stated in subjective.    Vital signs:  Temp:  [97.7 °F (36.5 °C)-98.9 °F (37.2 °C)] 98.1 °F (36.7 °C)  Pulse:  [50-68] 62  Resp:  [11-23] 20  BP: (104-168)/(55-95) 114/63  SpO2:  [90 %-100 %] 92 %    Physical Exam:    General: No acute distress  Respiratory: no wheezes, no rhonchi  Cardiovascular: S1, S2, regular rate and rhythm  Abdomen: Soft, mild ttp in RUQ,, non-distended, positive bowel sounds, surgical incisions well approximated   Neuro: No new focal deficits.   Extremities: no edema    Diagnostic Data:    Labs:  No results for input(s): \"WBC\", \"HGB\", \"MCV\", \"PLT\", \"BAND\", \"INR\" in the last 168 hours.    Invalid input(s): \"LYM#\", \"MONO#\", \"BASOS#\", \"EOSIN#\"    No results for input(s): \"GLU\", \"BUN\", \"CREATSERUM\", \"GFRAA\", \"GFRNAA\", \"CA\", \"ALB\", \"NA\", \"K\", \"CL\", \"CO2\", \"ALKPHO\", \"AST\", \"ALT\", \"BILT\", \"TP\" in the last 168 hours.    CrCl cannot be calculated (Patient's most recent lab result is older than the maximum 7 days allowed.).    No results for input(s): \"TROP\", \"TROPHS\", \"CK\" in  the last 168 hours.    No results for input(s): \"PTP\", \"INR\" in the last 168 hours.               Microbiology    No results found for this visit on 11/22/24.      Imaging: Reviewed in Epic.    Medications:    enoxaparin  40 mg Subcutaneous Daily    acetaminophen  1,000 mg Oral Q8H ASIA    ketorolac  15 mg Intravenous Q6H    traMADol  50 mg Oral 4 times per day    ezetimibe  10 mg Oral Daily    gabapentin  300 mg Oral Nightly    furosemide  20 mg Oral BID    levothyroxine  75 mcg Oral Daily @ 0700    losartan  25 mg Oral Daily    metoprolol tartrate  25 mg Oral BID    montelukast  10 mg Oral Nightly    pravastatin  10 mg Oral Nightly    sertraline  25 mg Oral Daily    fluticasone-salmeterol  1 puff Inhalation BID    umeclidinium bromide  1 puff Inhalation Daily    acyclovir  400 mg Oral BID    aspirin  81 mg Oral Daily    insulin aspart  1-10 Units Subcutaneous TID AC and HS       Assessment & Plan:      #Laparoscopic cholecystectomy  Surgery on 11/22 for symptomatic cholelithiasis  Diet advanced  Pain control, anti-emetics  Lovenox for dvt proph     #CAD s/p stent x2  Patient off Brilinta, did not tolerated plavix or prasugrel  Resume aspirin  Continue statin, BB, ARB     #PAD   resume cilostazole   ASA, statin increased     #Essential HTN  Losartan, Lasix     #HLD  Ezetimibe, increased statin strength at dc, f/u with PCP      #DM Type 2  ISS     #Peripheral neuropathy  Gabapentin      #COPD  No wheeze on exam  Trelegy, Singulair, Albuterol prn     #Hypothyroidism  Synthroid      #GERD - PPI     #Depression   Zoloft, xanax prn     Agree with home later today     Charanjit Coley MD    Supplementary Documentation:     Quality:  DVT Mechanical Prophylaxis:   SCDs, Early ambuation  DVT Pharmacologic Prophylaxis   Medication    enoxaparin (Lovenox) 40 MG/0.4ML SUBQ injection 40 mg         DVT Pharmacologic prophylaxis: Aspirin 81 mg      Code Status: Not on file  Clay: No urinary catheter in place  Clay Duration  (in days):   Central line:    SALTY: 11/23/2024    The 21st Century Cures Act makes medical notes like these available to patients in the interest of transparency. Please be advised this is a medical document. Medical documents are intended to carry relevant information, facts as evident, and the clinical opinion of the practitioner. The medical note is intended as peer to peer communication and may appear blunt or direct. It is written in medical language and may contain abbreviations or verbiage that are unfamiliar.

## 2024-11-25 ENCOUNTER — PATIENT OUTREACH (OUTPATIENT)
Dept: CASE MANAGEMENT | Age: 79
End: 2024-11-25

## 2024-11-25 DIAGNOSIS — Z02.9 ENCOUNTERS FOR UNSPECIFIED ADMINISTRATIVE PURPOSE: Primary | ICD-10-CM

## 2024-11-25 NOTE — PROGRESS NOTES
Left message on mailbox for patient to call nurse care manager back for transitions of care call.  Nurse care  information included in message.

## 2024-11-26 ENCOUNTER — TELEPHONE (OUTPATIENT)
Facility: LOCATION | Age: 79
End: 2024-11-26

## 2024-11-26 DIAGNOSIS — R19.7 DIARRHEA, UNSPECIFIED TYPE: Primary | ICD-10-CM

## 2024-11-26 NOTE — PAYOR COMM NOTE
PLEASE NOTE:  THIS IS OPIB      --------------  DISCHARGE REVIEW    Payor: [employer] PPO  Subscriber #:  [id number]  Authorization Number: [id number]    Admit date: N/A  Admit time:  N/A  Discharge Date: 11/23/2024 12:16 PM     Admitting Physician: Osiris Castro MD  Attending Physician:  No att. providers found  Primary Care Physician: August Coley MD       Discharge Summary Notes    No notes of this type exist for this encounter.         REVIEWER COMMENTS

## 2024-11-26 NOTE — TELEPHONE ENCOUNTER
The patient recently called stating she is experiencing diarrhea and that  told her to call the office to get a prescription to control the diarrhea.    Call back [phone]

## 2024-11-26 NOTE — TELEPHONE ENCOUNTER
LAPAROSCOPIC CHOLECYSTECTOMY, WITH INDOCYANINE GREEN with Dr Castro 11/22/24. Spoke with patient who reports having diarrhea that started yesterday. Patient reports tow episodes or both watery and loose bowel movements yesterday and three today.  Denies nausea, vomiting, or increased abdominal pain.  Pain is being controlled by ibuprofen.  Able to tolerated a soft diet but is afraid to eat due to the diarrhea.  Patient has been taking imodium with no relief.

## 2024-11-27 NOTE — TELEPHONE ENCOUNTER
RACIEL Acharya responds:  I will place an order for c. Diff stool test to rule out infection at the cause of her symptoms. After she completes this, she can add take 1 serving of metamucil daily to help bulk up her stool. Loose stools following surgery can unfortunately occur and should improve with time. She can avoid fatty and greasy foods as this may trigger her symptoms   ----------  Left detailed VM for patient and relayed info above.

## 2024-11-27 NOTE — PROGRESS NOTES
Second attempt -  Left message on mailbox for patient to call nurse care manager back for transitions of care call.  Nurse care  information included in message.      tibditt message sent as well.

## 2024-11-27 NOTE — TELEPHONE ENCOUNTER
Patient calling back, states she does not have liquid diarrhea anymore because she took Metamucil yesterday.  Relayed to patient that C diff testing is not long needed, and encouraged patient to continue Metamucil.  Patient is agreeable, and states she will follow up with PCP Dr. Coley for further care.

## 2024-11-29 ENCOUNTER — TELEPHONE (OUTPATIENT)
Dept: FAMILY MEDICINE CLINIC | Facility: CLINIC | Age: 79
End: 2024-11-29

## 2024-11-29 ENCOUNTER — PATIENT OUTREACH (OUTPATIENT)
Dept: CASE MANAGEMENT | Age: 79
End: 2024-11-29

## 2024-11-29 DIAGNOSIS — F32.A ANXIETY AND DEPRESSION: ICD-10-CM

## 2024-11-29 DIAGNOSIS — Z76.0 MEDICATION REFILL: ICD-10-CM

## 2024-11-29 DIAGNOSIS — F41.9 ANXIETY AND DEPRESSION: ICD-10-CM

## 2024-11-29 DIAGNOSIS — B37.0 THRUSH: Primary | ICD-10-CM

## 2024-11-29 NOTE — TELEPHONE ENCOUNTER
Spoke to patient for monthly CCM.    Patient C/O thrush. States she has had this a few times. Rinsing mouth after inhaler. Has been using salt water but symptoms not improving.    Requesting treatment for thrush.    Please contact patient to advise    Thank you

## 2024-11-29 NOTE — PROGRESS NOTES
Spoke to Gabriela for Chronic Care Management.      Updates to patient care team/comments: UTD   Patient reported changes in medications: reports no changes   Med Adherence  Comment: reports adherence      Health Maintenance:   Health Maintenance   Topic Date Due    Zoster Vaccines (1 of 2) Never done    DEXA Scan  Never done    Diabetes Care Foot Exam  07/26/2024    COVID-19 Vaccine (2 - 2024-25 season) 09/01/2024    Influenza Vaccine (1) 10/01/2024    Diabetes Care A1C  03/04/2025    Diabetes Care: Microalb/Creat Ratio  03/15/2025    Diabetes Care Dilated Eye Exam  11/07/2025    Diabetes Care: GFR  11/14/2025    MA Annual Health Assessment  Completed    Annual Depression Screening  Completed    Fall Risk Screening (Annual)  Completed    Tobacco Cessation Counseling  Completed    Pneumococcal Vaccine: 65+ Years  Completed       Patient updates/concerns:   Spoke with patient.  Patient relates doing ok. Mood good/stable  Denies any recent falls. B/P and pulse stable. Followed by cardiology. Breathing stable. Using inhalers. Currently has thrush and requesting treatment. TE sent to PCP. Recent cholecystectomy completed. Loose stools stable with metamucil. Plans on picking up stool kit incase loose stools return. Staying hydrated. Denies any urinary symptoms. Eating a low fat diet. No other questions or concerns.    Encouraged patient:   Self care: Take the time to do the things you love.   Nutrition:  Good nutrition helps us to maintain our weight, fight off infection, and help reduce the risk of developing other chronic issues.   Physical activity:  Physical activity is important to help maintain independence and improve quality of life.       Goals/Action Plan:    Active goal from previous outreach: Staying healthy, maintain independence, and stability.     Patient reported progress towards goals: progressing                - What: continues to follow up on medical conditions            - Where/When/How: seeing  specialist and PCP   Patient Reported Barriers and Concerns: as per above                    - Plan for overcoming barriers: encouraged patient to call with any questions or concerns.     Care Managers Interventions: Continue to provide encouragement and education for healthy coping and diagnosis.      Future Appointments:   Future Appointments   Date Time Provider Department Center   1/6/2025  1:20 PM August Coley MD [facility] Care Manager Follow Up Date: 1 month     Reason For Follow Up: review progress and or barriers towards patient's goals.     Time Spent This Encounter Total: 18 min medical record review, telephone communication, care plan updates where needed, education, goals, and action plan recreation/update. Provided acknowledgment and validation to patient's concerns.   Monthly Minute Total including today: 18 min   Physical assessment, complete health history, and need for CCM established by August Coley MD.    Friendly reminder - 2025 Benefits Open Enrollment is here!   We encourage you to review your current Medicare coverage and explore your options during this period. We have developed a Medicare Information Page on our website to serve as a resource for guidance and answers to any questions you might have.   You can access it by visiting, [email or web address]

## 2024-11-29 NOTE — TELEPHONE ENCOUNTER
Called patient to get more information, left message to have her call office back and speak with a nurse    Dr. Coley please advise on c/o thrush.   Do you want her to have a visit?

## 2024-11-29 NOTE — PROGRESS NOTES
Third attempt -  Left message on mailbox for patient to call nurse care manager back for transitions of care call.  Nurse care  information included in message.      Mychart not read

## 2024-11-30 RX ORDER — NYSTATIN 100000 [USP'U]/ML
5 SUSPENSION ORAL 4 TIMES DAILY
Qty: 140 ML | Refills: 0 | Status: SHIPPED | OUTPATIENT
Start: 2024-11-30 | End: 2024-12-07

## 2024-12-02 ENCOUNTER — TELEPHONE (OUTPATIENT)
Dept: FAMILY MEDICINE CLINIC | Facility: CLINIC | Age: 79
End: 2024-12-02

## 2024-12-02 RX ORDER — POTASSIUM CHLORIDE 1500 MG/1
1 TABLET, EXTENDED RELEASE ORAL DAILY
Qty: 90 TABLET | Refills: 1 | Status: SHIPPED | OUTPATIENT
Start: 2024-12-02

## 2024-12-02 RX ORDER — BUSPIRONE HYDROCHLORIDE 10 MG/1
10 TABLET ORAL 2 TIMES DAILY
Qty: 180 TABLET | Refills: 1 | Status: SHIPPED | OUTPATIENT
Start: 2024-12-02

## 2024-12-02 NOTE — TELEPHONE ENCOUNTER
Attempted to call patient, left detailed message on VM that rx was sent to [facility] for thrush symptoms. Call office with any questions.

## 2024-12-02 NOTE — TELEPHONE ENCOUNTER
Patient left a voicemail during the holiday weekend stating how she has had diarrhea after her surgery. Her surgeon recommended she gets a stool kit from our office, please advise.

## 2024-12-03 NOTE — TELEPHONE ENCOUNTER
Called patient for condition update. She had Cholecystectomy about 2 weeks ago. She had two days of watery stool about 3 days ago. This morning stool was a little firmer, not watery, but still loose.  She is taking Metamucil daily and using Imodium. She is drinking plenty of water. She has a follow up with Gen Surg on Friday.  A C Diff test was ordered for patient by Gen Surge but she did not complete as watery diarrhea started to subside.     Dr Coley any further recs? Patient wondering if she should proceed with any stool test or if you want to prescribe an antibiotic for her for possible C Diff?

## 2024-12-03 NOTE — TELEPHONE ENCOUNTER
Last appointment: 10/21/24  Next appointment: 1/27/25  Previous refill encounter(s): 4/11/24 90 d/s with 1 refill    Requested Prescriptions     Pending Prescriptions Disp Refills    amLODIPine (NORVASC) 10 MG tablet [Pharmacy Med Name: amLODIPine Besylate 10 MG Oral Tablet] 90 tablet 3     Sig: Take 1 tablet by mouth once daily    pantoprazole (PROTONIX) 40 MG tablet [Pharmacy Med Name: Pantoprazole Sodium 40 MG Oral Tablet Delayed Release] 90 tablet 3     Sig: Take 1 tablet by mouth once daily    escitalopram (LEXAPRO) 20 MG tablet [Pharmacy Med Name: Escitalopram Oxalate 20 MG Oral Tablet] 90 tablet 3     Sig: Take 1 tablet by mouth once daily         For Pharmacy Admin Tracking Only    Program: Medication Refill  CPA in place:    Recommendation Provided To:   Intervention Detail: New Rx: 3, reason: Patient Preference  Intervention Accepted By:   Gap Closed?:    Time Spent (min): 5   Patient had stitches placed by ED on 12-30, wants to know if she can wait until her upcoming 1-10 OV to have removed, states she takes blood thinners.   Altho c-dif resolved as of 11-4 patient reporting loose bowels, diarrhea, wonders if c-dif has returned,

## 2024-12-04 NOTE — TELEPHONE ENCOUNTER
Attempted to call patient, left detailed message on VM per phone protocol. Advised patient to call back with any further questions.

## 2024-12-04 NOTE — TELEPHONE ENCOUNTER
Loose stools can be common after cholecystectomy. Would advise high fiber, low fat diet. Can continue metamucil. Can use immodium as needed. Complete stool testing as directed by surgery. Stay well hydrated.

## 2024-12-05 NOTE — PROGRESS NOTES
[facility] Family Medicine Office Note  Chief Complaint:   Chief Complaint   Patient presents with    Dizziness     Symptoms started 2 months ago, Pt thinks it may be BP medication Losartan, has shaking in right arm     Shortness Of Breath     Pt states may be from medication Brillinta        HPI:   This is a 79 year old female coming in for follow up of chronic conditions.     HTN - reports that she has been experiencing increasing lightheadedness/dizziness. Thinks it might be d/t blood pressure medication (losartan). Denies any syncopal episodes. Denies any chest pains, palpitations, dyspnea.     CAD - has been having SOB from brillinta. Has followed up with cardiologist regarding this in the past. Has not been able to tolerate plavix or effient. Has upcoming f/u with cardiology.    Past Medical History:    Abdominal hernia    Anxiety    Arthritis    Back pain    Back problem    spinal stenosis    Bilateral pulmonary embolism (HCC)    Bloating    Cancer (HCC)    LUNG    COPD (chronic obstructive pulmonary disease) (HCC)    NO HOME O2    Coronary atherosclerosis    STENT X1    Diabetes (HCC)    DIET CONTROLLED    Diabetes mellitus (HCC)    Diarrhea, unspecified    Disorder of thyroid    Easy bruising    Esophageal reflux    Exposure to medical diagnostic radiation    last tx 11/2023    Feeling lonely    Flatulence/gas pain/belching    Food intolerance    Hearing impairment    PT DENIES, NO AIDS    Hearing loss    Hemorrhoids    High blood pressure    High cholesterol    History of blood transfusion    ABOUT 20 YEARS AGO, NO ADVERSE REACTION    History of eating disorder    Muscle weakness    Neuropathy    Night sweats    Osteoarthritis    Pain in joints    Pneumonia of both lungs due to infectious organism, unspecified part of lung    Pulmonary emboli (HCC)    Pulmonary embolism (HCC)    Renal disorder    CKD RECONCILLED FROM OUTSIDE PROBLEM LIST    RSV (acute bronchiolitis due to respiratory syncytial  virus)    Shortness of breath    Stress    Thyroid disease    Uncomfortable fullness after meals    Visual impairment    GLASSES    Wears glasses     Past Surgical History:   Procedure Laterality Date    Amputation finger/thumb+flaps      Colectomy      Colonoscopy      Colonoscopy N/A 1/15/2024    Procedure: COLONOSCOPY;  Surgeon: Tirso Kimball MD;  Location:  ENDOSCOPY    Hysterectomy       Social History:  Social History     Socioeconomic History    Marital status:    Tobacco Use    Smoking status: Every Day     Current packs/day: 0.50     Average packs/day: 0.5 packs/day for 63.3 years (31.6 ttl pk-yrs)     Types: Cigarettes     Start date: 2/24/1964    Smokeless tobacco: Never   Vaping Use    Vaping status: Never Used   Substance and Sexual Activity    Alcohol use: Not Currently     Comment: RARE    Drug use: Never   Other Topics Concern    Caffeine Concern Yes     Comment: coffee/coca cola/Iced tea    Exercise No     Social Drivers of Health     Financial Resource Strain: Low Risk  (8/15/2023)    Financial Resource Strain     Difficulty of Paying Living Expenses: Not hard at all     Med Affordability: No   Food Insecurity: No Food Insecurity (11/22/2024)    Food Insecurity     Food Insecurity: Never true   Transportation Needs: No Transportation Needs (11/22/2024)    Transportation Needs     Lack of Transportation: No   Physical Activity: Insufficiently Active (8/15/2023)    Exercise Vital Sign     Days of Exercise per Week: 2 days     Minutes of Exercise per Session: 30 min   Stress: No Stress Concern Present (8/15/2023)    Stress     Feeling of Stress : No   Social Connections: Socially Integrated (8/15/2023)    Social Connections     Frequency of Socialization with Friends and Family: 2   Housing Stability: Low Risk  (11/22/2024)    Housing Stability     Housing Instability: No     Family History:  Family History   Problem Relation Age of Onset    Heart Disorder Father     Stroke Father     Uterine  Cancer Mother     Diabetes Mother     Cancer Mother         colon    Heart Attack Daughter     Diabetes Daughter     Colon Polyps Daughter     Colon Polyps Son      Allergies:  Allergies[1]  Current Meds:  Current Outpatient Medications   Medication Sig Dispense Refill    ezetimibe 10 MG Oral Tab Take 1 tablet (10 mg total) by mouth daily.      nystatin 100,000 Units/g External Cream APPLY TOPICALLY 1 APPLICATION AS NEEDED      sertraline (ZOLOFT) 25 MG Oral Tab Take 1 tablet (25 mg total) by mouth daily. (Patient taking differently: Take 1 tablet (25 mg total) by mouth daily. Takes 1/2 tablet) 90 tablet 3    DICYCLOMINE 20 MG Oral Tab TAKE 1 TABLET BY MOUTH THREE TIMES A DAY (Patient taking differently: Take 1 tablet (20 mg total) by mouth as needed.) 270 tablet 0    ALBUTEROL 108 (90 Base) MCG/ACT Inhalation Aero Soln TAKE 2 PUFFS BY MOUTH EVERY 6 HOURS AS NEEDED FOR WHEEZE OR SHORTNESS OF BREATH 25.5 each 3    ELMIRON 100 MG Oral Cap TAKE 1 CAPSULE BY MOUTH EVERY DAY 90 capsule 1    levothyroxine (SYNTHROID) 75 MCG Oral Tab Take 1 tablet (75 mcg total) by mouth daily. 30 tablet 3    benzonatate 200 MG Oral Cap Take 1 capsule (200 mg total) by mouth 3 (three) times daily as needed for cough. 30 capsule 2    ESOMEPRAZOLE MAGNESIUM 20 MG Oral Capsule Delayed Release TAKE 1 CAPSULE BY MOUTH EVERY DAY IN THE MORNING BEFORE BREAKFAST (Patient taking differently: Take 2 capsules (40 mg total) by mouth before breakfast.) 90 capsule 1    METOPROLOL TARTRATE 50 MG Oral Tab TAKE 0.5 TABLETS BY MOUTH 2 TIMES DAILY. 90 tablet 1    ALPRAZOLAM 0.5 MG Oral Tab TAKE 1 TABLET BY MOUTH NIGHTLY SCHEDULED AND TAKE 1/2 TABLET BY MOUTH DAILY AS NEEDED FOR ANXIETY. 45 tablet 2    GABAPENTIN 100 MG Oral Cap TAKE 1 CAPSULE BY MOUTH IN THE MORNING, 1-2 CAPSULES BY MOUTH IN THE AFTERNOON, AND 3 CAPSULES BY MOUTH IN THE EVENING DAILY (Patient taking differently: Take 3 capsules (300 mg total) by mouth nightly.) 180 capsule 5    estradiol 0.1  MG/GM Vaginal Cream Place 1 g vaginally daily. (Patient taking differently: Place 1 g vaginally twice a week.) 42.5 g 5    furosemide 20 MG Oral Tab Take 1 tablet (20 mg total) by mouth 2 (two) times daily. 180 tablet 1    MONTELUKAST 10 MG Oral Tab TAKE 1 TABLET BY MOUTH EVERY DAY AT NIGHT 90 tablet 3    ACYCLOVIR 400 MG Oral Tab TAKE 1 TABLET BY MOUTH TWICE A  tablet 0    B Complex Vitamins (B COMPLEX-B12 OR) Take 1 tablet by mouth daily.      Turmeric 400 MG Oral Cap Take 1 capsule by mouth daily.      Ascorbic Acid (VITAMIN C) 1000 MG Oral Tab Take 1 tablet (1,000 mg total) by mouth daily.      omega-3 fatty acids 1000 MG Oral Cap Take 1,000 mg by mouth daily.      loratadine 10 MG Oral Tablet Dispersible Take 1 tablet (10 mg total) by mouth daily.      Capsicum, Cayenne, (CAYENNE PEPPER OR) Take 1 tablet by mouth daily.      Nebulizers (E[employer]bits AEROSOL DELIVERY SYSTEM) Does not apply Misc Take 1 Bottle by mouth As Directed.      Respiratory Therapy Supplies (NEBULIZER/TUBING/MOUTHPIECE) Does not apply Kit Use as directed 1 each 0    TRELEGY ELLIPTA 100-62.5-25 MCG/ACT Inhalation Aerosol Powder, Breath Activated Inhale 1 puff into the lungs daily. 180 each 3    diclofenac 1 % External Gel Apply 2 g topically 4 (four) times daily. 100 g 5    Vitamin D3, Cholecalciferol, 125 MCG (5000 UT) Oral Cap Take 1 capsule (5,000 Units total) by mouth daily.      BUSPIRONE 10 MG Oral Tab TAKE 1 TABLET BY MOUTH TWICE A  tablet 1    POTASSIUM CHLORIDE ER 20 MEQ Oral Tab CR TAKE 1 TABLET BY MOUTH EVERY DAY 90 tablet 1    nystatin 864277 UNIT/ML Mouth/Throat Suspension Take 5 mL (500,000 Units total) by mouth 4 (four) times daily for 7 days. 140 mL 0    rosuvastatin 20 MG Oral Tab Take 1 tablet (20 mg total) by mouth nightly. 30 tablet 2    cilostazol 50 MG Oral Tab Take 1 tablet (50 mg total) by mouth 2 (two) times daily. 60 tablet 0    ondansetron 4 MG Oral Tablet Dispersible Take 1 tablet (4 mg total) by mouth  every 4 (four) hours as needed. 12 tablet 0    traMADol 50 MG Oral Tab Take 1 tablet (50 mg total) by mouth as needed for Pain. 15 tablet 0    Naloxone HCl 4 MG/0.1ML Nasal Liquid 4 mg by Nasal route as needed. If patient remains unresponsive, repeat dose in other nostril 2-5 minutes after first dose. 1 kit 0    ASPIRIN LOW DOSE 81 MG Oral Tab EC Take 1 tablet (81 mg total) by mouth daily.      losartan 25 MG Oral Tab Take 1 tablet (25 mg total) by mouth daily.        Ready to quit: Not Answered  Counseling given: Not Answered       REVIEW OF SYSTEMS:   Review of Systems   A comprehensive 10 point review of systems was completed.  Pertinent positives and negatives noted in the the HPI.    EXAM:   /72   Pulse 60   Resp 18   Ht 5' 4\" (1.626 m)   Wt 149 lb (67.6 kg)   SpO2 95%   BMI 25.58 kg/m²  Estimated body mass index is 25.58 kg/m² as calculated from the following:    Height as of this encounter: 5' 4\" (1.626 m).    Weight as of this encounter: 149 lb (67.6 kg).   Vital signs reviewed.Appears stated age, well groomed.  Physical Exam  Vitals and nursing note reviewed.   Constitutional:       Appearance: Normal appearance.   HENT:      Head: Normocephalic and atraumatic.   Eyes:      Pupils: Pupils are equal, round, and reactive to light.   Cardiovascular:      Rate and Rhythm: Normal rate and regular rhythm.      Pulses: Normal pulses.      Heart sounds: Normal heart sounds. No murmur heard.  Pulmonary:      Effort: Pulmonary effort is normal. No respiratory distress.      Breath sounds: Normal breath sounds. No stridor. No wheezing or rhonchi.   Abdominal:      General: Abdomen is flat. Bowel sounds are normal. There is no distension.      Palpations: Abdomen is soft. There is no mass.      Tenderness: There is no abdominal tenderness.      Hernia: No hernia is present.   Musculoskeletal:      Lumbar back: No tenderness. Decreased range of motion.      Right lower leg: No edema.      Left lower leg: No  edema.   Neurological:      Mental Status: She is alert and oriented to person, place, and time.   Psychiatric:         Mood and Affect: Mood normal.       Bilateral barefoot skin diabetic exam is normal, visualized feet and the appearance is normal.  Bilateral monofilament/sensation of both feet is normal.  Pulsation pedal pulse exam of both lower legs/feet is normal as well.     ASSESSMENT AND PLAN:   1. Primary hypertension  BP on low end of normal - could be contributing to sx. Advised to hold losartan and monitor BP at home. Continue other medications. Has f/u with cardiology scheduled. Send in BP log in 1 week. F/u 3mo     2. Type 2 diabetes mellitus with diabetic polyneuropathy, without long-term current use of insulin ([facility])  Controlled, Last A1c value was 6.7% done 9/4/2024.  Due for diabetic eye exam.   CPM.     - Diabetic Retinopathy Exam  OU - Both Eyes; Future    3. Postmenopausal state  Due for DXA  - XR DEXA BONE DENSITOMETRY (CPT=77080); Future    4. Lumbar radiculopathy  Following with pain mgmt. S/p L4-5, L5-S1 transforaminal epidural on 10/29 with pain mgmt (Dr Steele). Remains symptomatic at times. continue supportive care measures, HEP. Consider PT if needed. F/u with pain mgmt.     5. Coronary artery disease involving native coronary artery of native heart without angina pectoris  Stable, no anginal sx. F/u with cardiology regarding SE of brillinta.     6. Cancer of bronchus of right upper lobe (HCC)  Following with pulmonology; has f/u CT due 02/25    7. Stage 3a chronic kidney disease (HCC)  Stable, continue to stay well hydrated and avoid nephrotoxic meds. Will CTM.       Meds & Refills for this Visit:  Requested Prescriptions      No prescriptions requested or ordered in this encounter       Health Maintenance:  Health Maintenance Due   Topic Date Due    Zoster Vaccines (1 of 2) Never done    DEXA Scan  Never done    Diabetes Care Foot Exam  07/26/2024    COVID-19 Vaccine (2 - 2024-25 season)  09/01/2024    Influenza Vaccine (1) 10/01/2024       Patient/Caregiver Education: Patient/Caregiver Education: There are no barriers to learning. Medical education done.   Outcome: Patient verbalizes understanding. Patient is notified to call with any questions, complications, allergies, or worsening or changing symptoms.  Patient is to call with any side effects or complications from the treatments as a result of today.     Problem List:  Patient Active Problem List   Diagnosis    Pulmonary emphysema (HCC)    Type 2 diabetes mellitus with diabetic polyneuropathy, without long-term current use of insulin (HCC)    Chronic midline low back pain without sciatica    Nicotine dependence    Depression, recurrent (HCC)    Hypothyroidism    Liver cyst    Renal cyst    Splenic cyst    Neuropathy    Stage 3a chronic kidney disease (HCC)    Primary hypertension    Clostridioides difficile carrier    Adenomatous polyp    Ampullary adenoma    Atrophic vaginitis    Chronic interstitial cystitis    Dyslipidemia    Grade IV hemorrhoids    Hearing loss    IBS (irritable bowel syndrome)    Nonallergic rhinitis    Ptosis of right eyelid    Regular astigmatism of both eyes    Tinnitus    Vitreous membrane    Anxiety disorder    Chronic midline low back pain with bilateral sciatica    Fibromyalgia    PAD (peripheral artery disease) (HCC)    Cancer of bronchus of right upper lobe (HCC)    Coronary artery disease involving native coronary artery of native heart    Hyperlipidemia    Symptomatic cholelithiasis          [1]   Allergies  Allergen Reactions    Keflex [Cephalexin] HIVES    Rosuvastatin MYALGIA    Cephalosporins NAUSEA ONLY     NAUSEA ONLY WHEN TAKEN ON AN EMPTY STOMACH, OTHERWISE CAN TAKE

## 2024-12-06 ENCOUNTER — OFFICE VISIT (OUTPATIENT)
Facility: LOCATION | Age: 79
End: 2024-12-06
Payer: MEDICARE

## 2024-12-06 VITALS
WEIGHT: 148 LBS | BODY MASS INDEX: 25.27 KG/M2 | OXYGEN SATURATION: 93 % | TEMPERATURE: 97 F | SYSTOLIC BLOOD PRESSURE: 134 MMHG | DIASTOLIC BLOOD PRESSURE: 79 MMHG | HEART RATE: 66 BPM | HEIGHT: 64 IN

## 2024-12-06 DIAGNOSIS — R19.7 DIARRHEA, UNSPECIFIED TYPE: ICD-10-CM

## 2024-12-06 DIAGNOSIS — Z90.49 STATUS POST LAPAROSCOPIC CHOLECYSTECTOMY: ICD-10-CM

## 2024-12-06 DIAGNOSIS — Z98.890 POSTOPERATIVE STATE: Primary | ICD-10-CM

## 2024-12-06 PROCEDURE — 3075F SYST BP GE 130 - 139MM HG: CPT

## 2024-12-06 PROCEDURE — 3008F BODY MASS INDEX DOCD: CPT

## 2024-12-06 PROCEDURE — 99024 POSTOP FOLLOW-UP VISIT: CPT

## 2024-12-06 PROCEDURE — 3078F DIAST BP <80 MM HG: CPT

## 2024-12-06 PROCEDURE — 1160F RVW MEDS BY RX/DR IN RCRD: CPT

## 2024-12-06 PROCEDURE — 1159F MED LIST DOCD IN RCRD: CPT

## 2024-12-06 PROCEDURE — 1111F DSCHRG MED/CURRENT MED MERGE: CPT

## 2024-12-06 NOTE — PROGRESS NOTES
Post Operative Visit Note       Active Problems  1. Postoperative state    2. Status post laparoscopic cholecystectomy    3. Diarrhea, unspecified type         Chief Complaint   Chief Complaint   Patient presents with    Post-Op     PO - 11/22 w/ leh - LAPAROSCOPIC CHOLECYSTECTOMY, WITH INDOCYANINE GREEN, diarrhea, no other symptoms.          History of Present Illness   The patient presents for continued care and evaluation following a laparoscopic cholecystectomy with ICG cholangiogram with Dr. Castro on 11/22/2024.     Overall, the patient is doing well postoperatively. No significant pain at her incision sites. She states she required Tramadol for pain the first two days following her operation, but since has been managing her pain with Ibuprofen.     The patient is tolerating a diet. She denies nausea or vomiting.     She states she has been having diarrhea. She states initially after surgery, her stool was watery. She states she is taking metamucil and Imodium as needed.  She states she does not like the flavor of the Metamucil powder.  The patient does have a history of C. difficile.  She called our office regarding her diarrhea and is C. difficile was ordered, but she did not provide a stool sample.    The patient states following her operation, she was given something for her peripheral edema and is curious if our office prescribed this and can send her a refill.    The patient's son states that the patient has difficulty getting to our [place] office due to the distance from her home in [place].  He is concerned about her providing a stool sample and her ability to get to any remaining office visits.    Allergies  Gabriela is allergic to keflex [cephalexin], rosuvastatin, and cephalosporins.    Past Medical / Surgical / Social / Family History    The past medical and past surgical history have been reviewed by me today.     Past Medical History:    Abdominal hernia    Anxiety    Arthritis    Back  pain    Back problem    spinal stenosis    Bilateral pulmonary embolism (HCC)    Bloating    Cancer (HCC)    LUNG    COPD (chronic obstructive pulmonary disease) (HCC)    NO HOME O2    Coronary atherosclerosis    STENT X1    Diabetes (HCC)    DIET CONTROLLED    Diabetes mellitus (HCC)    Diarrhea, unspecified    Disorder of thyroid    Easy bruising    Esophageal reflux    Exposure to medical diagnostic radiation    last tx 11/2023    Feeling lonely    Flatulence/gas pain/belching    Food intolerance    Hearing impairment    PT DENIES, NO AIDS    Hearing loss    Hemorrhoids    High blood pressure    High cholesterol    History of blood transfusion    ABOUT 20 YEARS AGO, NO ADVERSE REACTION    History of eating disorder    Muscle weakness    Neuropathy    Night sweats    Osteoarthritis    Pain in joints    Pneumonia of both lungs due to infectious organism, unspecified part of lung    Pulmonary emboli (HCC)    Pulmonary embolism (HCC)    Renal disorder    CKD RECONCILLED FROM OUTSIDE PROBLEM LIST    RSV (acute bronchiolitis due to respiratory syncytial virus)    Shortness of breath    Stress    Thyroid disease    Uncomfortable fullness after meals    Visual impairment    GLASSES    Wears glasses     Past Surgical History:   Procedure Laterality Date    Amputation finger/thumb+flaps      Colectomy      Colonoscopy      Colonoscopy N/A 1/15/2024    Procedure: COLONOSCOPY;  Surgeon: Tirso Kimball MD;  Location:  ENDOSCOPY    Hysterectomy         The family history and social history have been reviewed by me today.    Family History   Problem Relation Age of Onset    Heart Disorder Father     Stroke Father     Uterine Cancer Mother     Diabetes Mother     Cancer Mother         colon    Heart Attack Daughter     Diabetes Daughter     Colon Polyps Daughter     Colon Polyps Son      Social History     Socioeconomic History    Marital status:    Tobacco Use    Smoking status: Every Day     Current packs/day: 0.50      Average packs/day: 0.5 packs/day for 63.3 years (31.6 ttl pk-yrs)     Types: Cigarettes     Start date: 2/24/1964    Smokeless tobacco: Never   Vaping Use    Vaping status: Never Used   Substance and Sexual Activity    Alcohol use: Not Currently     Comment: RARE    Drug use: Never   Other Topics Concern    Caffeine Concern Yes     Comment: coffee/coca cola/Iced tea    Exercise No        Current Outpatient Medications:     BUSPIRONE 10 MG Oral Tab, TAKE 1 TABLET BY MOUTH TWICE A DAY, Disp: 180 tablet, Rfl: 1    POTASSIUM CHLORIDE ER 20 MEQ Oral Tab CR, TAKE 1 TABLET BY MOUTH EVERY DAY, Disp: 90 tablet, Rfl: 1    nystatin 658076 UNIT/ML Mouth/Throat Suspension, Take 5 mL (500,000 Units total) by mouth 4 (four) times daily for 7 days., Disp: 140 mL, Rfl: 0    rosuvastatin 20 MG Oral Tab, Take 1 tablet (20 mg total) by mouth nightly., Disp: 30 tablet, Rfl: 2    cilostazol 50 MG Oral Tab, Take 1 tablet (50 mg total) by mouth 2 (two) times daily., Disp: 60 tablet, Rfl: 0    ondansetron 4 MG Oral Tablet Dispersible, Take 1 tablet (4 mg total) by mouth every 4 (four) hours as needed., Disp: 12 tablet, Rfl: 0    traMADol 50 MG Oral Tab, Take 1 tablet (50 mg total) by mouth as needed for Pain., Disp: 15 tablet, Rfl: 0    Naloxone HCl 4 MG/0.1ML Nasal Liquid, 4 mg by Nasal route as needed. If patient remains unresponsive, repeat dose in other nostril 2-5 minutes after first dose., Disp: 1 kit, Rfl: 0    ASPIRIN LOW DOSE 81 MG Oral Tab EC, Take 1 tablet (81 mg total) by mouth daily., Disp: , Rfl:     losartan 25 MG Oral Tab, Take 1 tablet (25 mg total) by mouth daily., Disp: , Rfl:     ezetimibe 10 MG Oral Tab, Take 1 tablet (10 mg total) by mouth daily., Disp: , Rfl:     nystatin 100,000 Units/g External Cream, APPLY TOPICALLY 1 APPLICATION AS NEEDED, Disp: , Rfl:     sertraline (ZOLOFT) 25 MG Oral Tab, Take 1 tablet (25 mg total) by mouth daily. (Patient taking differently: Take 1 tablet (25 mg total) by mouth daily. Takes  1/2 tablet), Disp: 90 tablet, Rfl: 3    DICYCLOMINE 20 MG Oral Tab, TAKE 1 TABLET BY MOUTH THREE TIMES A DAY (Patient taking differently: Take 1 tablet (20 mg total) by mouth as needed.), Disp: 270 tablet, Rfl: 0    ALBUTEROL 108 (90 Base) MCG/ACT Inhalation Aero Soln, TAKE 2 PUFFS BY MOUTH EVERY 6 HOURS AS NEEDED FOR WHEEZE OR SHORTNESS OF BREATH, Disp: 25.5 each, Rfl: 3    ELMIRON 100 MG Oral Cap, TAKE 1 CAPSULE BY MOUTH EVERY DAY, Disp: 90 capsule, Rfl: 1    levothyroxine (SYNTHROID) 75 MCG Oral Tab, Take 1 tablet (75 mcg total) by mouth daily., Disp: 30 tablet, Rfl: 3    benzonatate 200 MG Oral Cap, Take 1 capsule (200 mg total) by mouth 3 (three) times daily as needed for cough., Disp: 30 capsule, Rfl: 2    ESOMEPRAZOLE MAGNESIUM 20 MG Oral Capsule Delayed Release, TAKE 1 CAPSULE BY MOUTH EVERY DAY IN THE MORNING BEFORE BREAKFAST (Patient taking differently: Take 2 capsules (40 mg total) by mouth before breakfast.), Disp: 90 capsule, Rfl: 1    METOPROLOL TARTRATE 50 MG Oral Tab, TAKE 0.5 TABLETS BY MOUTH 2 TIMES DAILY., Disp: 90 tablet, Rfl: 1    ALPRAZOLAM 0.5 MG Oral Tab, TAKE 1 TABLET BY MOUTH NIGHTLY SCHEDULED AND TAKE 1/2 TABLET BY MOUTH DAILY AS NEEDED FOR ANXIETY., Disp: 45 tablet, Rfl: 2    GABAPENTIN 100 MG Oral Cap, TAKE 1 CAPSULE BY MOUTH IN THE MORNING, 1-2 CAPSULES BY MOUTH IN THE AFTERNOON, AND 3 CAPSULES BY MOUTH IN THE EVENING DAILY (Patient taking differently: Take 3 capsules (300 mg total) by mouth nightly.), Disp: 180 capsule, Rfl: 5    estradiol 0.1 MG/GM Vaginal Cream, Place 1 g vaginally daily. (Patient taking differently: Place 1 g vaginally twice a week.), Disp: 42.5 g, Rfl: 5    furosemide 20 MG Oral Tab, Take 1 tablet (20 mg total) by mouth 2 (two) times daily., Disp: 180 tablet, Rfl: 1    MONTELUKAST 10 MG Oral Tab, TAKE 1 TABLET BY MOUTH EVERY DAY AT NIGHT, Disp: 90 tablet, Rfl: 3    ACYCLOVIR 400 MG Oral Tab, TAKE 1 TABLET BY MOUTH TWICE A DAY, Disp: 180 tablet, Rfl: 0    B Complex  Vitamins (B COMPLEX-B12 OR), Take 1 tablet by mouth daily., Disp: , Rfl:     Turmeric 400 MG Oral Cap, Take 1 capsule by mouth daily., Disp: , Rfl:     Ascorbic Acid (VITAMIN C) 1000 MG Oral Tab, Take 1 tablet (1,000 mg total) by mouth daily., Disp: , Rfl:     omega-3 fatty acids 1000 MG Oral Cap, Take 1,000 mg by mouth daily., Disp: , Rfl:     loratadine 10 MG Oral Tablet Dispersible, Take 1 tablet (10 mg total) by mouth daily., Disp: , Rfl:     Capsicum, Cayenne, (CAYENNE PEPPER OR), Take 1 tablet by mouth daily., Disp: , Rfl:     Nebulizers ([employer]TAP AEROSOL DELIVERY SYSTEM) Does not apply Misc, Take 1 Bottle by mouth As Directed., Disp: , Rfl:     Respiratory Therapy Supplies (NEBULIZER/TUBING/MOUTHPIECE) Does not apply Kit, Use as directed, Disp: 1 each, Rfl: 0    TRELEGY ELLIPTA 100-62.5-25 MCG/ACT Inhalation Aerosol Powder, Breath Activated, Inhale 1 puff into the lungs daily., Disp: 180 each, Rfl: 3    diclofenac 1 % External Gel, Apply 2 g topically 4 (four) times daily., Disp: 100 g, Rfl: 5    Vitamin D3, Cholecalciferol, 125 MCG (5000 UT) Oral Cap, Take 1 capsule (5,000 Units total) by mouth daily., Disp: , Rfl:       Review of Systems  The Review of Systems has been reviewed by me during today.  Review of Systems    Physical Findings   /79 (BP Location: Right arm, Patient Position: Sitting, Cuff Size: adult)   Pulse 66   Temp 97.2 °F (36.2 °C) (Temporal)   Ht 64\"   Wt 148 lb (67.1 kg)   SpO2 93%   BMI 25.40 kg/m²   Physical Exam  Vitals and nursing note reviewed.   Constitutional:       General: She is not in acute distress.     Appearance: Normal appearance.   HENT:      Head: Normocephalic and atraumatic.      Right Ear: External ear normal.      Left Ear: External ear normal.      Nose: Nose normal.   Eyes:      General: No scleral icterus.     Conjunctiva/sclera: Conjunctivae normal.   Abdominal:      General: Abdomen is flat. There is no distension.      Palpations: Abdomen is soft. There is  no mass.      Tenderness: There is no abdominal tenderness.      Hernia: No hernia is present.      Comments: Clinical exam of the patient's abdomen reveals it to be soft, nondistended, nontender to palpation.  Laparoscopic incision sites are clean, dry, intact without surrounding erythema or cellulitis.     Musculoskeletal:      Cervical back: Normal range of motion and neck supple.   Neurological:      Mental Status: She is alert.   Psychiatric:         Mood and Affect: Mood normal.         Behavior: Behavior normal.         Thought Content: Thought content normal.             Assessment   1. Postoperative state    2. Status post laparoscopic cholecystectomy    3. Diarrhea, unspecified type          Plan   The patient is doing well status post laparoscopic cholecystectomy.    Pathology is pending.  I informed the patient that Dr. Castro will call her once her pathology results.    I discussed with the patient that they should refrain from any bending, pushing, pulling, twisting, or lifting of a force greater than 15-20 pounds for 6 weeks post-op. Activity restrictions were reviewed. Driving restrictions were reviewed.     The patient may shower. They should avoid scrubbing their incisions, but may allow soap and water to wash over the incisions. The patient should avoid submerging their incisions in a bath, hot tub, pool for a total of 2 weeks postoperatively.    The patient should continue a general diet.    The patient may take ibuprofen and Tylenol as needed for pain management.  They may also utilize heating pads or ice packs for comfort measures.    I instructed the patient to obtain a stool sample.  I informed the patient that her stool sample must be loose stool in order for them to test the sample for C. difficile.    If the patient does not like Metamucil powder, then she may try the pills or Gummies.  I explained to the patient that the Metamucil powder typically works best for loose stools.    I  explained to the patient that her primary care provider, Dr. Coley is the one who prescribed the cilostazol to the patient and she should follow-up with him regarding this.    I do recommend she follow-up with Dr. Coley postoperatively.    The patient has some questions regarding billing.  I instructed her to call the billing department regarding this.  She plans to call them this upcoming Monday, 12/9/2024.    All of the patient's questions were answered.  The patient verbalized understanding and agreement with the plan of care.    This patient should follow-up with Dr. Castro in approximately 1 month to discuss her postoperative symptoms.  She may follow-up at the [place] office as this is closer to her.  This patient should return urgently for any problems or complications related to the surgical intervention.         No orders of the defined types were placed in this encounter.      Imaging & Referrals   None    Follow Up  Return in about 1 month (around 1/6/2025).    Maryam Trejo PA-C

## 2024-12-07 DIAGNOSIS — B37.9 YEAST INFECTION: ICD-10-CM

## 2024-12-09 RX ORDER — ESTRADIOL 0.1 MG/G
1 CREAM VAGINAL DAILY
Qty: 42.5 G | Refills: 5 | Status: SHIPPED | OUTPATIENT
Start: 2024-12-09

## 2024-12-10 ENCOUNTER — OFFICE VISIT (OUTPATIENT)
Dept: FAMILY MEDICINE CLINIC | Facility: CLINIC | Age: 79
End: 2024-12-10
Payer: MEDICARE

## 2024-12-10 VITALS
HEIGHT: 64 IN | RESPIRATION RATE: 16 BRPM | WEIGHT: 150 LBS | HEART RATE: 76 BPM | OXYGEN SATURATION: 97 % | SYSTOLIC BLOOD PRESSURE: 130 MMHG | DIASTOLIC BLOOD PRESSURE: 80 MMHG | BODY MASS INDEX: 25.61 KG/M2

## 2024-12-10 DIAGNOSIS — J01.10 ACUTE NON-RECURRENT FRONTAL SINUSITIS: Primary | ICD-10-CM

## 2024-12-10 DIAGNOSIS — Z86.19 HISTORY OF CLOSTRIDIOIDES DIFFICILE COLITIS: ICD-10-CM

## 2024-12-10 PROCEDURE — 1159F MED LIST DOCD IN RCRD: CPT | Performed by: NURSE PRACTITIONER

## 2024-12-10 PROCEDURE — 99214 OFFICE O/P EST MOD 30 MIN: CPT | Performed by: NURSE PRACTITIONER

## 2024-12-10 PROCEDURE — 1111F DSCHRG MED/CURRENT MED MERGE: CPT | Performed by: NURSE PRACTITIONER

## 2024-12-10 PROCEDURE — 3079F DIAST BP 80-89 MM HG: CPT | Performed by: NURSE PRACTITIONER

## 2024-12-10 PROCEDURE — 3075F SYST BP GE 130 - 139MM HG: CPT | Performed by: NURSE PRACTITIONER

## 2024-12-10 PROCEDURE — 3008F BODY MASS INDEX DOCD: CPT | Performed by: NURSE PRACTITIONER

## 2024-12-10 RX ORDER — VANCOMYCIN HYDROCHLORIDE 125 MG/1
125 CAPSULE ORAL DAILY
Qty: 10 CAPSULE | Refills: 0 | Status: SHIPPED | OUTPATIENT
Start: 2024-12-10 | End: 2024-12-20

## 2024-12-10 RX ORDER — METHYLPREDNISOLONE 4 MG/1
TABLET ORAL
Qty: 21 EACH | Refills: 0 | Status: SHIPPED | OUTPATIENT
Start: 2024-12-10

## 2024-12-10 RX ORDER — DOXYCYCLINE 100 MG/1
100 CAPSULE ORAL 2 TIMES DAILY
Qty: 14 CAPSULE | Refills: 0 | Status: SHIPPED | OUTPATIENT
Start: 2024-12-10 | End: 2024-12-17

## 2024-12-10 NOTE — PROGRESS NOTES
Gabriela Ndiaye is a 79 year old female.   Chief Complaint   Patient presents with    Sinus Problem     Sinus drainage for 4 days      HPI:    Symptoms started  7  days ago with purulent nasal discharge, frontal sinus pressure, and headache, a lot of  post-nasal drip.Worsening since onset     Allergies:  Allergies[1]       Current Outpatient Medications   Medication Sig Dispense Refill    ESTRADIOL 0.1 MG/GM Vaginal Cream PLACE 1 GRAM VAGINALLY DAILY 42.5 g 5    BUSPIRONE 10 MG Oral Tab TAKE 1 TABLET BY MOUTH TWICE A  tablet 1    POTASSIUM CHLORIDE ER 20 MEQ Oral Tab CR TAKE 1 TABLET BY MOUTH EVERY DAY 90 tablet 1    rosuvastatin 20 MG Oral Tab Take 1 tablet (20 mg total) by mouth nightly. 30 tablet 2    cilostazol 50 MG Oral Tab Take 1 tablet (50 mg total) by mouth 2 (two) times daily. 60 tablet 0    ondansetron 4 MG Oral Tablet Dispersible Take 1 tablet (4 mg total) by mouth every 4 (four) hours as needed. 12 tablet 0    traMADol 50 MG Oral Tab Take 1 tablet (50 mg total) by mouth as needed for Pain. 15 tablet 0    Naloxone HCl 4 MG/0.1ML Nasal Liquid 4 mg by Nasal route as needed. If patient remains unresponsive, repeat dose in other nostril 2-5 minutes after first dose. 1 kit 0    ASPIRIN LOW DOSE 81 MG Oral Tab EC Take 1 tablet (81 mg total) by mouth daily.      losartan 25 MG Oral Tab Take 1 tablet (25 mg total) by mouth daily.      ezetimibe 10 MG Oral Tab Take 1 tablet (10 mg total) by mouth daily.      nystatin 100,000 Units/g External Cream APPLY TOPICALLY 1 APPLICATION AS NEEDED      sertraline (ZOLOFT) 25 MG Oral Tab Take 1 tablet (25 mg total) by mouth daily. (Patient taking differently: Take 1 tablet (25 mg total) by mouth daily. Takes 1/2 tablet) 90 tablet 3    DICYCLOMINE 20 MG Oral Tab TAKE 1 TABLET BY MOUTH THREE TIMES A DAY (Patient taking differently: Take 1 tablet (20 mg total) by mouth as needed.) 270 tablet 0    ALBUTEROL 108 (90 Base) MCG/ACT Inhalation Aero Soln TAKE 2 PUFFS BY  MOUTH EVERY 6 HOURS AS NEEDED FOR WHEEZE OR SHORTNESS OF BREATH 25.5 each 3    ELMIRON 100 MG Oral Cap TAKE 1 CAPSULE BY MOUTH EVERY DAY 90 capsule 1    levothyroxine (SYNTHROID) 75 MCG Oral Tab Take 1 tablet (75 mcg total) by mouth daily. 30 tablet 3    benzonatate 200 MG Oral Cap Take 1 capsule (200 mg total) by mouth 3 (three) times daily as needed for cough. 30 capsule 2    ESOMEPRAZOLE MAGNESIUM 20 MG Oral Capsule Delayed Release TAKE 1 CAPSULE BY MOUTH EVERY DAY IN THE MORNING BEFORE BREAKFAST (Patient taking differently: Take 2 capsules (40 mg total) by mouth before breakfast.) 90 capsule 1    METOPROLOL TARTRATE 50 MG Oral Tab TAKE 0.5 TABLETS BY MOUTH 2 TIMES DAILY. 90 tablet 1    ALPRAZOLAM 0.5 MG Oral Tab TAKE 1 TABLET BY MOUTH NIGHTLY SCHEDULED AND TAKE 1/2 TABLET BY MOUTH DAILY AS NEEDED FOR ANXIETY. 45 tablet 2    GABAPENTIN 100 MG Oral Cap TAKE 1 CAPSULE BY MOUTH IN THE MORNING, 1-2 CAPSULES BY MOUTH IN THE AFTERNOON, AND 3 CAPSULES BY MOUTH IN THE EVENING DAILY (Patient taking differently: Take 3 capsules (300 mg total) by mouth nightly.) 180 capsule 5    furosemide 20 MG Oral Tab Take 1 tablet (20 mg total) by mouth 2 (two) times daily. 180 tablet 1    MONTELUKAST 10 MG Oral Tab TAKE 1 TABLET BY MOUTH EVERY DAY AT NIGHT 90 tablet 3    ACYCLOVIR 400 MG Oral Tab TAKE 1 TABLET BY MOUTH TWICE A  tablet 0    B Complex Vitamins (B COMPLEX-B12 OR) Take 1 tablet by mouth daily.      Turmeric 400 MG Oral Cap Take 1 capsule by mouth daily.      Ascorbic Acid (VITAMIN C) 1000 MG Oral Tab Take 1 tablet (1,000 mg total) by mouth daily.      omega-3 fatty acids 1000 MG Oral Cap Take 1,000 mg by mouth daily.      loratadine 10 MG Oral Tablet Dispersible Take 1 tablet (10 mg total) by mouth daily.      Capsicum, Cayenne, (CAYENNE PEPPER OR) Take 1 tablet by mouth daily.      Nebulizers ([employer] AEROSOL DELIVERY SYSTEM) Does not apply Misc Take 1 Bottle by mouth As Directed.      Respiratory Therapy Supplies  (NEBULIZER/TUBING/MOUTHPIECE) Does not apply Kit Use as directed 1 each 0    TRELEGY ELLIPTA 100-62.5-25 MCG/ACT Inhalation Aerosol Powder, Breath Activated Inhale 1 puff into the lungs daily. 180 each 3    diclofenac 1 % External Gel Apply 2 g topically 4 (four) times daily. 100 g 5    Vitamin D3, Cholecalciferol, 125 MCG (5000 UT) Oral Cap Take 1 capsule (5,000 Units total) by mouth daily.        Past Medical History:    Abdominal hernia    Anxiety    Arthritis    Back pain    Back problem    spinal stenosis    Bilateral pulmonary embolism (HCC)    Bloating    Cancer (HCC)    LUNG    COPD (chronic obstructive pulmonary disease) (HCC)    NO HOME O2    Coronary atherosclerosis    STENT X1    Diabetes (HCC)    DIET CONTROLLED    Diabetes mellitus (HCC)    Diarrhea, unspecified    Disorder of thyroid    Easy bruising    Esophageal reflux    Exposure to medical diagnostic radiation    last tx 11/2023    Feeling lonely    Flatulence/gas pain/belching    Food intolerance    Hearing impairment    PT DENIES, NO AIDS    Hearing loss    Hemorrhoids    High blood pressure    High cholesterol    History of blood transfusion    ABOUT 20 YEARS AGO, NO ADVERSE REACTION    History of eating disorder    Muscle weakness    Neuropathy    Night sweats    Osteoarthritis    Pain in joints    Pneumonia of both lungs due to infectious organism, unspecified part of lung    Pulmonary emboli (HCC)    Pulmonary embolism (HCC)    Renal disorder    CKD RECONCILLED FROM OUTSIDE PROBLEM LIST    RSV (acute bronchiolitis due to respiratory syncytial virus)    Shortness of breath    Stress    Thyroid disease    Uncomfortable fullness after meals    Visual impairment    GLASSES    Wears glasses      Past Surgical History:   Procedure Laterality Date    Amputation finger/thumb+flaps      Colectomy      Colonoscopy      Colonoscopy N/A 1/15/2024    Procedure: COLONOSCOPY;  Surgeon: Tirso Kimball MD;  Location:  ENDOSCOPY    Hysterectomy               ROS:   GENERAL HEALTH: otherwise feels well  EYES: no visual complaints or deficits  RESPIRATORY: no shortness of breath, wheezing   CARDIOVASCULAR: no chest pain or SOB.  GI: denies nausea, vomiting.  PSYCHE: no symptoms of depression or anxiety      PE:  /80   Pulse 76   Resp 16   Ht 5' 4\" (1.626 m)   Wt 150 lb (68 kg)   SpO2 97%   BMI 25.75 kg/m²   General: WD/WN in no acute distress.   Eyes & ears: conjunctiva clear, sclera white; TM’s non-bulging without erythema.   Sinuses  frontal: tender to percussion.   Nares: red mucosa and thick yellow discharge, no septal deviations.   Mouth: moist with mild erythema, no exudates, and + PND.   Neck: Supple with no cervical LAD.   Lungs: Clear to auscultation bilaterally; no wheeze, rhonchi, or rales.    Heart: S1/S2 regular no murmurs.            ASSESSMENT/ PLAN:     Diagnoses and all orders for this visit:    Acute non-recurrent frontal sinusitis  -     doxycycline 100 MG Oral Cap; Take 1 capsule (100 mg total) by mouth 2 (two) times daily for 7 days.  -     methylPREDNISolone (MEDROL) 4 MG Oral Tablet Therapy Pack; As directed.    History of Clostridioides difficile colitis  -     vancomycin 125 MG Oral Cap; Take 1 capsule (125 mg total) by mouth daily for 10 days.       Increase fluids, rest, Tylenol or Ibuprofen prn, f/u in 5 days if not better.              [1]   Allergies  Allergen Reactions    Keflex [Cephalexin] HIVES    Rosuvastatin MYALGIA    Cephalosporins NAUSEA ONLY     NAUSEA ONLY WHEN TAKEN ON AN EMPTY STOMACH, OTHERWISE CAN TAKE

## 2024-12-13 ENCOUNTER — TELEPHONE (OUTPATIENT)
Dept: HEMATOLOGY/ONCOLOGY | Facility: HOSPITAL | Age: 79
End: 2024-12-13

## 2024-12-13 ENCOUNTER — TELEPHONE (OUTPATIENT)
Facility: LOCATION | Age: 79
End: 2024-12-13

## 2024-12-13 DIAGNOSIS — C83.10 MANTLE CELL LYMPHOMA, UNSPECIFIED BODY REGION (HCC): Primary | ICD-10-CM

## 2024-12-13 NOTE — TELEPHONE ENCOUNTER
New consult for mantle cell lymphoma. Dr. Osiris Castro is calling to refer patient to see Dr. Barger. Please contact patient to schedule at [phone]. Called 12/13/24 [facility].

## 2024-12-13 NOTE — TELEPHONE ENCOUNTER
Called the patient with pathology results  Pathology shows mantle cell lymphoma inside the gallbladder  Discussed with patient that she will need oncology evaluation  She will see Dr. Tolliver  Patient and family requesting [facility] as this is closer to home for them  PET scan also ordered    Patient's granddaughter, Agnes, was also updated on pathology findings and recommendation to follow-up with Dr. Tolliver and obtain PET scan    If they have any questions or concerns, they are to contact my office    Osiris Castro MD  [facility]  12/13/2024  12:46 PM

## 2024-12-16 ENCOUNTER — TELEPHONE (OUTPATIENT)
Facility: LOCATION | Age: 79
End: 2024-12-16

## 2024-12-16 NOTE — TELEPHONE ENCOUNTER
Left detailed VM for patient, and relayed that PET order has been faxed to [facility] at [phone]. Encouraged her to schedule appt.

## 2024-12-16 NOTE — TELEPHONE ENCOUNTER
The patient called stating she is experiencing diarrhea, discomfort on right side of the abdomen, and patient would like to know the information for the cancer center in [place] because she was told about the center but did not get any information on the center.     Call back # [phone]

## 2024-12-16 NOTE — TELEPHONE ENCOUNTER
Patient called to let us know that she called [facility] to schedule her PET scan and was told they do not have an order.  I called [phone] to confirm and was told the same thing.  Please fax PET order to [phone].    Call back number is [phone]

## 2024-12-16 NOTE — TELEPHONE ENCOUNTER
Left detailed VM for patient, and provided phone number to [facility] in [place].  Also encouraged patient to CB if she continues to have diarrhea and R-sided abdominal pain.

## 2024-12-16 NOTE — TELEPHONE ENCOUNTER
The patient recently called stating if she can get a new PET Scan order sent to [facility] in [place] because she would not be able to travel to [place] for imaging and that [facility] is closer to her home. The patient transportation won't be able to travel to [place] for imaging around the time that [facility] stated the patient could schedule the appointment. The patient stated that she has cancer and would like for the order to be done soon.       Call back # [phone]

## 2024-12-17 ENCOUNTER — TELEPHONE (OUTPATIENT)
Age: 79
End: 2024-12-17

## 2024-12-17 ENCOUNTER — TELEPHONE (OUTPATIENT)
Dept: HEMATOLOGY/ONCOLOGY | Age: 79
End: 2024-12-17

## 2024-12-17 DIAGNOSIS — F41.9 ANXIETY AND DEPRESSION: ICD-10-CM

## 2024-12-17 DIAGNOSIS — F32.A ANXIETY AND DEPRESSION: ICD-10-CM

## 2024-12-17 NOTE — TELEPHONE ENCOUNTER
Pt called to see if there is anything available later around 3pm or 330 so her granddaughter can attend the appt    Please give the pt a call back

## 2024-12-18 RX ORDER — ALPRAZOLAM 0.5 MG
TABLET ORAL
Qty: 45 TABLET | Refills: 2 | Status: SHIPPED | OUTPATIENT
Start: 2024-12-18

## 2024-12-19 ENCOUNTER — HOSPITAL ENCOUNTER (OUTPATIENT)
Dept: NUCLEAR MEDICINE | Facility: HOSPITAL | Age: 79
Discharge: HOME | End: 2024-12-19
Attending: STUDENT IN AN ORGANIZED HEALTH CARE EDUCATION/TRAINING PROGRAM
Payer: MEDICARE

## 2024-12-19 ENCOUNTER — TELEPHONE (OUTPATIENT)
Facility: LOCATION | Age: 79
End: 2024-12-19

## 2024-12-19 DIAGNOSIS — C83.10 MANTLE CELL LYMPHOMA, UNSPECIFIED BODY REGION (HCC): ICD-10-CM

## 2024-12-19 DIAGNOSIS — K92.1 MELENA: Primary | ICD-10-CM

## 2024-12-19 LAB — GLUCOSE BLD-MCNC: 105 MG/DL (ref 70–99)

## 2024-12-19 PROCEDURE — 78815 PET IMAGE W/CT SKULL-THIGH: CPT | Performed by: STUDENT IN AN ORGANIZED HEALTH CARE EDUCATION/TRAINING PROGRAM

## 2024-12-19 PROCEDURE — 82962 GLUCOSE BLOOD TEST: CPT

## 2024-12-19 NOTE — TELEPHONE ENCOUNTER
Good morning, I called the patient today to reschedule her upcoming appointment and while I was talking to the patient she told me she is experiencing some symptoms and would like for  to know and if there is a way to control the symptoms. The symptoms are chronic diarrhea and discomfort on right side of abdomen area.The patient is going in for a PET Scan today and will not be be home to answer the phone. The patient would like for the nurse or doctor to call tomorrow at anytime. The patient also stated that she will be taking a stool sample to  tomorrow because the stool loose type.     Call back # [phone]

## 2024-12-20 ENCOUNTER — TELEPHONE (OUTPATIENT)
Facility: LOCATION | Age: 79
End: 2024-12-20

## 2024-12-20 RX ORDER — CHOLESTYRAMINE 4 G/9G
POWDER, FOR SUSPENSION ORAL
Qty: 60 EACH | Refills: 0 | Status: SHIPPED | OUTPATIENT
Start: 2024-12-20 | End: 2025-01-19

## 2024-12-20 NOTE — TELEPHONE ENCOUNTER
Patient advised to follow up with PCP regarding stool, perhaps she will be able to be seen by him sooner than 2/5/25.  Patient advised diarrhea and abdominal pain can occur following cholecystectomy and that if her symptoms worsens (nausea, vomiting, fever, chills)she should present to ER.  Patient verbalized understanding.

## 2024-12-20 NOTE — TELEPHONE ENCOUNTER
Spoke to patient.  Patient informed of cholestyramine prescription and lab ordered for her.  Patient provided with number for central scheduling.  Patient encouraged to call PCP to follow up on stool color.  Patient  advised to present to ER if symptoms worsens.  Patient verbalized understanding

## 2024-12-24 ENCOUNTER — PATIENT OUTREACH (OUTPATIENT)
Dept: CASE MANAGEMENT | Age: 79
End: 2024-12-24

## 2024-12-26 DIAGNOSIS — I10 ESSENTIAL HYPERTENSION: ICD-10-CM

## 2024-12-26 RX ORDER — FUROSEMIDE 20 MG/1
20 TABLET ORAL 2 TIMES DAILY
Qty: 180 TABLET | Refills: 1 | Status: SHIPPED | OUTPATIENT
Start: 2024-12-26

## 2025-01-01 NOTE — TELEPHONE ENCOUNTER
Monitor symptoms. Stay well hydrated. Start losartan as we discussed.   Will try to discuss w/ her cardiologist.    Opt out

## 2025-01-06 ENCOUNTER — LAB ENCOUNTER (OUTPATIENT)
Dept: LAB | Age: 80
End: 2025-01-06
Attending: FAMILY MEDICINE
Payer: MEDICARE

## 2025-01-06 ENCOUNTER — OFFICE VISIT (OUTPATIENT)
Dept: FAMILY MEDICINE CLINIC | Facility: CLINIC | Age: 80
End: 2025-01-06
Payer: MEDICARE

## 2025-01-06 VITALS
DIASTOLIC BLOOD PRESSURE: 78 MMHG | BODY MASS INDEX: 25.1 KG/M2 | HEART RATE: 65 BPM | RESPIRATION RATE: 18 BRPM | WEIGHT: 147 LBS | HEIGHT: 64 IN | OXYGEN SATURATION: 93 % | SYSTOLIC BLOOD PRESSURE: 128 MMHG

## 2025-01-06 DIAGNOSIS — K92.1 BLACK TARRY STOOLS: Primary | ICD-10-CM

## 2025-01-06 DIAGNOSIS — I25.10 CORONARY ARTERY DISEASE INVOLVING NATIVE CORONARY ARTERY OF NATIVE HEART WITHOUT ANGINA PECTORIS: ICD-10-CM

## 2025-01-06 DIAGNOSIS — K92.1 BLACK TARRY STOOLS: ICD-10-CM

## 2025-01-06 DIAGNOSIS — I73.9 PAD (PERIPHERAL ARTERY DISEASE) (HCC): ICD-10-CM

## 2025-01-06 DIAGNOSIS — E11.42 TYPE 2 DIABETES MELLITUS WITH DIABETIC POLYNEUROPATHY, WITHOUT LONG-TERM CURRENT USE OF INSULIN (HCC): ICD-10-CM

## 2025-01-06 DIAGNOSIS — E03.9 HYPOTHYROIDISM, UNSPECIFIED TYPE: ICD-10-CM

## 2025-01-06 DIAGNOSIS — E78.5 DYSLIPIDEMIA: ICD-10-CM

## 2025-01-06 LAB
ALBUMIN SERPL-MCNC: 3.8 G/DL (ref 3.2–4.8)
ALBUMIN/GLOB SERPL: 1.4 {RATIO} (ref 1–2)
ALP LIVER SERPL-CCNC: 61 U/L
ALT SERPL-CCNC: <7 U/L
ANION GAP SERPL CALC-SCNC: 9 MMOL/L (ref 0–18)
AST SERPL-CCNC: 13 U/L (ref ?–34)
BASOPHILS # BLD AUTO: 0.06 X10(3) UL (ref 0–0.2)
BASOPHILS NFR BLD AUTO: 0.5 %
BILIRUB SERPL-MCNC: 0.3 MG/DL (ref 0.2–1.1)
BUN BLD-MCNC: 11 MG/DL (ref 9–23)
CALCIUM BLD-MCNC: 9.4 MG/DL (ref 8.7–10.4)
CHLORIDE SERPL-SCNC: 109 MMOL/L (ref 98–112)
CHOLEST SERPL-MCNC: 228 MG/DL (ref ?–200)
CO2 SERPL-SCNC: 23 MMOL/L (ref 21–32)
CREAT BLD-MCNC: 1.21 MG/DL
CREAT UR-SCNC: 173 MG/DL
EGFRCR SERPLBLD CKD-EPI 2021: 45 ML/MIN/1.73M2 (ref 60–?)
EOSINOPHIL # BLD AUTO: 0.17 X10(3) UL (ref 0–0.7)
EOSINOPHIL NFR BLD AUTO: 1.6 %
ERYTHROCYTE [DISTWIDTH] IN BLOOD BY AUTOMATED COUNT: 14.4 %
EST. AVERAGE GLUCOSE BLD GHB EST-MCNC: 131 MG/DL (ref 68–126)
FASTING PATIENT LIPID ANSWER: YES
FASTING STATUS PATIENT QL REPORTED: YES
GLOBULIN PLAS-MCNC: 2.7 G/DL (ref 2–3.5)
GLUCOSE BLD-MCNC: 103 MG/DL (ref 70–99)
HBA1C MFR BLD: 6.2 % (ref ?–5.7)
HCT VFR BLD AUTO: 38.9 %
HDLC SERPL-MCNC: 67 MG/DL (ref 40–59)
HGB BLD-MCNC: 12.7 G/DL
IMM GRANULOCYTES # BLD AUTO: 0.03 X10(3) UL (ref 0–1)
IMM GRANULOCYTES NFR BLD: 0.3 %
LDLC SERPL CALC-MCNC: 136 MG/DL (ref ?–100)
LYMPHOCYTES # BLD AUTO: 1.46 X10(3) UL (ref 1–4)
LYMPHOCYTES NFR BLD AUTO: 13.3 %
MCH RBC QN AUTO: 30.2 PG (ref 26–34)
MCHC RBC AUTO-ENTMCNC: 32.6 G/DL (ref 31–37)
MCV RBC AUTO: 92.4 FL
MICROALBUMIN UR-MCNC: 1 MG/DL
MICROALBUMIN/CREAT 24H UR-RTO: 5.8 UG/MG (ref ?–30)
MONOCYTES # BLD AUTO: 1.02 X10(3) UL (ref 0.1–1)
MONOCYTES NFR BLD AUTO: 9.3 %
NEUTROPHILS # BLD AUTO: 8.22 X10 (3) UL (ref 1.5–7.7)
NEUTROPHILS # BLD AUTO: 8.22 X10(3) UL (ref 1.5–7.7)
NEUTROPHILS NFR BLD AUTO: 75 %
NONHDLC SERPL-MCNC: 161 MG/DL (ref ?–130)
OSMOLALITY SERPL CALC.SUM OF ELEC: 292 MOSM/KG (ref 275–295)
PLATELET # BLD AUTO: 383 10(3)UL (ref 150–450)
POTASSIUM SERPL-SCNC: 3.8 MMOL/L (ref 3.5–5.1)
PROT SERPL-MCNC: 6.5 G/DL (ref 5.7–8.2)
RBC # BLD AUTO: 4.21 X10(6)UL
SODIUM SERPL-SCNC: 141 MMOL/L (ref 136–145)
T4 FREE SERPL-MCNC: 1.3 NG/DL (ref 0.8–1.7)
TRIGL SERPL-MCNC: 141 MG/DL (ref 30–149)
TSI SER-ACNC: 0.43 UIU/ML (ref 0.55–4.78)
VLDLC SERPL CALC-MCNC: 26 MG/DL (ref 0–30)
WBC # BLD AUTO: 11 X10(3) UL (ref 4–11)

## 2025-01-06 PROCEDURE — 85025 COMPLETE CBC W/AUTO DIFF WBC: CPT

## 2025-01-06 PROCEDURE — 3074F SYST BP LT 130 MM HG: CPT | Performed by: FAMILY MEDICINE

## 2025-01-06 PROCEDURE — 36415 COLL VENOUS BLD VENIPUNCTURE: CPT

## 2025-01-06 PROCEDURE — 99214 OFFICE O/P EST MOD 30 MIN: CPT | Performed by: FAMILY MEDICINE

## 2025-01-06 PROCEDURE — 80061 LIPID PANEL: CPT

## 2025-01-06 PROCEDURE — 3078F DIAST BP <80 MM HG: CPT | Performed by: FAMILY MEDICINE

## 2025-01-06 PROCEDURE — 84443 ASSAY THYROID STIM HORMONE: CPT

## 2025-01-06 PROCEDURE — 80053 COMPREHEN METABOLIC PANEL: CPT

## 2025-01-06 PROCEDURE — 82043 UR ALBUMIN QUANTITATIVE: CPT

## 2025-01-06 PROCEDURE — 83036 HEMOGLOBIN GLYCOSYLATED A1C: CPT

## 2025-01-06 PROCEDURE — 3008F BODY MASS INDEX DOCD: CPT | Performed by: FAMILY MEDICINE

## 2025-01-06 PROCEDURE — 82570 ASSAY OF URINE CREATININE: CPT

## 2025-01-06 PROCEDURE — 1159F MED LIST DOCD IN RCRD: CPT | Performed by: FAMILY MEDICINE

## 2025-01-06 PROCEDURE — 84439 ASSAY OF FREE THYROXINE: CPT

## 2025-01-06 PROCEDURE — 1160F RVW MEDS BY RX/DR IN RCRD: CPT | Performed by: FAMILY MEDICINE

## 2025-01-06 RX ORDER — HYDROCODONE BITARTRATE AND ACETAMINOPHEN 5; 325 MG/1; MG/1
1 TABLET ORAL 2 TIMES DAILY PRN
COMMUNITY
Start: 2025-01-03

## 2025-01-06 RX ORDER — CILOSTAZOL 50 MG/1
50 TABLET ORAL 2 TIMES DAILY
Qty: 180 TABLET | Refills: 3 | Status: SHIPPED | OUTPATIENT
Start: 2025-01-06

## 2025-01-06 RX ORDER — ESOMEPRAZOLE MAGNESIUM 40 MG/1
40 CAPSULE, DELAYED RELEASE ORAL
Qty: 90 CAPSULE | Refills: 1 | Status: SHIPPED | OUTPATIENT
Start: 2025-01-06

## 2025-01-06 NOTE — PROGRESS NOTES
[facility] Office Note  Chief Complaint:   Chief Complaint   Patient presents with    Follow - Up     3 month    Diabetes    Diarrhea     Symptoms started weeks ago per pt. Stools are black and tar like        HPI:   This is a 79 year old female coming in for    Reports diarrhea/loose stools since her cholecystectomy. She reports that she has been having black tarry stools since 12/23. S/p lap cholecystectomy on 11/22/24. She has been using pepto-bismol but does report that black stools precede this. She c/o RUQ abd pain. She was given cholecystramine but took this once & had nausea so did not take it again. Denies any bright red blood in stools. Does report that she has been taking NSAIDs daily for her leg pain d/t PAD. She has been taking up to Ibuprofen 800mg.  Denies any fevers, chest pains, dyspnea, lightheadedness/dizzines/syncopal episodes. Does have chronic fatigue but does not report any worsening of sx. Reports appetite is stable.     Past Medical History:    Abdominal hernia    Anxiety    Arthritis    Back pain    Back problem    spinal stenosis    Bilateral pulmonary embolism (HCC)    Bloating    Cancer (HCC)    LUNG    COPD (chronic obstructive pulmonary disease) (HCC)    NO HOME O2    Coronary atherosclerosis    STENT X1    Diabetes (HCC)    DIET CONTROLLED    Diabetes mellitus (HCC)    Diarrhea, unspecified    Disorder of thyroid    Easy bruising    Esophageal reflux    Exposure to medical diagnostic radiation    last tx 11/2023    Feeling lonely    Flatulence/gas pain/belching    Food intolerance    Hearing impairment    PT DENIES, NO AIDS    Hearing loss    Hemorrhoids    High blood pressure    High cholesterol    History of blood transfusion    ABOUT 20 YEARS AGO, NO ADVERSE REACTION    History of eating disorder    Muscle weakness    Neuropathy    Night sweats    Osteoarthritis    Pain in joints    Pneumonia of both lungs due to infectious organism, unspecified part of  lung    Pulmonary emboli (HCC)    Pulmonary embolism (HCC)    Renal disorder    CKD RECONCILLED FROM OUTSIDE PROBLEM LIST    RSV (acute bronchiolitis due to respiratory syncytial virus)    Shortness of breath    Stress    Thyroid disease    Uncomfortable fullness after meals    Visual impairment    GLASSES    Wears glasses     Past Surgical History:   Procedure Laterality Date    Amputation finger/thumb+flaps      Colectomy      Colonoscopy      Colonoscopy N/A 1/15/2024    Procedure: COLONOSCOPY;  Surgeon: Tirso Kimball MD;  Location:  ENDOSCOPY    Hysterectomy       Social History:  Social History     Socioeconomic History    Marital status:    Tobacco Use    Smoking status: Every Day     Current packs/day: 0.50     Average packs/day: 0.5 packs/day for 63.4 years (31.7 ttl pk-yrs)     Types: Cigarettes     Start date: 2/24/1964    Smokeless tobacco: Never   Vaping Use    Vaping status: Never Used   Substance and Sexual Activity    Alcohol use: Not Currently     Comment: RARE    Drug use: Never   Other Topics Concern    Caffeine Concern Yes     Comment: coffee/coca cola/Iced tea    Exercise No     Social Drivers of Health     Financial Resource Strain: Low Risk  (8/15/2023)    Financial Resource Strain     Difficulty of Paying Living Expenses: Not hard at all     Med Affordability: No   Food Insecurity: No Food Insecurity (11/22/2024)    Food Insecurity     Food Insecurity: Never true   Transportation Needs: No Transportation Needs (11/22/2024)    Transportation Needs     Lack of Transportation: No   Physical Activity: Insufficiently Active (8/15/2023)    Exercise Vital Sign     Days of Exercise per Week: 2 days     Minutes of Exercise per Session: 30 min   Stress: No Stress Concern Present (8/15/2023)    Stress     Feeling of Stress : No   Social Connections: Socially Integrated (8/15/2023)    Social Connections     Frequency of Socialization with Friends and Family: 2   Housing Stability: Low Risk   (11/22/2024)    Housing Stability     Housing Instability: No     Family History:  Family History   Problem Relation Age of Onset    Heart Disorder Father     Stroke Father     Uterine Cancer Mother     Diabetes Mother     Cancer Mother         colon    Heart Attack Daughter     Diabetes Daughter     Colon Polyps Daughter     Colon Polyps Son      Allergies:  Allergies[1]  Current Meds:  Current Outpatient Medications   Medication Sig Dispense Refill    HYDROcodone-acetaminophen 5-325 MG Oral Tab Take 1 tablet by mouth 2 (two) times daily as needed for Pain.      cilostazol 50 MG Oral Tab Take 1 tablet (50 mg total) by mouth 2 (two) times daily. 180 tablet 3    Esomeprazole Magnesium 40 MG Oral Capsule Delayed Release Take 1 capsule (40 mg total) by mouth every morning before breakfast. 90 capsule 1    FUROSEMIDE 20 MG Oral Tab TAKE 1 TABLET BY MOUTH TWICE A  tablet 1    ALPRAZolam 0.5 MG Oral Tab TAKE 1 TABLET BY MOUTH NIGHTLY AS SCHEDULED AND 1/2 TAB DAILY AS NEEDED FOR ANXIETY 45 tablet 2    ESTRADIOL 0.1 MG/GM Vaginal Cream PLACE 1 GRAM VAGINALLY DAILY 42.5 g 5    BUSPIRONE 10 MG Oral Tab TAKE 1 TABLET BY MOUTH TWICE A  tablet 1    POTASSIUM CHLORIDE ER 20 MEQ Oral Tab CR TAKE 1 TABLET BY MOUTH EVERY DAY 90 tablet 1    rosuvastatin 20 MG Oral Tab Take 1 tablet (20 mg total) by mouth nightly. 30 tablet 2    ondansetron 4 MG Oral Tablet Dispersible Take 1 tablet (4 mg total) by mouth every 4 (four) hours as needed. 12 tablet 0    traMADol 50 MG Oral Tab Take 1 tablet (50 mg total) by mouth as needed for Pain. 15 tablet 0    Naloxone HCl 4 MG/0.1ML Nasal Liquid 4 mg by Nasal route as needed. If patient remains unresponsive, repeat dose in other nostril 2-5 minutes after first dose. 1 kit 0    ASPIRIN LOW DOSE 81 MG Oral Tab EC Take 1 tablet (81 mg total) by mouth daily.      losartan 25 MG Oral Tab Take 1 tablet (25 mg total) by mouth daily.      ezetimibe 10 MG Oral Tab Take 1 tablet (10 mg total) by  mouth daily.      nystatin 100,000 Units/g External Cream APPLY TOPICALLY 1 APPLICATION AS NEEDED      sertraline (ZOLOFT) 25 MG Oral Tab Take 1 tablet (25 mg total) by mouth daily. (Patient taking differently: Take 1 tablet (25 mg total) by mouth daily. Takes 1/2 tablet) 90 tablet 3    DICYCLOMINE 20 MG Oral Tab TAKE 1 TABLET BY MOUTH THREE TIMES A  tablet 0    ALBUTEROL 108 (90 Base) MCG/ACT Inhalation Aero Soln TAKE 2 PUFFS BY MOUTH EVERY 6 HOURS AS NEEDED FOR WHEEZE OR SHORTNESS OF BREATH 25.5 each 3    ELMIRON 100 MG Oral Cap TAKE 1 CAPSULE BY MOUTH EVERY DAY 90 capsule 1    benzonatate 200 MG Oral Cap Take 1 capsule (200 mg total) by mouth 3 (three) times daily as needed for cough. 30 capsule 2    METOPROLOL TARTRATE 50 MG Oral Tab TAKE 0.5 TABLETS BY MOUTH 2 TIMES DAILY. 90 tablet 1    GABAPENTIN 100 MG Oral Cap TAKE 1 CAPSULE BY MOUTH IN THE MORNING, 1-2 CAPSULES BY MOUTH IN THE AFTERNOON, AND 3 CAPSULES BY MOUTH IN THE EVENING DAILY 180 capsule 5    MONTELUKAST 10 MG Oral Tab TAKE 1 TABLET BY MOUTH EVERY DAY AT NIGHT 90 tablet 3    ACYCLOVIR 400 MG Oral Tab TAKE 1 TABLET BY MOUTH TWICE A  tablet 0    B Complex Vitamins (B COMPLEX-B12 OR) Take 1 tablet by mouth daily.      Turmeric 400 MG Oral Cap Take 1 capsule by mouth daily.      Ascorbic Acid (VITAMIN C) 1000 MG Oral Tab Take 1 tablet (1,000 mg total) by mouth daily.      omega-3 fatty acids 1000 MG Oral Cap Take 1,000 mg by mouth daily.      loratadine 10 MG Oral Tablet Dispersible Take 1 tablet (10 mg total) by mouth daily.      Capsicum, Cayenne, (CAYENNE PEPPER OR) Take 1 tablet by mouth daily.      Nebulizers (eZono AEROSOL DELIVERY SYSTEM) Does not apply Misc Take 1 Bottle by mouth As Directed.      Respiratory Therapy Supplies (NEBULIZER/TUBING/MOUTHPIECE) Does not apply Kit Use as directed 1 each 0    TRELEGY ELLIPTA 100-62.5-25 MCG/ACT Inhalation Aerosol Powder, Breath Activated Inhale 1 puff into the lungs daily. 180 each 3     diclofenac 1 % External Gel Apply 2 g topically 4 (four) times daily. 100 g 5    Vitamin D3, Cholecalciferol, 125 MCG (5000 UT) Oral Cap Take 1 capsule (5,000 Units total) by mouth daily.      CHOLESTYRAMINE 4 g Oral Powd Pack 1- 2 PACKETS BY MOUTH DAILY OR TWICE DAILY. 60 packet 0    SYNTHROID 50 MCG Oral Tab Take 1 tablet (50 mcg total) by mouth before breakfast. 90 tablet 0      Ready to quit: Not Answered  Counseling given: Not Answered       REVIEW OF SYSTEMS:   Review of Systems   A comprehensive 10 point review of systems was completed.  Pertinent positives and negatives noted in the the HPI.    EXAM:   /78   Pulse 65   Resp 18   Ht 5' 4\" (1.626 m)   Wt 147 lb (66.7 kg)   SpO2 93%   BMI 25.23 kg/m²  Estimated body mass index is 25.23 kg/m² as calculated from the following:    Height as of this encounter: 5' 4\" (1.626 m).    Weight as of this encounter: 147 lb (66.7 kg).   Vital signs reviewed.Appears stated age, well groomed.  Physical Exam  Vitals and nursing note reviewed.   Constitutional:       Appearance: Normal appearance.   HENT:      Head: Normocephalic and atraumatic.   Eyes:      Pupils: Pupils are equal, round, and reactive to light.   Cardiovascular:      Rate and Rhythm: Normal rate and regular rhythm.      Pulses: Normal pulses.      Heart sounds: Normal heart sounds. No murmur heard.  Pulmonary:      Effort: Pulmonary effort is normal. No respiratory distress.      Breath sounds: Normal breath sounds. No stridor. No wheezing or rhonchi.   Abdominal:      General: Abdomen is flat. Bowel sounds are normal. There is no distension.      Palpations: Abdomen is soft. There is no mass.      Tenderness: There is abdominal tenderness (generalized).      Hernia: No hernia is present.   Musculoskeletal:      Right lower leg: No edema.      Left lower leg: No edema.   Skin:     Findings: No rash.   Neurological:      Mental Status: She is alert and oriented to person, place, and time.    Psychiatric:         Mood and Affect: Mood normal.        ASSESSMENT AND PLAN:   1. Black tarry stools  Concern for GI bleed vs medication side effect (pepto bismol). Will check stool hemoccult. Has c diff test pending as well given her previous history. Will check labs. Advised to stop peptobismol, NSAIDs. Will adjust PPI to nexium 40mg. Reviewed strict ER precautions. Might need to see GI for endoscopy. Will follow up in 1 week.   - Occult Blood Stool, Diagnostic; Future  - CBC With Differential With Platelet; Future  - Esomeprazole Magnesium 40 MG Oral Capsule Delayed Release; Take 1 capsule (40 mg total) by mouth every morning before breakfast.  Dispense: 90 capsule; Refill: 1    2. Type 2 diabetes mellitus with diabetic polyneuropathy, without long-term current use of insulin (formerly [facility])  - Microalb/Creat Ratio, Random Urine; Future    3. PAD (peripheral artery disease) (formerly [facility])  She wants to restart cilostazol for her PAD as she has been symptomatic; this was stopped as she was on anticoagulation for her PCI however she was unable to tolerate prasugruel, brilinta, plavix due to SE. She has been following with cardiology who is aware of this. Okay to restart cilostazol; f/u with cardiology.   - cilostazol 50 MG Oral Tab; Take 1 tablet (50 mg total) by mouth 2 (two) times daily.  Dispense: 180 tablet; Refill: 3    4. Coronary artery disease involving native coronary artery of native heart without angina pectoris  Stable, no anginal sx. Following with cardiology. She is unable to tolerate prasugrel, brilinta, plavix. She has been on ASA and is compliant with this.       Meds & Refills for this Visit:  Requested Prescriptions     Signed Prescriptions Disp Refills    cilostazol 50 MG Oral Tab 180 tablet 3     Sig: Take 1 tablet (50 mg total) by mouth 2 (two) times daily.    Esomeprazole Magnesium 40 MG Oral Capsule Delayed Release 90 capsule 1     Sig: Take 1 capsule (40 mg total) by mouth every morning before breakfast.        Health Maintenance:  Health Maintenance Due   Topic Date Due    Zoster Vaccines (1 of 2) Never done    DEXA Scan  Never done    COVID-19 Vaccine (2 - 2024-25 season) 09/01/2024    Influenza Vaccine (1) 10/01/2024    Annual Well Visit  01/01/2025    Annual Depression Screening  01/01/2025    Fall Risk Screening (Annual)  01/01/2025    Diabetes Care: Foot Exam (Annual)  01/01/2025    Tobacco Cessation Counseling  01/01/2025    Diabetes Care: Microalb/Creat Ratio (Annual)  01/01/2025       Patient/Caregiver Education: Patient/Caregiver Education: There are no barriers to learning. Medical education done.   Outcome: Patient verbalizes understanding. Patient is notified to call with any questions, complications, allergies, or worsening or changing symptoms.  Patient is to call with any side effects or complications from the treatments as a result of today.     Problem List:  Patient Active Problem List   Diagnosis    Pulmonary emphysema (HCC)    Type 2 diabetes mellitus with diabetic polyneuropathy, without long-term current use of insulin (HCC)    Chronic midline low back pain without sciatica    Nicotine dependence    Depression, recurrent (HCC)    Hypothyroidism    Liver cyst    Renal cyst    Splenic cyst    Neuropathy    Stage 3a chronic kidney disease (HCC)    Primary hypertension    Clostridioides difficile carrier    Adenomatous polyp    Ampullary adenoma    Atrophic vaginitis    Chronic interstitial cystitis    Dyslipidemia    Grade IV hemorrhoids    Hearing loss    IBS (irritable bowel syndrome)    Nonallergic rhinitis    Ptosis of right eyelid    Regular astigmatism of both eyes    Tinnitus    Vitreous membrane    Anxiety disorder    Chronic midline low back pain with bilateral sciatica    Fibromyalgia    PAD (peripheral artery disease) (HCC)    Cancer of bronchus of right upper lobe (HCC)    Coronary artery disease involving native coronary artery of native heart    Hyperlipidemia    Symptomatic  cholelithiasis    Mantle cell lymphoma (HCC)          [1]   Allergies  Allergen Reactions    Keflex [Cephalexin] HIVES    Rosuvastatin MYALGIA    Cephalosporins NAUSEA ONLY     NAUSEA ONLY WHEN TAKEN ON AN EMPTY STOMACH, OTHERWISE CAN TAKE

## 2025-01-07 ENCOUNTER — LAB ENCOUNTER (OUTPATIENT)
Dept: LAB | Age: 80
End: 2025-01-07
Attending: FAMILY MEDICINE
Payer: MEDICARE

## 2025-01-07 DIAGNOSIS — K92.1 BLACK TARRY STOOLS: ICD-10-CM

## 2025-01-07 PROCEDURE — 82272 OCCULT BLD FECES 1-3 TESTS: CPT

## 2025-01-08 DIAGNOSIS — E11.42 TYPE 2 DIABETES MELLITUS WITH DIABETIC POLYNEUROPATHY, WITHOUT LONG-TERM CURRENT USE OF INSULIN (HCC): Primary | ICD-10-CM

## 2025-01-08 DIAGNOSIS — K92.1 BLACK TARRY STOOLS: Primary | ICD-10-CM

## 2025-01-08 DIAGNOSIS — E03.9 HYPOTHYROIDISM, UNSPECIFIED TYPE: ICD-10-CM

## 2025-01-08 RX ORDER — LEVOTHYROXINE SODIUM 50 UG/1
50 TABLET ORAL
Qty: 90 TABLET | Refills: 0 | Status: SHIPPED | OUTPATIENT
Start: 2025-01-08 | End: 2025-01-08 | Stop reason: DRUGHIGH

## 2025-01-08 RX ORDER — LEVOTHYROXINE SODIUM 50 MCG
50 TABLET ORAL
Qty: 90 TABLET | Refills: 0 | Status: SHIPPED | OUTPATIENT
Start: 2025-01-08

## 2025-01-10 RX ORDER — CHOLESTYRAMINE 4 G/9G
POWDER, FOR SUSPENSION ORAL
Qty: 60 PACKET | Refills: 0 | Status: SHIPPED | OUTPATIENT
Start: 2025-01-10

## 2025-01-15 ENCOUNTER — OFFICE VISIT (OUTPATIENT)
Age: 80
End: 2025-01-15
Attending: INTERNAL MEDICINE
Payer: MEDICARE

## 2025-01-15 VITALS
HEART RATE: 65 BPM | DIASTOLIC BLOOD PRESSURE: 67 MMHG | RESPIRATION RATE: 18 BRPM | WEIGHT: 149.5 LBS | OXYGEN SATURATION: 96 % | BODY MASS INDEX: 25.52 KG/M2 | SYSTOLIC BLOOD PRESSURE: 127 MMHG | TEMPERATURE: 97 F | HEIGHT: 64 IN

## 2025-01-15 DIAGNOSIS — R91.1 PULMONARY NODULE: ICD-10-CM

## 2025-01-15 DIAGNOSIS — C83.19 MANTLE CELL LYMPHOMA OF SOLID ORGAN EXCLUDING SPLEEN (HCC): Primary | ICD-10-CM

## 2025-01-15 DIAGNOSIS — J43.9 PULMONARY EMPHYSEMA, UNSPECIFIED EMPHYSEMA TYPE (HCC): ICD-10-CM

## 2025-01-15 DIAGNOSIS — C34.11 CANCER OF BRONCHUS OF RIGHT UPPER LOBE (HCC): ICD-10-CM

## 2025-01-15 NOTE — PROGRESS NOTES
Here for new consult- mantle cell lymphoma s/p cholecystectomy. Her diarrhea is a bit better now. No fevers, chills, night sweats. She does have weight loss because of the diarrhea. She has diabetic neuropathy in her legs. She has a hx of a lung nodule that they presumed was malignant. Family hx updated.

## 2025-01-16 NOTE — PROGRESS NOTES
Hematology/Oncology Initial Consultation Note    Patient Name: Gabriela Ndiaye  Medical Record Number: [id number]    YOB: 1945   Date of Consultation: 1/15/2025   PCP: August Coley MD  Others: Dr Mg (pulmonology)    Reason for Consultation:  Gabriela Ndiaye was seen today for the diagnosis of mantle cell lymphoma    Oncologic History:  10/2023: had PET avid 1.1cm RUL nodule SUVmax 6.1. s/p RT. She was not biopsied as she was deemed high risk  11/22/24: s/p cholecystectomy, path positive for mantle cell lymphoma. Path reviewed at [facility]. Positive for CD20, BCL2, cyclin D1. Low proliferative index with Ki-67 <5%.  12/29/24: PET/CT without any FDG uptake. Indeterminate pulmonary nodules including new 5mm nodule in [facility]    History of Present Illness:      78 y/o F PMH COPD, hx bilateral PE 2021, CAD prior PCI, T2DM, active smoker, presumed malignant RUL lung cancer s/p RT 11/2023, mantle cell lymphoma who presents to establish care.    -here with granddaughter Agnes who she lives with  - she is an active smoker, smokes less than 1ppd and has been smoking all her life. She knows she has to quit but pre-contemplative about quitting. Agnes says she enjoys smoking too much  - she has been having loose bowel movements for \"years\", had increasing RUQ discomfort prior to presentation for cholecystectomy. Since cholecystectomy, loose Bms much better, has it intermittently every few days or so. No fevers, chills, night sweats, weight loss  - she follows Dr Maryse Mg for pulmonology care    Past Medical History:  Past Medical History:    Abdominal hernia    Anxiety    Arthritis    Back pain    Back problem    spinal stenosis    Bilateral pulmonary embolism (HCC)    Bloating    Cancer (HCC)    LUNG    COPD (chronic obstructive pulmonary disease) (HCC)    NO HOME O2    Coronary atherosclerosis    STENT X1    Diabetes (HCC)    DIET CONTROLLED    Diabetes mellitus (HCC)    Diarrhea, unspecified     Disorder of thyroid    Easy bruising    Esophageal reflux    Exposure to medical diagnostic radiation    last tx 11/2023    Feeling lonely    Flatulence/gas pain/belching    Food intolerance    Hearing impairment    PT DENIES, NO AIDS    Hearing loss    Hemorrhoids    High blood pressure    High cholesterol    History of blood transfusion    ABOUT 20 YEARS AGO, NO ADVERSE REACTION    History of eating disorder    Muscle weakness    Neuropathy    Night sweats    Osteoarthritis    Pain in joints    Pneumonia of both lungs due to infectious organism, unspecified part of lung    Pulmonary emboli (HCC)    Pulmonary embolism (HCC)    Renal disorder    CKD RECONCILLED FROM OUTSIDE PROBLEM LIST    RSV (acute bronchiolitis due to respiratory syncytial virus)    Shortness of breath    Stress    Thyroid disease    Uncomfortable fullness after meals    Visual impairment    GLASSES    Wears glasses       Past Surgical History:   Procedure Laterality Date    Amputation finger/thumb+flaps      Colectomy      Colonoscopy      Colonoscopy N/A 1/15/2024    Procedure: COLONOSCOPY;  Surgeon: Tirso Kimball MD;  Location:  ENDOSCOPY    Hysterectomy         Home Medications:   SYNTHROID 50 MCG Oral Tab Take 1 tablet (50 mcg total) by mouth before breakfast. 90 tablet 0    HYDROcodone-acetaminophen 5-325 MG Oral Tab Take 1 tablet by mouth 2 (two) times daily as needed for Pain.      cilostazol 50 MG Oral Tab Take 1 tablet (50 mg total) by mouth 2 (two) times daily. 180 tablet 3    Esomeprazole Magnesium 40 MG Oral Capsule Delayed Release Take 1 capsule (40 mg total) by mouth every morning before breakfast. 90 capsule 1    FUROSEMIDE 20 MG Oral Tab TAKE 1 TABLET BY MOUTH TWICE A  tablet 1    ALPRAZolam 0.5 MG Oral Tab TAKE 1 TABLET BY MOUTH NIGHTLY AS SCHEDULED AND 1/2 TAB DAILY AS NEEDED FOR ANXIETY 45 tablet 2    ESTRADIOL 0.1 MG/GM Vaginal Cream PLACE 1 GRAM VAGINALLY DAILY 42.5 g 5    BUSPIRONE 10 MG Oral Tab TAKE 1 TABLET  BY MOUTH TWICE A  tablet 1    POTASSIUM CHLORIDE ER 20 MEQ Oral Tab CR TAKE 1 TABLET BY MOUTH EVERY DAY 90 tablet 1    rosuvastatin 20 MG Oral Tab Take 1 tablet (20 mg total) by mouth nightly. 30 tablet 2    ondansetron 4 MG Oral Tablet Dispersible Take 1 tablet (4 mg total) by mouth every 4 (four) hours as needed. 12 tablet 0    traMADol 50 MG Oral Tab Take 1 tablet (50 mg total) by mouth as needed for Pain. 15 tablet 0    Naloxone HCl 4 MG/0.1ML Nasal Liquid 4 mg by Nasal route as needed. If patient remains unresponsive, repeat dose in other nostril 2-5 minutes after first dose. 1 kit 0    ASPIRIN LOW DOSE 81 MG Oral Tab EC Take 1 tablet (81 mg total) by mouth daily.      losartan 25 MG Oral Tab Take 1 tablet (25 mg total) by mouth daily.      ezetimibe 10 MG Oral Tab Take 1 tablet (10 mg total) by mouth daily.      nystatin 100,000 Units/g External Cream APPLY TOPICALLY 1 APPLICATION AS NEEDED      sertraline (ZOLOFT) 25 MG Oral Tab Take 1 tablet (25 mg total) by mouth daily. (Patient taking differently: Take 1 tablet (25 mg total) by mouth daily. Takes 1/2 tablet) 90 tablet 3    DICYCLOMINE 20 MG Oral Tab TAKE 1 TABLET BY MOUTH THREE TIMES A  tablet 0    ALBUTEROL 108 (90 Base) MCG/ACT Inhalation Aero Soln TAKE 2 PUFFS BY MOUTH EVERY 6 HOURS AS NEEDED FOR WHEEZE OR SHORTNESS OF BREATH 25.5 each 3    ELMIRON 100 MG Oral Cap TAKE 1 CAPSULE BY MOUTH EVERY DAY 90 capsule 1    benzonatate 200 MG Oral Cap Take 1 capsule (200 mg total) by mouth 3 (three) times daily as needed for cough. 30 capsule 2    METOPROLOL TARTRATE 50 MG Oral Tab TAKE 0.5 TABLETS BY MOUTH 2 TIMES DAILY. 90 tablet 1    GABAPENTIN 100 MG Oral Cap TAKE 1 CAPSULE BY MOUTH IN THE MORNING, 1-2 CAPSULES BY MOUTH IN THE AFTERNOON, AND 3 CAPSULES BY MOUTH IN THE EVENING DAILY 180 capsule 5    MONTELUKAST 10 MG Oral Tab TAKE 1 TABLET BY MOUTH EVERY DAY AT NIGHT 90 tablet 3    ACYCLOVIR 400 MG Oral Tab TAKE 1 TABLET BY MOUTH TWICE A   tablet 0    B Complex Vitamins (B COMPLEX-B12 OR) Take 1 tablet by mouth daily.      Turmeric 400 MG Oral Cap Take 1 capsule by mouth daily.      Ascorbic Acid (VITAMIN C) 1000 MG Oral Tab Take 1 tablet (1,000 mg total) by mouth daily.      omega-3 fatty acids 1000 MG Oral Cap Take 1,000 mg by mouth daily.      loratadine 10 MG Oral Tablet Dispersible Take 1 tablet (10 mg total) by mouth daily.      Capsicum, Cayenne, (CAYENNE PEPPER OR) Take 1 tablet by mouth daily.      Nebulizers ([employer] AEROSOL DELIVERY SYSTEM) Does not apply Misc Take 1 Bottle by mouth As Directed.      Respiratory Therapy Supplies (NEBULIZER/TUBING/MOUTHPIECE) Does not apply Kit Use as directed 1 each 0    TRELEGY ELLIPTA 100-62.5-25 MCG/ACT Inhalation Aerosol Powder, Breath Activated Inhale 1 puff into the lungs daily. 180 each 3    diclofenac 1 % External Gel Apply 2 g topically 4 (four) times daily. 100 g 5    Vitamin D3, Cholecalciferol, 125 MCG (5000 UT) Oral Cap Take 1 capsule (5,000 Units total) by mouth daily.       -------  Medications Ordered Prior to Encounter[1]    Allergies:   Allergies[2]    Psychosocial History:  Social History     Social History Narrative    Not on file     Social History     Socioeconomic History    Marital status:    Tobacco Use    Smoking status: Every Day     Current packs/day: 0.50     Average packs/day: 0.5 packs/day for 63.4 years (31.7 ttl pk-yrs)     Types: Cigarettes     Start date: 2/24/1964    Smokeless tobacco: Never   Vaping Use    Vaping status: Never Used   Substance and Sexual Activity    Alcohol use: Not Currently     Comment: RARE    Drug use: Never   Other Topics Concern    Caffeine Concern Yes     Comment: coffee/coca cola/Iced tea    Exercise No     Social Drivers of Health     Financial Resource Strain: Low Risk  (8/15/2023)    Financial Resource Strain     Difficulty of Paying Living Expenses: Not hard at all     Med Affordability: No   Food Insecurity: No Food Insecurity  (11/22/2024)    Food Insecurity     Food Insecurity: Never true   Transportation Needs: No Transportation Needs (11/22/2024)    Transportation Needs     Lack of Transportation: No   Physical Activity: Insufficiently Active (8/15/2023)    Exercise Vital Sign     Days of Exercise per Week: 2 days     Minutes of Exercise per Session: 30 min   Stress: No Stress Concern Present (8/15/2023)    Stress     Feeling of Stress : No   Social Connections: Socially Integrated (8/15/2023)    Social Connections     Frequency of Socialization with Friends and Family: 2   Housing Stability: Low Risk  (11/22/2024)    Housing Stability     Housing Instability: No       Family Medical History:  Family History   Problem Relation Age of Onset    Heart Disorder Father     Stroke Father     Uterine Cancer Mother     Diabetes Mother     Cancer Mother         colon    Heart Attack Daughter     Diabetes Daughter     Colon Polyps Daughter     Colon Polyps Son        Review of Systems:  A 10-point ROS was done with pertinent positives and negative per the HPI    Vital Signs:  Height: 162.6 cm (5' 4\") (01/15 1422)  Weight: 67.8 kg (149 lb 8 oz) (01/15 1422)  BSA (Calculated - sq m): 1.73 sq meters (01/15 1422)  Pulse: 65 (01/15 1422)  BP: 127/67 (01/15 1422)  Temp: 96.9 °F (36.1 °C) (01/15 1422)  Do Not Use - Resp Rate: --  SpO2: 96 % (01/15 1422)    Wt Readings from Last 6 Encounters:   01/15/25 67.8 kg (149 lb 8 oz)   01/06/25 66.7 kg (147 lb)   12/10/24 68 kg (150 lb)   12/06/24 67.1 kg (148 lb)   11/22/24 67.1 kg (148 lb)   11/07/24 67.6 kg (149 lb)       ECOG PS: 0    Physical Examination:  General: Patient is alert and oriented, not in acute distress  Psych: Mood and affect are appropriate  Eyes: EOMI, PERRL  ENT: Oropharynx is clear, no adenopathy  CV: nl S1/S2, no LE edema  Respiratory: CTAB , non -labored respirations   GI/Abd: Soft, non-tender, no hepatosplenomegaly  Neurological: Grossly intact   Lymphatics: No palpable cervical,  supraclavicular, axillary, or inguinal lymphadenopathy    Laboratory:  Lab Results   Component Value Date    WBC 11.0 01/06/2025    WBC 9.2 11/14/2024    WBC 13.7 (H) 10/15/2024    HGB 12.7 01/06/2025    HGB 13.3 11/14/2024    HGB 14.1 10/15/2024    HCT 38.9 01/06/2025    MCV 92.4 01/06/2025    MCH 30.2 01/06/2025    MCHC 32.6 01/06/2025    RDW 14.4 01/06/2025    .0 01/06/2025    .0 11/14/2024    .0 10/15/2024     Lab Results   Component Value Date     (H) 01/06/2025    BUN 11 01/06/2025    BUNCREA 25.5 (H) 07/25/2021    CREATSERUM 1.21 (H) 01/06/2025    CREATSERUM 1.12 (H) 11/14/2024    CREATSERUM 1.17 (H) 09/04/2024    ANIONGAP 9 01/06/2025    GFRNAA 50 (L) 03/28/2022    GFRAA 58 (L) 03/28/2022    CA 9.4 01/06/2025    OSMOCALC 292 01/06/2025    ALKPHO 61 01/06/2025    AST 13 01/06/2025    ALT <7 (L) 01/06/2025    BILT 0.3 01/06/2025    TP 6.5 01/06/2025    ALB 3.8 01/06/2025    GLOBULIN 2.7 01/06/2025     01/06/2025    K 3.8 01/06/2025     01/06/2025    CO2 23.0 01/06/2025     Lab Results   Component Value Date    PTT 46.0 (H) 07/08/2021    INR 0.92 07/06/2021         Impression & Plan:     Mantle cell lymphoma  - we discussed historically classical mantle cell lymphoma requires upfront treatment; however given non-zander variant and low ki-67 <5%, this is likely indolent mantle cell lymphoma that can just be observed and only needed to treat if she has symptoms, organ compromise or bone marrow suppression of which she has none currently  - PET/CT reviewed with no PET-avid lymphadenopathy  - she needs a bone marrow biopsy to assess degree of involvement of mantle cell lymphoma in the marrow  - we discussed if she ever needs treatment of MCL, due to her advanced age and comorbidities, the best treatment regimen for her would be rituximab + ibrutinib per the ENRICH trial, for which she does not need treatment currently as she is asymptomatic with indolent MCL  - plan restaging  Cts [id number] for the first year; if stable then can space to [id number]    Hx presumed lung cancer  - s/p RT 11/2023 to RUL nodule. No evidence of recurrence. However has new left nodule that needs attention   - her nodules will be monitored on restaging Cts for her MCL    Hx bilateral PE  - diagnosed in 2021. Did not have DVTs. Currently monitored off AC    COPD  - following Dr Mg    Tobacco abuse  - we discussed smoking cessation counseling; we spent 3 mins. I discussed risk of recurrent and new cancers and encouraged her to quit    Follow up: 3 mos, after restaging CT    Cuong Tolliver MD  [facility]ology Oncology          [1]   Current Outpatient Medications on File Prior to Visit   Medication Sig Dispense Refill    SYNTHROID 50 MCG Oral Tab Take 1 tablet (50 mcg total) by mouth before breakfast. 90 tablet 0    HYDROcodone-acetaminophen 5-325 MG Oral Tab Take 1 tablet by mouth 2 (two) times daily as needed for Pain.      cilostazol 50 MG Oral Tab Take 1 tablet (50 mg total) by mouth 2 (two) times daily. 180 tablet 3    Esomeprazole Magnesium 40 MG Oral Capsule Delayed Release Take 1 capsule (40 mg total) by mouth every morning before breakfast. 90 capsule 1    FUROSEMIDE 20 MG Oral Tab TAKE 1 TABLET BY MOUTH TWICE A  tablet 1    ALPRAZolam 0.5 MG Oral Tab TAKE 1 TABLET BY MOUTH NIGHTLY AS SCHEDULED AND 1/2 TAB DAILY AS NEEDED FOR ANXIETY 45 tablet 2    ESTRADIOL 0.1 MG/GM Vaginal Cream PLACE 1 GRAM VAGINALLY DAILY 42.5 g 5    BUSPIRONE 10 MG Oral Tab TAKE 1 TABLET BY MOUTH TWICE A  tablet 1    POTASSIUM CHLORIDE ER 20 MEQ Oral Tab CR TAKE 1 TABLET BY MOUTH EVERY DAY 90 tablet 1    rosuvastatin 20 MG Oral Tab Take 1 tablet (20 mg total) by mouth nightly. 30 tablet 2    ondansetron 4 MG Oral Tablet Dispersible Take 1 tablet (4 mg total) by mouth every 4 (four) hours as needed. 12 tablet 0    traMADol 50 MG Oral Tab Take 1 tablet (50 mg total) by mouth as needed for Pain. 15 tablet 0     Naloxone HCl 4 MG/0.1ML Nasal Liquid 4 mg by Nasal route as needed. If patient remains unresponsive, repeat dose in other nostril 2-5 minutes after first dose. 1 kit 0    ASPIRIN LOW DOSE 81 MG Oral Tab EC Take 1 tablet (81 mg total) by mouth daily.      losartan 25 MG Oral Tab Take 1 tablet (25 mg total) by mouth daily.      ezetimibe 10 MG Oral Tab Take 1 tablet (10 mg total) by mouth daily.      nystatin 100,000 Units/g External Cream APPLY TOPICALLY 1 APPLICATION AS NEEDED      sertraline (ZOLOFT) 25 MG Oral Tab Take 1 tablet (25 mg total) by mouth daily. (Patient taking differently: Take 1 tablet (25 mg total) by mouth daily. Takes 1/2 tablet) 90 tablet 3    DICYCLOMINE 20 MG Oral Tab TAKE 1 TABLET BY MOUTH THREE TIMES A  tablet 0    ALBUTEROL 108 (90 Base) MCG/ACT Inhalation Aero Soln TAKE 2 PUFFS BY MOUTH EVERY 6 HOURS AS NEEDED FOR WHEEZE OR SHORTNESS OF BREATH 25.5 each 3    ELMIRON 100 MG Oral Cap TAKE 1 CAPSULE BY MOUTH EVERY DAY 90 capsule 1    benzonatate 200 MG Oral Cap Take 1 capsule (200 mg total) by mouth 3 (three) times daily as needed for cough. 30 capsule 2    METOPROLOL TARTRATE 50 MG Oral Tab TAKE 0.5 TABLETS BY MOUTH 2 TIMES DAILY. 90 tablet 1    GABAPENTIN 100 MG Oral Cap TAKE 1 CAPSULE BY MOUTH IN THE MORNING, 1-2 CAPSULES BY MOUTH IN THE AFTERNOON, AND 3 CAPSULES BY MOUTH IN THE EVENING DAILY 180 capsule 5    MONTELUKAST 10 MG Oral Tab TAKE 1 TABLET BY MOUTH EVERY DAY AT NIGHT 90 tablet 3    ACYCLOVIR 400 MG Oral Tab TAKE 1 TABLET BY MOUTH TWICE A  tablet 0    B Complex Vitamins (B COMPLEX-B12 OR) Take 1 tablet by mouth daily.      Turmeric 400 MG Oral Cap Take 1 capsule by mouth daily.      Ascorbic Acid (VITAMIN C) 1000 MG Oral Tab Take 1 tablet (1,000 mg total) by mouth daily.      omega-3 fatty acids 1000 MG Oral Cap Take 1,000 mg by mouth daily.      loratadine 10 MG Oral Tablet Dispersible Take 1 tablet (10 mg total) by mouth daily.      Capsicum, Cayenne, (CAYENNE  PEPPER OR) Take 1 tablet by mouth daily.      Nebulizers ([employer] AEROSOL DELIVERY SYSTEM) Does not apply Misc Take 1 Bottle by mouth As Directed.      Respiratory Therapy Supplies (NEBULIZER/TUBING/MOUTHPIECE) Does not apply Kit Use as directed 1 each 0    TRELEGY ELLIPTA 100-62.5-25 MCG/ACT Inhalation Aerosol Powder, Breath Activated Inhale 1 puff into the lungs daily. 180 each 3    diclofenac 1 % External Gel Apply 2 g topically 4 (four) times daily. 100 g 5    Vitamin D3, Cholecalciferol, 125 MCG (5000 UT) Oral Cap Take 1 capsule (5,000 Units total) by mouth daily.      CHOLESTYRAMINE 4 g Oral Powd Pack 1- 2 PACKETS BY MOUTH DAILY OR TWICE DAILY. (Patient not taking: Reported on 1/15/2025) 60 packet 0    [] vancomycin 125 MG Oral Cap Take 1 capsule (125 mg total) by mouth daily for 10 days. 10 capsule 0    [] doxycycline 100 MG Oral Cap Take 1 capsule (100 mg total) by mouth 2 (two) times daily for 7 days. 14 capsule 0    [] cilostazol 50 MG Oral Tab Take 1 tablet (50 mg total) by mouth 2 (two) times daily. 60 tablet 0     No current facility-administered medications on file prior to visit.   [2]   Allergies  Allergen Reactions    Keflex [Cephalexin] HIVES    Rosuvastatin MYALGIA    Cephalosporins NAUSEA ONLY     NAUSEA ONLY WHEN TAKEN ON AN EMPTY STOMACH, OTHERWISE CAN TAKE

## 2025-01-24 ENCOUNTER — TELEPHONE (OUTPATIENT)
Dept: HEMATOLOGY/ONCOLOGY | Facility: HOSPITAL | Age: 80
End: 2025-01-24

## 2025-01-27 ENCOUNTER — TELEPHONE (OUTPATIENT)
Age: 80
End: 2025-01-27

## 2025-01-27 ENCOUNTER — LAB ENCOUNTER (OUTPATIENT)
Dept: LAB | Age: 80
End: 2025-01-27
Attending: FAMILY MEDICINE
Payer: MEDICARE

## 2025-01-27 ENCOUNTER — OFFICE VISIT (OUTPATIENT)
Dept: FAMILY MEDICINE CLINIC | Facility: CLINIC | Age: 80
End: 2025-01-27
Payer: MEDICARE

## 2025-01-27 VITALS
HEIGHT: 64 IN | BODY MASS INDEX: 25.1 KG/M2 | RESPIRATION RATE: 18 BRPM | HEART RATE: 59 BPM | WEIGHT: 147 LBS | OXYGEN SATURATION: 98 % | SYSTOLIC BLOOD PRESSURE: 128 MMHG | DIASTOLIC BLOOD PRESSURE: 74 MMHG

## 2025-01-27 DIAGNOSIS — E03.9 HYPOTHYROIDISM, UNSPECIFIED TYPE: ICD-10-CM

## 2025-01-27 DIAGNOSIS — R91.1 PULMONARY NODULE: ICD-10-CM

## 2025-01-27 DIAGNOSIS — I73.9 PAD (PERIPHERAL ARTERY DISEASE): ICD-10-CM

## 2025-01-27 DIAGNOSIS — R19.7 DIARRHEA, UNSPECIFIED TYPE: Primary | ICD-10-CM

## 2025-01-27 DIAGNOSIS — E11.42 TYPE 2 DIABETES MELLITUS WITH DIABETIC POLYNEUROPATHY, WITHOUT LONG-TERM CURRENT USE OF INSULIN (HCC): ICD-10-CM

## 2025-01-27 DIAGNOSIS — C83.19 MANTLE CELL LYMPHOMA OF SOLID ORGAN EXCLUDING SPLEEN (HCC): ICD-10-CM

## 2025-01-27 DIAGNOSIS — K92.1 BLACK TARRY STOOLS: ICD-10-CM

## 2025-01-27 PROCEDURE — G2211 COMPLEX E/M VISIT ADD ON: HCPCS | Performed by: FAMILY MEDICINE

## 2025-01-27 PROCEDURE — 1159F MED LIST DOCD IN RCRD: CPT | Performed by: FAMILY MEDICINE

## 2025-01-27 PROCEDURE — 3074F SYST BP LT 130 MM HG: CPT | Performed by: FAMILY MEDICINE

## 2025-01-27 PROCEDURE — 1160F RVW MEDS BY RX/DR IN RCRD: CPT | Performed by: FAMILY MEDICINE

## 2025-01-27 PROCEDURE — 99214 OFFICE O/P EST MOD 30 MIN: CPT | Performed by: FAMILY MEDICINE

## 2025-01-27 PROCEDURE — 82272 OCCULT BLD FECES 1-3 TESTS: CPT

## 2025-01-27 PROCEDURE — 3008F BODY MASS INDEX DOCD: CPT | Performed by: FAMILY MEDICINE

## 2025-01-27 PROCEDURE — 3078F DIAST BP <80 MM HG: CPT | Performed by: FAMILY MEDICINE

## 2025-01-27 NOTE — TELEPHONE ENCOUNTER
Patient is calling and states she tried to make an appointment but they don't have an order for sedation.     Relayed information from Demetrice PEREZ that all bone marrow biopsy patients are sedated and that it is not anesthesia. Patient stated understanding and stated she will have a , but patient stated she is nervous and is requesting a call from Demetrice PEREZ to confirm and explain the sedation.

## 2025-01-27 NOTE — PROGRESS NOTES
[facility] Office Note  Chief Complaint:   Chief Complaint   Patient presents with    Follow - Up     1 wk. Black stools- pt states stools are better since lowering thyroid medication, but she is c/o chills. Pt dropping off speciman to lab today-she came last week with wrong sample.     The following individual(s) verbally consented to be recorded using ambient AI listening technology and understand that they can each withdraw their consent to this listening technology at any point by asking the clinician to turn off or pause the recording       HPI:   This is a 79 year old female coming in for    01/06 OV  Reports diarrhea/loose stools since her cholecystectomy. She reports that she has been having black tarry stools since 12/23. S/p lap cholecystectomy on 11/22/24. She has been using pepto-bismol but does report that black stools precede this. She c/o RUQ abd pain. She was given cholecystramine but took this once & had nausea so did not take it again. Denies any bright red blood in stools. Does report that she has been taking NSAIDs daily for her leg pain d/t PAD. She has been taking up to Ibuprofen 800mg.  Denies any fevers, chest pains, dyspnea, lightheadedness/dizzines/syncopal episodes. Does have chronic fatigue but does not report any worsening of sx. Reports appetite is stable.     At last appointment, hemoccult ordered as well as c diff testing. She did not complete either. She was advised to stop peptobismol. Her stools are back to a normal color. Her TSH was noted to be low at 0.429 - her synthroid was adjusted to 50mcg daily. She does report that this has improved stools - no longer having loose watery diarrhea.     She did see oncology on 01/15 for her mantle cell lymphoma. Plan for bone marrow biopsy.  Also has new pulmonary nodule on recent PET scan (has h/o presumed lung cancer) - plan for monitoring on restaging Cts.    Past Medical History:    Abdominal hernia    Anxiety     Arthritis    Back pain    Back problem    spinal stenosis    Bilateral pulmonary embolism (HCC)    Bloating    Cancer (HCC)    LUNG    COPD (chronic obstructive pulmonary disease) (HCC)    NO HOME O2    Coronary atherosclerosis    STENT X1    Diabetes (HCC)    DIET CONTROLLED    Diabetes mellitus (HCC)    Diarrhea, unspecified    Disorder of thyroid    Easy bruising    Esophageal reflux    Exposure to medical diagnostic radiation    last tx 11/2023    Feeling lonely    Flatulence/gas pain/belching    Food intolerance    Hearing impairment    PT DENIES, NO AIDS    Hearing loss    Hemorrhoids    High blood pressure    High cholesterol    History of blood transfusion    ABOUT 20 YEARS AGO, NO ADVERSE REACTION    History of eating disorder    Muscle weakness    Neuropathy    Night sweats    Osteoarthritis    Pain in joints    Pneumonia of both lungs due to infectious organism, unspecified part of lung    Pulmonary emboli (HCC)    Pulmonary embolism (HCC)    Renal disorder    CKD RECONCILLED FROM OUTSIDE PROBLEM LIST    RSV (acute bronchiolitis due to respiratory syncytial virus)    Shortness of breath    Stress    Thyroid disease    Uncomfortable fullness after meals    Visual impairment    GLASSES    Wears glasses     Past Surgical History:   Procedure Laterality Date    Amputation finger/thumb+flaps      Cath bare metal stent (bms)      x 1    Cholecystectomy      Colectomy      Colonoscopy      Colonoscopy N/A 01/15/2024    Procedure: COLONOSCOPY;  Surgeon: Tirso Kimball MD;  Location:  ENDOSCOPY    Hysterectomy       Social History:  Social History     Socioeconomic History    Marital status:    Tobacco Use    Smoking status: Every Day     Current packs/day: 0.50     Average packs/day: 0.5 packs/day for 63.4 years (31.7 ttl pk-yrs)     Types: Cigarettes     Start date: 2/24/1964    Smokeless tobacco: Never   Vaping Use    Vaping status: Never Used   Substance and Sexual Activity    Alcohol use: Not  Currently     Comment: RARE    Drug use: Never   Other Topics Concern    Caffeine Concern Yes     Comment: coffee/coca cola/Iced tea    Exercise No     Social Drivers of Health     Financial Resource Strain: Low Risk  (8/15/2023)    Financial Resource Strain     Difficulty of Paying Living Expenses: Not hard at all     Med Affordability: No   Food Insecurity: No Food Insecurity (11/22/2024)    Food Insecurity     Food Insecurity: Never true   Transportation Needs: No Transportation Needs (11/22/2024)    Transportation Needs     Lack of Transportation: No   Physical Activity: Insufficiently Active (8/15/2023)    Exercise Vital Sign     Days of Exercise per Week: 2 days     Minutes of Exercise per Session: 30 min   Stress: No Stress Concern Present (8/15/2023)    Stress     Feeling of Stress : No   Social Connections: Socially Integrated (8/15/2023)    Social Connections     Frequency of Socialization with Friends and Family: 2   Housing Stability: Low Risk  (11/22/2024)    Housing Stability     Housing Instability: No     Family History:  Family History   Problem Relation Age of Onset    Heart Disorder Father     Stroke Father     Uterine Cancer Mother     Diabetes Mother     Cancer Mother         colon    Heart Attack Daughter     Diabetes Daughter     Colon Polyps Daughter     Colon Polyps Son      Allergies:  Allergies[1]  Current Meds:  Current Outpatient Medications   Medication Sig Dispense Refill    CHOLESTYRAMINE 4 g Oral Powd Pack 1- 2 PACKETS BY MOUTH DAILY OR TWICE DAILY. 60 packet 0    SYNTHROID 50 MCG Oral Tab Take 1 tablet (50 mcg total) by mouth before breakfast. 90 tablet 0    HYDROcodone-acetaminophen 5-325 MG Oral Tab Take 1 tablet by mouth 2 (two) times daily as needed for Pain.      cilostazol 50 MG Oral Tab Take 1 tablet (50 mg total) by mouth 2 (two) times daily. 180 tablet 3    Esomeprazole Magnesium 40 MG Oral Capsule Delayed Release Take 1 capsule (40 mg total) by mouth every morning before  breakfast. 90 capsule 1    FUROSEMIDE 20 MG Oral Tab TAKE 1 TABLET BY MOUTH TWICE A  tablet 1    ALPRAZolam 0.5 MG Oral Tab TAKE 1 TABLET BY MOUTH NIGHTLY AS SCHEDULED AND 1/2 TAB DAILY AS NEEDED FOR ANXIETY 45 tablet 2    ESTRADIOL 0.1 MG/GM Vaginal Cream PLACE 1 GRAM VAGINALLY DAILY 42.5 g 5    BUSPIRONE 10 MG Oral Tab TAKE 1 TABLET BY MOUTH TWICE A  tablet 1    POTASSIUM CHLORIDE ER 20 MEQ Oral Tab CR TAKE 1 TABLET BY MOUTH EVERY DAY 90 tablet 1    rosuvastatin 20 MG Oral Tab Take 1 tablet (20 mg total) by mouth nightly. (Patient taking differently: Take 1 tablet (20 mg total) by mouth every other day.) 30 tablet 2    ondansetron 4 MG Oral Tablet Dispersible Take 1 tablet (4 mg total) by mouth every 4 (four) hours as needed. 12 tablet 0    traMADol 50 MG Oral Tab Take 1 tablet (50 mg total) by mouth as needed for Pain. 15 tablet 0    Naloxone HCl 4 MG/0.1ML Nasal Liquid 4 mg by Nasal route as needed. If patient remains unresponsive, repeat dose in other nostril 2-5 minutes after first dose. 1 kit 0    ASPIRIN LOW DOSE 81 MG Oral Tab EC Take 1 tablet (81 mg total) by mouth daily.      losartan 25 MG Oral Tab Take 0.5 tablets (12.5 mg total) by mouth daily. Takes 1/2 of 25mg tablet      nystatin 100,000 Units/g External Cream APPLY TOPICALLY 1 APPLICATION AS NEEDED      sertraline (ZOLOFT) 25 MG Oral Tab Take 1 tablet (25 mg total) by mouth daily. (Patient taking differently: Take 1 tablet (25 mg total) by mouth daily. Takes 1/2 tablet) 90 tablet 3    DICYCLOMINE 20 MG Oral Tab TAKE 1 TABLET BY MOUTH THREE TIMES A DAY (Patient taking differently: Take 1 tablet (20 mg total) by mouth as needed.) 270 tablet 0    ALBUTEROL 108 (90 Base) MCG/ACT Inhalation Aero Soln TAKE 2 PUFFS BY MOUTH EVERY 6 HOURS AS NEEDED FOR WHEEZE OR SHORTNESS OF BREATH 25.5 each 3    ELMIRON 100 MG Oral Cap TAKE 1 CAPSULE BY MOUTH EVERY DAY 90 capsule 1    benzonatate 200 MG Oral Cap Take 1 capsule (200 mg total) by mouth 3  (three) times daily as needed for cough. 30 capsule 2    METOPROLOL TARTRATE 50 MG Oral Tab TAKE 0.5 TABLETS BY MOUTH 2 TIMES DAILY. 90 tablet 1    GABAPENTIN 100 MG Oral Cap TAKE 1 CAPSULE BY MOUTH IN THE MORNING, 1-2 CAPSULES BY MOUTH IN THE AFTERNOON, AND 3 CAPSULES BY MOUTH IN THE EVENING DAILY (Patient taking differently: Take 3 capsules (300 mg total) by mouth nightly.) 180 capsule 5    MONTELUKAST 10 MG Oral Tab TAKE 1 TABLET BY MOUTH EVERY DAY AT NIGHT 90 tablet 3    ACYCLOVIR 400 MG Oral Tab TAKE 1 TABLET BY MOUTH TWICE A  tablet 0    B Complex Vitamins (B COMPLEX-B12 OR) Take 1 tablet by mouth daily.      Turmeric 400 MG Oral Cap Take 1 capsule by mouth daily.      Ascorbic Acid (VITAMIN C) 1000 MG Oral Tab Take 1 tablet (1,000 mg total) by mouth daily.      omega-3 fatty acids 1000 MG Oral Cap Take 1,000 mg by mouth daily.      loratadine 10 MG Oral Tablet Dispersible Take 1 tablet (10 mg total) by mouth daily.      Capsicum, Cayenne, (CAYENNE PEPPER OR) Take 1 tablet by mouth daily.      Nebulizers ([employer] AEROSOL DELIVERY SYSTEM) Does not apply Misc Take 1 Bottle by mouth As Directed.      Respiratory Therapy Supplies (NEBULIZER/TUBING/MOUTHPIECE) Does not apply Kit Use as directed 1 each 0    TRELEGY ELLIPTA 100-62.5-25 MCG/ACT Inhalation Aerosol Powder, Breath Activated Inhale 1 puff into the lungs daily. 180 each 3    diclofenac 1 % External Gel Apply 2 g topically 4 (four) times daily. 100 g 5    Vitamin D3, Cholecalciferol, 125 MCG (5000 UT) Oral Cap Take 1 capsule (5,000 Units total) by mouth daily.        Ready to quit: Not Answered  Counseling given: Not Answered       REVIEW OF SYSTEMS:   Review of Systems   A comprehensive 10 point review of systems was completed.  Pertinent positives and negatives noted in the the HPI.    EXAM:   /74   Pulse 59   Resp 18   Ht 5' 4\" (1.626 m)   Wt 147 lb (66.7 kg)   SpO2 98%   BMI 25.23 kg/m²  Estimated body mass index is 25.23 kg/m² as  calculated from the following:    Height as of this encounter: 5' 4\" (1.626 m).    Weight as of this encounter: 147 lb (66.7 kg).   Vital signs reviewed.Appears stated age, well groomed.  Physical Exam  Vitals and nursing note reviewed.   Constitutional:       Appearance: Normal appearance.   HENT:      Head: Normocephalic and atraumatic.   Cardiovascular:      Rate and Rhythm: Normal rate and regular rhythm.      Pulses: Normal pulses.      Heart sounds: Normal heart sounds. No murmur heard.  Pulmonary:      Effort: Pulmonary effort is normal. No respiratory distress.      Breath sounds: Normal breath sounds. No stridor. No wheezing or rhonchi.   Abdominal:      General: Abdomen is flat. Bowel sounds are normal. There is no distension.      Palpations: Abdomen is soft. There is no mass.      Tenderness: There is no abdominal tenderness.      Hernia: No hernia is present.   Neurological:      Mental Status: She is alert and oriented to person, place, and time.   Psychiatric:         Mood and Affect: Mood normal.       Bilateral barefoot skin diabetic exam is abnormal with diabetic monofilament/sensation testing abnormal - diminished sensation        ASSESSMENT AND PLAN:   1. Diarrhea, unspecified type  Resolving; suspect hyperthyroid state was contributing to this. Advised to complete FIT/C diff testing. Reviewed return/call back precautions. F/u PRN    2. Hypothyroidism, unspecified type  Recheck TSH in 6-8wks. CPM.     3. PAD (peripheral artery disease)  Would benefit from PT program to help with debility, claudication from PAD.   - Physical Therapy Referral - [facility] Location    4. Type 2 diabetes mellitus with diabetic polyneuropathy, without long-term current use of insulin (HCC)  Diet controlled, Last A1c value was 6.2% done 1/6/2025.  CPM. Reviewed foot care instructions.     5. 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)  Following with oncology, plan for BM biopsy, continued surveillance.      6. Pulmonary nodule  Following with oncology, plan for continued surveillance.       Meds & Refills for this Visit:  Requested Prescriptions      No prescriptions requested or ordered in this encounter       Health Maintenance:  Health Maintenance Due   Topic Date Due    Zoster Vaccines (1 of 2) Never done    DEXA Scan  Never done    COVID-19 Vaccine (2 - 2024-25 season) 09/01/2024    Influenza Vaccine (1) 10/01/2024    Annual Well Visit  01/01/2025    Diabetes Care: Foot Exam (Annual)  01/01/2025    Tobacco Cessation Counseling  01/01/2025       Patient/Caregiver Education: Patient/Caregiver Education: There are no barriers to learning. Medical education done.   Outcome: Patient verbalizes understanding. Patient is notified to call with any questions, complications, allergies, or worsening or changing symptoms.  Patient is to call with any side effects or complications from the treatments as a result of today.     Problem List:  Patient Active Problem List   Diagnosis    Pulmonary emphysema (HCC)    Type 2 diabetes mellitus with diabetic polyneuropathy, without long-term current use of insulin (HCC)    Chronic midline low back pain without sciatica    Nicotine dependence    Depression, recurrent    Hypothyroidism    Liver cyst    Renal cyst    Splenic cyst    Neuropathy    Stage 3a chronic kidney disease (HCC)    Primary hypertension    Clostridioides difficile carrier    Adenomatous polyp    Ampullary adenoma    Atrophic vaginitis    Chronic interstitial cystitis    Dyslipidemia    Grade IV hemorrhoids    Hearing loss    IBS (irritable bowel syndrome)    Nonallergic rhinitis    Ptosis of right eyelid    Regular astigmatism of both eyes    Tinnitus    Vitreous membrane    Anxiety disorder    Chronic midline low back pain with bilateral sciatica    Fibromyalgia    PAD (peripheral artery disease)    Cancer of bronchus of right upper lobe (HCC)    Coronary artery disease involving native coronary artery of native  heart    Hyperlipidemia    Symptomatic cholelithiasis    Mantle cell lymphoma (HCC)          [1]   Allergies  Allergen Reactions    Keflex [Cephalexin] HIVES    Rosuvastatin MYALGIA    Cephalosporins NAUSEA ONLY     NAUSEA ONLY WHEN TAKEN ON AN EMPTY STOMACH, OTHERWISE CAN TAKE

## 2025-01-27 NOTE — TELEPHONE ENCOUNTER
Returned Gabriela's phone call to let her know that she will get sedation during the ct bone marrow biopsy. Left message for her to call back.     Contact info provided.

## 2025-01-27 NOTE — TELEPHONE ENCOUNTER
Returned call. Let her know I talked with the department and they will be giving her sedation. There is not an ORDER for it, but it is protocol with the bone marrow biopsy.

## 2025-01-27 NOTE — TELEPHONE ENCOUNTER
Pt called stated she is supposed to have a bone marrow test done however the order doesn't have anesthetic     Please resubmit orders and let me know I will call pt back with update

## 2025-01-28 ENCOUNTER — TELEPHONE (OUTPATIENT)
Dept: FAMILY MEDICINE CLINIC | Facility: CLINIC | Age: 80
End: 2025-01-28

## 2025-01-28 DIAGNOSIS — K92.1 BLACK TARRY STOOLS: Primary | ICD-10-CM

## 2025-01-28 NOTE — TELEPHONE ENCOUNTER
Matias called from [facility] and stated they received a fit card. Matias is requesting the correct order for the stool test she received. The order has been placed she stated she will call back if she has any further questions.      VIVIANA Coley just confirming what you meant to order.

## 2025-01-29 ENCOUNTER — PATIENT OUTREACH (OUTPATIENT)
Dept: CASE MANAGEMENT | Age: 80
End: 2025-01-29

## 2025-01-29 NOTE — PROGRESS NOTES
Spoke to Gabriela for Chronic Care Management.      Updates to patient care team/comments: UTD   Patient reported changes in medications: reports no changes   Med Adherence  Comment: reports adherence      Health Maintenance:   Health Maintenance   Topic Date Due    Zoster Vaccines (1 of 2) Never done    DEXA Scan  Never done    COVID-19 Vaccine (2 - 2024-25 season) 09/01/2024    Influenza Vaccine (1) 10/01/2024    Annual Well Visit  01/01/2025    Diabetes Care: Foot Exam (Annual)  01/01/2025    Tobacco Cessation Counseling  01/01/2025    Diabetes Care A1C  07/06/2025    Diabetes Care Dilated Eye Exam  11/07/2025    Diabetes Care: GFR  01/06/2026    Annual Depression Screening  Completed    Fall Risk Screening (Annual)  Completed    Diabetes Care: Microalb/Creat Ratio (Annual)  Completed    Pneumococcal Vaccine: 50+ Years  Completed    Meningococcal B Vaccine  Aged Out       Patient updates/concerns:   Spoke with patient. Relates doing ok, mood good/stable.  Recently seen PCP. Stools has improved. C/O leg pain but has a follow up scheduled.  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 Plan for bone marrow biopsy. Also has new pulmonary nodule on recent PET scan (has h/o presumed lung cancer) - plan for monitoring on restaging Cts. BP and pulse stable seeing cardiology in spring. Denies any urinary symptoms at this time. No other questions or concerns.    Encouraged patient:   Self care: Take the time to do the things you love.   Nutrition:  Good nutrition helps us to maintain our weight, fight off infection, and help reduce the risk of developing other chronic issues.   Physical activity:  Physical activity is important to help maintain independence and improve quality of life.     Goals/Action Plan:    Active goal from previous outreach: Staying healthy, maintain independence, and stability.     Patient reported progress towards goals: progressing                 - What: continues to follow up on health conditions            - Where/When/How:seeing PCP and specialist   Patient Reported Barriers and Concerns: as per above                    - Plan for overcoming barriers: encouraged patient to call with any questions or concerns.     Care Managers Interventions: Continue to provide encouragement and education for healthy coping and diagnosis.    Future Appointments:   Future Appointments   Date Time Provider Department Center   2/5/2025  1:00 PM Osiris Castor MD [facility] [address]   2/10/2025  8:00 AM [facility]   2/10/2025  9:00 AM  CT MAIN RM1 [facility]   4/28/2025  2:00 PM August Coley MD [facility] [place]         Critical access hospital Care Manager Follow Up Date: 1 month     Reason For Follow Up: review progress and or barriers towards patient's goals.     Time Spent This Encounter Total: 19 min medical record review, telephone communication, care plan updates where needed, education, goals, and action plan recreation/update. Provided acknowledgment and validation to patient's concerns.   Monthly Minute Total including today: 19 min   Physical assessment, complete health history, and need for CCM established by August Coley MD.

## 2025-02-07 NOTE — DISCHARGE INSTRUCTIONS
Discharge/After Visit [facility] - Department of Radiology  Biopsy - Renal (Kidney), Liver, Lung, Bone, Retroperitoneal Location    Post Sedation  Follow these guidelines:  You should be watched overnight by a responsible adult. This person should make sure your condition remains stable.   Do not drink any alcohol for 24 hours after the procedure.  Don’t drive, operate dangerous machinery, make important business or personal decisions, or sign legal documents for 24 hours after the procedure.  Note: Your healthcare provider may tell you not to take any medicine by mouth for pain or sleep for a period of time. These medicines may react with the medicine you were given during your procedure. This could cause a much stronger response than usual.     Activity:  Take it easy for the rest of the day after your biopsy.   You may be sore at the site of the biopsy for the next 5 days.   Do not do any strenuous exercises or lift over five pounds for the next 24 hours.     Biopsy Site:  Keep a bandage on the biopsy site for 24 hours after the procedure.   You may shower after 24 hours, but no soaking in a bathtub for 48 hours.     Lung Biopsy: If chest pain or shortness of breath occurs, go to the nearest Emergency Room.   Liver Biopsy: If there is any increase in right-sided back, shoulder, or abdominal pain, go to the nearest Emergency Room. Call your doctor for unusual dizziness or weakness.   Renal Biopsy: Report any bloody urine, bleeding at the site, swelling, or pain to your ordering provider,      Diet: Drink plenty of fluids and resume your usual home diet. A slow return to normal foods minimizes nausea.    Pain Management:   You may use over-the-counter or prescribed pain relief medication if it is not contraindicated by another condition.     Medications:  You may resume your usual home medications. You may resume blood thinners 24 hours after the procedure if there is no bleeding from/hematoma at  the puncture site or bloody urine (Renal Biopsy only)     When to seek medical advice:  Call the provider that ordered the biopsy with any questions and to discuss test results. These may also be available in your [facility] My Chart. You may also contact the Radiology Nurse at [phone], M-F, 8-5 with any additional questions or concerns.  Dial [phone] and ask the  to page the on-call IR Radiologist if any of these occur:    A change in color or temperature of the area where the biopsy was performed.  You develop increasing pain or shortness of breath.  Unusual drowsiness, weakness, or dizziness.  Unusual vomiting.     IF YOU FEEL YOU ARE EXPERIENCING AN EMERGENCY,   CALL 911 OR GO TO THE NEAREST EMERGENCY ROOM      4.2.24 MO/  Radiology

## 2025-02-10 ENCOUNTER — HOSPITAL ENCOUNTER (OUTPATIENT)
Dept: CT IMAGING | Facility: HOSPITAL | Age: 80
Discharge: HOME | End: 2025-02-10
Attending: INTERNAL MEDICINE
Payer: MEDICARE

## 2025-02-10 ENCOUNTER — NURSE ONLY (OUTPATIENT)
Dept: LAB | Facility: HOSPITAL | Age: 80
End: 2025-02-10
Attending: INTERNAL MEDICINE
Payer: MEDICARE

## 2025-02-10 VITALS
BODY MASS INDEX: 25.1 KG/M2 | OXYGEN SATURATION: 95 % | DIASTOLIC BLOOD PRESSURE: 53 MMHG | SYSTOLIC BLOOD PRESSURE: 108 MMHG | RESPIRATION RATE: 16 BRPM | HEART RATE: 58 BPM | HEIGHT: 64 IN | WEIGHT: 147 LBS | TEMPERATURE: 98 F

## 2025-02-10 DIAGNOSIS — C83.19 MANTLE CELL LYMPHOMA OF SOLID ORGAN EXCLUDING SPLEEN (HCC): Primary | ICD-10-CM

## 2025-02-10 DIAGNOSIS — C83.19 MANTLE CELL LYMPHOMA OF SOLID ORGAN EXCLUDING SPLEEN (HCC): ICD-10-CM

## 2025-02-10 LAB
ERYTHROCYTE [DISTWIDTH] IN BLOOD BY AUTOMATED COUNT: 14.5 %
GLUCOSE BLD-MCNC: 81 MG/DL (ref 70–99)
GLUCOSE BLD-MCNC: 88 MG/DL (ref 70–99)
HCT VFR BLD AUTO: 38.6 %
HGB BLD-MCNC: 12.7 G/DL
INR BLD: 1.03 (ref 0.8–1.2)
MCH RBC QN AUTO: 30.2 PG (ref 26–34)
MCHC RBC AUTO-ENTMCNC: 32.9 G/DL (ref 31–37)
MCV RBC AUTO: 91.9 FL
PLATELET # BLD AUTO: 381 10(3)UL (ref 150–450)
PROTHROMBIN TIME: 13.6 SECONDS (ref 11.6–14.8)
RBC # BLD AUTO: 4.2 X10(6)UL
WBC # BLD AUTO: 8.3 X10(3) UL (ref 4–11)

## 2025-02-10 PROCEDURE — 82962 GLUCOSE BLOOD TEST: CPT

## 2025-02-10 PROCEDURE — 88313 SPECIAL STAINS GROUP 2: CPT | Performed by: INTERNAL MEDICINE

## 2025-02-10 PROCEDURE — 38222 DX BONE MARROW BX & ASPIR: CPT | Performed by: INTERNAL MEDICINE

## 2025-02-10 PROCEDURE — 88305 TISSUE EXAM BY PATHOLOGIST: CPT | Performed by: INTERNAL MEDICINE

## 2025-02-10 PROCEDURE — 88314 HISTOCHEMICAL STAINS ADD-ON: CPT | Performed by: INTERNAL MEDICINE

## 2025-02-10 PROCEDURE — 88271 CYTOGENETICS DNA PROBE: CPT | Performed by: INTERNAL MEDICINE

## 2025-02-10 PROCEDURE — 88264 CHROMOSOME ANALYSIS 20-25: CPT | Performed by: INTERNAL MEDICINE

## 2025-02-10 PROCEDURE — 88342 IMHCHEM/IMCYTCHM 1ST ANTB: CPT | Performed by: INTERNAL MEDICINE

## 2025-02-10 PROCEDURE — 36415 COLL VENOUS BLD VENIPUNCTURE: CPT

## 2025-02-10 PROCEDURE — 88275 CYTOGENETICS 100-300: CPT | Performed by: INTERNAL MEDICINE

## 2025-02-10 PROCEDURE — 88237 TISSUE CULTURE BONE MARROW: CPT | Performed by: INTERNAL MEDICINE

## 2025-02-10 PROCEDURE — 88291 CYTO/MOLECULAR REPORT: CPT | Performed by: INTERNAL MEDICINE

## 2025-02-10 PROCEDURE — 85610 PROTHROMBIN TIME: CPT

## 2025-02-10 PROCEDURE — 77012 CT SCAN FOR NEEDLE BIOPSY: CPT | Performed by: INTERNAL MEDICINE

## 2025-02-10 PROCEDURE — 88184 FLOWCYTOMETRY/ TC 1 MARKER: CPT | Performed by: INTERNAL MEDICINE

## 2025-02-10 PROCEDURE — 85027 COMPLETE CBC AUTOMATED: CPT

## 2025-02-10 PROCEDURE — 88333 PATH CONSLTJ SURG CYTO XM 1: CPT | Performed by: INTERNAL MEDICINE

## 2025-02-10 PROCEDURE — 85097 BONE MARROW INTERPRETATION: CPT | Performed by: INTERNAL MEDICINE

## 2025-02-10 PROCEDURE — 99152 MOD SED SAME PHYS/QHP 5/>YRS: CPT | Performed by: INTERNAL MEDICINE

## 2025-02-10 PROCEDURE — 88185 FLOWCYTOMETRY/TC ADD-ON: CPT | Performed by: INTERNAL MEDICINE

## 2025-02-10 PROCEDURE — 88365 INSITU HYBRIDIZATION (FISH): CPT | Performed by: INTERNAL MEDICINE

## 2025-02-10 PROCEDURE — 88364 INSITU HYBRIDIZATION (FISH): CPT | Performed by: INTERNAL MEDICINE

## 2025-02-10 PROCEDURE — 88341 IMHCHEM/IMCYTCHM EA ADD ANTB: CPT | Performed by: INTERNAL MEDICINE

## 2025-02-10 RX ORDER — MIDAZOLAM HYDROCHLORIDE 1 MG/ML
INJECTION INTRAMUSCULAR; INTRAVENOUS
Status: COMPLETED
Start: 2025-02-10 | End: 2025-02-10

## 2025-02-10 RX ORDER — NICOTINE POLACRILEX 4 MG
30 LOZENGE BUCCAL
Status: DISCONTINUED | OUTPATIENT
Start: 2025-02-10 | End: 2025-02-12

## 2025-02-10 RX ORDER — SODIUM CHLORIDE 9 MG/ML
INJECTION, SOLUTION INTRAVENOUS CONTINUOUS
Status: DISCONTINUED | OUTPATIENT
Start: 2025-02-10 | End: 2025-02-12

## 2025-02-10 RX ORDER — MIDAZOLAM HYDROCHLORIDE 1 MG/ML
1 INJECTION INTRAMUSCULAR; INTRAVENOUS EVERY 5 MIN PRN
Status: ACTIVE | OUTPATIENT
Start: 2025-02-10 | End: 2025-02-10

## 2025-02-10 RX ORDER — NICOTINE POLACRILEX 4 MG
15 LOZENGE BUCCAL
Status: DISCONTINUED | OUTPATIENT
Start: 2025-02-10 | End: 2025-02-12

## 2025-02-10 RX ORDER — FLUMAZENIL 0.1 MG/ML
0.2 INJECTION INTRAVENOUS AS NEEDED
Status: DISCONTINUED | OUTPATIENT
Start: 2025-02-10 | End: 2025-02-12

## 2025-02-10 RX ORDER — DEXTROSE MONOHYDRATE 25 G/50ML
50 INJECTION, SOLUTION INTRAVENOUS
Status: DISCONTINUED | OUTPATIENT
Start: 2025-02-10 | End: 2025-02-12

## 2025-02-10 RX ORDER — NALOXONE HYDROCHLORIDE 0.4 MG/ML
0.08 INJECTION, SOLUTION INTRAMUSCULAR; INTRAVENOUS; SUBCUTANEOUS AS NEEDED
Status: DISCONTINUED | OUTPATIENT
Start: 2025-02-10 | End: 2025-02-12

## 2025-02-10 RX ADMIN — MIDAZOLAM HYDROCHLORIDE 1 MG: 1 INJECTION INTRAMUSCULAR; INTRAVENOUS at 09:50:00

## 2025-02-10 RX ADMIN — MIDAZOLAM HYDROCHLORIDE 1 MG: 1 INJECTION INTRAMUSCULAR; INTRAVENOUS at 09:55:00

## 2025-02-10 RX ADMIN — SODIUM CHLORIDE: 9 INJECTION, SOLUTION INTRAVENOUS at 09:15:00

## 2025-02-10 NOTE — IMAGING NOTE
1013 Called [facility] Charge RN-Li and pt transported to  2247 with Radiology RN and transport. VSS on r/a, dressing CDI, presently denies pain and tolerated procedure well, bedside report given to  Louise [facility] RN.

## 2025-02-10 NOTE — PROCEDURES
[facility]   part of [facility]  Procedure Note    Gabriela Ndiaye Patient Status:  Outpatient    1945 MRN [id number]   Location [facility] CT Attending Cuong Tolliver MD   Hosp Day # 0 PCP August Coley MD     Procedure: CT guided bone marrow biopsy    Pre-Procedure Diagnosis:  Lymphoma    Post-Procedure Diagnosis: Same    Anesthesia:  Local and Sedation    Findings:  11g aspirate and core of L iliac bone    Specimens: As above    Blood Loss:  Minimal    Tourniquet Time: None  Complications:  None  Drains:  None    Secondary Diagnosis:  None    Rj Duvall MD  2/10/2025

## 2025-02-10 NOTE — H&P
[facility]   part of [facility]   History & Physical    Gabriela Ndiaye Patient Status:  Outpatient    1945 MRN [id number]   Location [facility] CT Attending Cuong Tolliver MD   Hosp Day # 0 PCP August Coley MD     Admitting Diagnosis:   Mantle cell lymphoma    History of Present Illness:   80 yo F w/ lymphoma presents for BM biopsy. ROS neg.    History   Past Medical History:  Past Medical History:    Abdominal hernia    Anxiety    Arthritis    Back pain    Back problem    spinal stenosis    Bilateral pulmonary embolism (HCC)    Bloating    Cancer (HCC)    LUNG    COPD (chronic obstructive pulmonary disease) (HCC)    NO HOME O2    Coronary atherosclerosis    STENT X1    Diabetes (HCC)    DIET CONTROLLED    Diabetes mellitus (HCC)    Diarrhea, unspecified    Disorder of thyroid    Easy bruising    Esophageal reflux    Exposure to medical diagnostic radiation    last tx 2023    Feeling lonely    Flatulence/gas pain/belching    Food intolerance    Hearing impairment    PT DENIES, NO AIDS    Hearing loss    Hemorrhoids    High blood pressure    High cholesterol    History of blood transfusion    ABOUT 20 YEARS AGO, NO ADVERSE REACTION    History of eating disorder    Muscle weakness    Neuropathy    Night sweats    Osteoarthritis    Pain in joints    Pneumonia of both lungs due to infectious organism, unspecified part of lung    Pulmonary emboli (HCC)    Pulmonary embolism (HCC)    Renal disorder    CKD RECONCILLED FROM OUTSIDE PROBLEM LIST    RSV (acute bronchiolitis due to respiratory syncytial virus)    Shortness of breath    Stress    Thyroid disease    Uncomfortable fullness after meals    Visual impairment    GLASSES    Wears glasses       Past Surgical History:  Past Surgical History:   Procedure Laterality Date    Amputation finger/thumb+flaps      Cath bare metal stent (bms)      x 1    Cholecystectomy      Colectomy      Colonoscopy      Colonoscopy N/A 01/15/2024    Procedure:  COLONOSCOPY;  Surgeon: Tirso Kimball MD;  Location:  ENDOSCOPY    Hysterectomy         Social History:  Social History     Tobacco Use    Smoking status: Every Day     Current packs/day: 0.50     Average packs/day: 0.5 packs/day for 63.5 years (31.7 ttl pk-yrs)     Types: Cigarettes     Start date: 2/24/1964    Smokeless tobacco: Never   Substance Use Topics    Alcohol use: Not Currently     Comment: RARE        Family History:  Family History   Problem Relation Age of Onset    Heart Disorder Father     Stroke Father     Uterine Cancer Mother     Diabetes Mother     Cancer Mother         colon    Heart Attack Daughter     Diabetes Daughter     Colon Polyps Daughter     Colon Polyps Son        Allergies/Medications:   Allergies:  Allergies[1]    Medications:    Current Outpatient Medications:     SYNTHROID 50 MCG Oral Tab, Take 1 tablet (50 mcg total) by mouth before breakfast., Disp: 90 tablet, Rfl: 0    cilostazol 50 MG Oral Tab, Take 1 tablet (50 mg total) by mouth 2 (two) times daily., Disp: 180 tablet, Rfl: 3    Esomeprazole Magnesium 40 MG Oral Capsule Delayed Release, Take 1 capsule (40 mg total) by mouth every morning before breakfast., Disp: 90 capsule, Rfl: 1    FUROSEMIDE 20 MG Oral Tab, TAKE 1 TABLET BY MOUTH TWICE A DAY, Disp: 180 tablet, Rfl: 1    ESTRADIOL 0.1 MG/GM Vaginal Cream, PLACE 1 GRAM VAGINALLY DAILY, Disp: 42.5 g, Rfl: 5    BUSPIRONE 10 MG Oral Tab, TAKE 1 TABLET BY MOUTH TWICE A DAY, Disp: 180 tablet, Rfl: 1    POTASSIUM CHLORIDE ER 20 MEQ Oral Tab CR, TAKE 1 TABLET BY MOUTH EVERY DAY, Disp: 90 tablet, Rfl: 1    rosuvastatin 20 MG Oral Tab, Take 1 tablet (20 mg total) by mouth nightly. (Patient taking differently: Take 1 tablet (20 mg total) by mouth every other day.), Disp: 30 tablet, Rfl: 2    ASPIRIN LOW DOSE 81 MG Oral Tab EC, Take 1 tablet (81 mg total) by mouth daily., Disp: , Rfl:     losartan 25 MG Oral Tab, Take 0.5 tablets (12.5 mg total) by mouth daily. Takes 1/2 of 25mg tablet,  Disp: , Rfl:     sertraline (ZOLOFT) 25 MG Oral Tab, Take 1 tablet (25 mg total) by mouth daily. (Patient taking differently: Take 1 tablet (25 mg total) by mouth daily. Takes 1/2 tablet), Disp: 90 tablet, Rfl: 3    DICYCLOMINE 20 MG Oral Tab, TAKE 1 TABLET BY MOUTH THREE TIMES A DAY (Patient taking differently: Take 1 tablet (20 mg total) by mouth as needed.), Disp: 270 tablet, Rfl: 0    ELMIRON 100 MG Oral Cap, TAKE 1 CAPSULE BY MOUTH EVERY DAY, Disp: 90 capsule, Rfl: 1    METOPROLOL TARTRATE 50 MG Oral Tab, TAKE 0.5 TABLETS BY MOUTH 2 TIMES DAILY., Disp: 90 tablet, Rfl: 1    GABAPENTIN 100 MG Oral Cap, TAKE 1 CAPSULE BY MOUTH IN THE MORNING, 1-2 CAPSULES BY MOUTH IN THE AFTERNOON, AND 3 CAPSULES BY MOUTH IN THE EVENING DAILY (Patient taking differently: Take 3 capsules (300 mg total) by mouth nightly.), Disp: 180 capsule, Rfl: 5    MONTELUKAST 10 MG Oral Tab, TAKE 1 TABLET BY MOUTH EVERY DAY AT NIGHT, Disp: 90 tablet, Rfl: 3    ACYCLOVIR 400 MG Oral Tab, TAKE 1 TABLET BY MOUTH TWICE A DAY, Disp: 180 tablet, Rfl: 0    B Complex Vitamins (B COMPLEX-B12 OR), Take 1 tablet by mouth daily., Disp: , Rfl:     Turmeric 400 MG Oral Cap, Take 1 capsule by mouth daily., Disp: , Rfl:     Ascorbic Acid (VITAMIN C) 1000 MG Oral Tab, Take 1 tablet (1,000 mg total) by mouth daily., Disp: , Rfl:     omega-3 fatty acids 1000 MG Oral Cap, Take 1,000 mg by mouth daily., Disp: , Rfl:     loratadine 10 MG Oral Tablet Dispersible, Take 1 tablet (10 mg total) by mouth daily., Disp: , Rfl:     Capsicum, Cayenne, (CAYENNE PEPPER OR), Take 1 tablet by mouth daily., Disp: , Rfl:     TRELEGY ELLIPTA 100-62.5-25 MCG/ACT Inhalation Aerosol Powder, Breath Activated, Inhale 1 puff into the lungs daily., Disp: 180 each, Rfl: 3    Vitamin D3, Cholecalciferol, 125 MCG (5000 UT) Oral Cap, Take 1 capsule (5,000 Units total) by mouth daily., Disp: , Rfl:     CHOLESTYRAMINE 4 g Oral Powd Pack, 1- 2 PACKETS BY MOUTH DAILY OR TWICE DAILY., Disp: 60  packet, Rfl: 0    HYDROcodone-acetaminophen 5-325 MG Oral Tab, Take 1 tablet by mouth 2 (two) times daily as needed for Pain., Disp: , Rfl:     ALPRAZolam 0.5 MG Oral Tab, TAKE 1 TABLET BY MOUTH NIGHTLY AS SCHEDULED AND 1/2 TAB DAILY AS NEEDED FOR ANXIETY, Disp: 45 tablet, Rfl: 2    ondansetron 4 MG Oral Tablet Dispersible, Take 1 tablet (4 mg total) by mouth every 4 (four) hours as needed., Disp: 12 tablet, Rfl: 0    traMADol 50 MG Oral Tab, Take 1 tablet (50 mg total) by mouth as needed for Pain., Disp: 15 tablet, Rfl: 0    Naloxone HCl 4 MG/0.1ML Nasal Liquid, 4 mg by Nasal route as needed. If patient remains unresponsive, repeat dose in other nostril 2-5 minutes after first dose., Disp: 1 kit, Rfl: 0    nystatin 100,000 Units/g External Cream, APPLY TOPICALLY 1 APPLICATION AS NEEDED, Disp: , Rfl:     ALBUTEROL 108 (90 Base) MCG/ACT Inhalation Aero Soln, TAKE 2 PUFFS BY MOUTH EVERY 6 HOURS AS NEEDED FOR WHEEZE OR SHORTNESS OF BREATH, Disp: 25.5 each, Rfl: 3    benzonatate 200 MG Oral Cap, Take 1 capsule (200 mg total) by mouth 3 (three) times daily as needed for cough., Disp: 30 capsule, Rfl: 2    Nebulizers (VIOS AEROSOL DELIVERY SYSTEM) Does not apply Misc, Take 1 Bottle by mouth As Directed., Disp: , Rfl:     Respiratory Therapy Supplies (NEBULIZER/TUBING/MOUTHPIECE) Does not apply Kit, Use as directed, Disp: 1 each, Rfl: 0    diclofenac 1 % External Gel, Apply 2 g topically 4 (four) times daily., Disp: 100 g, Rfl: 5    Physical Exam & Review of Systems:   Physical Exam:    /52   Pulse 55   Temp 97.5 °F (36.4 °C) (Temporal)   Resp 11   Ht 64\"   Wt 147 lb   SpO2 95%   BMI 25.23 kg/m²     General: NAD  Neck: No JVD  Lungs: CTA bilat  Heart: RRR, S1, S2  Abdomen: Soft, NT/ND, BS+x4  Extremities: Warm, dry, no LE edema bilat  Pulses: 2+ bilat DP    Results:   Labs:  Recent Labs   Lab 02/10/25  0810   RBC 4.20   HGB 12.7   HCT 38.6   MCV 91.9   MCH 30.2   MCHC 32.9   RDW 14.5   WBC 8.3   .0      Recent Labs   Lab 02/10/25  0810   PTP 13.6   INR 1.03     No results for input(s): \"GLU\", \"BUN\", \"CREATSERUM\", \"GFRAA\", \"GFRNAA\", \"CA\", \"NA\", \"K\", \"CL\", \"CO2\" in the last 168 hours.    Assessment/Plan:   Impression: 78 yo F w/ lymphoma    I have discussed with the patient and/or legal representative the potential benefits, risks, and side effects of this procedure, the likelihood of the patient achieving goals; and the potential problems that might occur during recuperation.  I discussed reasonable alternatives to the procedure, including risks, benefits and side effects related to the alternatives, and risks related to not receiving this procedure.      Recommendations: CT guided bone marrow biopsy    Rj Duvall MD  2/10/2025  10:08 AM           [1]   Allergies  Allergen Reactions    Keflex [Cephalexin] HIVES    Rosuvastatin MYALGIA    Cephalosporins NAUSEA ONLY     NAUSEA ONLY WHEN TAKEN ON AN EMPTY STOMACH, OTHERWISE CAN TAKE

## 2025-02-15 DIAGNOSIS — I10 ESSENTIAL HYPERTENSION: ICD-10-CM

## 2025-02-15 RX ORDER — METOPROLOL TARTRATE 50 MG
25 TABLET ORAL 2 TIMES DAILY
Qty: 90 TABLET | Refills: 3 | Status: SHIPPED | OUTPATIENT
Start: 2025-02-15

## 2025-02-17 ENCOUNTER — TELEPHONE (OUTPATIENT)
Age: 80
End: 2025-02-17

## 2025-02-17 NOTE — TELEPHONE ENCOUNTER
Pt called and would like to know if the results are in for her bone marrow test she had done. If so, she is asking for a call back with the results

## 2025-02-19 LAB
CELLS ANALYZED LEUK/LYM: 20
CELLS COUNTED LEUK/LYM: 20
CELLS KARYOTYPED LEUK/LYM: 2
GTG BAND LEUK/LYM: 400

## 2025-02-20 LAB
CD10 CELLS NFR SPEC: <1 %
CD10/CD19: <1 %
CD19 CELLS NFR SPEC: 3 %
CD19+ MM: 92 %
CD19+/CD200+: 2 %
CD19+/CD38+ MM: 92 %
CD2 CELLS NFR SPEC: 94 %
CD20 CELLS NFR SPEC: 3 %
CD200 CELLS: 8 %
CD3 CELLS NFR SPEC: 85 %
CD3+/TCRGD+: 1 %
CD3+CD4+ CELLS NFR SPEC: 75 %
CD3+CD4+ CELLS/CD3+CD8+ CLL SPEC: 7.5
CD3+CD8+ CELLS NFR SPEC: 10 %
CD3-/CD56+: 9 %
CD34 CELLS NFR SPEC: <1 %
CD38 CELLS NFR SPEC: 1 %
CD38+/CD19+: <1 %
CD45 CELLS NFR SPEC: 100 %
CD45-/CD38+ KAPPA: 32 %
CD45-/CD38+ LAMBDA: 65 %
CD5 CELLS NFR SPEC: 84 %
CD5/CD19 CELLS: <1 %
CD56 MM: 65 %
CD7 CELLS NFR SPEC: 90 %
CELL SURF KAPPA/LAMBDA RATIO: 2
CELL SURF LAMBDA LIGHT CHAIN: 1 %
CELL SURFACE KAPPA LIGHT CHAIN: 2 %
TCR G-D CELLS NFR SPEC: 1 %

## 2025-02-28 ENCOUNTER — TELEPHONE (OUTPATIENT)
Age: 80
End: 2025-02-28

## 2025-02-28 ENCOUNTER — PATIENT OUTREACH (OUTPATIENT)
Dept: CASE MANAGEMENT | Age: 80
End: 2025-02-28

## 2025-02-28 NOTE — PROGRESS NOTES
JERMAINE verified for CCM monthly outreach.   [employer] message sent,  I will follow up with patient at a later time.      Medical record reviewed including recent office visits and test results

## 2025-03-04 LAB
CELLS ANALYZED FISH ONC: 200
CELLS ANALYZED FISH ONC: 200
CELLS COUNTED FISH ONC: 200
CELLS COUNTED FISH ONC: 200

## 2025-03-14 DIAGNOSIS — I10 ESSENTIAL (PRIMARY) HYPERTENSION: ICD-10-CM

## 2025-03-14 RX ORDER — LOSARTAN POTASSIUM 25 MG/1
25 TABLET ORAL DAILY
Qty: 90 TABLET | Refills: 1 | Status: SHIPPED | OUTPATIENT
Start: 2025-03-14

## 2025-03-17 DIAGNOSIS — J44.1 CHRONIC OBSTRUCTIVE PULMONARY DISEASE WITH ACUTE EXACERBATION (HCC): ICD-10-CM

## 2025-03-17 RX ORDER — PENTOSAN POLYSULFATE SODIUM 100 MG/1
100 CAPSULE, GELATIN COATED ORAL DAILY
Qty: 90 CAPSULE | Refills: 1 | Status: SHIPPED | OUTPATIENT
Start: 2025-03-17

## 2025-03-18 ENCOUNTER — TELEPHONE (OUTPATIENT)
Dept: FAMILY MEDICINE CLINIC | Facility: CLINIC | Age: 80
End: 2025-03-18

## 2025-03-18 NOTE — TELEPHONE ENCOUNTER
Patient called to verify if she needs to get labs done prior to appointment on 4/28/25. Informed patient that she only needs to get her thyroid labs done prior to he April appointment. The rest of her labs will be done July.Lab phone number given to patient. Patient verbalizes understanding.   
no

## 2025-03-25 ENCOUNTER — LAB ENCOUNTER (OUTPATIENT)
Dept: LAB | Age: 80
End: 2025-03-25
Attending: FAMILY MEDICINE
Payer: MEDICARE

## 2025-03-25 DIAGNOSIS — E03.9 HYPOTHYROIDISM, UNSPECIFIED TYPE: ICD-10-CM

## 2025-03-25 LAB
T4 FREE SERPL-MCNC: 0.8 NG/DL (ref 0.8–1.7)
TSI SER-ACNC: 8.04 UIU/ML (ref 0.55–4.78)

## 2025-03-25 PROCEDURE — 84439 ASSAY OF FREE THYROXINE: CPT

## 2025-03-25 PROCEDURE — 84443 ASSAY THYROID STIM HORMONE: CPT

## 2025-03-25 PROCEDURE — 36415 COLL VENOUS BLD VENIPUNCTURE: CPT

## 2025-03-27 ENCOUNTER — PATIENT OUTREACH (OUTPATIENT)
Dept: CASE MANAGEMENT | Age: 80
End: 2025-03-27

## 2025-03-27 ENCOUNTER — TELEPHONE (OUTPATIENT)
Dept: FAMILY MEDICINE CLINIC | Facility: CLINIC | Age: 80
End: 2025-03-27

## 2025-03-27 DIAGNOSIS — I10 PRIMARY HYPERTENSION: ICD-10-CM

## 2025-03-27 DIAGNOSIS — E03.9 HYPOTHYROIDISM, UNSPECIFIED TYPE: Primary | ICD-10-CM

## 2025-03-27 DIAGNOSIS — J43.9 PULMONARY EMPHYSEMA, UNSPECIFIED EMPHYSEMA TYPE (HCC): ICD-10-CM

## 2025-03-27 DIAGNOSIS — N18.31 STAGE 3A CHRONIC KIDNEY DISEASE (HCC): ICD-10-CM

## 2025-03-27 DIAGNOSIS — M79.7 FIBROMYALGIA: ICD-10-CM

## 2025-03-27 DIAGNOSIS — F41.9 ANXIETY DISORDER, UNSPECIFIED TYPE: ICD-10-CM

## 2025-03-27 DIAGNOSIS — E11.42 TYPE 2 DIABETES MELLITUS WITH DIABETIC POLYNEUROPATHY, WITHOUT LONG-TERM CURRENT USE OF INSULIN (HCC): ICD-10-CM

## 2025-03-27 NOTE — TELEPHONE ENCOUNTER
Patient was seen for symptoms on 1/27/25. Attempted to call patient, left message to call office.

## 2025-03-27 NOTE — TELEPHONE ENCOUNTER
Patient requesting lab results and thyroid medication to be adjusted.    States she is always freezing and knows she needs more medication for her thyroid.      Continues to have on going loose stool. Using Imodium. States she is scared to eat at times. (Gallbladder not present) was prescribed cholestyramine in the past but could not stand it looking for a possible alternative.     Future Appointments   Date Time Provider Department Center   4/9/2025  4:00 PM PF CT [facility] PF CT [place]   4/16/2025  2:30 PM Cuong Tolliver MD [facility]   4/28/2025  2:00 PM August Coley MD [facility] 17 [facility]     Please contact patient to advise    Thank you

## 2025-03-27 NOTE — PROGRESS NOTES
Spoke to Gabriela for Chronic Care Management.      Updates to patient care team/comments: UTD   Patient reported changes in medications: reports no changes   Med Adherence  Comment: reports adherence      Health Maintenance:   Health Maintenance   Topic Date Due    Zoster Vaccines (1 of 2) Never done    DEXA Scan  Never done    COVID-19 Vaccine (2 - 2024-25 season) 09/01/2024    Influenza Vaccine (1) 10/01/2024    Annual Well Visit  01/01/2025    Tobacco Cessation Counseling  01/01/2025    Diabetes Care A1C  07/06/2025    Diabetes Care Dilated Eye Exam  11/07/2025    Diabetes Care: GFR  01/06/2026    Annual Depression Screening  Completed    Fall Risk Screening (Annual)  Completed    Diabetes Care: Foot Exam (Annual)  Completed    Diabetes Care: Microalb/Creat Ratio (Annual)  Completed    Pneumococcal Vaccine: 50+ Years  Completed    Meningococcal B Vaccine  Aged Out       Patient updates/concerns:   Spoke with patient.  Patient relates doing well. Mood good/stable.  Denies any recent falls.   Patient requesting lab results and thyroid medication to be adjusted.States she is always freezing and knows she needs more medication for her thyroid.Continues to have on going loose stool. Using Imodium. States she is scared to eat at times. (Gallbladder not present) was prescribed cholestyramine in the past but could not stand it looking for a possible alternative. TE sent to PCP to advise. Does have follow up scheduled next month. Breathing stable. Scheduled for follow up CT next month.   Was seen by pain management for back pain. Awaiting X ray results. Trigger point injections completed and feels they have helped. No other questions or concerns.    Encouraged patient:   Self care: Take the time to do the things you love.   Nutrition:  Good nutrition helps us to maintain our weight, fight off infection, and help reduce the risk of developing other chronic issues.   Physical activity:  Physical activity is important to  help maintain independence and improve quality of life.     Goals/Action Plan:    Active goal from previous outreach: Staying healthy, maintain independence, and stability.     Patient reported progress towards goals: progressing                - What: continues to follow up on health conditions            - Where/When/How: seeing PCP and specialist   Patient Reported Barriers and Concerns: as per above                    - Plan for overcoming barriers: encouraged patient to call with any questions or concerns.     Care Managers Interventions: Continue to provide encouragement and education for healthy coping and diagnosis.      Future Appointments:   Future Appointments   Date Time Provider Department Center   4/9/2025  4:00 PM PF CT 1 PF CT [place]   4/16/2025  2:30 PM Cuong Tolliver MD PF [facility]   4/28/2025  2:00 PM August Coley MD [facility] 17 [facility] Care Manager Follow Up Date: 1 month     Reason For Follow Up: review progress and or barriers towards patient's goals.     Time Spent This Encounter Total: 23 min medical record review, telephone communication, care plan updates where needed, education, goals, and action plan recreation/update. Provided acknowledgment and validation to patient's concerns.   Monthly Minute Total including today: 23 min   Physical assessment, complete health history, and need for CCM established by August Coley MD.

## 2025-03-28 RX ORDER — LEVOTHYROXINE SODIUM 75 UG/1
75 TABLET ORAL
Qty: 90 TABLET | Refills: 0 | Status: SHIPPED | OUTPATIENT
Start: 2025-03-28

## 2025-03-28 NOTE — TELEPHONE ENCOUNTER
Patient returned call. Advised of increased dose of Synthroid and repeat labs. Orders placed.   Patient has not yet followed up with GI/Dr Kimball. [facility] info provided to patient. She is worried she will have a hard time finding a ride as her family works. Advised patient they do have a [place] office.     Patient will try Metamucil. Advised of BRAT diet and keep pushing fluids.    Patient tried one dose cholestyramine in the past but it made her nauseous. Should she try it again? Any other recs? OK to continue Kaopectate as it seems to help.

## 2025-03-28 NOTE — TELEPHONE ENCOUNTER
Patient called to request an increase thyroid medication as she is freezing and has not helped her diarrhea. She is requesting further recommendations for diarrhea (something stronger than Immodium). Advised to continue to avoid triggers, push fluid intake to prevent dehydration, states she is urinating at baseline, try a bland diet which she has been doing.    Dr. Coley - patient requesting to increase her Thyroid medication as she is freezing and further recommendations for continued diarrhea. States the decrease in thyroid medication didn't help her diarrhea

## 2025-03-28 NOTE — TELEPHONE ENCOUNTER
Reviewed recent labs. Increase synthroid to 75mcg and recheck TSH in 6-8wks.   2. Chronic diarrhea - has she followed back up with GI? Did she tolerate cholestyramine in the past?   Incorporate metamucil to help bulk up stools.   F/u with GI if no improvement.

## 2025-03-31 NOTE — TELEPHONE ENCOUNTER
Received call from pt, calling to check on status of levothyroxine and below message. Informed her levothyroxine was sent on 3/28, she will contact pharmacy. Patient plans to schedule follow-up appointment with GI, but asking what Dr. Coley recommends in the meantime for her diarrhea. Already informed of metamucil recommendation, did not tolerate cholestyramine. Asking if she should try cholestyramine again?     Dr. Coley- any other recs for diarrhea? Informed of metamucil recommendation. Does take kaopectate with some relif. Did not tolerate cholestyramine in the past, asking if she should try again? She is going to set up follow-up appointment with GI.

## 2025-03-31 NOTE — TELEPHONE ENCOUNTER
Called patient, advised to try cholestyramine again   States the cholestyramine did make her really nauseas, states she is willing to try again but is nervous due to the past reaction to this.     Please advise if any other recommendations other than the cholestyramine.

## 2025-03-31 NOTE — TELEPHONE ENCOUNTER
Pt states [facility] pharmacy has not received Synthroid 75 mcg. Rx e-scribed on 3/28/25.    Conference call to [facility] pharmacy in [place]. It sounded like someone picked up and then hung up. We called back and now the pharmacy is closed for lunch. Will try again after 1 pm.    Reminded pt to recheck TSH in 6-8 weeks (approximate 5/9/25).    Called [facility] pharmacy and spoke to makayla Coffman. Inquired if Rx was received:    Outpatient Medication Detail   Disp Refills Start End   levothyroxine (SYNTHROID) 75 MCG Oral Tab 90 tablet 0 3/28/2025 --   Sig - Route: Take 1 tablet (75 mcg total) by mouth before breakfast. - Oral   Sent to pharmacy as: Levothyroxine Sodium 75 MCG Oral Tablet (Synthroid)   E-Prescribing Status: Receipt confirmed by pharmacy (3/28/2025  4:01 PM CDT)     Pharmacy  [id number] [place], [place] - [address] [place] [address] [phone], [phone]     Per Augustus, she has not received Rx above. Per pharmacist, pt usually takes Brand only. Telephone order given. Pharmacist states she will prepare medication.    Called and informed pt of above. Pt verbalized understanding and appreciation.

## 2025-04-08 RX ORDER — LEVOTHYROXINE SODIUM 50 MCG
50 TABLET ORAL
Qty: 90 TABLET | Refills: 0 | OUTPATIENT
Start: 2025-04-08

## 2025-04-15 ENCOUNTER — OFFICE VISIT (OUTPATIENT)
Dept: FAMILY MEDICINE CLINIC | Facility: CLINIC | Age: 80
End: 2025-04-15
Payer: MEDICARE

## 2025-04-15 ENCOUNTER — LAB ENCOUNTER (OUTPATIENT)
Dept: LAB | Age: 80
End: 2025-04-15
Payer: MEDICARE

## 2025-04-15 VITALS
HEIGHT: 64 IN | BODY MASS INDEX: 24.24 KG/M2 | RESPIRATION RATE: 22 BRPM | OXYGEN SATURATION: 97 % | HEART RATE: 70 BPM | SYSTOLIC BLOOD PRESSURE: 130 MMHG | DIASTOLIC BLOOD PRESSURE: 72 MMHG | WEIGHT: 142 LBS

## 2025-04-15 DIAGNOSIS — R19.7 DIARRHEA, UNSPECIFIED TYPE: Primary | ICD-10-CM

## 2025-04-15 DIAGNOSIS — R19.7 DIARRHEA, UNSPECIFIED TYPE: ICD-10-CM

## 2025-04-15 LAB
ALBUMIN SERPL-MCNC: 4.4 G/DL (ref 3.2–4.8)
ALBUMIN/GLOB SERPL: 1.5 {RATIO} (ref 1–2)
ALP LIVER SERPL-CCNC: 69 U/L (ref 55–142)
ALT SERPL-CCNC: <7 U/L (ref 10–49)
ANION GAP SERPL CALC-SCNC: 9 MMOL/L (ref 0–18)
AST SERPL-CCNC: 14 U/L (ref ?–34)
BASOPHILS # BLD AUTO: 0.05 X10(3) UL (ref 0–0.2)
BASOPHILS NFR BLD AUTO: 0.7 %
BILIRUB SERPL-MCNC: 0.3 MG/DL (ref 0.2–1.1)
BUN BLD-MCNC: 15 MG/DL (ref 9–23)
CALCIUM BLD-MCNC: 10.2 MG/DL (ref 8.7–10.6)
CHLORIDE SERPL-SCNC: 102 MMOL/L (ref 98–112)
CO2 SERPL-SCNC: 28 MMOL/L (ref 21–32)
CREAT BLD-MCNC: 1.21 MG/DL (ref 0.55–1.02)
EGFRCR SERPLBLD CKD-EPI 2021: 45 ML/MIN/1.73M2 (ref 60–?)
EOSINOPHIL # BLD AUTO: 0.21 X10(3) UL (ref 0–0.7)
EOSINOPHIL NFR BLD AUTO: 2.8 %
ERYTHROCYTE [DISTWIDTH] IN BLOOD BY AUTOMATED COUNT: 15.2 %
FASTING STATUS PATIENT QL REPORTED: NO
GLOBULIN PLAS-MCNC: 3 G/DL (ref 2–3.5)
GLUCOSE BLD-MCNC: 119 MG/DL (ref 70–99)
HCT VFR BLD AUTO: 39.6 % (ref 35–48)
HGB BLD-MCNC: 12.8 G/DL (ref 12–16)
IMM GRANULOCYTES # BLD AUTO: 0.03 X10(3) UL (ref 0–1)
IMM GRANULOCYTES NFR BLD: 0.4 %
LYMPHOCYTES # BLD AUTO: 1.01 X10(3) UL (ref 1–4)
LYMPHOCYTES NFR BLD AUTO: 13.5 %
MCH RBC QN AUTO: 29.6 PG (ref 26–34)
MCHC RBC AUTO-ENTMCNC: 32.3 G/DL (ref 31–37)
MCV RBC AUTO: 91.5 FL (ref 80–100)
MONOCYTES # BLD AUTO: 0.97 X10(3) UL (ref 0.1–1)
MONOCYTES NFR BLD AUTO: 13 %
NEUTROPHILS # BLD AUTO: 5.19 X10 (3) UL (ref 1.5–7.7)
NEUTROPHILS # BLD AUTO: 5.19 X10(3) UL (ref 1.5–7.7)
NEUTROPHILS NFR BLD AUTO: 69.6 %
OSMOLALITY SERPL CALC.SUM OF ELEC: 290 MOSM/KG (ref 275–295)
PLATELET # BLD AUTO: 379 10(3)UL (ref 150–450)
POTASSIUM SERPL-SCNC: 4 MMOL/L (ref 3.5–5.1)
PROT SERPL-MCNC: 7.4 G/DL (ref 5.7–8.2)
RBC # BLD AUTO: 4.33 X10(6)UL (ref 3.8–5.3)
SODIUM SERPL-SCNC: 139 MMOL/L (ref 136–145)
WBC # BLD AUTO: 7.5 X10(3) UL (ref 4–11)

## 2025-04-15 PROCEDURE — 80053 COMPREHEN METABOLIC PANEL: CPT

## 2025-04-15 PROCEDURE — 99213 OFFICE O/P EST LOW 20 MIN: CPT

## 2025-04-15 PROCEDURE — 1160F RVW MEDS BY RX/DR IN RCRD: CPT

## 2025-04-15 PROCEDURE — 1159F MED LIST DOCD IN RCRD: CPT

## 2025-04-15 PROCEDURE — 85025 COMPLETE CBC W/AUTO DIFF WBC: CPT

## 2025-04-15 PROCEDURE — 3075F SYST BP GE 130 - 139MM HG: CPT

## 2025-04-15 PROCEDURE — 3008F BODY MASS INDEX DOCD: CPT

## 2025-04-15 PROCEDURE — 36415 COLL VENOUS BLD VENIPUNCTURE: CPT

## 2025-04-15 PROCEDURE — 3078F DIAST BP <80 MM HG: CPT

## 2025-04-15 NOTE — PROGRESS NOTES
[facility] Office Note    Chief Complaint   Patient presents with    Diarrhea     X2 months    Weakness        HPI:   Gabriela Ndiaye is a 79 year old female who presents for diarrhea.  States the diarrhea has been going on for 2 months and believes the increase in severity when she got her gallbladder removed.  Describes diarrhea as  watery, no blood.  States that diarrhea is sometimes dark in color but thinks it may be due to her taking kaopectate daily.  Reports periodic abdominal cramping in the right lower quadrant.  Denies any travel, sick contacts, suspicious foods, new medications.    States typically the diarrhea is episodic where she does not go to the bathroom for 2 days and then gets 4-5 episodes of diarrhea on the third day.  She states she has not used the bathroom for the diarrhea in the last 2 days currently.    For relief she has been taking Imodium as well.  Has been taking it daily.  She has not tried the Metamucil that her PCP Dr. Coley recommended and she is not taking the cholestyramine that Dr. Coley recommended because it made her nauseous but she was taking it without food.    She last ate 2 hotdogs last night for dinner.  Has also tried the brat diet previously for the last couple days but is not helping relieve the diarrhea.  Denies any fever or chills    She also reports she does feel weak.      Wt Readings from Last 6 Encounters:   04/15/25 142 lb (64.4 kg)   01/29/25 147 lb (66.7 kg)   01/27/25 147 lb (66.7 kg)   01/15/25 149 lb 8 oz (67.8 kg)   01/06/25 147 lb (66.7 kg)   12/10/24 150 lb (68 kg)     Body mass index is 24.37 kg/m².        Current Outpatient Medications   Medication Sig Dispense Refill    levothyroxine (SYNTHROID) 75 MCG Oral Tab Take 1 tablet (75 mcg total) by mouth before breakfast. 90 tablet 0    ELMIRON 100 MG Oral Cap TAKE 1 CAPSULE BY MOUTH EVERY DAY 90 capsule 1    LOSARTAN 25 MG Oral Tab TAKE 1 TABLET (25 MG TOTAL) BY MOUTH DAILY. 90 tablet 1     metoprolol tartrate 50 MG Oral Tab TAKE 1/2 TABLET BY MOUTH 2 TIMES DAILY. 90 tablet 3    CHOLESTYRAMINE 4 g Oral Powd Pack 1- 2 PACKETS BY MOUTH DAILY OR TWICE DAILY. 60 packet 0    HYDROcodone-acetaminophen 5-325 MG Oral Tab Take 1 tablet by mouth 2 (two) times daily as needed for Pain.      cilostazol 50 MG Oral Tab Take 1 tablet (50 mg total) by mouth 2 (two) times daily. 180 tablet 3    Esomeprazole Magnesium 40 MG Oral Capsule Delayed Release Take 1 capsule (40 mg total) by mouth every morning before breakfast. 90 capsule 1    FUROSEMIDE 20 MG Oral Tab TAKE 1 TABLET BY MOUTH TWICE A  tablet 1    ALPRAZolam 0.5 MG Oral Tab TAKE 1 TABLET BY MOUTH NIGHTLY AS SCHEDULED AND 1/2 TAB DAILY AS NEEDED FOR ANXIETY 45 tablet 2    ESTRADIOL 0.1 MG/GM Vaginal Cream PLACE 1 GRAM VAGINALLY DAILY 42.5 g 5    BUSPIRONE 10 MG Oral Tab TAKE 1 TABLET BY MOUTH TWICE A  tablet 1    POTASSIUM CHLORIDE ER 20 MEQ Oral Tab CR TAKE 1 TABLET BY MOUTH EVERY DAY 90 tablet 1    ondansetron 4 MG Oral Tablet Dispersible Take 1 tablet (4 mg total) by mouth every 4 (four) hours as needed. 12 tablet 0    traMADol 50 MG Oral Tab Take 1 tablet (50 mg total) by mouth as needed for Pain. 15 tablet 0    Naloxone HCl 4 MG/0.1ML Nasal Liquid 4 mg by Nasal route as needed. If patient remains unresponsive, repeat dose in other nostril 2-5 minutes after first dose. 1 kit 0    ASPIRIN LOW DOSE 81 MG Oral Tab EC Take 1 tablet (81 mg total) by mouth daily.      nystatin 100,000 Units/g External Cream APPLY TOPICALLY 1 APPLICATION AS NEEDED      sertraline (ZOLOFT) 25 MG Oral Tab Take 1 tablet (25 mg total) by mouth daily. (Patient taking differently: Take 1 tablet (25 mg total) by mouth daily. Takes 1/2 tablet) 90 tablet 3    DICYCLOMINE 20 MG Oral Tab TAKE 1 TABLET BY MOUTH THREE TIMES A DAY (Patient taking differently: Take 1 tablet (20 mg total) by mouth as needed.) 270 tablet 0    ALBUTEROL 108 (90 Base) MCG/ACT Inhalation Aero Soln TAKE  2 PUFFS BY MOUTH EVERY 6 HOURS AS NEEDED FOR WHEEZE OR SHORTNESS OF BREATH 25.5 each 3    benzonatate 200 MG Oral Cap Take 1 capsule (200 mg total) by mouth 3 (three) times daily as needed for cough. 30 capsule 2    GABAPENTIN 100 MG Oral Cap TAKE 1 CAPSULE BY MOUTH IN THE MORNING, 1-2 CAPSULES BY MOUTH IN THE AFTERNOON, AND 3 CAPSULES BY MOUTH IN THE EVENING DAILY (Patient taking differently: Take 3 capsules (300 mg total) by mouth nightly.) 180 capsule 5    MONTELUKAST 10 MG Oral Tab TAKE 1 TABLET BY MOUTH EVERY DAY AT NIGHT 90 tablet 3    ACYCLOVIR 400 MG Oral Tab TAKE 1 TABLET BY MOUTH TWICE A  tablet 0    B Complex Vitamins (B COMPLEX-B12 OR) Take 1 tablet by mouth daily.      Turmeric 400 MG Oral Cap Take 1 capsule by mouth daily.      Ascorbic Acid (VITAMIN C) 1000 MG Oral Tab Take 1 tablet (1,000 mg total) by mouth daily.      omega-3 fatty acids 1000 MG Oral Cap Take 1,000 mg by mouth daily.      loratadine 10 MG Oral Tablet Dispersible Take 1 tablet (10 mg total) by mouth daily.      Capsicum, Cayenne, (CAYENNE PEPPER OR) Take 1 tablet by mouth daily.      Nebulizers ([employer] AEROSOL DELIVERY SYSTEM) Does not apply Misc Take 1 Bottle by mouth As Directed.      Respiratory Therapy Supplies (NEBULIZER/TUBING/MOUTHPIECE) Does not apply Kit Use as directed 1 each 0    TRELEGY ELLIPTA 100-62.5-25 MCG/ACT Inhalation Aerosol Powder, Breath Activated Inhale 1 puff into the lungs daily. 180 each 3    diclofenac 1 % External Gel Apply 2 g topically 4 (four) times daily. 100 g 5    Vitamin D3, Cholecalciferol, 125 MCG (5000 UT) Oral Cap Take 1 capsule (5,000 Units total) by mouth daily.        Past Medical History:    Abdominal hernia    Anxiety    Arthritis    Back pain    Back problem    spinal stenosis    Bilateral pulmonary embolism (HCC)    Bloating    Cancer (HCC)    LUNG    COPD (chronic obstructive pulmonary disease) (HCC)    NO HOME O2    Coronary atherosclerosis    STENT X1    Diabetes (HCC)    DIET  CONTROLLED    Diabetes mellitus (HCC)    Diarrhea, unspecified    Disorder of thyroid    Easy bruising    Esophageal reflux    Exposure to medical diagnostic radiation    last tx 11/2023    Feeling lonely    Flatulence/gas pain/belching    Food intolerance    Hearing impairment    PT DENIES, NO AIDS    Hearing loss    Hemorrhoids    High blood pressure    High cholesterol    History of blood transfusion    ABOUT 20 YEARS AGO, NO ADVERSE REACTION    History of eating disorder    Muscle weakness    Neuropathy    Night sweats    Osteoarthritis    Pain in joints    Pneumonia of both lungs due to infectious organism, unspecified part of lung    Pulmonary emboli (HCC)    Pulmonary embolism (HCC)    Renal disorder    CKD RECONCILLED FROM OUTSIDE PROBLEM LIST    RSV (acute bronchiolitis due to respiratory syncytial virus)    Shortness of breath    Stress    Thyroid disease    Uncomfortable fullness after meals    Visual impairment    GLASSES    Wears glasses      Past Surgical History[1]   Family History[2]   Social History:  Short Social Hx on File[3]      REVIEW OF SYSTEMS:   GENERAL: feels tired, no lethargy, no fever, no chills  SKIN: denies any unusual skin lesions, rash.  HEENT:no abnormal taste  LUNGS: denies shortness of breath with exertion  CARDIOVASCULAR: denies chest pain on exertion  GI: as above.  :no dysuria.  MUSCULOSKELETAL: no myalgia  NEURO: denies headaches    EXAM:   /72   Pulse 70   Resp 22   Ht 5' 4\" (1.626 m)   Wt 142 lb (64.4 kg)   SpO2 97%   BMI 24.37 kg/m²   Body mass index is 24.37 kg/m².   GENERAL: well developed, well nourished,in no apparent distress  SKIN: no rashes,no suspicious lesions  HEENT:Mild dry oral mucosa.  EYES:PERRLA, EOMI, sclera not icteric.  NECK: supple,no adenopathy  LUNGS: clear to auscultation  CARDIO: RRR without murmur  GI: good BS's,no masses, HSM, mild diffuse tenderness in RLQ, Pitts negative, no guarding, no Psoas sign. No hernias.  EXTREMITIES: no  edema  NEURO: DTR 2+bilaterally upper and lower extremities.    ASSESSMENT AND PLAN:   1. Diarrhea, unspecified type  Encouraged Gabriela to start the Metamucil since it is a bulk fiber supplement and could cause the diarrhea to become more formed stools and produce solid bowel movements.  Also recommended trying over-the-counter probiotic or eating yogurt with probiotics in it such as Activia yogurt.  Discussed to take Imodium as needed but try not to rely on it daily to prevent any potential colitis.  Recommended brat diet and bland foods for the time being.  Also strongly recommended to drink Gatorade to stay hydrated and prevent dehydration.  Lastly discussed trying cholestyramine only if no relief from the recommendations above.  Recommend following up with GI for further recommendations  - Comp Metabolic Panel (14) [E]; Future  - CBC With Differential With Platelet; Future        Meds, Orders, & Refills for this Visit:  Requested Prescriptions      No prescriptions requested or ordered in this encounter         Imaging & Consults:  None      Health Maintenance:  Health Maintenance Due   Topic Date Due    Zoster Vaccines (1 of 2) Never done    DEXA Scan  Never done    COVID-19 Vaccine (2 - 2024-25 season) 09/01/2024    Annual Well Visit  01/01/2025    Tobacco Cessation Counseling  01/01/2025         Problem List:  Patient Active Problem List   Diagnosis    Pulmonary emphysema (HCC)    Type 2 diabetes mellitus with diabetic polyneuropathy, without long-term current use of insulin (HCC)    Chronic midline low back pain without sciatica    Nicotine dependence    Depression, recurrent    Hypothyroidism    Liver cyst    Renal cyst    Splenic cyst    Neuropathy    Stage 3a chronic kidney disease (HCC)    Primary hypertension    Clostridioides difficile carrier    Adenomatous polyp    Ampullary adenoma    Atrophic vaginitis    Chronic interstitial cystitis    Dyslipidemia    Grade IV hemorrhoids    Hearing loss    IBS  (irritable bowel syndrome)    Nonallergic rhinitis    Ptosis of right eyelid    Regular astigmatism of both eyes    Tinnitus    Vitreous membrane    Anxiety disorder    Chronic midline low back pain with bilateral sciatica    Fibromyalgia    PAD (peripheral artery disease)    Cancer of bronchus of right upper lobe (HCC)    Coronary artery disease involving native coronary artery of native heart    Hyperlipidemia    Symptomatic cholelithiasis    Mantle cell lymphoma (HCC)         Patient/Caregiver Education: Patient/Caregiver Education: There are no barriers to learning. Medical education done.   Outcome: Gabriela Ndiaye verbalizes understanding, agrees with the plan, and had no other questions at the end of today's visit. Gabriela Ndiaye is informed to call with any questions, complications, allergies, or worsening or changing symptoms.  Gabriela Ndiaye is to call with any side effects or complications from the treatments as a result of today.     Follow-up in   Return if symptoms worsen or fail to improve.    Note to patient: The 21st Century Cures Act makes medical notes like these available to patients in the interest of transparency. However, this is a medical document intended as peer to peer communication. It is written in medical language and may contain abbreviations or verbiage that are unfamiliar. It may appear blunt or direct. Medical documents are intended to carry relevant information, facts as evident, and the clinical opinion of the practitioner.          [1]   Past Surgical History:  Procedure Laterality Date    Amputation finger/thumb+flaps      Cath bare metal stent (bms)      x 1    Cholecystectomy      Colectomy      Colonoscopy      Colonoscopy N/A 01/15/2024    Procedure: COLONOSCOPY;  Surgeon: Tirso Kimball MD;  Location:  ENDOSCOPY    Hysterectomy     [2]   Family History  Problem Relation Age of Onset    Heart Disorder Father     Stroke Father     Uterine Cancer Mother     Diabetes Mother      Cancer Mother         colon    Heart Attack Daughter     Diabetes Daughter     Colon Polyps Daughter     Colon Polyps Son    [3]   Social History  Socioeconomic History    Marital status:    Tobacco Use    Smoking status: Every Day     Current packs/day: 0.50     Average packs/day: 0.5 packs/day for 63.6 years (31.8 ttl pk-yrs)     Types: Cigarettes     Start date: 2/24/1964    Smokeless tobacco: Never   Vaping Use    Vaping status: Never Used   Substance and Sexual Activity    Alcohol use: Not Currently     Comment: RARE    Drug use: Never   Other Topics Concern    Caffeine Concern Yes     Comment: coffee/coca cola/Iced tea    Exercise No     Social Drivers of Health     Food Insecurity: No Food Insecurity (11/22/2024)    Food Insecurity     Food Insecurity: Never true   Transportation Needs: No Transportation Needs (11/22/2024)    Transportation Needs     Lack of Transportation: No   Stress: No Stress Concern Present (8/15/2023)    Stress     Feeling of Stress : No   Housing Stability: Low Risk  (11/22/2024)    Housing Stability     Housing Instability: No

## 2025-04-15 NOTE — PATIENT INSTRUCTIONS
Recommend taking Metamucil daily since this is a bulk fiber can help turn your diarrhea into more solid bowel movements.    Recommend trying over-the-counter probiotic as well as increasing yogurt with probiotic in it such as Activia yogurt    Take Imodium as needed but try not to be relying on it daily.    Recommend brat diet and bland foods    Drink Gatorade to stay hydrated and prevent dehydration    Try cholestyramine if no relief    Follow-up with GI

## 2025-04-16 ENCOUNTER — PATIENT MESSAGE (OUTPATIENT)
Dept: INTERNAL MEDICINE CLINIC | Facility: CLINIC | Age: 80
End: 2025-04-16

## 2025-04-16 ENCOUNTER — APPOINTMENT (OUTPATIENT)
Age: 80
End: 2025-04-16
Attending: INTERNAL MEDICINE
Payer: MEDICARE

## 2025-04-16 NOTE — TELEPHONE ENCOUNTER
Patient returned call   Relayed results, verbalizes understanding   States her weakness is better  Continues to have diarrhea, discussed below recommendations from office visit.       LOV 4/15    1. Diarrhea, unspecified type  Encouraged Gabriela to start the Metamucil since it is a bulk fiber supplement and could cause the diarrhea to become more formed stools and produce solid bowel movements.  Also recommended trying over-the-counter probiotic or eating yogurt with probiotics in it such as Activia yogurt.  Discussed to take Imodium as needed but try not to rely on it daily to prevent any potential colitis.  Recommended brat diet and bland foods for the time being.  Also strongly recommended to drink Gatorade to stay hydrated and prevent dehydration.  Lastly discussed trying cholestyramine only if no relief from the recommendations above.  Recommend following up with GI for further recommendations  - Comp Metabolic Panel (14) [E]; Future  - CBC With Differential With Platelet; Future       Has upcoming appointment     Future Appointments   Date Time Provider Department Center   4/25/2025  1:00 PM PF CT RM1 PF [facility] [place]   4/28/2025  2:00 PM August Coley MD [facility] [place]   5/7/2025  3:00 PM Cuong Tolliver MD PF [facility] [place] [facility]   5/23/2025  9:40 AM Kasey Joy APRN [facility]

## 2025-04-17 DIAGNOSIS — F41.9 ANXIETY AND DEPRESSION: ICD-10-CM

## 2025-04-17 DIAGNOSIS — F32.A ANXIETY AND DEPRESSION: ICD-10-CM

## 2025-04-22 RX ORDER — ALPRAZOLAM 0.5 MG
TABLET ORAL
Qty: 45 TABLET | Refills: 2 | Status: SHIPPED | OUTPATIENT
Start: 2025-04-22

## 2025-04-22 NOTE — TELEPHONE ENCOUNTER
Please review. Protocol Failed; No Protocol      Recent fills: 1/22/2025, 3/6/2025  Last Rx written: 12/18/2024  Last office visit: 1/27/2025    Recent Visits  Date Type Provider Dept   04/15/25 Office Visit Neftaly Evangelista PA-C [facility]     Future Appointments  Date Type Provider Dept   04/28/[facility]   Showing future appointments within next 150 days with a meds authorizing provider and meeting all other requirements

## 2025-04-25 ENCOUNTER — TELEPHONE (OUTPATIENT)
Age: 80
End: 2025-04-25

## 2025-04-25 ENCOUNTER — TELEPHONE (OUTPATIENT)
Dept: INTERNAL MEDICINE CLINIC | Facility: CLINIC | Age: 80
End: 2025-04-25

## 2025-04-25 ENCOUNTER — HOSPITAL ENCOUNTER (OUTPATIENT)
Dept: CT IMAGING | Age: 80
Discharge: HOME | End: 2025-04-25
Attending: INTERNAL MEDICINE
Payer: MEDICARE

## 2025-04-25 DIAGNOSIS — J18.9 COMMUNITY ACQUIRED PNEUMONIA, UNSPECIFIED LATERALITY: Primary | ICD-10-CM

## 2025-04-25 DIAGNOSIS — R91.1 PULMONARY NODULE: ICD-10-CM

## 2025-04-25 DIAGNOSIS — C83.19 MANTLE CELL LYMPHOMA OF SOLID ORGAN EXCLUDING SPLEEN (HCC): ICD-10-CM

## 2025-04-25 PROCEDURE — 74177 CT ABD & PELVIS W/CONTRAST: CPT | Performed by: INTERNAL MEDICINE

## 2025-04-25 PROCEDURE — 71260 CT THORAX DX C+: CPT | Performed by: INTERNAL MEDICINE

## 2025-04-25 RX ORDER — METHYLPREDNISOLONE 4 MG/1
TABLET ORAL
Qty: 21 EACH | Refills: 0 | Status: SHIPPED | OUTPATIENT
Start: 2025-04-25

## 2025-04-25 NOTE — TELEPHONE ENCOUNTER
Called Agnes Gabriela's granddaughter. Discussed CT no evidence of lymphoma recurrence but has developing PNA. Agnes notes she has been coughing. To start levofloxacin today x 7 days. Agnes verbalized understanding and will have her  abx script.    Cuong Tolliver MD  [facility]

## 2025-04-25 NOTE — TELEPHONE ENCOUNTER
Patient called to report that she had a CT chest done/ordered by Dr. Tolliver and pneumonia was found incidentally, Dr. Tolliver started her on Levaquin however she has bad side effects from Levaquin and is requesting Dr. Coley prescribe Vancomycin and prednisone due to her history of SIDDS. Patient reports cough, however when she take Claritin the cough goes away. Denies fever, and no shortness of breath. Advised patient I will send message to Dr. Coley but to also call Dr. Tolliver's office and ask to switch anti, she states she will attempt that too.    Dr. Coley - LOV 4/15/25 (Neftaly) are you willing to prescribe vancomycin for pneumonia? Can you review CT results? Patient also requesting Prednisone

## 2025-04-25 NOTE — TELEPHONE ENCOUNTER
Pt called back after the call from Dr Tolliver.     Pt reports that she had c diff 2 years ago, and since then her PCP recommend she take Vancomycin 125 mg daily, if she is on an antibiotic   She already has a lot of diarrhea. Asking for the script for Vanco to be sent       Pt has a sl cough but otherwise no symptoms of pneumonia.       She said she is seeing her PCP on Monday if Dr Tolliver doesn't want to order it.   I let her know I would forward the message to Dr Tolliver

## 2025-04-25 NOTE — TELEPHONE ENCOUNTER
Vancomycin not indicated for pneumonia.   I would recommend to continue levaquin as directed if SE are  manageable.GI related side effects are common with antibiotics.   Will send in steroid taper for pneumonia

## 2025-04-28 ENCOUNTER — OFFICE VISIT (OUTPATIENT)
Dept: FAMILY MEDICINE CLINIC | Facility: CLINIC | Age: 80
End: 2025-04-28
Payer: MEDICARE

## 2025-04-28 VITALS
BODY MASS INDEX: 23.9 KG/M2 | OXYGEN SATURATION: 98 % | SYSTOLIC BLOOD PRESSURE: 112 MMHG | DIASTOLIC BLOOD PRESSURE: 62 MMHG | HEIGHT: 64 IN | WEIGHT: 140 LBS | HEART RATE: 75 BPM | RESPIRATION RATE: 18 BRPM

## 2025-04-28 DIAGNOSIS — G89.29 CHRONIC MIDLINE LOW BACK PAIN WITHOUT SCIATICA: ICD-10-CM

## 2025-04-28 DIAGNOSIS — Z00.00 ENCOUNTER FOR ANNUAL HEALTH EXAMINATION: Primary | ICD-10-CM

## 2025-04-28 DIAGNOSIS — N28.1 RENAL CYST: ICD-10-CM

## 2025-04-28 DIAGNOSIS — F41.9 ANXIETY DISORDER, UNSPECIFIED TYPE: ICD-10-CM

## 2025-04-28 DIAGNOSIS — M54.50 CHRONIC MIDLINE LOW BACK PAIN WITHOUT SCIATICA: ICD-10-CM

## 2025-04-28 DIAGNOSIS — C83.10 MANTLE CELL LYMPHOMA, UNSPECIFIED BODY REGION (HCC): ICD-10-CM

## 2025-04-28 DIAGNOSIS — I10 PRIMARY HYPERTENSION: ICD-10-CM

## 2025-04-28 DIAGNOSIS — K76.89 LIVER CYST: ICD-10-CM

## 2025-04-28 DIAGNOSIS — E03.9 HYPOTHYROIDISM, UNSPECIFIED TYPE: ICD-10-CM

## 2025-04-28 DIAGNOSIS — D73.4 SPLENIC CYST: ICD-10-CM

## 2025-04-28 DIAGNOSIS — J43.9 PULMONARY EMPHYSEMA, UNSPECIFIED EMPHYSEMA TYPE (HCC): ICD-10-CM

## 2025-04-28 DIAGNOSIS — M79.7 FIBROMYALGIA: ICD-10-CM

## 2025-04-28 DIAGNOSIS — R19.7 DIARRHEA, UNSPECIFIED TYPE: ICD-10-CM

## 2025-04-28 DIAGNOSIS — J18.9 COMMUNITY ACQUIRED PNEUMONIA OF RIGHT UPPER LOBE OF LUNG: ICD-10-CM

## 2025-04-28 DIAGNOSIS — G62.9 NEUROPATHY: ICD-10-CM

## 2025-04-28 DIAGNOSIS — Z78.0 POSTMENOPAUSAL STATE: ICD-10-CM

## 2025-04-28 DIAGNOSIS — F33.9 DEPRESSION, RECURRENT: ICD-10-CM

## 2025-04-28 DIAGNOSIS — I73.9 PAD (PERIPHERAL ARTERY DISEASE): ICD-10-CM

## 2025-04-28 DIAGNOSIS — N95.2 ATROPHIC VAGINITIS: ICD-10-CM

## 2025-04-28 DIAGNOSIS — I25.10 CORONARY ARTERY DISEASE INVOLVING NATIVE CORONARY ARTERY OF NATIVE HEART WITHOUT ANGINA PECTORIS: ICD-10-CM

## 2025-04-28 DIAGNOSIS — C34.11 CANCER OF BRONCHUS OF RIGHT UPPER LOBE (HCC): ICD-10-CM

## 2025-04-28 DIAGNOSIS — F17.209 NICOTINE DEPENDENCE WITH NICOTINE-INDUCED DISORDER, UNSPECIFIED NICOTINE PRODUCT TYPE: ICD-10-CM

## 2025-04-28 DIAGNOSIS — E11.42 TYPE 2 DIABETES MELLITUS WITH DIABETIC POLYNEUROPATHY, WITHOUT LONG-TERM CURRENT USE OF INSULIN (HCC): ICD-10-CM

## 2025-04-28 DIAGNOSIS — E78.5 DYSLIPIDEMIA: ICD-10-CM

## 2025-04-28 DIAGNOSIS — N18.31 STAGE 3A CHRONIC KIDNEY DISEASE (HCC): ICD-10-CM

## 2025-04-28 PROBLEM — K80.20 SYMPTOMATIC CHOLELITHIASIS: Status: RESOLVED | Noted: 2024-11-20 | Resolved: 2025-04-28

## 2025-04-28 PROBLEM — K64.3 GRADE IV HEMORRHOIDS: Status: RESOLVED | Noted: 2021-03-20 | Resolved: 2025-04-28

## 2025-04-28 PROCEDURE — 99214 OFFICE O/P EST MOD 30 MIN: CPT | Performed by: FAMILY MEDICINE

## 2025-04-28 PROCEDURE — 96160 PT-FOCUSED HLTH RISK ASSMT: CPT | Performed by: FAMILY MEDICINE

## 2025-04-28 PROCEDURE — 1170F FXNL STATUS ASSESSED: CPT | Performed by: FAMILY MEDICINE

## 2025-04-28 PROCEDURE — 3008F BODY MASS INDEX DOCD: CPT | Performed by: FAMILY MEDICINE

## 2025-04-28 PROCEDURE — 1160F RVW MEDS BY RX/DR IN RCRD: CPT | Performed by: FAMILY MEDICINE

## 2025-04-28 PROCEDURE — G0439 PPPS, SUBSEQ VISIT: HCPCS | Performed by: FAMILY MEDICINE

## 2025-04-28 PROCEDURE — 1125F AMNT PAIN NOTED PAIN PRSNT: CPT | Performed by: FAMILY MEDICINE

## 2025-04-28 PROCEDURE — 99406 BEHAV CHNG SMOKING 3-10 MIN: CPT | Performed by: FAMILY MEDICINE

## 2025-04-28 PROCEDURE — 99499 UNLISTED E&M SERVICE: CPT | Performed by: FAMILY MEDICINE

## 2025-04-28 PROCEDURE — 1159F MED LIST DOCD IN RCRD: CPT | Performed by: FAMILY MEDICINE

## 2025-04-28 PROCEDURE — 3078F DIAST BP <80 MM HG: CPT | Performed by: FAMILY MEDICINE

## 2025-04-28 PROCEDURE — 3074F SYST BP LT 130 MM HG: CPT | Performed by: FAMILY MEDICINE

## 2025-04-28 NOTE — PROGRESS NOTES
Subjective:   Gabriela Ndiaye is a 79 year old female who presents for a MA AHA (Medicare Advantage Annual Health Assessment) and Subsequent Annual Wellness visit (Pt already had Initial Annual Wellness) and scheduled follow up of multiple significant but stable problems.   History of Present Illness  Gabriela Ndiaye is an 79 year old female who presents with persistent diarrhea and weight loss.    She has been experiencing symptoms consistent with pneumonia, including rhinorrhea and cough, for the past two weeks. She has been taking Claritin for allergies and another medication for mucus. A recent CT chest/abd/pelvis on 04/25 revealed new opacity concerning for possible pneumonia, and she was started on antibiotics and a steroid pack by her oncologist. She has been compliant with antibiotics.     She is experiencing persistent diarrhea, which has been ongoing and is affecting her ability to eat, leading to a phobia about eating. She has lost two pounds, now weighing 140 pounds. She has been trying to manage her symptoms by avoiding greasy foods and using Metamucil, which helps bulk up her stools. She also takes loperamide and has been prescribed vancomycin for diarrhea (prophylaxis for h/o C diff). She follows with GI.     She has a history of peripheral arterial disease and was previously on cilostazol, which she had to stop while on antibiotics. She notes increased burning in her feet since stopping the medication.    She is trying to quit smoking, motivated by her daughter Ruchi, who has successfully quit. She reports feeling depressed, as she wants to move back to [place], but mentions a stable mood otherwise. She has neuropathy, which is stable. She is expecting an exercise machine for Mother's Day to help with circulation.        History/Other:   Fall Risk Assessment:   She has been screened for Falls and is low risk.      Cognitive Assessment:   Abnormal  What day of the week is this?: Correct  What month  is it?: Correct  What year is it?: Correct  Recall \"Ball\": Correct  Recall \"Flag\": Incorrect  Recall \"Tree\": Correct  MMSE SCORE: 29    Functional Ability/Status:   Gabriela Ndiaye has some abnormal functions as listed below:  She has Dressing and/or Bathing issues based on screening of functional status.  Difficulty dressing or bathing?: No  Bathing or Showering: Cannot do without help  Dressing: Cannot do without help  She has Toileting difficulties based on screening of functional status.She has Eating difficulties based on screening of functional status.She has Driving difficulties based on screening of functional status. She has Meal Preparation difficulties based on screening of functional status.She has difficulties Managing Money/Bills based on screening of functional status.She has difficulties Shopping for Groceries based on screening of functional status. She has Vision problems based on screening of functional status.       Depression Screening (PHQ):  PHQ-9 TOTAL SCORE: 6  , done 2025   Little interest or pleasure in doing things: 1    Trouble falling or staying asleep, or sleeping too much: 1     Feeling tired or having little energy: 1    Poor appetite or overeatin    Feeling bad about yourself - or that you are a failure or have let yourself or your family down: 2    If you checked off any problems, how difficult have these problems made it for you to do your work, take care of things at home, or get along with other people?: Not difficult at all    Counseled on depression 5 minutes.      Advanced Directives:   She does NOT have a Living Will. [Do you have a living will?: Yes]  She has a Power of  for Health Care on file in [facility].      Patient Active Problem List   Diagnosis    Pulmonary emphysema (HCC)    Type 2 diabetes mellitus with diabetic polyneuropathy, without long-term current use of insulin (HCC)    Chronic midline low back pain without sciatica    Nicotine dependence     Depression, recurrent    Hypothyroidism    Liver cyst    Renal cyst    Splenic cyst    Neuropathy    Stage 3a chronic kidney disease (HCC)    Primary hypertension    Clostridioides difficile carrier    Adenomatous polyp    Ampullary adenoma    Atrophic vaginitis    Chronic interstitial cystitis    Dyslipidemia    Hearing loss    IBS (irritable bowel syndrome)    Nonallergic rhinitis    Ptosis of right eyelid    Regular astigmatism of both eyes    Tinnitus    Vitreous membrane    Anxiety disorder    Chronic midline low back pain with bilateral sciatica    Fibromyalgia    PAD (peripheral artery disease)    Cancer of bronchus of right upper lobe (HCC)    Coronary artery disease involving native coronary artery of native heart    Hyperlipidemia    Mantle cell lymphoma (HCC)    MGUS (monoclonal gammopathy of unknown significance)     Allergies:  She is allergic to keflex [cephalexin], rosuvastatin, and cephalosporins.    Current Medications:  Outpatient Medications Marked as Taking for the 4/28/25 encounter (Office Visit) with August Coley MD   Medication Sig    levoFLOXacin 500 MG Oral Tab Take 500mg for the first day, then 250mg daily for 6 days. Renally dosed    methylPREDNISolone (MEDROL) 4 MG Oral Tablet Therapy Pack As directed.    vancomycin 125 MG Oral Cap Take 1 capsule (125 mg total) by mouth daily. For c.diff prophylaxis while on levofloxacin    ALPRAZolam 0.5 MG Oral Tab TAKE 1 TABLET BY MOUTH NIGHTLY AS SCHEDULED AND 1/2 TAB DAILY AS NEEDED FOR ANXIETY    levothyroxine (SYNTHROID) 75 MCG Oral Tab Take 1 tablet (75 mcg total) by mouth before breakfast.    ELMIRON 100 MG Oral Cap TAKE 1 CAPSULE BY MOUTH EVERY DAY    LOSARTAN 25 MG Oral Tab TAKE 1 TABLET (25 MG TOTAL) BY MOUTH DAILY.    metoprolol tartrate 50 MG Oral Tab TAKE 1/2 TABLET BY MOUTH 2 TIMES DAILY.    CHOLESTYRAMINE 4 g Oral Powd Pack 1- 2 PACKETS BY MOUTH DAILY OR TWICE DAILY.    HYDROcodone-acetaminophen 5-325 MG Oral Tab Take 1 tablet  by mouth as needed in the morning and 1 tablet as needed in the evening for Pain.    cilostazol 50 MG Oral Tab Take 1 tablet (50 mg total) by mouth 2 (two) times daily.    Esomeprazole Magnesium 40 MG Oral Capsule Delayed Release Take 1 capsule (40 mg total) by mouth every morning before breakfast.    FUROSEMIDE 20 MG Oral Tab TAKE 1 TABLET BY MOUTH TWICE A DAY    ESTRADIOL 0.1 MG/GM Vaginal Cream PLACE 1 GRAM VAGINALLY DAILY    BUSPIRONE 10 MG Oral Tab TAKE 1 TABLET BY MOUTH TWICE A DAY    POTASSIUM CHLORIDE ER 20 MEQ Oral Tab CR TAKE 1 TABLET BY MOUTH EVERY DAY    ondansetron 4 MG Oral Tablet Dispersible Take 1 tablet (4 mg total) by mouth every 4 (four) hours as needed.    traMADol 50 MG Oral Tab Take 1 tablet (50 mg total) by mouth as needed for Pain.    Naloxone HCl 4 MG/0.1ML Nasal Liquid 4 mg by Nasal route as needed. If patient remains unresponsive, repeat dose in other nostril 2-5 minutes after first dose. (Patient not taking: Reported on 5/15/2025)    ASPIRIN LOW DOSE 81 MG Oral Tab EC Take 1 tablet (81 mg total) by mouth in the morning.    nystatin 100,000 Units/g External Cream     sertraline (ZOLOFT) 25 MG Oral Tab Take 1 tablet (25 mg total) by mouth daily. (Patient taking differently: Take 1 tablet (25 mg total) by mouth in the morning. Takes 1/2 tablet.)    DICYCLOMINE 20 MG Oral Tab TAKE 1 TABLET BY MOUTH THREE TIMES A DAY    ALBUTEROL 108 (90 Base) MCG/ACT Inhalation Aero Soln TAKE 2 PUFFS BY MOUTH EVERY 6 HOURS AS NEEDED FOR WHEEZE OR SHORTNESS OF BREATH    [DISCONTINUED] benzonatate 200 MG Oral Cap Take 1 capsule (200 mg total) by mouth 3 (three) times daily as needed for cough.    GABAPENTIN 100 MG Oral Cap TAKE 1 CAPSULE BY MOUTH IN THE MORNING, 1-2 CAPSULES BY MOUTH IN THE AFTERNOON, AND 3 CAPSULES BY MOUTH IN THE EVENING DAILY (Patient taking differently: Take 3 capsules (300 mg total) by mouth nightly.)    [DISCONTINUED] MONTELUKAST 10 MG Oral Tab TAKE 1 TABLET BY MOUTH EVERY DAY AT NIGHT     ACYCLOVIR 400 MG Oral Tab TAKE 1 TABLET BY MOUTH TWICE A DAY    B Complex Vitamins (B COMPLEX-B12 OR) Take 1 tablet by mouth in the morning.    Turmeric 400 MG Oral Cap Take 1 capsule by mouth in the morning.    Ascorbic Acid (VITAMIN C) 1000 MG Oral Tab Take 1 tablet (1,000 mg total) by mouth in the morning.    omega-3 fatty acids 1000 MG Oral Cap Take 1,000 mg by mouth in the morning.    loratadine 10 MG Oral Tablet Dispersible Take 1 tablet (10 mg total) by mouth in the morning.    Capsicum, Cayenne, (CAYENNE PEPPER OR) Take 1 tablet by mouth in the morning.    Nebulizers ([employer] AEROSOL DELIVERY SYSTEM) Does not apply Misc Take 1 Bottle by mouth As Directed.    Respiratory Therapy Supplies (NEBULIZER/TUBING/MOUTHPIECE) Does not apply Kit Use as directed    TRELEGY ELLIPTA 100-62.5-25 MCG/ACT Inhalation Aerosol Powder, Breath Activated Inhale 1 puff into the lungs daily.    diclofenac 1 % External Gel Apply 2 g topically 4 (four) times daily.    Vitamin D3, Cholecalciferol, 125 MCG (5000 UT) Oral Cap Take 1 capsule (5,000 Units total) by mouth in the morning.       Medical History:  She  has a past medical history of Abdominal hernia, Anxiety, Arthritis, Back pain, Back problem, Bilateral pulmonary embolism (HCC) (07/06/2021), Bloating, Cancer ([facility]), COPD (chronic obstructive pulmonary disease) ([facility]), Coronary atherosclerosis, Diabetes (HCC), Diabetes mellitus (HCC), Diarrhea, unspecified, Disorder of thyroid, Easy bruising, Esophageal reflux, Exposure to medical diagnostic radiation, Feeling lonely, Flatulence/gas pain/belching, Food intolerance, Hearing impairment, Hearing loss, Hemorrhoids, High blood pressure, High cholesterol, History of blood transfusion, History of eating disorder, Muscle weakness, Neuropathy, Night sweats, Osteoarthritis, Pain in joints, Pneumonia of both lungs due to infectious organism, unspecified part of lung (08/15/2022), Pulmonary emboli (HCC), Pulmonary embolism (HCC), Renal  disorder, RSV (acute bronchiolitis due to respiratory syncytial virus), Shortness of breath, Stress, Thyroid disease, Uncomfortable fullness after meals, Visual impairment, and Wears glasses.  Surgical History:  She  has a past surgical history that includes hysterectomy; Colectomy; amputation finger/thumb+flaps; colonoscopy; colonoscopy (N/A, 01/15/2024); cath bare metal stent (bms); and cholecystectomy.   Family History:  Her family history includes Cancer in her mother; Colon Polyps in her daughter and son; Diabetes in her daughter and mother; Heart Attack in her daughter; Heart Disorder in her father; Stroke in her father; Uterine Cancer in her mother.  Social History:  She  reports that she has been smoking cigarettes. She started smoking about 61 years ago. She has a 31.9 pack-year smoking history. She has never used smokeless tobacco. She reports that she does not currently use alcohol. She reports that she does not use drugs.    Tobacco:  Social History     Tobacco Use   Smoking Status Some Days    Current packs/day: 0.50    Average packs/day: 0.5 packs/day for 63.7 years (31.8 ttl pk-yrs)    Types: Cigarettes    Start date: 2/24/1964   Smokeless Tobacco Never     E-Cigarettes/Vaping       Questions Responses    E-Cigarette Use Never User          E-Cigarette/Vaping Substances       Questions Responses    Nicotine No    THC No    CBD No    Flavoring No          E-Cigarette/Vaping Devices       Questions Responses    Disposable No    Pre-filled or Refillable Cartridge No    Refillable Tank No    Pre-filled Pod No           Tobacco cessation counseling for 3-10 minutes.      CAGE Alcohol Screen:   CAGE screening score of 0 on 4/28/2025, showing low risk of alcohol abuse.      Patient Care Team:  August Coley MD as PCP - General (Family Medicine)  Varghese Rodriguez MD as Consulting Physician (NEUROLOGY)  Boo Vazquez MD (GASTROENTEROLOGY)  Li Quiroz as Physical Therapist (Physical  Therapy)  Jana Connell MD (Cardiovascular Diseases)  Elizabeth Whitley RMA as [facility] Care Manager  Kira Barton APRN (Nurse Practitioner)  Tirso Kimball MD (GASTROENTEROLOGY)  Rick Bernabe APRN (Nurse Practitioner Family)  Rick Bernabe APRN (Nurse Practitioner Family)    Review of Systems  Pertinent items are noted in HPI.    Objective:   Physical Exam  Constitutional:       Appearance: Normal appearance.   HENT:      Head: Normocephalic and atraumatic.      Mouth/Throat:      Mouth: Mucous membranes are moist.      Pharynx: Oropharynx is clear.   Eyes:      Pupils: Pupils are equal, round, and reactive to light.   Cardiovascular:      Rate and Rhythm: Normal rate and regular rhythm.      Pulses: Normal pulses.      Heart sounds: Normal heart sounds. No murmur heard.  Pulmonary:      Effort: Pulmonary effort is normal. No respiratory distress.      Breath sounds: Rhonchi present. No wheezing.   Abdominal:      General: Abdomen is flat. Bowel sounds are normal. There is no distension.      Palpations: Abdomen is soft. There is no mass.      Tenderness: There is no abdominal tenderness.      Hernia: No hernia is present.   Musculoskeletal:      Right lower leg: No edema.      Left lower leg: No edema.   Skin:     Findings: No rash.   Neurological:      General: No focal deficit present.      Mental Status: She is alert and oriented to person, place, and time.       /62   Pulse 75   Resp 18   Ht 5' 4\" (1.626 m)   Wt 140 lb (63.5 kg)   SpO2 98%   BMI 24.03 kg/m²  Estimated body mass index is 24.03 kg/m² as calculated from the following:    Height as of this encounter: 5' 4\" (1.626 m).    Weight as of this encounter: 140 lb (63.5 kg).    Medicare Hearing Assessment:   Hearing Screening    Screening Method: Whisper Test  Whisper Test Result: Pass         Visual Acuity:   Right Eye Visual Acuity: Corrected Right Eye Chart Acuity: 20/40   Left Eye Visual Acuity: Corrected Left Eye Chart Acuity: 20/40    Both Eyes Visual Acuity: Corrected Both Eyes Chart Acuity: 20/40   Able To Tolerate Visual Acuity: Yes        Assessment & Plan:   Gabriela Ndiaye is a 79 year old female who presents for a Medicare Assessment.     1. Encounter for annual health examination (Primary)  -Immunizations: Consider shingrex  -Metabolic: BMI 24. BP wnl. Due for annual labs   -Cancer screening: UTD  -Communicable disease: low risk   -Mental health: no concerns   -Other preventative: follow with dentistry and optometry.   -Lifestyle: Follow a well balanced healthy diet with emphasis on fruits, vegetables, whole grains, lean meats. Limit processed and junk foods. Aim for at least 150 minutes of moderate intensity exercise weekly. Make sure you are staying adequately hydrated. Aim to get 7-9 hours of sleep nightly.       2. Postmenopausal state  Due for DEXA scan. Advised on weightbearing exercise, maintaining good calcium/vitamin D intake.   -     XR DEXA BONE DENSITOMETRY (CPT=77080); Future; Expected date: 04/28/2025    3. Community acquired pneumonia of right upper lobe of lung  Improving - Adhering to antibiotic regimen.  - Continue Levaquin and steroid pack.  - Monitor symptoms.    4. Cancer of bronchus of right upper lobe (HCC)  No evidence on recent CT. Oncology managing. CTM.     5. PAD (peripheral artery disease)  Cilostazol held d/t recent antibiotics. Has plans on resuming this. She is following with cardiology. Advised on daily exercise.     6. Depression, recurrent     Major Depressive Disorder, Last PHQ score 6, on 4/28/2025,  , Severity: Mild (PHQ Score 5-9)  Recurrent episode currently active  Clinical Course: stable Fair control Currently stable and controlled with the current treatment plan. Continue present management.   Antidepressant Meds: sertraline Tabs - 25 MG  Anxiolytic Meds: ALPRAZolam Tabs - 0.5 MG; busPIRone Tabs - 10 MG  Reassess this in : 3 months.      7. Mantle cell lymphoma, unspecified body region  (HCC)  Stable, no evidence of disease on recent CT. Plan for continued surveillance by oncology.     8. Pulmonary emphysema, unspecified emphysema type (HCC)  Stable, CPM    9. Stage 3a chronic kidney disease (HCC)  Stable, CTM. Avoid nephrotoxic medications, stay well hydrated.     10. Type 2 diabetes mellitus with diabetic polyneuropathy, without long-term current use of insulin (HCC)  Diabetes: A1c is 6.2 done 1/6/2025 which shows Excellent control. Continue current meds and lifestyle modification.   DM Meds:       Diabetic Complications: CKD 3a (GFR 46).   Peripheral neuropathy  CAD   Diabetes is : stable Continue current treatment regimen. Reassess Diabetes in : in 3 months    11. Coronary artery disease involving native coronary artery of native heart without angina pectoris  Currently stable and controlled with the current treatment plan. Continue present management.            12. Dyslipidemia  Currently stable and controlled with the current treatment plan. Continue present management.            13. Primary hypertension  Currently stable and controlled with the current treatment plan. Continue present management.           14. Hypothyroidism, unspecified type  Uncontrolled, recent TSH at 8.040 in 03/2025. Dosage of levothyroxine adjusted - increase from 50 to 75mcg. Due for recheck of TSH 05/09.     15. Neuropathy  2/2 T2DM & PAD. Will resume cilostazol after completion of antibiotics, did have good relief of this in the past. Continue supportive care measures, compression stockings etc.     16. Anxiety disorder, unspecified type  Currently stable and controlled with the current treatment plan. Continue present management.          17. Fibromyalgia  Currently stable and controlled with the current treatment plan. Continue present management.          18. Liver cyst  Stable, CTM     19. Renal cyst  Stable, CTM    20. Splenic cyst  Stable, CTM    21. Nicotine dependence with nicotine-induced disorder,  unspecified nicotine product type  Provided w/ tobacco cessation counseling. She has been on chantix in the past.     22. Chronic midline low back pain without sciatica  Stable, CTM. Continue supportive care measures prn. Continue daily exercises/stretching.     23. Diarrhea, unspecified type  Chronic issue - following with GI. Continue supportive care measures.     24. Atrophic vaginitis  Currently stable and controlled with the current treatment plan. Continue present management.          The patient indicates understanding of these issues and agrees to the plan.  Reinforced healthy diet, lifestyle, and exercise.      Return in about 3 months (around 7/28/2025).     August Coley MD, 4/28/2025     Supplementary Documentation:   General Health:  In the past six months, have you lost more than 10 pounds without trying?: 1 - Yes  Has your appetite been poor?: Yes  Type of Diet: Balanced  How does the patient maintain a good energy level?: Appropriate Exercise, Daily Walks  How would you describe your daily physical activity?: Moderate  How would you describe your current health state?: Fair  How do you maintain positive mental well-being?: Social Interaction  On a scale of 0 to 10, with 0 being no pain and 10 being severe pain, what is your pain level?: 4 - (Moderate)  In the past six months, have you experienced urine leakage?: 0-No  At any time do you feel concerned for the safety/well-being of yourself and/or your children, in your home or elsewhere?: No  Have you had any immunizations at another office such as Influenza, Hepatitis B, Tetanus, or Pneumococcal?: No    Health Maintenance   Topic Date Due    Zoster Vaccines (1 of 2) Never done    DEXA Scan  Never done    COVID-19 Vaccine (2 - 2024-25 season) 09/01/2024    Annual Well Visit  01/01/2025    Tobacco Cessation Counseling  01/01/2025    Diabetes Care A1C  07/06/2025    Influenza Vaccine (Season Ended) 10/01/2025    Diabetes Care Dilated Eye Exam   11/07/2025    Diabetes Care: GFR  04/15/2026    Annual Depression Screening  Completed    Fall Risk Screening (Annual)  Completed    Diabetes Care: Foot Exam (Annual)  Completed    Diabetes Care: Microalb/Creat Ratio (Annual)  Completed    Pneumococcal Vaccine: 50+ Years  Completed    Meningococcal B Vaccine  Aged Out

## 2025-04-28 NOTE — TELEPHONE ENCOUNTER
Called patient and informed of below information.   States she has her 3 month follow up today and will discuss with him then.     Future Appointments   Date Time Provider Department Center   4/28/2025  2:00 PM August Coley MD [facility]   5/7/2025  3:00 PM Cuong Tolliver MD [facility]   5/23/2025  9:40 AM Kasey Joy APRN [facility]

## 2025-04-28 NOTE — PROGRESS NOTES
The following individual(s) verbally consented to be recorded using ambient AI listening technology and understand that they can each withdraw their consent to this listening technology at any point by asking the clinician to turn off or pause the recording:    Patient name: Gabriela Ndiyae  Additional names:

## 2025-04-29 ENCOUNTER — TELEPHONE (OUTPATIENT)
Dept: FAMILY MEDICINE CLINIC | Facility: CLINIC | Age: 80
End: 2025-04-29

## 2025-04-29 ENCOUNTER — PATIENT OUTREACH (OUTPATIENT)
Dept: CASE MANAGEMENT | Age: 80
End: 2025-04-29

## 2025-04-29 DIAGNOSIS — B37.31 CANDIDAL VAGINITIS: Primary | ICD-10-CM

## 2025-04-29 NOTE — TELEPHONE ENCOUNTER
Called and spoke to pt. She states any time she is on antibiotics she gets a yeast infection. She had office visit yesterday but forgot to mention this to Dr. Coley. She started having vaginal itching yesterday and also noticed a white patch in her mouth.     Dr. Coley - pt requesting prescription for yeast infection

## 2025-04-29 NOTE — TELEPHONE ENCOUNTER
Spoke to patient for monthly CCM.    Patient reports having a yeast infection. Having itching and burning started last night. Located vaginally and also now starting in her mouth.    States she has a history of this with taking anti-biotics.    Also c/o feeling weak. Feels her B/P is low. When she stands up she is light headed. Appetite continues to be down and losing weight.     Please contact patient to advise.    Thank you

## 2025-04-29 NOTE — PROGRESS NOTES
JERMAINE verified for [facility] monthly outreach.   Seen PCP yesterday. Complaining of a yeast infection. Feeling weak and feels her B/P has been dropping. TE sent to PCP to advise.    I will follow up with patient at a later time.      Medical record reviewed including recent office visits and test results

## 2025-04-30 RX ORDER — FLUCONAZOLE 150 MG/1
150 TABLET ORAL ONCE
Qty: 2 TABLET | Refills: 0 | Status: SHIPPED | OUTPATIENT
Start: 2025-04-30 | End: 2025-04-30

## 2025-04-30 NOTE — TELEPHONE ENCOUNTER
Lm for pt (Ok per HIPAA) to inform as per PCP. Diflucan sent to [facility] pharm on file. To please follow up if symptoms do not improve, or any other concerns. To call back at the office if any further questions.

## 2025-05-07 ENCOUNTER — OFFICE VISIT (OUTPATIENT)
Age: 80
End: 2025-05-07
Attending: INTERNAL MEDICINE
Payer: MEDICARE

## 2025-05-07 VITALS
RESPIRATION RATE: 20 BRPM | TEMPERATURE: 98 F | WEIGHT: 142 LBS | SYSTOLIC BLOOD PRESSURE: 119 MMHG | HEART RATE: 62 BPM | BODY MASS INDEX: 24.24 KG/M2 | OXYGEN SATURATION: 95 % | HEIGHT: 64.02 IN | DIASTOLIC BLOOD PRESSURE: 71 MMHG

## 2025-05-07 DIAGNOSIS — D47.2 MGUS (MONOCLONAL GAMMOPATHY OF UNKNOWN SIGNIFICANCE): ICD-10-CM

## 2025-05-07 DIAGNOSIS — C83.19 MANTLE CELL LYMPHOMA OF SOLID ORGAN EXCLUDING SPLEEN (HCC): ICD-10-CM

## 2025-05-07 DIAGNOSIS — R91.1 PULMONARY NODULE: ICD-10-CM

## 2025-05-07 LAB
ALBUMIN SERPL-MCNC: 3.8 G/DL (ref 3.2–4.8)
ALBUMIN/GLOB SERPL: 1.3 {RATIO} (ref 1–2)
ALP LIVER SERPL-CCNC: 69 U/L (ref 55–142)
ALT SERPL-CCNC: 8 U/L (ref 10–49)
ANION GAP SERPL CALC-SCNC: 8 MMOL/L (ref 0–18)
AST SERPL-CCNC: 18 U/L (ref ?–34)
BASOPHILS # BLD AUTO: 0.07 X10(3) UL (ref 0–0.2)
BASOPHILS NFR BLD AUTO: 0.6 %
BILIRUB SERPL-MCNC: 0.3 MG/DL (ref 0.2–1.1)
BUN BLD-MCNC: 16 MG/DL (ref 9–23)
CALCIUM BLD-MCNC: 9.2 MG/DL (ref 8.7–10.6)
CHLORIDE SERPL-SCNC: 106 MMOL/L (ref 98–112)
CO2 SERPL-SCNC: 24 MMOL/L (ref 21–32)
CREAT BLD-MCNC: 1.18 MG/DL (ref 0.55–1.02)
EGFRCR SERPLBLD CKD-EPI 2021: 46 ML/MIN/1.73M2 (ref 60–?)
EOSINOPHIL # BLD AUTO: 0.12 X10(3) UL (ref 0–0.7)
EOSINOPHIL NFR BLD AUTO: 1.1 %
ERYTHROCYTE [DISTWIDTH] IN BLOOD BY AUTOMATED COUNT: 14.7 %
GLOBULIN PLAS-MCNC: 2.9 G/DL (ref 2–3.5)
GLUCOSE BLD-MCNC: 116 MG/DL (ref 70–99)
HCT VFR BLD AUTO: 39.4 % (ref 35–48)
HGB BLD-MCNC: 12.8 G/DL (ref 12–16)
IGA SERPL-MCNC: 56.1 MG/DL (ref 40–350)
IGM SERPL-MCNC: 54.6 MG/DL (ref 50–300)
IMM GRANULOCYTES # BLD AUTO: 0.09 X10(3) UL (ref 0–1)
IMM GRANULOCYTES NFR BLD: 0.8 %
IMMUNOGLOBULIN PNL SER-MCNC: 1312 MG/DL (ref 650–1600)
LYMPHOCYTES # BLD AUTO: 1.44 X10(3) UL (ref 1–4)
LYMPHOCYTES NFR BLD AUTO: 12.9 %
MCH RBC QN AUTO: 29.8 PG (ref 26–34)
MCHC RBC AUTO-ENTMCNC: 32.5 G/DL (ref 31–37)
MCV RBC AUTO: 91.6 FL (ref 80–100)
MONOCYTES # BLD AUTO: 0.95 X10(3) UL (ref 0.1–1)
MONOCYTES NFR BLD AUTO: 8.5 %
NEUTROPHILS # BLD AUTO: 8.53 X10 (3) UL (ref 1.5–7.7)
NEUTROPHILS # BLD AUTO: 8.53 X10(3) UL (ref 1.5–7.7)
NEUTROPHILS NFR BLD AUTO: 76.1 %
OSMOLALITY SERPL CALC.SUM OF ELEC: 288 MOSM/KG (ref 275–295)
PLATELET # BLD AUTO: 380 10(3)UL (ref 150–450)
POTASSIUM SERPL-SCNC: 3.8 MMOL/L (ref 3.5–5.1)
PROT SERPL-MCNC: 6.7 G/DL (ref 5.7–8.2)
RBC # BLD AUTO: 4.3 X10(6)UL (ref 3.8–5.3)
SODIUM SERPL-SCNC: 138 MMOL/L (ref 136–145)
WBC # BLD AUTO: 11.2 X10(3) UL (ref 4–11)

## 2025-05-07 NOTE — PROGRESS NOTES
Education Record    Learner:  Patient and Family Member    Disease / Diagnosis: Mantle Cell Lymphoma    Barriers / Limitations:  None   Comments:    Method:  Discussion   Comments:    General Topics:  Side effects and symptom management and Plan of care reviewed   Comments:    Outcome:  Shows understanding   Comments:    Here for f/u and CT results. Recovering from PNA. Still coughing up phlegm and feels very weak. Had a backache yesterday. Still trying to do ADLs and chores at home.

## 2025-05-07 NOTE — PROGRESS NOTES
Hematology/Oncology Clinic Follow Up Visit    Patient Name: Gabriela Ndiaye  Medical Record Number: [id number]    YOB: 1945   PCP: August Coley MD    Reason for Consultation:  Gabriela Ndiaye was seen today for the diagnosis of mantle cell lymphoma    Oncologic History:  10/2023: had PET avid 1.1cm RUL nodule SUVmax 6.1. s/p RT. She was not biopsied as she was deemed high risk  11/22/24: s/p cholecystectomy, path positive for mantle cell lymphoma. Path reviewed at [facility]. Positive for CD20, BCL2, cyclin D1. Low proliferative index with Ki-67 <5%.  12/29/24: PET/CT without any FDG uptake. Indeterminate pulmonary nodules including new 5mm nodule in [facility]  2/10/25: bmbx with path positive for 5-10% plasma cell neoplasm. No evidence of mantle cell lymphoma  4/25/25: CT C/A/P with MIHIR but likely early PNA, treated with abx    History of Present Illness:      80 y/o F PMH COPD, hx bilateral PE 2021, CAD s/p PCI, T2DM, active smoker, presumed malignant RUL lung cancer s/p RT 11/2023, mantle cell lymphoma, MGUS who presents for follow up.    - here for follow up  - reports feeling ongoing weakness, but she was also working in yard yesterday all day pulling weeds  - denies shortness of breath; completed antibiotics  - ongoing loose Bms which she takes imodium for      Past Medical History:  Past Medical History[1]  Past Surgical History[2]    Home Medications:  Current Medications[3]    Allergies:   Allergies[4]    Psychosocial History:  Social History     Socioeconomic History    Marital status:      Spouse name: Not on file    Number of children: Not on file    Years of education: Not on file    Highest education level: Not on file   Occupational History    Not on file   Tobacco Use    Smoking status: Some Days     Current packs/day: 0.50     Average packs/day: 0.5 packs/day for 63.7 years (31.8 ttl pk-yrs)     Types: Cigarettes     Start date: 2/24/1964    Smokeless tobacco: Never   Vaping  Use    Vaping status: Never Used   Substance and Sexual Activity    Alcohol use: Not Currently     Comment: RARE    Drug use: Never    Sexual activity: Not on file   Other Topics Concern     Service Not Asked    Blood Transfusions Not Asked    Caffeine Concern Yes     Comment: coffee/coca cola/Iced tea    Occupational Exposure Not Asked    Hobby Hazards Not Asked    Sleep Concern Not Asked    Stress Concern Not Asked    Weight Concern Not Asked    Special Diet Not Asked    Back Care Not Asked    Exercise No    Bike Helmet Not Asked    Seat Belt Not Asked    Self-Exams Not Asked   Social History Narrative    Not on file     Social Drivers of Health     Food Insecurity: No Food Insecurity (11/22/2024)    Food Insecurity     Food Insecurity: Never true   Transportation Needs: No Transportation Needs (11/22/2024)    Transportation Needs     Lack of Transportation: No     Car Seat: Not on file   Housing Stability: Low Risk  (11/22/2024)    Housing Stability     Housing Instability: No     Housing Instability Emergency: Not on file     Crib or Bassinette: Not on file       Family Medical History:  Family History[5]    Review of Systems:  A 10-point ROS was done with pertinent positives and negative per the HPI    Vital Signs:  Height: 162.6 cm (5' 4.02\") (05/07 1510)  Weight: 64.4 kg (142 lb) (05/07 1510)  BSA (Calculated - sq m): 1.69 sq meters (05/07 1510)  Pulse: 62 (05/07 1510)  BP: 119/71 (05/07 1510)  Temp: 97.5 °F (36.4 °C) (05/07 1510)  Do Not Use - Resp Rate: --  SpO2: 95 % (05/07 1510)    Wt Readings from Last 6 Encounters:   05/07/25 64.4 kg (142 lb)   04/28/25 63.5 kg (140 lb)   04/15/25 64.4 kg (142 lb)   01/29/25 66.7 kg (147 lb)   01/27/25 66.7 kg (147 lb)   01/15/25 67.8 kg (149 lb 8 oz)         Physical Examination:  General: Patient is alert and oriented, not in acute distress  Psych: Mood and affect are appropriate  Eyes: EOMI, PERRL  ENT: Oropharynx is clear, no adenopathy  CV: no LE  edema  Respiratory: ctab, non-labored respirations  GI/Abd: Soft, non-tender   Neurological: Grossly intact   Lymphatics: No palpable cervical, supraclavicular, axillary, or inguinal lymphadenopathy      Laboratory:  Lab Results   Component Value Date    WBC 11.2 (H) 05/07/2025    WBC 7.5 04/15/2025    WBC 8.3 02/10/2025    HGB 12.8 05/07/2025    HGB 12.8 04/15/2025    HGB 12.7 02/10/2025    HCT 39.4 05/07/2025    MCV 91.6 05/07/2025    MCH 29.8 05/07/2025    MCHC 32.5 05/07/2025    RDW 14.7 05/07/2025    .0 05/07/2025    .0 04/15/2025    .0 02/10/2025     Lab Results   Component Value Date     (H) 05/07/2025    BUN 16 05/07/2025    BUNCREA 25.5 (H) 07/25/2021    CREATSERUM 1.18 (H) 05/07/2025    CREATSERUM 1.21 (H) 04/15/2025    CREATSERUM 1.21 (H) 01/06/2025    ANIONGAP 8 05/07/2025    GFRNAA 50 (L) 03/28/2022    GFRAA 58 (L) 03/28/2022    CA 9.2 05/07/2025    OSMOCALC 288 05/07/2025    ALKPHO 69 05/07/2025    AST 18 05/07/2025    ALT 8 (L) 05/07/2025    BILT 0.3 05/07/2025    TP 6.7 05/07/2025    ALB 3.8 05/07/2025    GLOBULIN 2.9 05/07/2025     05/07/2025    K 3.8 05/07/2025     05/07/2025    CO2 24.0 05/07/2025     Lab Results   Component Value Date    PTT 46.0 (H) 07/08/2021    INR 1.03 02/10/2025     Impression & Plan:     Mantle cell lymphoma  - non-zander variant with low ki-67 <5%. No evidence of marrow involvement. Found incidentally on cholecystectomy  - PET/CT post-op did not showed any PET-avid lymphadenopathy  - CT reviewed with MIHIR; continue restaging [id number] for first year, then space to [id number]    MGUS  - discussed MGUS, 1% progression to plasma cell dyscrasia each year  - annual surveillance  - check monoclonal protein study today    Pneumonia  - treated with levaquin. Clear on lung exam today    Hx presumed lung cancer  - s/p RT 11/2023 to RUL nodule. No evidence of recurrence. However has new left nodule that needs attention   - her nodules will be  monitored on restaging Cts for her MCL     Hx bilateral PE  - diagnosed in 2021. Did not have DVTs. Currently monitored off AC     COPD  - following Dr Mg    F/u: 3 months, after CT    [facility]            [1]   Past Medical History:   Abdominal hernia    Anxiety    Arthritis    Back pain    Back problem    spinal stenosis    Bilateral pulmonary embolism (HCC)    Bloating    Cancer (HCC)    LUNG    COPD (chronic obstructive pulmonary disease) (HCC)    NO HOME O2    Coronary atherosclerosis    STENT X1    Diabetes (HCC)    DIET CONTROLLED    Diabetes mellitus (HCC)    Diarrhea, unspecified    Disorder of thyroid    Easy bruising    Esophageal reflux    Exposure to medical diagnostic radiation    last tx 11/2023    Feeling lonely    Flatulence/gas pain/belching    Food intolerance    Hearing impairment    PT DENIES, NO AIDS    Hearing loss    Hemorrhoids    High blood pressure    High cholesterol    History of blood transfusion    ABOUT 20 YEARS AGO, NO ADVERSE REACTION    History of eating disorder    Muscle weakness    Neuropathy    Night sweats    Osteoarthritis    Pain in joints    Pneumonia of both lungs due to infectious organism, unspecified part of lung    Pulmonary emboli (HCC)    Pulmonary embolism (HCC)    Renal disorder    CKD RECONCILLED FROM OUTSIDE PROBLEM LIST    RSV (acute bronchiolitis due to respiratory syncytial virus)    Shortness of breath    Stress    Thyroid disease    Uncomfortable fullness after meals    Visual impairment    GLASSES    Wears glasses   [2]   Past Surgical History:  Procedure Laterality Date    Amputation finger/thumb+flaps      Cath bare metal stent (bms)      x 1    Cholecystectomy      Colectomy      Colonoscopy      Colonoscopy N/A 01/15/2024    Procedure: COLONOSCOPY;  Surgeon: Tirso Kimball MD;  Location:  ENDOSCOPY    Hysterectomy     [3]    levoFLOXacin 500 MG Oral Tab Take 500mg for the first day, then 250mg daily for 6  days. Renally dosed 8 tablet 0    methylPREDNISolone (MEDROL) 4 MG Oral Tablet Therapy Pack As directed. 21 each 0    vancomycin 125 MG Oral Cap Take 1 capsule (125 mg total) by mouth daily. For c.diff prophylaxis while on levofloxacin 7 capsule 0    ALPRAZolam 0.5 MG Oral Tab TAKE 1 TABLET BY MOUTH NIGHTLY AS SCHEDULED AND 1/2 TAB DAILY AS NEEDED FOR ANXIETY 45 tablet 2    levothyroxine (SYNTHROID) 75 MCG Oral Tab Take 1 tablet (75 mcg total) by mouth before breakfast. 90 tablet 0    ELMIRON 100 MG Oral Cap TAKE 1 CAPSULE BY MOUTH EVERY DAY 90 capsule 1    LOSARTAN 25 MG Oral Tab TAKE 1 TABLET (25 MG TOTAL) BY MOUTH DAILY. 90 tablet 1    metoprolol tartrate 50 MG Oral Tab TAKE 1/2 TABLET BY MOUTH 2 TIMES DAILY. 90 tablet 3    CHOLESTYRAMINE 4 g Oral Powd Pack 1- 2 PACKETS BY MOUTH DAILY OR TWICE DAILY. 60 packet 0    HYDROcodone-acetaminophen 5-325 MG Oral Tab Take 1 tablet by mouth as needed in the morning and 1 tablet as needed in the evening for Pain.      cilostazol 50 MG Oral Tab Take 1 tablet (50 mg total) by mouth 2 (two) times daily. 180 tablet 3    Esomeprazole Magnesium 40 MG Oral Capsule Delayed Release Take 1 capsule (40 mg total) by mouth every morning before breakfast. 90 capsule 1    FUROSEMIDE 20 MG Oral Tab TAKE 1 TABLET BY MOUTH TWICE A  tablet 1    ESTRADIOL 0.1 MG/GM Vaginal Cream PLACE 1 GRAM VAGINALLY DAILY 42.5 g 5    BUSPIRONE 10 MG Oral Tab TAKE 1 TABLET BY MOUTH TWICE A  tablet 1    POTASSIUM CHLORIDE ER 20 MEQ Oral Tab CR TAKE 1 TABLET BY MOUTH EVERY DAY 90 tablet 1    ondansetron 4 MG Oral Tablet Dispersible Take 1 tablet (4 mg total) by mouth every 4 (four) hours as needed. 12 tablet 0    traMADol 50 MG Oral Tab Take 1 tablet (50 mg total) by mouth as needed for Pain. 15 tablet 0    ASPIRIN LOW DOSE 81 MG Oral Tab EC Take 1 tablet (81 mg total) by mouth in the morning.      nystatin 100,000 Units/g External Cream       sertraline (ZOLOFT) 25 MG Oral Tab Take 1 tablet (25  mg total) by mouth daily. (Patient taking differently: Take 1 tablet (25 mg total) by mouth in the morning. Takes 1/2 tablet.) 90 tablet 3    DICYCLOMINE 20 MG Oral Tab TAKE 1 TABLET BY MOUTH THREE TIMES A  tablet 0    ALBUTEROL 108 (90 Base) MCG/ACT Inhalation Aero Soln TAKE 2 PUFFS BY MOUTH EVERY 6 HOURS AS NEEDED FOR WHEEZE OR SHORTNESS OF BREATH 25.5 each 3    benzonatate 200 MG Oral Cap Take 1 capsule (200 mg total) by mouth 3 (three) times daily as needed for cough. 30 capsule 2    GABAPENTIN 100 MG Oral Cap TAKE 1 CAPSULE BY MOUTH IN THE MORNING, 1-2 CAPSULES BY MOUTH IN THE AFTERNOON, AND 3 CAPSULES BY MOUTH IN THE EVENING DAILY (Patient taking differently: Take 3 capsules (300 mg total) by mouth nightly.) 180 capsule 5    MONTELUKAST 10 MG Oral Tab TAKE 1 TABLET BY MOUTH EVERY DAY AT NIGHT 90 tablet 3    ACYCLOVIR 400 MG Oral Tab TAKE 1 TABLET BY MOUTH TWICE A  tablet 0    B Complex Vitamins (B COMPLEX-B12 OR) Take 1 tablet by mouth in the morning.      Turmeric 400 MG Oral Cap Take 1 capsule by mouth in the morning.      Ascorbic Acid (VITAMIN C) 1000 MG Oral Tab Take 1 tablet (1,000 mg total) by mouth in the morning.      omega-3 fatty acids 1000 MG Oral Cap Take 1,000 mg by mouth in the morning.      loratadine 10 MG Oral Tablet Dispersible Take 1 tablet (10 mg total) by mouth in the morning.      Capsicum, Cayenne, (CAYENNE PEPPER OR) Take 1 tablet by mouth in the morning.      Nebulizers ([employer] AEROSOL DELIVERY SYSTEM) Does not apply Misc Take 1 Bottle by mouth As Directed.      Respiratory Therapy Supplies (NEBULIZER/TUBING/MOUTHPIECE) Does not apply Kit Use as directed 1 each 0    TRELEGY ELLIPTA 100-62.5-25 MCG/ACT Inhalation Aerosol Powder, Breath Activated Inhale 1 puff into the lungs daily. 180 each 3    diclofenac 1 % External Gel Apply 2 g topically 4 (four) times daily. 100 g 5    Vitamin D3, Cholecalciferol, 125 MCG (5000 UT) Oral Cap Take 1 capsule (5,000 Units total) by mouth  in the morning.     [4]   Allergies  Allergen Reactions    Keflex [Cephalexin] HIVES    Rosuvastatin MYALGIA    Cephalosporins NAUSEA ONLY     NAUSEA ONLY WHEN TAKEN ON AN EMPTY STOMACH, OTHERWISE CAN TAKE   [5]   Family History  Problem Relation Age of Onset    Heart Disorder Father     Stroke Father     Uterine Cancer Mother     Diabetes Mother     Cancer Mother         colon    Heart Attack Daughter     Diabetes Daughter     Colon Polyps Daughter     Colon Polyps Son

## 2025-05-13 ENCOUNTER — TELEPHONE (OUTPATIENT)
Age: 80
End: 2025-05-13

## 2025-05-13 LAB
ALBUMIN SERPL ELPH-MCNC: 3.29 G/DL (ref 3.75–5.21)
ALBUMIN/GLOB SERPL: 1.13 {RATIO} (ref 1–2)
ALPHA1 GLOB SERPL ELPH-MCNC: 0.4 G/DL (ref 0.19–0.46)
ALPHA2 GLOB SERPL ELPH-MCNC: 0.89 G/DL (ref 0.48–1.05)
B-GLOBULIN SERPL ELPH-MCNC: 0.63 G/DL (ref 0.68–1.23)
GAMMA GLOB SERPL ELPH-MCNC: 0.99 G/DL (ref 0.62–1.7)
KAPPA LC FREE SER-MCNC: 1.67 MG/DL (ref 0.33–1.94)
KAPPA LC FREE/LAMBDA FREE SER NEPH: 0.61 {RATIO} (ref 0.26–1.65)
LAMBDA LC FREE SERPL-MCNC: 2.72 MG/DL (ref 0.57–2.63)
M PROTEIN 1 SERPL ELPH-MCNC: 0.74 G/DL (ref ?–0)
PROT SERPL-MCNC: 6.2 G/DL (ref 5.7–8.2)

## 2025-05-13 NOTE — TELEPHONE ENCOUNTER
Called Gabriela to let her know her labs are stable and we should monitor for now. No intervention needed.

## 2025-05-15 ENCOUNTER — OFFICE VISIT (OUTPATIENT)
Dept: FAMILY MEDICINE CLINIC | Facility: CLINIC | Age: 80
End: 2025-05-15
Payer: MEDICARE

## 2025-05-15 ENCOUNTER — HOSPITAL ENCOUNTER (OUTPATIENT)
Dept: GENERAL RADIOLOGY | Age: 80
Discharge: HOME | End: 2025-05-15
Attending: FAMILY MEDICINE
Payer: MEDICARE

## 2025-05-15 VITALS
OXYGEN SATURATION: 93 % | HEART RATE: 66 BPM | SYSTOLIC BLOOD PRESSURE: 126 MMHG | BODY MASS INDEX: 24.24 KG/M2 | DIASTOLIC BLOOD PRESSURE: 76 MMHG | TEMPERATURE: 98 F | WEIGHT: 142 LBS | HEIGHT: 64 IN | RESPIRATION RATE: 20 BRPM

## 2025-05-15 DIAGNOSIS — R06.02 SHORTNESS OF BREATH: ICD-10-CM

## 2025-05-15 DIAGNOSIS — J18.9 COMMUNITY ACQUIRED PNEUMONIA OF RIGHT UPPER LOBE OF LUNG: ICD-10-CM

## 2025-05-15 DIAGNOSIS — R06.02 SHORTNESS OF BREATH: Primary | ICD-10-CM

## 2025-05-15 PROCEDURE — 99214 OFFICE O/P EST MOD 30 MIN: CPT | Performed by: FAMILY MEDICINE

## 2025-05-15 PROCEDURE — 1160F RVW MEDS BY RX/DR IN RCRD: CPT | Performed by: FAMILY MEDICINE

## 2025-05-15 PROCEDURE — 3074F SYST BP LT 130 MM HG: CPT | Performed by: FAMILY MEDICINE

## 2025-05-15 PROCEDURE — 71046 X-RAY EXAM CHEST 2 VIEWS: CPT | Performed by: FAMILY MEDICINE

## 2025-05-15 PROCEDURE — 1159F MED LIST DOCD IN RCRD: CPT | Performed by: FAMILY MEDICINE

## 2025-05-15 PROCEDURE — 3078F DIAST BP <80 MM HG: CPT | Performed by: FAMILY MEDICINE

## 2025-05-15 PROCEDURE — 3008F BODY MASS INDEX DOCD: CPT | Performed by: FAMILY MEDICINE

## 2025-05-15 RX ORDER — BENZONATATE 200 MG/1
200 CAPSULE ORAL 3 TIMES DAILY PRN
Qty: 30 CAPSULE | Refills: 5 | Status: SHIPPED | OUTPATIENT
Start: 2025-05-15

## 2025-05-15 NOTE — PROGRESS NOTES
The following individual(s) verbally consented to be recorded using ambient AI listening technology and understand that they can each withdraw their consent to this listening technology at any point by asking the clinician to turn off or pause the recording:    Patient name: Gabriela Ndiaye  Additional names:

## 2025-05-16 RX ORDER — MONTELUKAST SODIUM 10 MG/1
10 TABLET ORAL NIGHTLY
Qty: 90 TABLET | Refills: 3 | Status: SHIPPED | OUTPATIENT
Start: 2025-05-16

## 2025-05-22 DIAGNOSIS — Z76.0 MEDICATION REFILL: ICD-10-CM

## 2025-05-22 DIAGNOSIS — I10 ESSENTIAL HYPERTENSION: ICD-10-CM

## 2025-05-23 RX ORDER — FUROSEMIDE 20 MG/1
20 TABLET ORAL 2 TIMES DAILY
Qty: 180 TABLET | Refills: 1 | Status: SHIPPED | OUTPATIENT
Start: 2025-05-23

## 2025-05-23 RX ORDER — POTASSIUM CHLORIDE 1500 MG/1
1 TABLET, EXTENDED RELEASE ORAL DAILY
Qty: 90 TABLET | Refills: 3 | Status: SHIPPED | OUTPATIENT
Start: 2025-05-23

## 2025-05-28 ENCOUNTER — PATIENT OUTREACH (OUTPATIENT)
Dept: CASE MANAGEMENT | Age: 80
End: 2025-05-28

## 2025-06-03 DIAGNOSIS — F41.9 ANXIETY AND DEPRESSION: ICD-10-CM

## 2025-06-03 DIAGNOSIS — F32.A ANXIETY AND DEPRESSION: ICD-10-CM

## 2025-06-04 ENCOUNTER — HOSPITAL ENCOUNTER (OUTPATIENT)
Dept: GENERAL RADIOLOGY | Age: 80
Discharge: HOME | End: 2025-06-04
Payer: MEDICARE

## 2025-06-04 ENCOUNTER — OFFICE VISIT (OUTPATIENT)
Dept: FAMILY MEDICINE CLINIC | Facility: CLINIC | Age: 80
End: 2025-06-04
Payer: MEDICARE

## 2025-06-04 VITALS
RESPIRATION RATE: 16 BRPM | SYSTOLIC BLOOD PRESSURE: 140 MMHG | HEART RATE: 67 BPM | OXYGEN SATURATION: 96 % | WEIGHT: 146 LBS | TEMPERATURE: 98 F | HEIGHT: 64 IN | DIASTOLIC BLOOD PRESSURE: 72 MMHG | BODY MASS INDEX: 24.92 KG/M2

## 2025-06-04 DIAGNOSIS — R05.3 PERSISTENT COUGH: Primary | ICD-10-CM

## 2025-06-04 DIAGNOSIS — R39.9 UTI SYMPTOMS: ICD-10-CM

## 2025-06-04 DIAGNOSIS — J01.00 ACUTE NON-RECURRENT MAXILLARY SINUSITIS: ICD-10-CM

## 2025-06-04 DIAGNOSIS — R19.7 DIARRHEA, UNSPECIFIED TYPE: ICD-10-CM

## 2025-06-04 DIAGNOSIS — R05.3 PERSISTENT COUGH: ICD-10-CM

## 2025-06-04 DIAGNOSIS — G89.4 CHRONIC PAIN SYNDROME: ICD-10-CM

## 2025-06-04 DIAGNOSIS — B37.9 YEAST INFECTION: ICD-10-CM

## 2025-06-04 LAB
APPEARANCE: CLEAR
BILIRUBIN: NEGATIVE
GLUCOSE (URINE DIPSTICK): NEGATIVE MG/DL
KETONES (URINE DIPSTICK): NEGATIVE MG/DL
LEUKOCYTES: NEGATIVE
MULTISTIX LOT#: NORMAL NUMERIC
NITRITE, URINE: NEGATIVE
OCCULT BLOOD: NEGATIVE
PH, URINE: 5.5 (ref 4.5–8)
PROTEIN (URINE DIPSTICK): NEGATIVE MG/DL
SPECIFIC GRAVITY: 1.01 (ref 1–1.03)
URINE-COLOR: YELLOW
UROBILINOGEN,SEMI-QN: 0.2 MG/DL (ref 0–1.9)

## 2025-06-04 PROCEDURE — 1160F RVW MEDS BY RX/DR IN RCRD: CPT

## 2025-06-04 PROCEDURE — 3078F DIAST BP <80 MM HG: CPT

## 2025-06-04 PROCEDURE — 71046 X-RAY EXAM CHEST 2 VIEWS: CPT

## 2025-06-04 PROCEDURE — 3008F BODY MASS INDEX DOCD: CPT

## 2025-06-04 PROCEDURE — 87086 URINE CULTURE/COLONY COUNT: CPT

## 2025-06-04 PROCEDURE — 99213 OFFICE O/P EST LOW 20 MIN: CPT

## 2025-06-04 PROCEDURE — 1159F MED LIST DOCD IN RCRD: CPT

## 2025-06-04 PROCEDURE — 3077F SYST BP >= 140 MM HG: CPT

## 2025-06-04 PROCEDURE — 81003 URINALYSIS AUTO W/O SCOPE: CPT

## 2025-06-04 RX ORDER — GABAPENTIN 100 MG/1
CAPSULE ORAL
Qty: 180 CAPSULE | Refills: 3 | Status: SHIPPED | OUTPATIENT
Start: 2025-06-04

## 2025-06-04 RX ORDER — ESTRADIOL 0.1 MG/G
1 CREAM VAGINAL DAILY
Qty: 42.5 G | Refills: 5 | Status: SHIPPED | OUTPATIENT
Start: 2025-06-04

## 2025-06-04 RX ORDER — BUSPIRONE HYDROCHLORIDE 10 MG/1
10 TABLET ORAL 2 TIMES DAILY
Qty: 180 TABLET | Refills: 1 | Status: SHIPPED | OUTPATIENT
Start: 2025-06-04

## 2025-06-04 RX ORDER — AZELASTINE HYDROCHLORIDE 137 UG/1
1 SPRAY, METERED NASAL 2 TIMES DAILY
Qty: 30 ML | Refills: 0 | Status: SHIPPED | OUTPATIENT
Start: 2025-06-04

## 2025-06-04 NOTE — PROGRESS NOTES
[facility] Family Medicine Office Note  Chief Complaint:   Chief Complaint   Patient presents with    Cough     F/u 5/15/25    Dysuria     X1 week       HPI:     History of Present Illness  Gabriela Ndiaye is an 79 year old female with interstitial cystitis and COPD who presents with symptoms of a urinary tract infection and persistent cough.    She has been experiencing a burning sensation during urination for the past three days, which is concerning given her history of interstitial cystitis. This condition already causes frequent urination and pressure, similar to a urinary tract infection. She is seeking evaluation for this burning sensation.    She has a persistent cough that has been ongoing since her last visit 5/15/2025. A previous CT scan revealed pneumonia in her right lung. Currently, she experiences symptoms she attributes to allergies, including rhinorrhea and sinus swelling. She takes Claritin in the morning and Singulair at night, which help alleviate some symptoms. She also uses benzonatate for the cough. The cough is sometimes productive, with clear or occasionally yellow sputum, and she notes increased allergy symptoms since her last visit.    She has a history of COPD, which contributes to her dyspnea, especially when climbing stairs. No chest pain or wheezing, but she reports chills without fever. She takes ibuprofen for myalgias.    She mentions a history of pneumonia diagnosed following a CT scan. She was treated with antibiotics (levaquin) for pneumonia. She also has a history of lung cancer and underwent a bone scan, which was negative. She is not currently on any cancer treatment as her recent CT scan was clear.    She experiences chronic diarrhea, which worsened after her cholecystectomy. She manages it with Metamucil, Imodium, and Kaopectate. She is scheduled to see a nurse practitioner for this issue at the end of the month.      Past Medical History[1]  Past Surgical  History[2]  Social History:  Short Social Hx on File[3]  Family History:  Family History[4]  Allergies:  Allergies[5]  Current Meds:  Current Medications[6]   Ready to quit: Not Answered  Counseling given: Not Answered       REVIEW OF SYSTEMS:   Review of Systems   Constitutional:  Positive for chills. Negative for fever.   HENT:  Positive for congestion, facial swelling, postnasal drip, rhinorrhea and sinus pressure.    Respiratory:  Positive for cough and shortness of breath. Negative for wheezing.    Cardiovascular:  Negative for chest pain.   Gastrointestinal:  Positive for diarrhea.   Genitourinary:  Positive for dysuria.        EXAM:   /72   Pulse 67   Temp 97.9 °F (36.6 °C)   Resp 16   Ht 5' 4\" (1.626 m)   Wt 146 lb (66.2 kg)   SpO2 96%   BMI 25.06 kg/m²  Estimated body mass index is 25.06 kg/m² as calculated from the following:    Height as of this encounter: 5' 4\" (1.626 m).    Weight as of this encounter: 146 lb (66.2 kg).   Vital signs reviewed.Appears stated age, well groomed.  Physical Exam  Constitutional:       Appearance: Normal appearance.   HENT:      Head: Normocephalic and atraumatic.      Nose: Rhinorrhea present. No congestion.      Mouth/Throat:      Mouth: Mucous membranes are moist.      Pharynx: Oropharynx is clear. No oropharyngeal exudate or posterior oropharyngeal erythema.   Eyes:      Extraocular Movements: Extraocular movements intact.      Conjunctiva/sclera: Conjunctivae normal.      Pupils: Pupils are equal, round, and reactive to light.   Cardiovascular:      Rate and Rhythm: Normal rate and regular rhythm.      Heart sounds: Normal heart sounds.   Pulmonary:      Effort: No respiratory distress.      Breath sounds: Normal breath sounds. No stridor. No wheezing, rhonchi or rales.   Chest:      Chest wall: No tenderness.   Musculoskeletal:      Cervical back: Normal range of motion.   Skin:     General: Skin is warm.      Coloration: Skin is not pale.      Findings: No  erythema.   Neurological:      Mental Status: She is alert and oriented to person, place, and time.   Psychiatric:         Mood and Affect: Mood normal.         Behavior: Behavior normal.         Thought Content: Thought content normal.         Judgment: Judgment normal.         Recent Results (from the past 72 hours)   URINALYSIS, AUTO, W/O SCOPE    Collection Time: 06/04/25  1:16 PM   Result Value Ref Range    Glucose Urine Negative Negative mg/dL    Bilirubin Urine Negative Negative    Ketones, UA Negative Negative - Trace mg/dL    Spec Gravity 1.015 1.005 - 1.030    Blood Urine Negative Negative    PH Urine 5.5 5.0 - 8.0    Protein Urine Negative Negative - Trace mg/dL    Urobilinogen Urine 0.2 0.2 - 1.0 mg/dL    Nitrite Urine Negative Negative    Leukocyte Esterase Urine Negative Negative    APPEARANCE clear Clear    Color Urine yellow Yellow    Multistix Lot# [id number] Numeric    Multistix Expiration Date 12/31/2025 Date         ASSESSMENT AND PLAN:   1. Persistent cough  Lungs clear to auscultation.  However chest x-ray ordered to further evaluate given patient has a history of pneumonia and lung cancer.  Will evaluate chest x-ray results before adding antibiotic. suspect a large portion of her cough is due to allergies and congestion and prescribed azelastine to reduce postnasal drip possibly causing the cough.  - azelastine 137 MCG/SPRAY Nasal Solution; 1 spray by Nasal route 2 (two) times daily.  Dispense: 30 mL; Refill: 0  - XR CHEST PA + LAT CHEST (CPT=71046); Future    2. UTI symptoms  UA was negative for any nitrates, leukocytes, or ongoing UTI infection.  Will send urine out to be cultured.  Will hold off on prescribing antibiotic until urine culture is processed.  - URINALYSIS, AUTO, W/O SCOPE  - Urine Culture, Routine; Future  - Urine Culture, Routine    3. Acute non-recurrent maxillary sinusitis  Patient experienced sinus pressure and congestion after maxillary sinuses.  Slight rhinorrhea and  postnasal drip is also appreciated.  Recommend continue over-the-counter allergy medication and Singulair but also adding azelastine to help reduce congestion.  Suspect congestion is possibly causing postnasal drip which is then causing the cough.  - azelastine 137 MCG/SPRAY Nasal Solution; 1 spray by Nasal route 2 (two) times daily.  Dispense: 30 mL; Refill: 0    4. Diarrhea, unspecified type  Has upcoming appointment with GI 2025      .    Meds, Orders, & Refills for this Visit:  Requested Prescriptions     Signed Prescriptions Disp Refills    azelastine 137 MCG/SPRAY Nasal Solution 30 mL 0     Si spray by Nasal route 2 (two) times daily.         Imaging & Consults:  None      Health Maintenance:  Health Maintenance Due   Topic Date Due    Zoster Vaccines (1 of 2) Never done    DEXA Scan  Never done    COVID-19 Vaccine ( season) 2024         Problem List:  Problem List[7]      Patient/Caregiver Education: Patient/Caregiver Education: There are no barriers to learning. Medical education done.   Outcome: Gabriela Zelda verbalizes understanding, agrees with the plan, and had no other questions at the end of today's visit. Gabriela Ndiaye is informed to call with any questions, complications, allergies, or worsening or changing symptoms.  Gabriela Ndiaye is to call with any side effects or complications from the treatments as a result of today.     Follow-up in   Return if symptoms worsen or fail to improve.    Note to patient: The  [employer] C[employer] Act makes medical notes like these available to patients in the interest of transparency. However, this is a medical document intended as peer to peer communication. It is written in medical language and may contain abbreviations or verbiage that are unfamiliar. It may appear blunt or direct. Medical documents are intended to carry relevant information, facts as evident, and the clinical opinion of the practitioner.         [1]   Past Medical  History:   Abdominal hernia    Anxiety    Arthritis    Back pain    Back problem    spinal stenosis    Bilateral pulmonary embolism (HCC)    Bloating    Cancer (HCC)    LUNG    COPD (chronic obstructive pulmonary disease) (HCC)    NO HOME O2    Coronary atherosclerosis    STENT X1    Diabetes (HCC)    DIET CONTROLLED    Diabetes mellitus (HCC)    Diarrhea, unspecified    Disorder of thyroid    Easy bruising    Esophageal reflux    Exposure to medical diagnostic radiation    last tx 11/2023    Feeling lonely    Flatulence/gas pain/belching    Food intolerance    Hearing impairment    PT DENIES, NO AIDS    Hearing loss    Hemorrhoids    High blood pressure    High cholesterol    History of blood transfusion    ABOUT 20 YEARS AGO, NO ADVERSE REACTION    History of eating disorder    Muscle weakness    Neuropathy    Night sweats    Osteoarthritis    Pain in joints    Pneumonia of both lungs due to infectious organism, unspecified part of lung    Pulmonary emboli (HCC)    Pulmonary embolism (HCC)    Renal disorder    CKD RECONCILLED FROM OUTSIDE PROBLEM LIST    RSV (acute bronchiolitis due to respiratory syncytial virus)    Shortness of breath    Stress    Thyroid disease    Uncomfortable fullness after meals    Visual impairment    GLASSES    Wears glasses   [2]   Past Surgical History:  Procedure Laterality Date    Amputation finger/thumb+flaps      Cath bare metal stent (bms)      x 1    Cholecystectomy      Colectomy      Colonoscopy      Colonoscopy N/A 01/15/2024    Procedure: COLONOSCOPY;  Surgeon: Tirso Kimball MD;  Location:  ENDOSCOPY    Hysterectomy     [3]   Social History  Socioeconomic History    Marital status:    Tobacco Use    Smoking status: Some Days     Current packs/day: 0.50     Average packs/day: 0.5 packs/day for 63.8 years (31.9 ttl pk-yrs)     Types: Cigarettes     Start date: 2/24/1964    Smokeless tobacco: Never   Vaping Use    Vaping status: Never Used   Substance and Sexual Activity     Alcohol use: Not Currently     Comment: RARE    Drug use: Never   Other Topics Concern    Caffeine Concern Yes     Comment: coffee/coca cola/Iced tea    Exercise No     Social Drivers of Health     Food Insecurity: No Food Insecurity (5/15/2025)    NCSS - Food Insecurity     Worried About Running Out of Food in the Last Year: No     Ran Out of Food in the Last Year: No   Transportation Needs: No Transportation Needs (5/15/2025)    NCSS - Transportation     Lack of Transportation: No   Stress: No Stress Concern Present (8/15/2023)    Stress     Feeling of Stress : No   Housing Stability: Not At Risk (5/15/2025)    NCSS - Housing/Utilities     Has Housing: Yes     Worried About Losing Housing: No     Unable to Get Utilities: No   [4]   Family History  Problem Relation Age of Onset    Heart Disorder Father     Stroke Father     Uterine Cancer Mother     Diabetes Mother     Cancer Mother         colon    Heart Attack Daughter     Diabetes Daughter     Colon Polyps Daughter     Colon Polyps Son    [5]   Allergies  Allergen Reactions    Keflex [Cephalexin] HIVES    Rosuvastatin MYALGIA    Cephalosporins NAUSEA ONLY     NAUSEA ONLY WHEN TAKEN ON AN EMPTY STOMACH, OTHERWISE CAN TAKE   [6]   Current Outpatient Medications   Medication Sig Dispense Refill    busPIRone 10 MG Oral Tab Take 1 tablet (10 mg total) by mouth 2 (two) times daily. 180 tablet 1    azelastine 137 MCG/SPRAY Nasal Solution 1 spray by Nasal route 2 (two) times daily. 30 mL 0    furosemide 20 MG Oral Tab Take 1 tablet (20 mg total) by mouth 2 (two) times daily. 180 tablet 1    Potassium Chloride ER 20 MEQ Oral Tab CR Take 1 tablet by mouth daily. 90 tablet 3    MONTELUKAST 10 MG Oral Tab TAKE 1 TABLET BY MOUTH EVERY DAY AT NIGHT 90 tablet 3    benzonatate 200 MG Oral Cap Take 1 capsule (200 mg total) by mouth 3 (three) times daily as needed for cough. 30 capsule 5    levoFLOXacin 500 MG Oral Tab Take 500mg for the first day, then 250mg daily for 6 days.  Renally dosed 8 tablet 0    methylPREDNISolone (MEDROL) 4 MG Oral Tablet Therapy Pack As directed. 21 each 0    vancomycin 125 MG Oral Cap Take 1 capsule (125 mg total) by mouth daily. For c.diff prophylaxis while on levofloxacin 7 capsule 0    ALPRAZolam 0.5 MG Oral Tab TAKE 1 TABLET BY MOUTH NIGHTLY AS SCHEDULED AND 1/2 TAB DAILY AS NEEDED FOR ANXIETY 45 tablet 2    levothyroxine (SYNTHROID) 75 MCG Oral Tab Take 1 tablet (75 mcg total) by mouth before breakfast. 90 tablet 0    ELMIRON 100 MG Oral Cap TAKE 1 CAPSULE BY MOUTH EVERY DAY 90 capsule 1    LOSARTAN 25 MG Oral Tab TAKE 1 TABLET (25 MG TOTAL) BY MOUTH DAILY. 90 tablet 1    metoprolol tartrate 50 MG Oral Tab TAKE 1/2 TABLET BY MOUTH 2 TIMES DAILY. 90 tablet 3    CHOLESTYRAMINE 4 g Oral Powd Pack 1- 2 PACKETS BY MOUTH DAILY OR TWICE DAILY. 60 packet 0    HYDROcodone-acetaminophen 5-325 MG Oral Tab Take 1 tablet by mouth as needed in the morning and 1 tablet as needed in the evening for Pain.      cilostazol 50 MG Oral Tab Take 1 tablet (50 mg total) by mouth 2 (two) times daily. 180 tablet 3    Esomeprazole Magnesium 40 MG Oral Capsule Delayed Release Take 1 capsule (40 mg total) by mouth every morning before breakfast. 90 capsule 1    ESTRADIOL 0.1 MG/GM Vaginal Cream PLACE 1 GRAM VAGINALLY DAILY 42.5 g 5    ondansetron 4 MG Oral Tablet Dispersible Take 1 tablet (4 mg total) by mouth every 4 (four) hours as needed. 12 tablet 0    traMADol 50 MG Oral Tab Take 1 tablet (50 mg total) by mouth as needed for Pain. 15 tablet 0    Naloxone HCl 4 MG/0.1ML Nasal Liquid 4 mg by Nasal route as needed. If patient remains unresponsive, repeat dose in other nostril 2-5 minutes after first dose. 1 kit 0    ASPIRIN LOW DOSE 81 MG Oral Tab EC Take 1 tablet (81 mg total) by mouth in the morning.      nystatin 100,000 Units/g External Cream       sertraline (ZOLOFT) 25 MG Oral Tab Take 1 tablet (25 mg total) by mouth daily. (Patient taking differently: Take 1 tablet (25 mg  total) by mouth in the morning. Takes 1/2 tablet.) 90 tablet 3    DICYCLOMINE 20 MG Oral Tab TAKE 1 TABLET BY MOUTH THREE TIMES A  tablet 0    ALBUTEROL 108 (90 Base) MCG/ACT Inhalation Aero Soln TAKE 2 PUFFS BY MOUTH EVERY 6 HOURS AS NEEDED FOR WHEEZE OR SHORTNESS OF BREATH 25.5 each 3    GABAPENTIN 100 MG Oral Cap TAKE 1 CAPSULE BY MOUTH IN THE MORNING, 1-2 CAPSULES BY MOUTH IN THE AFTERNOON, AND 3 CAPSULES BY MOUTH IN THE EVENING DAILY (Patient taking differently: Take 3 capsules (300 mg total) by mouth nightly.) 180 capsule 5    ACYCLOVIR 400 MG Oral Tab TAKE 1 TABLET BY MOUTH TWICE A  tablet 0    B Complex Vitamins (B COMPLEX-B12 OR) Take 1 tablet by mouth in the morning.      Turmeric 400 MG Oral Cap Take 1 capsule by mouth in the morning.      Ascorbic Acid (VITAMIN C) 1000 MG Oral Tab Take 1 tablet (1,000 mg total) by mouth in the morning.      omega-3 fatty acids 1000 MG Oral Cap Take 1,000 mg by mouth in the morning.      loratadine 10 MG Oral Tablet Dispersible Take 1 tablet (10 mg total) by mouth in the morning.      Capsicum, Cayenne, (CAYENNE PEPPER OR) Take 1 tablet by mouth in the morning.      Nebulizers ([employer] AEROSOL DELIVERY SYSTEM) Does not apply Misc Take 1 Bottle by mouth As Directed.      Respiratory Therapy Supplies (NEBULIZER/TUBING/MOUTHPIECE) Does not apply Kit Use as directed 1 each 0    TRELEGY ELLIPTA 100-62.5-25 MCG/ACT Inhalation Aerosol Powder, Breath Activated Inhale 1 puff into the lungs daily. 180 each 3    diclofenac 1 % External Gel Apply 2 g topically 4 (four) times daily. 100 g 5    Vitamin D3, Cholecalciferol, 125 MCG (5000 UT) Oral Cap Take 1 capsule (5,000 Units total) by mouth in the morning.     [7]   Patient Active Problem List  Diagnosis    Pulmonary emphysema (HCC)    Type 2 diabetes mellitus with diabetic polyneuropathy, without long-term current use of insulin (HCC)    Chronic midline low back pain without sciatica    Nicotine dependence    Depression,  recurrent    Hypothyroidism    Liver cyst    Renal cyst    Splenic cyst    Neuropathy    Stage 3a chronic kidney disease (HCC)    Primary hypertension    Clostridioides difficile carrier    Adenomatous polyp    Ampullary adenoma    Atrophic vaginitis    Chronic interstitial cystitis    Dyslipidemia    Hearing loss    IBS (irritable bowel syndrome)    Nonallergic rhinitis    Ptosis of right eyelid    Regular astigmatism of both eyes    Tinnitus    Vitreous membrane    Anxiety disorder    Chronic midline low back pain with bilateral sciatica    Fibromyalgia    PAD (peripheral artery disease)    Cancer of bronchus of right upper lobe (HCC)    Coronary artery disease involving native coronary artery of native heart    Hyperlipidemia    Mantle cell lymphoma (HCC)    MGUS (monoclonal gammopathy of unknown significance)

## 2025-06-04 NOTE — PATIENT INSTRUCTIONS
Complete chest x-ray due to history of pneumonia and cough going on for 1 month     azelastine prescription to reduce congestion and postnasal drip in your nose.  Continue over-the-counter allergy medications and Singulair.    Your UA did not show any signs infections.  However we will send the urine out the culture and let you know if it shows any signs of bacteria.  If it does we will start antibiotic who should

## 2025-06-04 NOTE — TELEPHONE ENCOUNTER
Dr. Coley - see pended refill request - LOV today with Neftaly with you 5/15/25 - no PAP smear on file

## 2025-06-09 ENCOUNTER — TELEPHONE (OUTPATIENT)
Dept: FAMILY MEDICINE CLINIC | Facility: CLINIC | Age: 80
End: 2025-06-09

## 2025-06-09 NOTE — TELEPHONE ENCOUNTER
Patient called stating she finished her antibiotic for her urinary tract infection  She states she continues to have a burning sensation when she urinates    Advised recommendation is to follow up in office to discuss ongoing symptoms   Appt scheduled    Future Appointments   Date Time Provider Department Center   6/10/2025  1:30 PM Neftaly Evangelista PA-C [facility]   7/21/2025  1:00 PM Kasey Joy APRN [facility]   7/28/2025  2:00 PM August Coley MD [facility]   8/7/2025  4:00 PM PF CT RM1 PF CT Call   8/13/2025  2:45 PM PF OOT PF SW [facility]   8/13/2025  3:15 PM Cuong Tolliver MD PF SW [facility]        Neftaly Evangelista PA-C  6/6/2025  1:50 PM CDT       Since patient still having burning with urination and dark-colored urine I recommend treat empirically with ciprofloxacin since the urine culture did see multiple types of species of bacteria.  Cipro should also cover the cough.     Will also send vancomycin to take with ciprofloxacin since he does have history of C. difficile to prevent potential C. difficile recurrent     It would be Cipro 250 mg 2 times a day for 3 days.  I recommend follow-up next week after the antibiotic finishing with me or follow-up with Dr. Coley next week.

## 2025-06-10 NOTE — PROGRESS NOTES
Subjective:   Gabriela Ndiaye is a 79 year old female who presents for Cough (C/o Chest congestion and sob that started 1-2 weeks ago. When coughing has phlegm in the morning )       History/Other:   History of Present Illness  Gabriela Ndiaye is an 79 year old female who presents with persistent cough and congestion.    She has been experiencing a persistent cough and significant congestion despite completing a course of antibiotics for pneumonia approximately two weeks ago. Her symptoms have only slightly improved since finishing the antibiotics. She continues to experience trouble breathing and occasionally forgets to use her inhalers, although she uses a nebulizer in the morning.    She completed a steroid pack some time ago and is not currently on any cough medications. She takes Claritin for allergies and Mucinex to help bring up phlegm. No fever is reported.    She denies fever but confirms experiencing cough with phlegm production, congestion, and difficulty breathing. She is trying to maintain her nutrition and hydration, though not to her usual standards.   Chief Complaint Reviewed and Verified  Nursing Notes Reviewed and   Verified  Tobacco Reviewed  Allergies Reviewed  Medications Reviewed    Medical History Reviewed  Surgical History Reviewed  OB Status Reviewed    Family History Reviewed  Social History Reviewed         Tobacco:  Tobacco Use[1]  E-Cigarettes/Vaping       Questions Responses    E-Cigarette Use Never User          E-Cigarette/Vaping Substances       Questions Responses    Nicotine No    THC No    CBD No    Flavoring No          E-Cigarette/Vaping Devices       Questions Responses    Disposable No    Pre-filled or Refillable Cartridge No    Refillable Tank No    Pre-filled Pod No           Tobacco cessation counseling for <3 minutes.      Current Medications[2]    Review of Systems:  Pertinent items are noted in HPI.      Objective:   /76   Pulse 66   Temp 98 °F (36.7  °C) (Oral)   Resp 20   Ht 5' 4\" (1.626 m)   Wt 142 lb (64.4 kg)   SpO2 93%   BMI 24.37 kg/m²  Estimated body mass index is 24.37 kg/m² as calculated from the following:    Height as of this encounter: 5' 4\" (1.626 m).    Weight as of this encounter: 142 lb (64.4 kg).   Physical Exam  GENERAL: Alert, cooperative, well developed, no acute distress.  HEENT: Normocephalic, normal oropharynx, moist mucous membranes.  CHEST: Crackles in the left lower lobe otherwise lungs clear.  CARDIOVASCULAR: Normal heart rate and rhythm, S1 and S2 normal without murmurs.  ABDOMEN: Soft, non-tender, non-distended, without organomegaly, normal bowel sounds.  EXTREMITIES: No cyanosis or edema.  NEUROLOGICAL: Cranial nerves grossly intact, moves all extremities without gross motor or sensory deficit.      Assessment & Plan:   1. Shortness of breath (Primary)  -     Cancel: XR CHEST PA + LAT CHEST (CPT=71046); Future; Expected date: 05/15/2025  -     XR CHEST PA + LAT CHEST (CPT=71046); Future; Expected date: 05/15/2025  2. Community acquired pneumonia of right upper lobe of lung  -     Cancel: XR CHEST PA + LAT CHEST (CPT=71046); Future; Expected date: 05/15/2025  -     Benzonatate; Take 1 capsule (200 mg total) by mouth 3 (three) times daily as needed for cough.  Dispense: 30 capsule; Refill: 5  -     XR CHEST PA + LAT CHEST (CPT=71046); Future; Expected date: 05/15/2025    Assessment & Plan  Shortness of breath  Persistent cough, congestion, SOB post-pneumonia treatment. Audible crackles at left lower lung fields No fever. Inconsistent inhaler use noted. Completed steroid pack previously. Suspect she is still recovering from recent infection. VSS, reassuring.   - Order chest x-ray to evaluate respiratory status.  - Prescribe Tessalon Perles for cough.  - Advise regular inhaler use.  - Consider extending steroid treatment based on x-ray results.  - Reviewed return/ER precautions.     Pneumonia  Recent pneumonia treated with  antibiotics. Persistent symptoms with crackles in left lower lobe. Awaiting chest x-ray results.  - Review chest x-ray results for new or persistent pneumonia.        Return in about 1 week (around 5/22/2025), or if symptoms worsen or fail to improve.        August Coley MD, 6/10/2025, 1:28 PM             [1]   Social History  Tobacco Use   Smoking Status Some Days    Current packs/day: 0.50    Average packs/day: 0.5 packs/day for 63.8 years (31.9 ttl pk-yrs)    Types: Cigarettes    Start date: 2/24/1964   Smokeless Tobacco Never   [2]   Current Outpatient Medications   Medication Sig Dispense Refill    benzonatate 200 MG Oral Cap Take 1 capsule (200 mg total) by mouth 3 (three) times daily as needed for cough. 30 capsule 5    methylPREDNISolone (MEDROL) 4 MG Oral Tablet Therapy Pack As directed. 21 each 0    ALPRAZolam 0.5 MG Oral Tab TAKE 1 TABLET BY MOUTH NIGHTLY AS SCHEDULED AND 1/2 TAB DAILY AS NEEDED FOR ANXIETY 45 tablet 2    levothyroxine (SYNTHROID) 75 MCG Oral Tab Take 1 tablet (75 mcg total) by mouth before breakfast. 90 tablet 0    ELMIRON 100 MG Oral Cap TAKE 1 CAPSULE BY MOUTH EVERY DAY 90 capsule 1    LOSARTAN 25 MG Oral Tab TAKE 1 TABLET (25 MG TOTAL) BY MOUTH DAILY. 90 tablet 1    metoprolol tartrate 50 MG Oral Tab TAKE 1/2 TABLET BY MOUTH 2 TIMES DAILY. 90 tablet 3    CHOLESTYRAMINE 4 g Oral Powd Pack 1- 2 PACKETS BY MOUTH DAILY OR TWICE DAILY. 60 packet 0    HYDROcodone-acetaminophen 5-325 MG Oral Tab Take 1 tablet by mouth as needed in the morning and 1 tablet as needed in the evening for Pain.      cilostazol 50 MG Oral Tab Take 1 tablet (50 mg total) by mouth 2 (two) times daily. 180 tablet 3    Esomeprazole Magnesium 40 MG Oral Capsule Delayed Release Take 1 capsule (40 mg total) by mouth every morning before breakfast. 90 capsule 1    ondansetron 4 MG Oral Tablet Dispersible Take 1 tablet (4 mg total) by mouth every 4 (four) hours as needed. 12 tablet 0    traMADol 50 MG Oral Tab  Take 1 tablet (50 mg total) by mouth as needed for Pain. 15 tablet 0    ASPIRIN LOW DOSE 81 MG Oral Tab EC Take 1 tablet (81 mg total) by mouth in the morning.      nystatin 100,000 Units/g External Cream       sertraline (ZOLOFT) 25 MG Oral Tab Take 1 tablet (25 mg total) by mouth daily. (Patient taking differently: Take 1 tablet (25 mg total) by mouth in the morning. Takes 1/2 tablet.) 90 tablet 3    DICYCLOMINE 20 MG Oral Tab TAKE 1 TABLET BY MOUTH THREE TIMES A  tablet 0    ALBUTEROL 108 (90 Base) MCG/ACT Inhalation Aero Soln TAKE 2 PUFFS BY MOUTH EVERY 6 HOURS AS NEEDED FOR WHEEZE OR SHORTNESS OF BREATH 25.5 each 3    ACYCLOVIR 400 MG Oral Tab TAKE 1 TABLET BY MOUTH TWICE A  tablet 0    B Complex Vitamins (B COMPLEX-B12 OR) Take 1 tablet by mouth in the morning.      Turmeric 400 MG Oral Cap Take 1 capsule by mouth in the morning.      Ascorbic Acid (VITAMIN C) 1000 MG Oral Tab Take 1 tablet (1,000 mg total) by mouth in the morning.      omega-3 fatty acids 1000 MG Oral Cap Take 1,000 mg by mouth in the morning.      loratadine 10 MG Oral Tablet Dispersible Take 1 tablet (10 mg total) by mouth in the morning.      Capsicum, Cayenne, (CAYENNE PEPPER OR) Take 1 tablet by mouth in the morning.      Nebulizers ([email or web address] AEROSOL DELIVERY SYSTEM) Does not apply Misc Take 1 Bottle by mouth As Directed.      Respiratory Therapy Supplies (NEBULIZER/TUBING/MOUTHPIECE) Does not apply Kit Use as directed 1 each 0    TRELEGY ELLIPTA 100-62.5-25 MCG/ACT Inhalation Aerosol Powder, Breath Activated Inhale 1 puff into the lungs daily. 180 each 3    diclofenac 1 % External Gel Apply 2 g topically 4 (four) times daily. 100 g 5    Vitamin D3, Cholecalciferol, 125 MCG (5000 UT) Oral Cap Take 1 capsule (5,000 Units total) by mouth in the morning.      vancomycin 125 MG Oral Cap Take 1 capsule (125 mg total) by mouth daily. Take for c diff prophylaxis due to hx of c diff 7 capsule 0    busPIRone 10 MG Oral Tab Take 1 tablet  (10 mg total) by mouth 2 (two) times daily. 180 tablet 1    gabapentin 100 MG Oral Cap TAKE 1 CAPSULE BY MOUTH IN THE MORNING, 1-2 CAPSULES BY MOUTH IN THE AFTERNOON, AND 3 CAPSULES BY MOUTH IN THE EVENING DAILY 180 capsule 3    ESTRADIOL 0.1 MG/GM Vaginal Cream PLACE 1 GRAM VAGINALLY DAILY 42.5 g 5    azelastine 137 MCG/SPRAY Nasal Solution 1 spray by Nasal route 2 (two) times daily. 30 mL 0    furosemide 20 MG Oral Tab Take 1 tablet (20 mg total) by mouth 2 (two) times daily. 180 tablet 1    Potassium Chloride ER 20 MEQ Oral Tab CR Take 1 tablet by mouth daily. 90 tablet 3    MONTELUKAST 10 MG Oral Tab TAKE 1 TABLET BY MOUTH EVERY DAY AT NIGHT 90 tablet 3    Naloxone HCl 4 MG/0.1ML Nasal Liquid 4 mg by Nasal route as needed. If patient remains unresponsive, repeat dose in other nostril 2-5 minutes after first dose. 1 kit 0

## 2025-06-11 ENCOUNTER — TELEPHONE (OUTPATIENT)
Dept: FAMILY MEDICINE CLINIC | Facility: CLINIC | Age: 80
End: 2025-06-11

## 2025-06-11 DIAGNOSIS — R05.3 PERSISTENT COUGH: ICD-10-CM

## 2025-06-11 DIAGNOSIS — R10.2 PELVIC PRESSURE IN FEMALE: Primary | ICD-10-CM

## 2025-06-11 NOTE — TELEPHONE ENCOUNTER
Patient had appointment yesterday that she had to cancel as her car broke down. She is intending to follow up with [facility] as soon as it is fixed, hopefully by grandson this weekend.  Patient finished 3 days of Cipro. Still reports some slight pressure. She is using Nystatin cream on external labia and feels like it helps. Wondering if she can get a refill?  She takes Claritin every morning for seasonal allergies. She still reports phlegm and is taking Mucinex for that. She reports increasing shortness of breath. Denies wheezing. She states she lives in her granddaughter's basement and has to walk 2 flights up to take a shower and this is exacerbating her symptoms. She has a history of COPD? She states her O2 sats are sometimes 92-93. She is wondering if [facility] would order a steroid dose pack for her for increased shortness of breath?

## 2025-06-12 RX ORDER — NYSTATIN 100000 U/G
1 CREAM TOPICAL 2 TIMES DAILY
Qty: 30 G | Refills: 0 | Status: SHIPPED | OUTPATIENT
Start: 2025-06-12

## 2025-06-12 RX ORDER — METHYLPREDNISOLONE 4 MG/1
TABLET ORAL
Qty: 1 EACH | Refills: 0 | Status: SHIPPED | OUTPATIENT
Start: 2025-06-12

## 2025-06-12 RX ORDER — ALBUTEROL SULFATE 90 UG/1
2 INHALANT RESPIRATORY (INHALATION) EVERY 6 HOURS PRN
Qty: 18 G | Refills: 0 | Status: SHIPPED | OUTPATIENT
Start: 2025-06-12

## 2025-06-12 NOTE — TELEPHONE ENCOUNTER
Nystatin cream Rx sent.  Medrol dose pack sent.  I also sent an albuterol inhaler to use as needed for SOB. Can use as needed if still have SOB after Trelegy use    Is she still taking he Trelegy Ellipita inhaler for COPD? I see on her med list it was last prescribed 9/21/2023. This can help with her SOB    Can follow-up with me or Dr. Coley when she has transportation    Orders Placed This Encounter    methylPREDNISolone (MEDROL) 4 MG Oral Tablet Therapy Pack     Sig: As directed.     Dispense:  1 each     Refill:  0    albuterol 108 (90 Base) MCG/ACT Inhalation Aero Soln     Sig: Inhale 2 puffs into the lungs every 6 (six) hours as needed for Wheezing or Shortness of Breath.     Dispense:  18 g     Refill:  0    nystatin 100,000 Units/g External Cream     Sig: Apply 1 Application topically in the morning and 1 Application in the evening.     Dispense:  30 g     Refill:  0

## 2025-06-12 NOTE — TELEPHONE ENCOUNTER
Received call back from pt. Informed her meds sent, can take albuterol inhaler PRN for shortness of breath after trelegy use. Patient is still using Trelegy. Advised to follow-up if sxs persist when she has transportation, pt stated understanding and was very appreciative.

## 2025-06-17 ENCOUNTER — OFFICE VISIT (OUTPATIENT)
Dept: FAMILY MEDICINE CLINIC | Facility: CLINIC | Age: 80
End: 2025-06-17
Payer: MEDICARE

## 2025-06-17 VITALS
SYSTOLIC BLOOD PRESSURE: 144 MMHG | DIASTOLIC BLOOD PRESSURE: 64 MMHG | WEIGHT: 142 LBS | BODY MASS INDEX: 24.24 KG/M2 | HEIGHT: 64 IN | OXYGEN SATURATION: 96 % | RESPIRATION RATE: 11 BRPM | HEART RATE: 65 BPM

## 2025-06-17 DIAGNOSIS — R19.7 DIARRHEA, UNSPECIFIED TYPE: ICD-10-CM

## 2025-06-17 DIAGNOSIS — R39.9 UTI SYMPTOMS: Primary | ICD-10-CM

## 2025-06-17 DIAGNOSIS — N89.8 VAGINAL IRRITATION: ICD-10-CM

## 2025-06-17 PROCEDURE — 3078F DIAST BP <80 MM HG: CPT

## 2025-06-17 PROCEDURE — 3008F BODY MASS INDEX DOCD: CPT

## 2025-06-17 PROCEDURE — 81003 URINALYSIS AUTO W/O SCOPE: CPT

## 2025-06-17 PROCEDURE — 87086 URINE CULTURE/COLONY COUNT: CPT

## 2025-06-17 PROCEDURE — 99214 OFFICE O/P EST MOD 30 MIN: CPT

## 2025-06-17 PROCEDURE — 1159F MED LIST DOCD IN RCRD: CPT

## 2025-06-17 PROCEDURE — 81514 NFCT DS BV&VAGINITIS DNA ALG: CPT

## 2025-06-17 PROCEDURE — 1160F RVW MEDS BY RX/DR IN RCRD: CPT

## 2025-06-17 PROCEDURE — 3077F SYST BP >= 140 MM HG: CPT

## 2025-06-17 RX ORDER — FLUCONAZOLE 150 MG/1
150 TABLET ORAL ONCE
Qty: 3 TABLET | Refills: 0 | Status: CANCELLED | OUTPATIENT
Start: 2025-06-17 | End: 2025-06-17

## 2025-06-17 NOTE — PROGRESS NOTES
[facility] Family Medicine Office Note  Chief Complaint:   Chief Complaint   Patient presents with    UTI     recheck       HPI:     History of Present Illness  Gabriela Ndiaye is an 79 year old female who presents with persistent urinary symptoms.    She has been experiencing persistent diarrhea daily for several weeks following her gallbladder removal. She uses Imodium regularly and wears a pad at night due to occasional involuntary stool passage, which she believes may be causing contamination and contributing to her symptoms. She has an upcoming appointment in July with gastroenterology to address the diarrhea issues    She reports ongoing urinary symptoms, including dribbling urine and wearing a pad at night. Despite a recent negative urinalysis, she continues to experience chills and burning sensations in the vaginal area. She applies nystatin cream to the vaginal area, which helps alleviate the burning.    She has a history of burning and itching, previously treated with medication for a yeast infection, providing relief. She is concerned about potential contamination from the estrogen applicator she uses and has stopped using estrogen temporarily in the past when experiencing yeast infections.    She previously took taking ciprofloxacin for three days that was prescribed 2 weeks ago due to her urine culture showing probable contamination, which improved her symptoms. She also uses over-the-counter urinary pills (AZOs) for discomfort, which she finds effective.    No redness or discharge in the vaginal area. Reports urine leakage, particularly noticeable in the morning.    Past Medical History[1]  Past Surgical History[2]  Social History:  Short Social Hx on File[3]  Family History:  Family History[4]  Allergies:  Allergies[5]  Current Meds:  Current Medications[6]   Ready to quit: Not Answered  Counseling given: Not Answered       REVIEW OF SYSTEMS:   Review of Systems   Constitutional:  Positive for  chills.   Gastrointestinal:  Positive for diarrhea.   Genitourinary:  Positive for dysuria, frequency and vaginal pain. Negative for vaginal bleeding and vaginal discharge.   Skin:  Negative for color change and rash.        EXAM:   /64   Pulse 65   Resp 11   Ht 5' 4\" (1.626 m)   Wt 142 lb (64.4 kg)   SpO2 96%   BMI 24.37 kg/m²  Estimated body mass index is 24.37 kg/m² as calculated from the following:    Height as of this encounter: 5' 4\" (1.626 m).    Weight as of this encounter: 142 lb (64.4 kg).   Vital signs reviewed.Appears stated age, well groomed.  Physical Exam  Exam conducted with a chaperone present (Medical Assistant Hailee Villafana chaperoned).   Constitutional:       Appearance: Normal appearance.   HENT:      Head: Normocephalic and atraumatic.   Cardiovascular:      Rate and Rhythm: Normal rate and regular rhythm.   Pulmonary:      Effort: Pulmonary effort is normal.      Breath sounds: Normal breath sounds.   Abdominal:      Tenderness: There is no right CVA tenderness or left CVA tenderness.   Genitourinary:     General: Normal vulva.      Vagina: No vaginal discharge.      Comments:  exam: No redness or rash visualized on labia. No discharge or bleeding noticed from the vaginal. Vaginal wall was swabbed with swab for vaginitis/candidiasis pcr panel. Speculum was not used to visualize the cervix    Skin:     General: Skin is warm and dry.   Neurological:      Mental Status: She is alert and oriented to person, place, and time.   Psychiatric:         Mood and Affect: Mood normal.         Behavior: Behavior normal.         Thought Content: Thought content normal.         Judgment: Judgment normal.         Recent Results (from the past 72 hours)   URINALYSIS, AUTO, W/O SCOPE    Collection Time: 06/17/25  2:17 PM   Result Value Ref Range    Glucose Urine Negative Negative mg/dL    Bilirubin Urine Negative Negative    Ketones, UA Negative Negative - Trace mg/dL    Spec Gravity 1.015 1.005  - 1.030    Blood Urine Negative Negative    PH Urine 5.5 5.0 - 8.0    Protein Urine Negative Negative - Trace mg/dL    Urobilinogen Urine 0.2 0.2 - 1.0 mg/dL    Nitrite Urine Negative Negative    Leukocyte Esterase Urine Negative Negative    APPEARANCE clear Clear    Color Urine yellow Yellow    Multistix Lot# [id number] Numeric    Multistix Expiration Date 12/31/25 Date         ASSESSMENT AND PLAN:   1. UTI symptoms  - Urine Culture, Routine; Future  - URINALYSIS, AUTO, W/O SCOPE  - Vaginitis Vaginosis PCR Panel; Future  - Urogynecology Referral - In Network  - Vaginitis Vaginosis PCR Panel  - Urine Culture, Routine    2. Vaginal irritation  - Vaginitis Vaginosis PCR Panel; Future  - Urogynecology Referral - In Network  - Vaginitis Vaginosis PCR Panel    3. Diarrhea, unspecified type    Assessment & Plan  UTI Symptoms  Symptoms suggestive of UTI but negative urinalysis today. Prevous urineanalysis and urine culture were also inconclusive of an UTI when completed 2 weeks ago at prior visit. Will still send today's urine sample out to be cultured. Temporary relief with ciprofloxacin when prescribed 2 weeks ago. Possible fecal contamination from diarrhea and incontinence. No signs of an ongoing yeast infection based on exam today but given pt report feeling relief with utilizing nystatin cream, recommend to continue doing so for now. Swabbed for vaginigits and canidididas as well today. New urinary leakage noted. Referral to urogynecologist considered for persistent symptoms.  - Sent urine sample for culture.  - Sent vaginitis/candidiasis PCR lab  - Refer to urogynecologist for further evaluation.    Vaginal Irritation  Vaginal burning with history of yeast infections. Nystatin cream provides relief, suggesting yeast infection. Pelvic exam planned.   Advised against prolonged use of AZOs due to low GFR of 46 seen on 5/7/25 cmp.  - Performed pelvic exam to assess for yeast infection, no evidence seen on exam but will send  on vaginitis/candidiasis pcr lab out to confirm.  - Continue nystatin cream for relief.    Diarrhea  Chronic diarrhea post-cholecystectomy causing fecal incontinence and potential urinary contamination. Managed with loperamide. Gastroenterology follow-up scheduled.  - Continue loperamide as needed.  - Follow up with gastroenterology           Meds, Orders, & Refills for this Visit:  Requested Prescriptions      No prescriptions requested or ordered in this encounter         Imaging & Consults:  OP REFERRAL TO UROGYNECOLOGY CLINIC  Orders Placed This Encounter   Procedures    Urogynecology Referral - In Network     Referral Priority:   Routine     Referral Type:   OFFICE VISIT     Referred to Provider:   Melissa Leach DO     Requested Specialty:   Urogynecology     Number of Visits Requested:   1         Health Maintenance:  Health Maintenance Due   Topic Date Due    Zoster Vaccines (1 of 2) Never done    DEXA Scan  Never done    COVID-19 Vaccine (2 - 2024-25 season) 09/01/2024    Diabetes Care A1C  07/06/2025         Problem List:  Problem List[7]      Patient/Caregiver Education: Patient/Caregiver Education: There are no barriers to learning. Medical education done.   Outcome: Gabriela Ndiaye verbalizes understanding, agrees with the plan, and had no other questions at the end of today's visit. Gabriela Ndiaye is informed to call with any questions, complications, allergies, or worsening or changing symptoms.  Gabriela Ndiaye is to call with any side effects or complications from the treatments as a result of today.     Follow-up in   Return if symptoms worsen or fail to improve.    Note to patient: The 21st Century Cures Act makes medical notes like these available to patients in the interest of transparency. However, this is a medical document intended as peer to peer communication. It is written in medical language and may contain abbreviations or verbiage that are unfamiliar. It may appear blunt or direct.  Medical documents are intended to carry relevant information, facts as evident, and the clinical opinion of the practitioner.         [1]   Past Medical History:   Abdominal hernia    Anxiety    Arthritis    Back pain    Back problem    spinal stenosis    Bilateral pulmonary embolism (HCC)    Bloating    Cancer (HCC)    LUNG    COPD (chronic obstructive pulmonary disease) (HCC)    NO HOME O2    Coronary atherosclerosis    STENT X1    Diabetes (HCC)    DIET CONTROLLED    Diabetes mellitus (HCC)    Diarrhea, unspecified    Disorder of thyroid    Easy bruising    Esophageal reflux    Exposure to medical diagnostic radiation    last tx 11/2023    Feeling lonely    Flatulence/gas pain/belching    Food intolerance    Hearing impairment    PT DENIES, NO AIDS    Hearing loss    Hemorrhoids    High blood pressure    High cholesterol    History of blood transfusion    ABOUT 20 YEARS AGO, NO ADVERSE REACTION    History of eating disorder    Muscle weakness    Neuropathy    Night sweats    Osteoarthritis    Pain in joints    Pneumonia of both lungs due to infectious organism, unspecified part of lung    Pulmonary emboli (HCC)    Pulmonary embolism (HCC)    Renal disorder    CKD RECONCILLED FROM OUTSIDE PROBLEM LIST    RSV (acute bronchiolitis due to respiratory syncytial virus)    Shortness of breath    Stress    Thyroid disease    Uncomfortable fullness after meals    Visual impairment    GLASSES    Wears glasses   [2]   Past Surgical History:  Procedure Laterality Date    Amputation finger/thumb+flaps      Cath bare metal stent (bms)      x 1    Cholecystectomy      Colectomy      Colonoscopy      Colonoscopy N/A 01/15/2024    Procedure: COLONOSCOPY;  Surgeon: Tirso Kimball MD;  Location:  ENDOSCOPY    Hysterectomy     [3]   Social History  Socioeconomic History    Marital status:    Tobacco Use    Smoking status: Some Days     Current packs/day: 0.50     Average packs/day: 0.5 packs/day for 63.8 years (31.9 ttl  pk-yrs)     Types: Cigarettes     Start date: 2/24/1964    Smokeless tobacco: Never   Vaping Use    Vaping status: Never Used   Substance and Sexual Activity    Alcohol use: Not Currently     Comment: RARE    Drug use: Never   Other Topics Concern    Caffeine Concern Yes     Comment: coffee/coca cola/Iced tea    Exercise No     Social Drivers of Health     Food Insecurity: No Food Insecurity (5/15/2025)    NCSS - Food Insecurity     Worried About Running Out of Food in the Last Year: No     Ran Out of Food in the Last Year: No   Transportation Needs: No Transportation Needs (5/15/2025)    NCSS - Transportation     Lack of Transportation: No   Stress: No Stress Concern Present (8/15/2023)    Stress     Feeling of Stress : No   Housing Stability: Not At Risk (5/15/2025)    NCSS - Housing/Utilities     Has Housing: Yes     Worried About Losing Housing: No     Unable to Get Utilities: No   [4]   Family History  Problem Relation Age of Onset    Heart Disorder Father     Stroke Father     Uterine Cancer Mother     Diabetes Mother     Cancer Mother         colon    Heart Attack Daughter     Diabetes Daughter     Colon Polyps Daughter     Colon Polyps Son    [5]   Allergies  Allergen Reactions    Keflex [Cephalexin] HIVES    Rosuvastatin MYALGIA    Cephalosporins NAUSEA ONLY     NAUSEA ONLY WHEN TAKEN ON AN EMPTY STOMACH, OTHERWISE CAN TAKE   [6]   Current Outpatient Medications   Medication Sig Dispense Refill    methylPREDNISolone (MEDROL) 4 MG Oral Tablet Therapy Pack As directed. 1 each 0    albuterol 108 (90 Base) MCG/ACT Inhalation Aero Soln Inhale 2 puffs into the lungs every 6 (six) hours as needed for Wheezing or Shortness of Breath. 18 g 0    nystatin 100,000 Units/g External Cream Apply 1 Application topically in the morning and 1 Application in the evening. 30 g 0    vancomycin 125 MG Oral Cap Take 1 capsule (125 mg total) by mouth daily. Take for c diff prophylaxis due to hx of c diff 7 capsule 0    busPIRone  10 MG Oral Tab Take 1 tablet (10 mg total) by mouth 2 (two) times daily. 180 tablet 1    gabapentin 100 MG Oral Cap TAKE 1 CAPSULE BY MOUTH IN THE MORNING, 1-2 CAPSULES BY MOUTH IN THE AFTERNOON, AND 3 CAPSULES BY MOUTH IN THE EVENING DAILY 180 capsule 3    ESTRADIOL 0.1 MG/GM Vaginal Cream PLACE 1 GRAM VAGINALLY DAILY 42.5 g 5    azelastine 137 MCG/SPRAY Nasal Solution 1 spray by Nasal route 2 (two) times daily. 30 mL 0    furosemide 20 MG Oral Tab Take 1 tablet (20 mg total) by mouth 2 (two) times daily. 180 tablet 1    Potassium Chloride ER 20 MEQ Oral Tab CR Take 1 tablet by mouth daily. 90 tablet 3    MONTELUKAST 10 MG Oral Tab TAKE 1 TABLET BY MOUTH EVERY DAY AT NIGHT 90 tablet 3    benzonatate 200 MG Oral Cap Take 1 capsule (200 mg total) by mouth 3 (three) times daily as needed for cough. 30 capsule 5    methylPREDNISolone (MEDROL) 4 MG Oral Tablet Therapy Pack As directed. 21 each 0    ALPRAZolam 0.5 MG Oral Tab TAKE 1 TABLET BY MOUTH NIGHTLY AS SCHEDULED AND 1/2 TAB DAILY AS NEEDED FOR ANXIETY 45 tablet 2    levothyroxine (SYNTHROID) 75 MCG Oral Tab Take 1 tablet (75 mcg total) by mouth before breakfast. 90 tablet 0    ELMIRON 100 MG Oral Cap TAKE 1 CAPSULE BY MOUTH EVERY DAY 90 capsule 1    LOSARTAN 25 MG Oral Tab TAKE 1 TABLET (25 MG TOTAL) BY MOUTH DAILY. 90 tablet 1    metoprolol tartrate 50 MG Oral Tab TAKE 1/2 TABLET BY MOUTH 2 TIMES DAILY. 90 tablet 3    CHOLESTYRAMINE 4 g Oral Powd Pack 1- 2 PACKETS BY MOUTH DAILY OR TWICE DAILY. 60 packet 0    HYDROcodone-acetaminophen 5-325 MG Oral Tab Take 1 tablet by mouth as needed in the morning and 1 tablet as needed in the evening for Pain.      cilostazol 50 MG Oral Tab Take 1 tablet (50 mg total) by mouth 2 (two) times daily. 180 tablet 3    Esomeprazole Magnesium 40 MG Oral Capsule Delayed Release Take 1 capsule (40 mg total) by mouth every morning before breakfast. 90 capsule 1    ondansetron 4 MG Oral Tablet Dispersible Take 1 tablet (4 mg total) by  mouth every 4 (four) hours as needed. 12 tablet 0    traMADol 50 MG Oral Tab Take 1 tablet (50 mg total) by mouth as needed for Pain. 15 tablet 0    Naloxone HCl 4 MG/0.1ML Nasal Liquid 4 mg by Nasal route as needed. If patient remains unresponsive, repeat dose in other nostril 2-5 minutes after first dose. 1 kit 0    ASPIRIN LOW DOSE 81 MG Oral Tab EC Take 1 tablet (81 mg total) by mouth in the morning.      sertraline (ZOLOFT) 25 MG Oral Tab Take 1 tablet (25 mg total) by mouth daily. (Patient taking differently: Take 1 tablet (25 mg total) by mouth in the morning. Takes 1/2 tablet.) 90 tablet 3    DICYCLOMINE 20 MG Oral Tab TAKE 1 TABLET BY MOUTH THREE TIMES A  tablet 0    ALBUTEROL 108 (90 Base) MCG/ACT Inhalation Aero Soln TAKE 2 PUFFS BY MOUTH EVERY 6 HOURS AS NEEDED FOR WHEEZE OR SHORTNESS OF BREATH 25.5 each 3    ACYCLOVIR 400 MG Oral Tab TAKE 1 TABLET BY MOUTH TWICE A  tablet 0    B Complex Vitamins (B COMPLEX-B12 OR) Take 1 tablet by mouth in the morning.      Turmeric 400 MG Oral Cap Take 1 capsule by mouth in the morning.      Ascorbic Acid (VITAMIN C) 1000 MG Oral Tab Take 1 tablet (1,000 mg total) by mouth in the morning.      omega-3 fatty acids 1000 MG Oral Cap Take 1,000 mg by mouth in the morning.      loratadine 10 MG Oral Tablet Dispersible Take 1 tablet (10 mg total) by mouth in the morning.      Capsicum, Cayenne, (CAYENNE PEPPER OR) Take 1 tablet by mouth in the morning.      Nebulizers (iDubba AEROSOL DELIVERY SYSTEM) Does not apply Misc Take 1 Bottle by mouth As Directed.      Respiratory Therapy Supplies (NEBULIZER/TUBING/MOUTHPIECE) Does not apply Kit Use as directed 1 each 0    TRELEGY ELLIPTA 100-62.5-25 MCG/ACT Inhalation Aerosol Powder, Breath Activated Inhale 1 puff into the lungs daily. 180 each 3    diclofenac 1 % External Gel Apply 2 g topically 4 (four) times daily. 100 g 5    Vitamin D3, Cholecalciferol, 125 MCG (5000 UT) Oral Cap Take 1 capsule (5,000 Units total) by  mouth in the morning.     [7]   Patient Active Problem List  Diagnosis    Pulmonary emphysema (HCC)    Type 2 diabetes mellitus with diabetic polyneuropathy, without long-term current use of insulin (HCC)    Chronic midline low back pain without sciatica    Nicotine dependence    Depression, recurrent    Hypothyroidism    Liver cyst    Renal cyst    Splenic cyst    Neuropathy    Stage 3a chronic kidney disease (HCC)    Primary hypertension    Clostridioides difficile carrier    Adenomatous polyp    Ampullary adenoma    Atrophic vaginitis    Chronic interstitial cystitis    Dyslipidemia    Hearing loss    IBS (irritable bowel syndrome)    Nonallergic rhinitis    Ptosis of right eyelid    Regular astigmatism of both eyes    Tinnitus    Vitreous membrane    Anxiety disorder    Chronic midline low back pain with bilateral sciatica    Fibromyalgia    PAD (peripheral artery disease)    Cancer of bronchus of right upper lobe (HCC)    Coronary artery disease involving native coronary artery of native heart    Hyperlipidemia    Mantle cell lymphoma (HCC)    MGUS (monoclonal gammopathy of unknown significance)

## 2025-06-22 DIAGNOSIS — N30.10 CHRONIC INTERSTITIAL CYSTITIS: Primary | ICD-10-CM

## 2025-06-23 ENCOUNTER — TELEPHONE (OUTPATIENT)
Dept: FAMILY MEDICINE CLINIC | Facility: CLINIC | Age: 80
End: 2025-06-23

## 2025-06-23 DIAGNOSIS — N30.10 CHRONIC INTERSTITIAL CYSTITIS: Primary | ICD-10-CM

## 2025-06-23 NOTE — TELEPHONE ENCOUNTER
Ok to see urology, Dr. Springer or Edward, whomever is 1st available. STAT Referral placed below.    In regards to ongoing burning, she just finished a medrol dose pack for her cough just probably not the best to prescribe another prednisone Rx or dosepack to reduce inflammation.  Recommend tylenol over ibuprofen since her GFR last checked in May was 46. I am not familiar with \"sulfer\" medication she is referring to but the generic name of Elmiron is pentosan polysulfate sodium.    Antihistamines like loratadine (which is already on her medication list and can take one daily if she is not currently) can help reduce some inflammation as well. If she does not have any loratadine at home, over-the-counter Claritin has the same benefits    Dr. Coley does prescribe her Elmiron 100mg capsule that she should be taking daily to help with the interstitial cystitis.    Orders Placed This Encounter   Procedures    Urology Referral - In Network     Referral Priority:   Urgent     Referral Type:   OFFICE VISIT     Referred to Provider:   Lavell Leon MD     Requested Specialty:   UROLOGY     Number of Visits Requested:   1

## 2025-06-23 NOTE — TELEPHONE ENCOUNTER
Spoke to patient and informed of Neftaly's response, confirmed referral information and other recommendations. Patient asking if she could take Elmiron twice daily.  Patient verbalizes understanding.    Neftaly - patient asking if can take Elmiron twice daily

## 2025-06-23 NOTE — TELEPHONE ENCOUNTER
Patient called regarding referral to UroGyne in [place]. Patient states she is very limited with transportation. She states her family is unable to drive her to [place]. She needs to take an Uber for any appointments. She is asking if she could see Dr Springer or Dr Leon, one of the [facility] Urologists in [place]. She knows where that location is and it would be easier to get to.  She states she is having extreme burning now, taking ibuprofen but wonder if there is anything else that can be prescribed. She states a physician in [place] she used to see prescribed her \"sulfer\" (sulfa antibiotic?). She wanted to share that with providers.     Neftaly - ok to see urology vs uro gyne? Any other rx for burning? In talking to patient frequently, transportation seems to be a problem for patient with regards to referring her to other specialities.

## 2025-06-26 DIAGNOSIS — K92.1 BLACK TARRY STOOLS: ICD-10-CM

## 2025-06-30 ENCOUNTER — OFFICE VISIT (OUTPATIENT)
Facility: LOCATION | Age: 80
End: 2025-06-30
Payer: MEDICARE

## 2025-06-30 DIAGNOSIS — N28.1 RENAL CYST: ICD-10-CM

## 2025-06-30 DIAGNOSIS — R35.0 URINARY FREQUENCY: ICD-10-CM

## 2025-06-30 DIAGNOSIS — N39.41 URGE INCONTINENCE: ICD-10-CM

## 2025-06-30 DIAGNOSIS — R39.89 BLADDER PAIN: Primary | ICD-10-CM

## 2025-06-30 DIAGNOSIS — R39.15 URINARY URGENCY: ICD-10-CM

## 2025-06-30 DIAGNOSIS — N30.10 INTERSTITIAL CYSTITIS: ICD-10-CM

## 2025-06-30 PROCEDURE — 1160F RVW MEDS BY RX/DR IN RCRD: CPT

## 2025-06-30 PROCEDURE — 99203 OFFICE O/P NEW LOW 30 MIN: CPT

## 2025-06-30 PROCEDURE — 81003 URINALYSIS AUTO W/O SCOPE: CPT

## 2025-06-30 PROCEDURE — 1159F MED LIST DOCD IN RCRD: CPT

## 2025-06-30 RX ORDER — ESOMEPRAZOLE MAGNESIUM 40 MG/1
40 CAPSULE, DELAYED RELEASE ORAL
Qty: 90 CAPSULE | Refills: 1 | Status: SHIPPED | OUTPATIENT
Start: 2025-06-30

## 2025-06-30 RX ORDER — EZETIMIBE 10 MG/1
TABLET ORAL
COMMUNITY
Start: 2025-06-26

## 2025-06-30 RX ORDER — SOLIFENACIN SUCCINATE 5 MG/1
5 TABLET, FILM COATED ORAL DAILY
Qty: 90 TABLET | Refills: 0 | Status: SHIPPED | OUTPATIENT
Start: 2025-06-30

## 2025-06-30 NOTE — PROGRESS NOTES
HPI:     Gabriela Ndiaye is an 79 year old female with hx of anxiety, lung cancer, COPD, HLD, and HTN, who presents to the office today for interstitial cystitis consultation.     PCP: Dr. August Coley     C/o: bladder pain, LUTS  Approx onset: 2 wks   Description:  - has not a flare-up in the 3 yrs since moving here from [place].   - urinary freq, sensation that he needs to urinate every 20 mins but tries to delay urination  - occ urinary urgency  - dysuria  - bladder pain/lower abd burning pain; gets pains in her Right abdomen a lot since having gall bladder removed in April 2025.  - coffee in the mornings seem to trigger bladder pains  - wears pads, a little bit of urine leakage into pad  - was treated a few wks ago for UTI, treated w/Cipro.   - has been on Elmiron for the past 3 yrs, was initially on once daily, now up to BID over the last couple of weeks  - PRN pyridium has been effective    Denies: gross hematuria, flank pain, cloudy/malodorous urine, F/C/N/V    Baseline~  DF: at least q3-4hrs  NOC: x0-1  Fluids: ~16 oz water, 1/2 cup coffee in the mornings    Hx of kidney stones: YES, last was when she was in her 20s    Fhx of  malignancy: YES, bladder cancer (mother)    UA today neg blood, leuks, nitrites    Prior results:   (06/17/25) Ucx: no growth at 18-24 hrs  (06/04/25) Ucx: 10-50K multiple species present - probable contamination    HISTORY:  Past Medical History[1]   Past Surgical History[2]   Family History[3]   Social History: Short Social Hx on File[4]     Medications (Active prior to today's visit):  Current Medications[5]    Allergies:  Allergies[6]    ROS:     A comprehensive 10 point review of systems was completed.  Pertinent positives and negatives noted in the the HPI.    PHYSICAL EXAM:     GENERAL APPEARANCE: well, developed, well nourished, in no acute distress  NEUROLOGIC: nonfocal, alert and oriented  HEAD: normocephalic, atraumatic  EYES: sclera non-icteric  EARS: hearing intact  ORAL  CAVITY: mucosa moist  NECK/THYROID: no obvious goiter or masses  LUNGS: nonlabored breathing  ABDOMEN: soft, no obvious masses or tenderness  SKIN: no obvious rashes     ASSESSMENT/PLAN:   Diagnoses and all orders for this visit:    Renal cyst  -     URINALYSIS, AUTO, W/O SCOPE    Interstitial cystitis   Bladder pain  Urinary frequency  Urinary urgency  Urge incontinence  - discussed behavioral modifications including drinking at least 48-64 oz water daily, limiting bladder irritants, timed/double voiding, kegel exercises, etc.   - can continue Elmiron BID as prescribed   - can use pyridium PRN as prescribed  - apply heat/ice pack PRN x15-20 mins to suprapubic region with barrier between pack and skin    Follow-up: in 2-3 months; sooner as needed    SADI Combs  Department of Urology  [facility] have spent 34 min total time on the day of the encounter, including: review of chart including lab and imaging studies, obtaining and/or reviewing separately obtained history, performing a medically appropriate examination and/or evaluation, counseling and educating the patient/family/caregiver, ordering medications/tests/procedures, documenting clinical information in Epic, and independently interpreting results and communicating results to the patient/family/caregiver.        [1]   Past Medical History:   Abdominal hernia    Anxiety    Arthritis    Back pain    Back problem    spinal stenosis    Bilateral pulmonary embolism (HCC)    Bloating    Cancer (HCC)    LUNG    COPD (chronic obstructive pulmonary disease) (HCC)    NO HOME O2    Coronary atherosclerosis    STENT X1    Diabetes (HCC)    DIET CONTROLLED    Diabetes mellitus (HCC)    Diarrhea, unspecified    Disorder of thyroid    Easy bruising    Esophageal reflux    Exposure to medical diagnostic radiation    last tx 11/2023    Feeling lonely    Flatulence/gas pain/belching    Food intolerance    Hearing impairment    PT DENIES, NO AIDS     Hearing loss    Hemorrhoids    High blood pressure    High cholesterol    History of blood transfusion    ABOUT 20 YEARS AGO, NO ADVERSE REACTION    History of eating disorder    Muscle weakness    Neuropathy    Night sweats    Osteoarthritis    Pain in joints    Pneumonia of both lungs due to infectious organism, unspecified part of lung    Pulmonary emboli (HCC)    Pulmonary embolism (HCC)    Renal disorder    CKD RECONCILLED FROM OUTSIDE PROBLEM LIST    RSV (acute bronchiolitis due to respiratory syncytial virus)    Shortness of breath    Stress    Thyroid disease    Uncomfortable fullness after meals    Visual impairment    GLASSES    Wears glasses   [2]   Past Surgical History:  Procedure Laterality Date    Amputation finger/thumb+flaps      Cath bare metal stent (bms)      x 1    Cholecystectomy      Colectomy      Colonoscopy      Colonoscopy N/A 01/15/2024    Procedure: COLONOSCOPY;  Surgeon: Tirso Kimball MD;  Location:  ENDOSCOPY    Hysterectomy     [3]   Family History  Problem Relation Age of Onset    Heart Disorder Father     Stroke Father     Uterine Cancer Mother     Diabetes Mother     Cancer Mother         colon    Heart Attack Daughter     Diabetes Daughter     Colon Polyps Daughter     Colon Polyps Son    [4]   Social History  Socioeconomic History    Marital status:    Tobacco Use    Smoking status: Some Days     Current packs/day: 0.50     Average packs/day: 0.5 packs/day for 63.8 years (31.9 ttl pk-yrs)     Types: Cigarettes     Start date: 2/24/1964    Smokeless tobacco: Never   Vaping Use    Vaping status: Never Used   Substance and Sexual Activity    Alcohol use: Not Currently     Comment: RARE    Drug use: Never   Other Topics Concern    Caffeine Concern Yes     Comment: coffee/coca cola/Iced tea    Exercise No     Social Drivers of Health     Food Insecurity: No Food Insecurity (5/15/2025)    NCSS - Food Insecurity     Worried About Running Out of Food in the Last Year: No     Ran  Out of Food in the Last Year: No   Transportation Needs: No Transportation Needs (5/15/2025)    NCSS - Transportation     Lack of Transportation: No   Stress: No Stress Concern Present (8/15/2023)    Stress     Feeling of Stress : No   Housing Stability: Not At Risk (5/15/2025)    NCSS - Housing/Utilities     Has Housing: Yes     Worried About Losing Housing: No     Unable to Get Utilities: No   [5]   Current Outpatient Medications   Medication Sig Dispense Refill    ezetimibe 10 MG Oral Tab       Esomeprazole Magnesium 40 MG Oral Capsule Delayed Release Take 1 capsule (40 mg total) by mouth before breakfast. 90 capsule 1    methylPREDNISolone (MEDROL) 4 MG Oral Tablet Therapy Pack As directed. 1 each 0    albuterol 108 (90 Base) MCG/ACT Inhalation Aero Soln Inhale 2 puffs into the lungs every 6 (six) hours as needed for Wheezing or Shortness of Breath. 18 g 0    nystatin 100,000 Units/g External Cream Apply 1 Application topically in the morning and 1 Application in the evening. 30 g 0    vancomycin 125 MG Oral Cap Take 1 capsule (125 mg total) by mouth daily. Take for c diff prophylaxis due to hx of c diff 7 capsule 0    busPIRone 10 MG Oral Tab Take 1 tablet (10 mg total) by mouth 2 (two) times daily. 180 tablet 1    gabapentin 100 MG Oral Cap TAKE 1 CAPSULE BY MOUTH IN THE MORNING, 1-2 CAPSULES BY MOUTH IN THE AFTERNOON, AND 3 CAPSULES BY MOUTH IN THE EVENING DAILY 180 capsule 3    ESTRADIOL 0.1 MG/GM Vaginal Cream PLACE 1 GRAM VAGINALLY DAILY 42.5 g 5    azelastine 137 MCG/SPRAY Nasal Solution 1 spray by Nasal route 2 (two) times daily. 30 mL 0    furosemide 20 MG Oral Tab Take 1 tablet (20 mg total) by mouth 2 (two) times daily. 180 tablet 1    Potassium Chloride ER 20 MEQ Oral Tab CR Take 1 tablet by mouth daily. 90 tablet 3    MONTELUKAST 10 MG Oral Tab TAKE 1 TABLET BY MOUTH EVERY DAY AT NIGHT 90 tablet 3    benzonatate 200 MG Oral Cap Take 1 capsule (200 mg total) by mouth 3 (three) times daily as needed  for cough. 30 capsule 5    methylPREDNISolone (MEDROL) 4 MG Oral Tablet Therapy Pack As directed. 21 each 0    ALPRAZolam 0.5 MG Oral Tab TAKE 1 TABLET BY MOUTH NIGHTLY AS SCHEDULED AND 1/2 TAB DAILY AS NEEDED FOR ANXIETY 45 tablet 2    levothyroxine (SYNTHROID) 75 MCG Oral Tab Take 1 tablet (75 mcg total) by mouth before breakfast. 90 tablet 0    ELMIRON 100 MG Oral Cap TAKE 1 CAPSULE BY MOUTH EVERY DAY 90 capsule 1    LOSARTAN 25 MG Oral Tab TAKE 1 TABLET (25 MG TOTAL) BY MOUTH DAILY. 90 tablet 1    metoprolol tartrate 50 MG Oral Tab TAKE 1/2 TABLET BY MOUTH 2 TIMES DAILY. 90 tablet 3    CHOLESTYRAMINE 4 g Oral Powd Pack 1- 2 PACKETS BY MOUTH DAILY OR TWICE DAILY. 60 packet 0    HYDROcodone-acetaminophen 5-325 MG Oral Tab Take 1 tablet by mouth as needed in the morning and 1 tablet as needed in the evening for Pain.      cilostazol 50 MG Oral Tab Take 1 tablet (50 mg total) by mouth 2 (two) times daily. 180 tablet 3    ondansetron 4 MG Oral Tablet Dispersible Take 1 tablet (4 mg total) by mouth every 4 (four) hours as needed. 12 tablet 0    traMADol 50 MG Oral Tab Take 1 tablet (50 mg total) by mouth as needed for Pain. 15 tablet 0    Naloxone HCl 4 MG/0.1ML Nasal Liquid 4 mg by Nasal route as needed. If patient remains unresponsive, repeat dose in other nostril 2-5 minutes after first dose. 1 kit 0    ASPIRIN LOW DOSE 81 MG Oral Tab EC Take 1 tablet (81 mg total) by mouth in the morning.      sertraline (ZOLOFT) 25 MG Oral Tab Take 1 tablet (25 mg total) by mouth daily. (Patient taking differently: Take 1 tablet (25 mg total) by mouth in the morning. Takes 1/2 tablet.) 90 tablet 3    DICYCLOMINE 20 MG Oral Tab TAKE 1 TABLET BY MOUTH THREE TIMES A  tablet 0    ALBUTEROL 108 (90 Base) MCG/ACT Inhalation Aero Soln TAKE 2 PUFFS BY MOUTH EVERY 6 HOURS AS NEEDED FOR WHEEZE OR SHORTNESS OF BREATH 25.5 each 3    ACYCLOVIR 400 MG Oral Tab TAKE 1 TABLET BY MOUTH TWICE A  tablet 0    B Complex Vitamins (B  COMPLEX-B12 OR) Take 1 tablet by mouth in the morning.      Turmeric 400 MG Oral Cap Take 1 capsule by mouth in the morning.      Ascorbic Acid (VITAMIN C) 1000 MG Oral Tab Take 1 tablet (1,000 mg total) by mouth in the morning.      omega-3 fatty acids 1000 MG Oral Cap Take 1,000 mg by mouth in the morning.      loratadine 10 MG Oral Tablet Dispersible Take 1 tablet (10 mg total) by mouth in the morning.      Capsicum, Cayenne, (CAYENNE PEPPER OR) Take 1 tablet by mouth in the morning.      Nebulizers ([employer] AEROSOL DELIVERY SYSTEM) Does not apply Misc Take 1 Bottle by mouth As Directed.      Respiratory Therapy Supplies (NEBULIZER/TUBING/MOUTHPIECE) Does not apply Kit Use as directed 1 each 0    TRELEGY ELLIPTA 100-62.5-25 MCG/ACT Inhalation Aerosol Powder, Breath Activated Inhale 1 puff into the lungs daily. 180 each 3    diclofenac 1 % External Gel Apply 2 g topically 4 (four) times daily. 100 g 5    Vitamin D3, Cholecalciferol, 125 MCG (5000 UT) Oral Cap Take 1 capsule (5,000 Units total) by mouth in the morning.     [6]   Allergies  Allergen Reactions    Keflex [Cephalexin] HIVES    Rosuvastatin MYALGIA    Cephalosporins NAUSEA ONLY     NAUSEA ONLY WHEN TAKEN ON AN EMPTY STOMACH, OTHERWISE CAN TAKE

## 2025-07-02 ENCOUNTER — LAB ENCOUNTER (OUTPATIENT)
Dept: LAB | Age: 80
End: 2025-07-02
Attending: FAMILY MEDICINE
Payer: MEDICARE

## 2025-07-02 DIAGNOSIS — E11.42 TYPE 2 DIABETES MELLITUS WITH DIABETIC POLYNEUROPATHY, WITHOUT LONG-TERM CURRENT USE OF INSULIN (HCC): ICD-10-CM

## 2025-07-02 DIAGNOSIS — E03.9 HYPOTHYROIDISM, UNSPECIFIED TYPE: ICD-10-CM

## 2025-07-02 LAB
ALBUMIN SERPL-MCNC: 3.9 G/DL (ref 3.2–4.8)
ALBUMIN/GLOB SERPL: 1.4 {RATIO} (ref 1–2)
ALP LIVER SERPL-CCNC: 51 U/L (ref 55–142)
ALT SERPL-CCNC: <7 U/L (ref 10–49)
ANION GAP SERPL CALC-SCNC: 12 MMOL/L (ref 0–18)
AST SERPL-CCNC: 12 U/L (ref ?–34)
BILIRUB SERPL-MCNC: 0.2 MG/DL (ref 0.2–1.1)
BUN BLD-MCNC: 14 MG/DL (ref 9–23)
CALCIUM BLD-MCNC: 9.3 MG/DL (ref 8.7–10.6)
CHLORIDE SERPL-SCNC: 109 MMOL/L (ref 98–112)
CHOLEST SERPL-MCNC: 240 MG/DL (ref ?–200)
CO2 SERPL-SCNC: 23 MMOL/L (ref 21–32)
CREAT BLD-MCNC: 1.22 MG/DL (ref 0.55–1.02)
EGFRCR SERPLBLD CKD-EPI 2021: 45 ML/MIN/1.73M2 (ref 60–?)
EST. AVERAGE GLUCOSE BLD GHB EST-MCNC: 137 MG/DL (ref 68–126)
FASTING PATIENT LIPID ANSWER: YES
FASTING STATUS PATIENT QL REPORTED: YES
GLOBULIN PLAS-MCNC: 2.8 G/DL (ref 2–3.5)
GLUCOSE BLD-MCNC: 90 MG/DL (ref 70–99)
HBA1C MFR BLD: 6.4 % (ref ?–5.7)
HDLC SERPL-MCNC: 70 MG/DL (ref 40–59)
LDLC SERPL CALC-MCNC: 139 MG/DL (ref ?–100)
NONHDLC SERPL-MCNC: 170 MG/DL (ref ?–130)
OSMOLALITY SERPL CALC.SUM OF ELEC: 298 MOSM/KG (ref 275–295)
POTASSIUM SERPL-SCNC: 4.2 MMOL/L (ref 3.5–5.1)
PROT SERPL-MCNC: 6.7 G/DL (ref 5.7–8.2)
SODIUM SERPL-SCNC: 144 MMOL/L (ref 136–145)
TRIGL SERPL-MCNC: 174 MG/DL (ref 30–149)
TSI SER-ACNC: 0.89 UIU/ML (ref 0.55–4.78)
VLDLC SERPL CALC-MCNC: 32 MG/DL (ref 0–30)

## 2025-07-02 PROCEDURE — 84443 ASSAY THYROID STIM HORMONE: CPT

## 2025-07-02 PROCEDURE — 80061 LIPID PANEL: CPT

## 2025-07-02 PROCEDURE — 36415 COLL VENOUS BLD VENIPUNCTURE: CPT

## 2025-07-02 PROCEDURE — 80053 COMPREHEN METABOLIC PANEL: CPT

## 2025-07-02 PROCEDURE — 83036 HEMOGLOBIN GLYCOSYLATED A1C: CPT

## 2025-07-02 RX ORDER — LEVOTHYROXINE SODIUM 75 MCG
75 TABLET ORAL
Qty: 90 TABLET | Refills: 3 | Status: SHIPPED | OUTPATIENT
Start: 2025-07-02

## 2025-07-02 NOTE — TELEPHONE ENCOUNTER
Patient returned call  Asked if she can complete her lab today, advised ok to complete TSH today

## 2025-07-03 ENCOUNTER — PATIENT OUTREACH (OUTPATIENT)
Dept: CASE MANAGEMENT | Age: 80
End: 2025-07-03

## 2025-07-03 NOTE — PROGRESS NOTES
New CCM information sent to patient.    CCM to reach out to patient during the month for monthly outreach.

## 2025-07-04 DIAGNOSIS — R05.3 PERSISTENT COUGH: ICD-10-CM

## 2025-07-08 RX ORDER — ALBUTEROL SULFATE 90 UG/1
2 INHALANT RESPIRATORY (INHALATION) EVERY 6 HOURS PRN
Qty: 18 EACH | Refills: 0 | Status: SHIPPED | OUTPATIENT
Start: 2025-07-08

## 2025-07-08 NOTE — TELEPHONE ENCOUNTER
Refill passed per [facility] protocol.  Requested Prescriptions   Pending Prescriptions Disp Refills    ALBUTEROL 108 (90 Base) MCG/ACT Inhalation Aero Soln [Pharmacy Med Name: ALBUTEROL HFA (VENTOLIN) INH] 18 each 0     Sig: TAKE 2 PUFFS BY MOUTH EVERY 6 HOURS AS NEEDED FOR WHEEZE OR SHORTNESS OF BREATH       Asthma & COPD Medication Protocol Passed - 7/8/2025  9:04 AM        Passed - Appointment in past 6 or next 3 months      Recent Outpatient Visits              1 week ago Bladder pain    [address] Isha Shields APRN    Office Visit    3 weeks ago UTI symptoms    [address] Neftaly Evangelista PA-C    Office Visit    1 month ago Persistent cough    [address] Neftaly Evangelista PA-C    Office Visit    1 month ago Shortness of breath    [address] August Coley MD    Office Visit    2 months ago 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)    [facility] Cuong Tolliver MD    Office Visit          Future Appointments         Provider Department Appt Notes    In 1 week Kasey Joy APRN [facility], [facility] 4/3/25 FUOV for diarrhea with  on 5/23/25 at 9:40AM-DartanyaB(made aware of location)  5/16/25 R/S to 7/21/25 at 1PM-DartanyaB    In 2 weeks August Coley MD [address] 3 [place]u ([facility])    In 1 month PF CT [address]     In 1 month PF OOT [facility] Ignacio md reza    In 1 month Cuong Tolliver MD [facility] md reza    In 1 month Isha Shields APRN [address] 2 mo                    Passed - Medication is active on med list

## 2025-07-21 DIAGNOSIS — J01.00 ACUTE NON-RECURRENT MAXILLARY SINUSITIS: ICD-10-CM

## 2025-07-21 DIAGNOSIS — R05.3 PERSISTENT COUGH: ICD-10-CM

## 2025-07-21 RX ORDER — AZELASTINE HYDROCHLORIDE 137 UG/1
1 SPRAY, METERED NASAL 2 TIMES DAILY
Qty: 30 ML | Refills: 0 | Status: SHIPPED | OUTPATIENT
Start: 2025-07-21

## 2025-07-21 NOTE — TELEPHONE ENCOUNTER
Refilled  Orders Placed This Encounter    azelastine 137 MCG/SPRAY Nasal Solution     Si spray by Nasal route 2 (two) times daily. Only use as needed for nasal congestion     Dispense:  30 mL     Refill:  0

## 2025-07-24 ENCOUNTER — TELEPHONE (OUTPATIENT)
Dept: SURGERY | Facility: CLINIC | Age: 80
End: 2025-07-24

## 2025-07-24 ENCOUNTER — PATIENT OUTREACH (OUTPATIENT)
Dept: CASE MANAGEMENT | Age: 80
End: 2025-07-24

## 2025-07-24 DIAGNOSIS — R82.90 URINE FINDING: Primary | ICD-10-CM

## 2025-07-24 NOTE — TELEPHONE ENCOUNTER
Last office visit on 6/30/25  RN called patient.  She reports, urgency, leaking and burning when urinating. Solifenacin and Pyridium not working. Asking for alternatives. Takes Ibuprofen with food. Uses nystatin cream before and is using it as well. Uses pads and avoid bladder irritants. Uses estrogen as well.     RN recommends  Need of urine sample for UA/Cx to rule out infection  Continue to push fluids. Avoid drinking fluids after 6pm. Sip of water before bed with meds  Take time to urinate  Empty bladder completely  Avoid constipation  Discussed use of Pyridium. Urine turns orange. Follow instructions in the box for frequency  Attend hygiene promptly. Change pads often. May need blue chucks at night, if not wearing undee.   May use Desitin or zinc oxide to avoid skin irritation. Discussed how to apply. Not to use with Nystatin.   Avoid bladder irritants    She agreed to plans and verbalized understanding.    Request to change Solifenacin forwarded to APRN to advise

## 2025-07-24 NOTE — TELEPHONE ENCOUNTER
Pt called.  Pt was rx.  Medication is not working.  Pt is having burning in the vagina area. A little urine leakage.  Please call

## 2025-07-25 RX ORDER — MIRABEGRON 25 MG/1
25 TABLET, FILM COATED, EXTENDED RELEASE ORAL DAILY
Qty: 90 TABLET | Refills: 0 | Status: SHIPPED | OUTPATIENT
Start: 2025-07-25 | End: 2025-10-23

## 2025-07-28 ENCOUNTER — OFFICE VISIT (OUTPATIENT)
Dept: FAMILY MEDICINE CLINIC | Facility: CLINIC | Age: 80
End: 2025-07-28
Payer: MEDICARE

## 2025-07-28 VITALS
HEART RATE: 71 BPM | SYSTOLIC BLOOD PRESSURE: 130 MMHG | BODY MASS INDEX: 24.75 KG/M2 | HEIGHT: 64 IN | DIASTOLIC BLOOD PRESSURE: 72 MMHG | WEIGHT: 145 LBS | OXYGEN SATURATION: 94 %

## 2025-07-28 DIAGNOSIS — Z78.0 POSTMENOPAUSAL STATE: ICD-10-CM

## 2025-07-28 DIAGNOSIS — E11.42 TYPE 2 DIABETES MELLITUS WITH DIABETIC POLYNEUROPATHY, WITHOUT LONG-TERM CURRENT USE OF INSULIN (HCC): ICD-10-CM

## 2025-07-28 DIAGNOSIS — R35.0 INCREASED URINARY FREQUENCY: Primary | ICD-10-CM

## 2025-07-28 LAB
APPEARANCE: CLEAR
BILIRUBIN: NEGATIVE
GLUCOSE (URINE DIPSTICK): NEGATIVE MG/DL
KETONES (URINE DIPSTICK): NEGATIVE MG/DL
LEUKOCYTES: NEGATIVE
MULTISTIX LOT#: ABNORMAL NUMERIC
NITRITE, URINE: NEGATIVE
PH, URINE: 6 (ref 4.5–8)
PROTEIN (URINE DIPSTICK): NEGATIVE MG/DL
SPECIFIC GRAVITY: 1.01 (ref 1–1.03)
URINE-COLOR: YELLOW
UROBILINOGEN,SEMI-QN: 0.2 MG/DL (ref 0–1.9)

## 2025-07-28 PROCEDURE — 3075F SYST BP GE 130 - 139MM HG: CPT | Performed by: FAMILY MEDICINE

## 2025-07-28 PROCEDURE — 1160F RVW MEDS BY RX/DR IN RCRD: CPT | Performed by: FAMILY MEDICINE

## 2025-07-28 PROCEDURE — 99214 OFFICE O/P EST MOD 30 MIN: CPT | Performed by: FAMILY MEDICINE

## 2025-07-28 PROCEDURE — 3078F DIAST BP <80 MM HG: CPT | Performed by: FAMILY MEDICINE

## 2025-07-28 PROCEDURE — 87077 CULTURE AEROBIC IDENTIFY: CPT | Performed by: FAMILY MEDICINE

## 2025-07-28 PROCEDURE — 81003 URINALYSIS AUTO W/O SCOPE: CPT | Performed by: FAMILY MEDICINE

## 2025-07-28 PROCEDURE — 87186 SC STD MICRODIL/AGAR DIL: CPT | Performed by: FAMILY MEDICINE

## 2025-07-28 PROCEDURE — 87086 URINE CULTURE/COLONY COUNT: CPT | Performed by: FAMILY MEDICINE

## 2025-07-28 PROCEDURE — 1159F MED LIST DOCD IN RCRD: CPT | Performed by: FAMILY MEDICINE

## 2025-07-28 PROCEDURE — 3008F BODY MASS INDEX DOCD: CPT | Performed by: FAMILY MEDICINE

## 2025-07-28 NOTE — TELEPHONE ENCOUNTER
This encounter is now closed.     RN called patient. No answer. Left message. APRN sent order of Mirabegron 25mg to [facility].     Medication Detail    Medication Quantity Refills Start End   Mirabegron ER (MYRBETRIQ) 25 MG Oral Tablet 24 Hr 90 tablet 0 7/25/2025 10/23/2025   Sig:   Take 1 tablet (25 mg total) by mouth daily.

## 2025-07-29 ENCOUNTER — TELEPHONE (OUTPATIENT)
Dept: FAMILY MEDICINE CLINIC | Facility: CLINIC | Age: 80
End: 2025-07-29

## 2025-08-01 ENCOUNTER — OFFICE VISIT (OUTPATIENT)
Dept: FAMILY MEDICINE CLINIC | Facility: CLINIC | Age: 80
End: 2025-08-01

## 2025-08-01 VITALS
HEIGHT: 64 IN | RESPIRATION RATE: 16 BRPM | OXYGEN SATURATION: 98 % | WEIGHT: 144 LBS | BODY MASS INDEX: 24.59 KG/M2 | DIASTOLIC BLOOD PRESSURE: 78 MMHG | TEMPERATURE: 98 F | HEART RATE: 68 BPM | SYSTOLIC BLOOD PRESSURE: 136 MMHG

## 2025-08-01 DIAGNOSIS — N30.00 ACUTE CYSTITIS WITHOUT HEMATURIA: Primary | ICD-10-CM

## 2025-08-01 PROCEDURE — 3008F BODY MASS INDEX DOCD: CPT | Performed by: EMERGENCY MEDICINE

## 2025-08-01 PROCEDURE — 1125F AMNT PAIN NOTED PAIN PRSNT: CPT | Performed by: EMERGENCY MEDICINE

## 2025-08-01 PROCEDURE — 1159F MED LIST DOCD IN RCRD: CPT | Performed by: EMERGENCY MEDICINE

## 2025-08-01 PROCEDURE — 1160F RVW MEDS BY RX/DR IN RCRD: CPT | Performed by: EMERGENCY MEDICINE

## 2025-08-01 PROCEDURE — G2211 COMPLEX E/M VISIT ADD ON: HCPCS | Performed by: EMERGENCY MEDICINE

## 2025-08-01 PROCEDURE — 3078F DIAST BP <80 MM HG: CPT | Performed by: EMERGENCY MEDICINE

## 2025-08-01 PROCEDURE — 3075F SYST BP GE 130 - 139MM HG: CPT | Performed by: EMERGENCY MEDICINE

## 2025-08-01 PROCEDURE — 99213 OFFICE O/P EST LOW 20 MIN: CPT | Performed by: EMERGENCY MEDICINE

## 2025-08-02 ENCOUNTER — HOSPITAL ENCOUNTER (EMERGENCY)
Age: 80
Discharge: HOME | End: 2025-08-02
Attending: EMERGENCY MEDICINE

## 2025-08-02 VITALS
OXYGEN SATURATION: 100 % | BODY MASS INDEX: 24.41 KG/M2 | TEMPERATURE: 98 F | WEIGHT: 143 LBS | SYSTOLIC BLOOD PRESSURE: 138 MMHG | HEIGHT: 64 IN | DIASTOLIC BLOOD PRESSURE: 80 MMHG | RESPIRATION RATE: 18 BRPM | HEART RATE: 65 BPM

## 2025-08-02 DIAGNOSIS — N30.10 INTERSTITIAL CYSTITIS: ICD-10-CM

## 2025-08-02 DIAGNOSIS — N30.00 ACUTE CYSTITIS WITHOUT HEMATURIA: Primary | ICD-10-CM

## 2025-08-02 DIAGNOSIS — R05.3 PERSISTENT COUGH: ICD-10-CM

## 2025-08-02 LAB
ALBUMIN SERPL-MCNC: 4.1 G/DL (ref 3.2–4.8)
ALBUMIN/GLOB SERPL: 1.6 (ref 1–2)
ALP LIVER SERPL-CCNC: 60 U/L (ref 55–142)
ALT SERPL-CCNC: 7 U/L (ref 10–49)
ANION GAP SERPL CALC-SCNC: 10 MMOL/L (ref 0–18)
AST SERPL-CCNC: 12 U/L (ref ?–34)
BASOPHILS # BLD AUTO: 0.05 X10(3) UL (ref 0–0.2)
BASOPHILS NFR BLD AUTO: 0.5 %
BILIRUB SERPL-MCNC: 0.3 MG/DL (ref 0.2–1.1)
BILIRUB UR QL STRIP.AUTO: NEGATIVE
BUN BLD-MCNC: 13 MG/DL (ref 9–23)
CALCIUM BLD-MCNC: 9 MG/DL (ref 8.7–10.6)
CHLORIDE SERPL-SCNC: 106 MMOL/L (ref 98–112)
CLARITY UR REFRACT.AUTO: CLEAR
CO2 SERPL-SCNC: 23 MMOL/L (ref 21–32)
COLOR UR AUTO: YELLOW
CREAT BLD-MCNC: 1.18 MG/DL (ref 0.55–1.02)
EGFRCR SERPLBLD CKD-EPI 2021: 46 ML/MIN/1.73M2 (ref 60–?)
EOSINOPHIL # BLD AUTO: 0.11 X10(3) UL (ref 0–0.7)
EOSINOPHIL NFR BLD AUTO: 1 %
ERYTHROCYTE [DISTWIDTH] IN BLOOD BY AUTOMATED COUNT: 15.8 %
GLOBULIN PLAS-MCNC: 2.6 G/DL (ref 2–3.5)
GLUCOSE BLD-MCNC: 118 MG/DL (ref 70–99)
GLUCOSE UR STRIP.AUTO-MCNC: NEGATIVE MG/DL
HCT VFR BLD AUTO: 39.8 % (ref 35–48)
HGB BLD-MCNC: 12.8 G/DL (ref 12–16)
IMM GRANULOCYTES # BLD AUTO: 0.08 X10(3) UL (ref 0–1)
IMM GRANULOCYTES NFR BLD: 0.8 %
KETONES UR STRIP.AUTO-MCNC: NEGATIVE MG/DL
LEUKOCYTE ESTERASE UR QL STRIP.AUTO: NEGATIVE
LYMPHOCYTES # BLD AUTO: 0.82 X10(3) UL (ref 1–4)
LYMPHOCYTES NFR BLD AUTO: 7.8 %
MCH RBC QN AUTO: 28.8 PG (ref 26–34)
MCHC RBC AUTO-ENTMCNC: 32.2 G/DL (ref 31–37)
MCV RBC AUTO: 89.6 FL (ref 80–100)
MONOCYTES # BLD AUTO: 0.9 X10(3) UL (ref 0.1–1)
MONOCYTES NFR BLD AUTO: 8.6 %
NEUTROPHILS # BLD AUTO: 8.54 X10 (3) UL (ref 1.5–7.7)
NEUTROPHILS # BLD AUTO: 8.54 X10(3) UL (ref 1.5–7.7)
NEUTROPHILS NFR BLD AUTO: 81.3 %
NITRITE UR QL STRIP.AUTO: NEGATIVE
OSMOLALITY SERPL CALC.SUM OF ELEC: 289 MOSM/KG (ref 275–295)
PH UR STRIP.AUTO: 5 (ref 5–8)
PLATELET # BLD AUTO: 363 10(3)UL (ref 150–450)
POTASSIUM SERPL-SCNC: 3.7 MMOL/L (ref 3.5–5.1)
PROT SERPL-MCNC: 6.7 G/DL (ref 5.7–8.2)
PROT UR STRIP.AUTO-MCNC: NEGATIVE MG/DL
RBC # BLD AUTO: 4.44 X10(6)UL (ref 3.8–5.3)
RBC UR QL AUTO: NEGATIVE
SODIUM SERPL-SCNC: 139 MMOL/L (ref 136–145)
SP GR UR STRIP.AUTO: <=1.005 (ref 1–1.03)
UROBILINOGEN UR STRIP.AUTO-MCNC: 0.2 MG/DL
WBC # BLD AUTO: 10.5 X10(3) UL (ref 4–11)

## 2025-08-02 PROCEDURE — 85025 COMPLETE CBC W/AUTO DIFF WBC: CPT | Performed by: EMERGENCY MEDICINE

## 2025-08-02 PROCEDURE — 99285 EMERGENCY DEPT VISIT HI MDM: CPT

## 2025-08-02 PROCEDURE — 80053 COMPREHEN METABOLIC PANEL: CPT | Performed by: EMERGENCY MEDICINE

## 2025-08-02 PROCEDURE — 36415 COLL VENOUS BLD VENIPUNCTURE: CPT

## 2025-08-02 PROCEDURE — 81003 URINALYSIS AUTO W/O SCOPE: CPT | Performed by: EMERGENCY MEDICINE

## 2025-08-02 PROCEDURE — 99283 EMERGENCY DEPT VISIT LOW MDM: CPT

## 2025-08-02 PROCEDURE — 87086 URINE CULTURE/COLONY COUNT: CPT | Performed by: EMERGENCY MEDICINE

## 2025-08-02 RX ORDER — NITROFURANTOIN 25; 75 MG/1; MG/1
100 CAPSULE ORAL 2 TIMES DAILY
Qty: 6 CAPSULE | Refills: 0 | Status: SHIPPED | OUTPATIENT
Start: 2025-08-02 | End: 2025-08-05

## 2025-08-04 ENCOUNTER — PATIENT OUTREACH (OUTPATIENT)
Age: 80
End: 2025-08-04

## 2025-08-04 ENCOUNTER — TELEPHONE (OUTPATIENT)
Dept: FAMILY MEDICINE CLINIC | Facility: CLINIC | Age: 80
End: 2025-08-04

## 2025-08-04 DIAGNOSIS — F41.9 ANXIETY AND DEPRESSION: ICD-10-CM

## 2025-08-04 DIAGNOSIS — F32.A ANXIETY AND DEPRESSION: ICD-10-CM

## 2025-08-05 ENCOUNTER — OFFICE VISIT (OUTPATIENT)
Dept: FAMILY MEDICINE CLINIC | Facility: CLINIC | Age: 80
End: 2025-08-05

## 2025-08-05 VITALS
HEART RATE: 73 BPM | HEIGHT: 64 IN | SYSTOLIC BLOOD PRESSURE: 120 MMHG | DIASTOLIC BLOOD PRESSURE: 52 MMHG | OXYGEN SATURATION: 97 % | RESPIRATION RATE: 21 BRPM | BODY MASS INDEX: 24.41 KG/M2 | WEIGHT: 143 LBS

## 2025-08-05 DIAGNOSIS — N30.10 CHRONIC INTERSTITIAL CYSTITIS: ICD-10-CM

## 2025-08-05 DIAGNOSIS — N30.00 ACUTE CYSTITIS WITHOUT HEMATURIA: Primary | ICD-10-CM

## 2025-08-05 PROCEDURE — 99214 OFFICE O/P EST MOD 30 MIN: CPT

## 2025-08-05 PROCEDURE — 3008F BODY MASS INDEX DOCD: CPT

## 2025-08-05 PROCEDURE — 3078F DIAST BP <80 MM HG: CPT

## 2025-08-05 PROCEDURE — 3074F SYST BP LT 130 MM HG: CPT

## 2025-08-05 RX ORDER — ALBUTEROL SULFATE 90 UG/1
2 INHALANT RESPIRATORY (INHALATION) EVERY 6 HOURS PRN
Qty: 18 EACH | Refills: 3 | Status: SHIPPED | OUTPATIENT
Start: 2025-08-05

## 2025-08-06 RX ORDER — ALPRAZOLAM 0.5 MG
TABLET ORAL
Qty: 45 TABLET | Refills: 2 | Status: SHIPPED | OUTPATIENT
Start: 2025-08-06

## 2025-08-07 ENCOUNTER — HOSPITAL ENCOUNTER (OUTPATIENT)
Dept: CT IMAGING | Age: 80
Discharge: HOME | End: 2025-08-07
Attending: INTERNAL MEDICINE

## 2025-08-07 DIAGNOSIS — R91.1 PULMONARY NODULE: ICD-10-CM

## 2025-08-07 DIAGNOSIS — C83.19 MANTLE CELL LYMPHOMA OF SOLID ORGAN EXCLUDING SPLEEN (HCC): ICD-10-CM

## 2025-08-07 PROCEDURE — 71260 CT THORAX DX C+: CPT | Performed by: INTERNAL MEDICINE

## 2025-08-07 PROCEDURE — 74177 CT ABD & PELVIS W/CONTRAST: CPT | Performed by: INTERNAL MEDICINE

## 2025-08-08 ENCOUNTER — OFFICE VISIT (OUTPATIENT)
Dept: FAMILY MEDICINE CLINIC | Facility: CLINIC | Age: 80
End: 2025-08-08

## 2025-08-08 ENCOUNTER — TELEPHONE (OUTPATIENT)
Dept: FAMILY MEDICINE CLINIC | Facility: CLINIC | Age: 80
End: 2025-08-08

## 2025-08-08 VITALS
BODY MASS INDEX: 24.24 KG/M2 | WEIGHT: 142 LBS | HEIGHT: 64 IN | HEART RATE: 75 BPM | TEMPERATURE: 98 F | RESPIRATION RATE: 16 BRPM | SYSTOLIC BLOOD PRESSURE: 132 MMHG | DIASTOLIC BLOOD PRESSURE: 88 MMHG | OXYGEN SATURATION: 96 %

## 2025-08-08 DIAGNOSIS — R30.0 DYSURIA: Primary | ICD-10-CM

## 2025-08-08 DIAGNOSIS — N89.8 VAGINAL IRRITATION: ICD-10-CM

## 2025-08-08 PROCEDURE — 87086 URINE CULTURE/COLONY COUNT: CPT | Performed by: NURSE PRACTITIONER

## 2025-08-08 PROCEDURE — 1160F RVW MEDS BY RX/DR IN RCRD: CPT | Performed by: NURSE PRACTITIONER

## 2025-08-08 PROCEDURE — 1159F MED LIST DOCD IN RCRD: CPT | Performed by: NURSE PRACTITIONER

## 2025-08-08 PROCEDURE — 3075F SYST BP GE 130 - 139MM HG: CPT | Performed by: NURSE PRACTITIONER

## 2025-08-08 PROCEDURE — 3079F DIAST BP 80-89 MM HG: CPT | Performed by: NURSE PRACTITIONER

## 2025-08-08 PROCEDURE — 3008F BODY MASS INDEX DOCD: CPT | Performed by: NURSE PRACTITIONER

## 2025-08-08 PROCEDURE — 99213 OFFICE O/P EST LOW 20 MIN: CPT | Performed by: NURSE PRACTITIONER

## 2025-08-08 PROCEDURE — 81514 NFCT DS BV&VAGINITIS DNA ALG: CPT | Performed by: NURSE PRACTITIONER

## 2025-08-08 RX ORDER — CLOTRIMAZOLE 1 %
1 CREAM WITH APPLICATOR VAGINAL NIGHTLY
Qty: 1 EACH | Refills: 0 | Status: SHIPPED | OUTPATIENT
Start: 2025-08-08 | End: 2025-08-13

## 2025-08-11 ENCOUNTER — PATIENT OUTREACH (OUTPATIENT)
Age: 80
End: 2025-08-11

## 2025-08-12 ENCOUNTER — TELEPHONE (OUTPATIENT)
Dept: FAMILY MEDICINE CLINIC | Facility: CLINIC | Age: 80
End: 2025-08-12

## 2025-08-13 ENCOUNTER — OFFICE VISIT (OUTPATIENT)
Facility: LOCATION | Age: 80
End: 2025-08-13
Attending: INTERNAL MEDICINE

## 2025-08-13 ENCOUNTER — APPOINTMENT (OUTPATIENT)
Facility: LOCATION | Age: 80
End: 2025-08-13
Attending: INTERNAL MEDICINE

## 2025-08-13 ENCOUNTER — TELEPHONE (OUTPATIENT)
Dept: FAMILY MEDICINE CLINIC | Facility: CLINIC | Age: 80
End: 2025-08-13

## 2025-08-13 VITALS
RESPIRATION RATE: 18 BRPM | DIASTOLIC BLOOD PRESSURE: 74 MMHG | WEIGHT: 145.5 LBS | OXYGEN SATURATION: 95 % | TEMPERATURE: 97 F | HEART RATE: 78 BPM | BODY MASS INDEX: 24.84 KG/M2 | HEIGHT: 64.02 IN | SYSTOLIC BLOOD PRESSURE: 164 MMHG

## 2025-08-13 DIAGNOSIS — R91.1 PULMONARY NODULE: ICD-10-CM

## 2025-08-13 DIAGNOSIS — C83.19 MANTLE CELL LYMPHOMA OF SOLID ORGAN EXCLUDING SPLEEN (HCC): ICD-10-CM

## 2025-08-13 DIAGNOSIS — D47.2 MGUS (MONOCLONAL GAMMOPATHY OF UNKNOWN SIGNIFICANCE): Primary | ICD-10-CM

## 2025-08-13 DIAGNOSIS — F32.1 CURRENT MODERATE EPISODE OF MAJOR DEPRESSIVE DISORDER WITHOUT PRIOR EPISODE (HCC): ICD-10-CM

## 2025-08-13 LAB
ALBUMIN SERPL-MCNC: 4 G/DL (ref 3.2–4.8)
ALBUMIN/GLOB SERPL: 1.4 (ref 1–2)
ALP LIVER SERPL-CCNC: 64 U/L (ref 55–142)
ALT SERPL-CCNC: 7 U/L (ref 10–49)
ANION GAP SERPL CALC-SCNC: 10 MMOL/L (ref 0–18)
AST SERPL-CCNC: 11 U/L (ref ?–34)
BASOPHILS # BLD AUTO: 0.01 X10(3) UL (ref 0–0.2)
BASOPHILS NFR BLD AUTO: 0.1 %
BILIRUB SERPL-MCNC: 0.2 MG/DL (ref 0.2–1.1)
BUN BLD-MCNC: 18 MG/DL (ref 9–23)
CALCIUM BLD-MCNC: 9.3 MG/DL (ref 8.7–10.6)
CHLORIDE SERPL-SCNC: 106 MMOL/L (ref 98–112)
CO2 SERPL-SCNC: 23 MMOL/L (ref 21–32)
CREAT BLD-MCNC: 1.12 MG/DL (ref 0.55–1.02)
EGFRCR SERPLBLD CKD-EPI 2021: 49 ML/MIN/1.73M2 (ref 60–?)
EOSINOPHIL # BLD AUTO: 0 X10(3) UL (ref 0–0.7)
EOSINOPHIL NFR BLD AUTO: 0 %
ERYTHROCYTE [DISTWIDTH] IN BLOOD BY AUTOMATED COUNT: 15.9 %
FASTING STATUS PATIENT QL REPORTED: NO
GLOBULIN PLAS-MCNC: 2.8 G/DL (ref 2–3.5)
GLUCOSE BLD-MCNC: 130 MG/DL (ref 70–99)
HCT VFR BLD AUTO: 36.1 % (ref 35–48)
HGB BLD-MCNC: 11.7 G/DL (ref 12–16)
IMM GRANULOCYTES # BLD AUTO: 0.1 X10(3) UL (ref 0–1)
IMM GRANULOCYTES NFR BLD: 0.9 %
LYMPHOCYTES # BLD AUTO: 1.03 X10(3) UL (ref 1–4)
LYMPHOCYTES NFR BLD AUTO: 9.2 %
MCH RBC QN AUTO: 28.8 PG (ref 26–34)
MCHC RBC AUTO-ENTMCNC: 32.4 G/DL (ref 31–37)
MCV RBC AUTO: 88.9 FL (ref 80–100)
MONOCYTES # BLD AUTO: 0.89 X10(3) UL (ref 0.1–1)
MONOCYTES NFR BLD AUTO: 7.9 %
NEUTROPHILS # BLD AUTO: 9.2 X10 (3) UL (ref 1.5–7.7)
NEUTROPHILS # BLD AUTO: 9.2 X10(3) UL (ref 1.5–7.7)
NEUTROPHILS NFR BLD AUTO: 81.9 %
OSMOLALITY SERPL CALC.SUM OF ELEC: 292 MOSM/KG (ref 275–295)
PLATELET # BLD AUTO: 403 10(3)UL (ref 150–450)
POTASSIUM SERPL-SCNC: 4 MMOL/L (ref 3.5–5.1)
PROT SERPL-MCNC: 6.8 G/DL (ref 5.7–8.2)
RBC # BLD AUTO: 4.06 X10(6)UL (ref 3.8–5.3)
SODIUM SERPL-SCNC: 139 MMOL/L (ref 136–145)
WBC # BLD AUTO: 11.2 X10(3) UL (ref 4–11)

## 2025-08-14 DIAGNOSIS — J44.1 CHRONIC OBSTRUCTIVE PULMONARY DISEASE WITH ACUTE EXACERBATION (HCC): ICD-10-CM

## 2025-08-14 DIAGNOSIS — R05.3 PERSISTENT COUGH: ICD-10-CM

## 2025-08-14 DIAGNOSIS — Z76.0 ENCOUNTER FOR MEDICATION REFILL: ICD-10-CM

## 2025-08-14 DIAGNOSIS — J01.00 ACUTE NON-RECURRENT MAXILLARY SINUSITIS: ICD-10-CM

## 2025-08-15 RX ORDER — SERTRALINE HYDROCHLORIDE 25 MG/1
25 TABLET, FILM COATED ORAL DAILY
Qty: 90 TABLET | Refills: 3 | Status: SHIPPED | OUTPATIENT
Start: 2025-08-15

## 2025-08-18 ENCOUNTER — OFFICE VISIT (OUTPATIENT)
Facility: LOCATION | Age: 80
End: 2025-08-18

## 2025-08-18 DIAGNOSIS — R39.89 BLADDER PAIN: ICD-10-CM

## 2025-08-18 DIAGNOSIS — R39.15 URINARY URGENCY: ICD-10-CM

## 2025-08-18 DIAGNOSIS — R35.0 URINARY FREQUENCY: ICD-10-CM

## 2025-08-18 DIAGNOSIS — N30.10 INTERSTITIAL CYSTITIS: Primary | ICD-10-CM

## 2025-08-18 DIAGNOSIS — R82.90 URINE FINDING: ICD-10-CM

## 2025-08-18 PROCEDURE — 1159F MED LIST DOCD IN RCRD: CPT

## 2025-08-18 PROCEDURE — 81003 URINALYSIS AUTO W/O SCOPE: CPT

## 2025-08-18 PROCEDURE — 99213 OFFICE O/P EST LOW 20 MIN: CPT

## 2025-08-18 PROCEDURE — 1160F RVW MEDS BY RX/DR IN RCRD: CPT

## 2025-08-18 RX ORDER — AZELASTINE HYDROCHLORIDE 137 UG/1
1 SPRAY, METERED NASAL 2 TIMES DAILY
Qty: 30 ML | Refills: 0 | Status: SHIPPED | OUTPATIENT
Start: 2025-08-18

## 2025-08-18 RX ORDER — PENTOSAN POLYSULFATE SODIUM 100 MG/1
100 CAPSULE, GELATIN COATED ORAL DAILY
Qty: 90 CAPSULE | Refills: 1 | OUTPATIENT
Start: 2025-08-18

## 2025-08-18 RX ORDER — PHENAZOPYRIDINE HYDROCHLORIDE 100 MG/1
100 TABLET, FILM COATED ORAL 3 TIMES DAILY PRN
Qty: 10 TABLET | Refills: 0 | Status: CANCELLED | OUTPATIENT
Start: 2025-08-18

## 2025-08-18 RX ORDER — ACYCLOVIR 400 MG/1
400 TABLET ORAL 2 TIMES DAILY
Qty: 180 TABLET | Refills: 3 | Status: SHIPPED | OUTPATIENT
Start: 2025-08-18

## 2025-08-19 ENCOUNTER — PATIENT OUTREACH (OUTPATIENT)
Age: 80
End: 2025-08-19

## 2025-08-19 ENCOUNTER — TELEPHONE (OUTPATIENT)
Dept: SURGERY | Facility: CLINIC | Age: 80
End: 2025-08-19

## 2025-08-20 ENCOUNTER — OFFICE VISIT (OUTPATIENT)
Dept: FAMILY MEDICINE CLINIC | Facility: CLINIC | Age: 80
End: 2025-08-20

## 2025-08-20 VITALS
RESPIRATION RATE: 18 BRPM | DIASTOLIC BLOOD PRESSURE: 78 MMHG | SYSTOLIC BLOOD PRESSURE: 140 MMHG | WEIGHT: 145.5 LBS | BODY MASS INDEX: 25 KG/M2 | TEMPERATURE: 99 F | OXYGEN SATURATION: 97 % | HEART RATE: 69 BPM

## 2025-08-20 DIAGNOSIS — N89.8 VAGINAL ITCHING: ICD-10-CM

## 2025-08-20 DIAGNOSIS — R30.0 DYSURIA: Primary | ICD-10-CM

## 2025-08-20 LAB
APPEARANCE: NORMAL
BILIRUBIN: NEGATIVE
GLUCOSE (URINE DIPSTICK): NEGATIVE MG/DL
KETONES (URINE DIPSTICK): NEGATIVE MG/DL
LEUKOCYTES: NEGATIVE
MULTISTIX LOT#: ABNORMAL NUMERIC
NITRITE, URINE: POSITIVE
OCCULT BLOOD: NEGATIVE
PH, URINE: 5.5 (ref 4.5–8)
PROTEIN (URINE DIPSTICK): NEGATIVE MG/DL
SPECIFIC GRAVITY: 1 (ref 1–1.03)
UROBILINOGEN,SEMI-QN: 0.2 MG/DL (ref 0–1.9)

## 2025-08-20 PROCEDURE — 87086 URINE CULTURE/COLONY COUNT: CPT | Performed by: PHYSICIAN ASSISTANT

## 2025-08-20 PROCEDURE — 81514 NFCT DS BV&VAGINITIS DNA ALG: CPT | Performed by: PHYSICIAN ASSISTANT

## 2025-08-20 PROCEDURE — 1160F RVW MEDS BY RX/DR IN RCRD: CPT | Performed by: PHYSICIAN ASSISTANT

## 2025-08-20 PROCEDURE — 3078F DIAST BP <80 MM HG: CPT | Performed by: PHYSICIAN ASSISTANT

## 2025-08-20 PROCEDURE — 99213 OFFICE O/P EST LOW 20 MIN: CPT | Performed by: PHYSICIAN ASSISTANT

## 2025-08-20 PROCEDURE — 81003 URINALYSIS AUTO W/O SCOPE: CPT | Performed by: PHYSICIAN ASSISTANT

## 2025-08-20 PROCEDURE — 1159F MED LIST DOCD IN RCRD: CPT | Performed by: PHYSICIAN ASSISTANT

## 2025-08-20 PROCEDURE — 3077F SYST BP >= 140 MM HG: CPT | Performed by: PHYSICIAN ASSISTANT

## 2025-08-20 RX ORDER — FLUCONAZOLE 150 MG/1
150 TABLET ORAL ONCE
Qty: 1 TABLET | Refills: 1 | Status: SHIPPED | OUTPATIENT
Start: 2025-08-20 | End: 2025-08-20

## 2025-08-20 RX ORDER — NITROFURANTOIN 25; 75 MG/1; MG/1
100 CAPSULE ORAL 2 TIMES DAILY
Qty: 14 CAPSULE | Refills: 0 | Status: SHIPPED | OUTPATIENT
Start: 2025-08-20 | End: 2025-08-27

## 2025-08-21 ENCOUNTER — TELEPHONE (OUTPATIENT)
Dept: FAMILY MEDICINE CLINIC | Facility: CLINIC | Age: 80
End: 2025-08-21

## 2025-08-21 DIAGNOSIS — Z86.19 HX OF CLOSTRIDIUM DIFFICILE INFECTION: ICD-10-CM

## 2025-08-21 RX ORDER — VANCOMYCIN HYDROCHLORIDE 125 MG/1
125 CAPSULE ORAL DAILY
Qty: 7 CAPSULE | Refills: 0 | Status: SHIPPED | OUTPATIENT
Start: 2025-08-21

## 2025-08-25 ENCOUNTER — TELEPHONE (OUTPATIENT)
Dept: FAMILY MEDICINE CLINIC | Facility: CLINIC | Age: 80
End: 2025-08-25

## 2025-08-26 ENCOUNTER — PATIENT OUTREACH (OUTPATIENT)
Age: 80
End: 2025-08-26

## 2025-08-27 ENCOUNTER — OFFICE VISIT (OUTPATIENT)
Dept: FAMILY MEDICINE CLINIC | Facility: CLINIC | Age: 80
End: 2025-08-27

## 2025-08-27 VITALS
RESPIRATION RATE: 20 BRPM | HEART RATE: 73 BPM | HEIGHT: 64 IN | BODY MASS INDEX: 24.75 KG/M2 | WEIGHT: 145 LBS | OXYGEN SATURATION: 93 % | DIASTOLIC BLOOD PRESSURE: 62 MMHG | SYSTOLIC BLOOD PRESSURE: 102 MMHG

## 2025-08-27 DIAGNOSIS — B35.6 TINEA CRURIS: ICD-10-CM

## 2025-08-27 DIAGNOSIS — N95.2 ATROPHIC VAGINITIS: Primary | ICD-10-CM

## 2025-08-27 PROCEDURE — 81514 NFCT DS BV&VAGINITIS DNA ALG: CPT | Performed by: FAMILY MEDICINE

## 2025-08-27 RX ORDER — FLUCONAZOLE 150 MG/1
TABLET ORAL
COMMUNITY
Start: 2025-08-25

## 2025-08-27 RX ORDER — CLOTRIMAZOLE 1 %
1 CREAM (GRAM) TOPICAL 2 TIMES DAILY
Qty: 14 G | Refills: 0 | Status: SHIPPED | OUTPATIENT
Start: 2025-08-27 | End: 2025-09-10

## 2025-08-28 LAB
BV BACTERIA DNA VAG QL NAA+PROBE: NEGATIVE
C GLABRATA DNA VAG QL NAA+PROBE: NEGATIVE
C KRUSEI DNA VAG QL NAA+PROBE: NEGATIVE
CANDIDA DNA VAG QL NAA+PROBE: NEGATIVE
T VAGINALIS DNA VAG QL NAA+PROBE: NEGATIVE

## (undated) DIAGNOSIS — J44.1 CHRONIC OBSTRUCTIVE PULMONARY DISEASE WITH ACUTE EXACERBATION (HCC): ICD-10-CM

## (undated) DIAGNOSIS — M48.00 SPINAL STENOSIS, UNSPECIFIED SPINAL REGION: ICD-10-CM

## (undated) DIAGNOSIS — F32.A ANXIETY AND DEPRESSION: ICD-10-CM

## (undated) DIAGNOSIS — I73.9 PVD (PERIPHERAL VASCULAR DISEASE) (HCC): Primary | ICD-10-CM

## (undated) DIAGNOSIS — R30.0 DYSURIA: Primary | ICD-10-CM

## (undated) DIAGNOSIS — F41.9 ANXIETY AND DEPRESSION: ICD-10-CM

## (undated) DIAGNOSIS — Z76.0 ENCOUNTER FOR MEDICATION REFILL: ICD-10-CM

## (undated) DIAGNOSIS — E11.65 TYPE 2 DIABETES MELLITUS WITH HYPERGLYCEMIA, WITHOUT LONG-TERM CURRENT USE OF INSULIN (HCC): ICD-10-CM

## (undated) DIAGNOSIS — I10 ESSENTIAL HYPERTENSION: ICD-10-CM

## (undated) DIAGNOSIS — J44.1 CHRONIC OBSTRUCTIVE PULMONARY DISEASE WITH ACUTE EXACERBATION (HCC): Primary | ICD-10-CM

## (undated) DIAGNOSIS — K13.79 ORAL MASS: Primary | ICD-10-CM

## (undated) DIAGNOSIS — S46.911D STRAIN OF RIGHT SHOULDER, SUBSEQUENT ENCOUNTER: Primary | ICD-10-CM

## (undated) DIAGNOSIS — A04.72 CLOSTRIDIUM DIFFICILE DIARRHEA: Primary | ICD-10-CM

## (undated) DEVICE — Device: Brand: SUTURE PASSOR PRO

## (undated) DEVICE — #15 STERILE STAINLESS BLADE: Brand: STERILE STAINLESS BLADES

## (undated) DEVICE — ADHESIVE SKIN TOP FOR WND CLSR DERMBND ADV

## (undated) DEVICE — STERIS KITS

## (undated) DEVICE — ENDOPATH ULTRA VERESS INSUFFLATION NEEDLES WITH LUER LOCK CONNECTORS: Brand: ENDOPATH

## (undated) DEVICE — BINDER ABD UNISX 9IN 45IN SM AND M UNIV

## (undated) DEVICE — L-HOOK CAUTERY PROBE TIP, DISPOSABLE: Brand: RENEW

## (undated) DEVICE — KIT VLV 5 PC AIR H2O SUCT BX ENDOGATOR CONN

## (undated) DEVICE — TROCAR: Brand: KII SHIELDED BLADED ACCESS SYSTEM

## (undated) DEVICE — MINI ENDOCUT SCISSOR TIP, DISPOSABLE: Brand: RENEW

## (undated) DEVICE — TRAP POLYP W/ 2 SPEC TY CLR MAGNIFYING WIND

## (undated) DEVICE — 40580 - THE PINK PAD - ADVANCED TRENDELENBURG POSITIONING KIT: Brand: 40580 - THE PINK PAD - ADVANCED TRENDELENBURG POSITIONING KIT

## (undated) DEVICE — LAPAROVUE VISIBILITY SYSTEM LAPAROSCOPIC SOLUTIONS: Brand: LAPAROVUE

## (undated) DEVICE — TRADITIONAL MARYLAND DISSECTOR TIP, DISPOSABLE: Brand: RENEW

## (undated) DEVICE — LASSO POLYPECTOMY SNARE: Brand: LASSO

## (undated) DEVICE — SUT MCRYL 4-0 18IN PS-2 ABSRB UD 19MM 3/8 CIR

## (undated) DEVICE — GLOVE SUR 6.5 SENSICARE PI PIP CRM PWD F

## (undated) DEVICE — 1200CC GUARDIAN II: Brand: GUARDIAN

## (undated) DEVICE — TROCAR: Brand: KII® SLEEVE

## (undated) DEVICE — HUNTER GASPER TIP, DISPOSABLE: Brand: RENEW

## (undated) DEVICE — 10FT COMBINED O2 DELIVERY/CO2 MONITORING. FILTER WITH MICROSTREAM TYPE LUER: Brand: DUAL ADULT NASAL CANNULA

## (undated) DEVICE — LAP CHOLE/APPY CDS-LF: Brand: MEDLINE INDUSTRIES, INC.

## (undated) DEVICE — POUCH SPECIMEN WIRE 6X3 250ML

## (undated) DEVICE — SUT COAT VCRL + 0 54IN ABSRB UD ANTIBACT

## (undated) DEVICE — GLOVE SUR 6.5 SENSICARE PI PIP GRN PWD F

## (undated) DEVICE — CLIP APPLIER WITH CLIP LOGIC TECHNOLOGY: Brand: ENDO CLIP III

## (undated) DEVICE — SYRINGE MED 10ML LL TIP W/O SFTY DISP

## (undated) DEVICE — GRABBER GRASPER TIP, DISPOSABLE: Brand: RENEW

## (undated) DEVICE — TROCAR: Brand: KII FIOS FIRST ENTRY

## (undated) DEVICE — GIJAW SINGLE-USE BIOPSY FORCEPS WITH NEEDLE: Brand: GIJAW

## (undated) DEVICE — PDS II VLT 0 107CM AG ST3: Brand: ENDOLOOP

## (undated) DEVICE — SOLUTION IRRIG 1000ML 0.9% NACL USP BTL

## (undated) DEVICE — 3M™ RED DOT™ MONITORING ELECTRODE WITH FOAM TAPE AND STICKY GEL, 50/BAG, 20/CASE, 72/PLT 2570: Brand: RED DOT™

## (undated) DEVICE — BITEBLOCK ENDOSCP 60FR MAXI STRP

## (undated) DEVICE — COVER,LIGHT,CAMERA,HARD,1/PK,STRL: Brand: MEDLINE

## (undated) DEVICE — SLEEVE COMPR MD KNEE LEN SGL USE KENDALL SCD

## (undated) NOTE — LETTER
OUTSIDE TESTING RESULT REQUEST     IMPORTANT: FOR YOUR IMMEDIATE ATTENTION  Please FAX all test results listed below to: [phone]       * * * * If testing is NOT complete, arrange with patient A.S.A.P. * * * *    Patient Name: Gabriela Ndiaye  Surgery Date: 2024  Medical Record: [id number]  CSN: [id number]  : 1945 - A: 79 y     Sex: female  Surgeon(s):  Burt Wyatt MD  Procedure: LAPAROSCOPIC CHOLECYSTECTOMY POSSIBLE OPEN WITH CHOLANGIOGRAM (C-ARM)  Anesthesia Type: General     Surgeon: Burt Wyatt MD     The following Testing and Time Line are REQUIRED PER ANESTHESIA     EKG READ AND SIGNED WITHIN   90 days      Thank You,   Sent by: ANA Winn

## (undated) NOTE — LETTER
[address]  [address]  Authorization for Surgical Operation and Procedure     Date:___________                                                                                                         Time:__________  I hereby authorize Surgeon(s):  Osiris Castro MD, my physician and his/her assistants (if applicable), which may include medical students, residents, and/or fellows, to perform the following surgical operation/ procedure and administer such anesthesia as may be determined necessary by my physician:  Operation/Procedure name (s) Procedure(s):  LAPAROSCOPIC CHOLECYSTECTOMY POSSIBLE OPEN WITH CHOLANGIOGRAM (C-ARM) WITH INDOCYANINE GREEN on Gabriela Ndiaye   2.   I recognize that during the surgical operation/procedure, unforeseen conditions may necessitate additional or different procedures than those listed above.  I, therefore, further authorize and request that the above-named surgeon, assistants, or designees perform such procedures as are, in their judgment, necessary and desirable.    3.   My surgeon/physician has discussed prior to my surgery the potential benefits, risks and side effects of this procedure; the likelihood of achieving goals; and potential problems that might occur during recuperation.  They also discussed reasonable alternatives to the procedure, including risks, benefits, and side effects related to the alternatives and risks related to not receiving this procedure.  I have had all my questions answered and I acknowledge that no guarantee has been made as to the result that may be obtained.    4.   Should the need arise during my operation/procedure, which includes change of level of care prior to discharge, I also consent to the administration of blood and/or blood products.  Further, I understand that despite careful testing and screening of blood or blood products by collecting agencies, I may still be subject to ill effects as a result  of receiving a blood transfusion and/or blood products.  The following are some, but not all, of the potential risks that can occur: fever and allergic reactions, hemolytic reactions, transmission of diseases such as Hepatitis, AIDS and Cytomegalovirus (CMV) and fluid overload.  In the event that I wish to have an autologous transfusion of my own blood, or a directed donor transfusion, I will discuss this with my physician.  Check only if Refusing Blood or Blood Products  I understand refusal of blood or blood products as deemed necessary by my physician may have serious consequences to my condition to include possible death. I hereby assume responsibility for my refusal and release the hospital, its personnel, and my physicians from any responsibility for the consequences of my refusal.          o  Refuse      5.   I authorize the use of any specimen, organs, tissues, body parts or foreign objects that may be removed from my body during the operation/procedure for diagnosis, research or teaching purposes and their subsequent disposal by hospital authorities.  I also authorize the release of specimen test results and/or written reports to my treating physician on the hospital medical staff or other referring or consulting physicians involved in my care, at the discretion of the Pathologist or my treating physician.    6.   I consent to the photographing or videotaping of the operations or procedures to be performed, including appropriate portions of my body for medical, scientific, or educational purposes, provided my identity is not revealed by the pictures or by descriptive texts accompanying them.  If the procedure has been photographed/videotaped, the surgeon will obtain the original picture, image, videotape or CD.  The hospital will not be responsible for storage, release or maintenance of the picture, image, tape or CD.    7.   I consent to the presence of a  or observers in the operating  room as deemed necessary by my physician or their designees.    8.   I recognize that in the event my procedure results in extended X-Ray/fluoroscopy time, I may develop a skin reaction.    9. If I have a Do Not Attempt Resuscitation (DNAR) order in place, that status will be suspended while in the operating room, procedural suite, and during the recovery period unless otherwise explicitly stated by me (or a person authorized to consent on my behalf). The surgeon or my attending physician will determine when the applicable recovery period ends for purposes of reinstating the DNAR order.  10. Patients having a sterilization procedure: I understand that if the procedure is successful the results will be permanent and it will therefore be impossible for me to inseminate, conceive, or bear children.  I also understand that the procedure is intended to result in sterility, although the result has not been guaranteed.   11. I acknowledge that my physician has explained sedation/analgesia administration to me including the risk and benefits I consent to the administration of sedation/analgesia as may be necessary or desirable in the judgment of my physician.    I CERTIFY THAT I HAVE READ AND FULLY UNDERSTAND THE ABOVE CONSENT TO OPERATION and/or OTHER PROCEDURE.    _________________________________________  __________________________________  Signature of Patient     Signature of Responsible Person         ___________________________________         Printed Name of Responsible Person           _________________________________                 Relationship to Patient  _________________________________________  ______________________________  Signature of Witness          Date  Time      Patient Name: Gabriela Ndiaye     : 1945                 Printed: 2024     Medical Record #: [id number]                     Page 1 of 2                                    [address],  [place]  [address]    Consent for Anesthesia    IGabriela agree to be cared for by an anesthesiologist, who is specially trained to monitor me and give me medicine to put me to sleep or keep me comfortable during my procedure    I understand that my anesthesiologist is not an employee or agent of [facility] or [employer]. He or she works for [employer].    As the patient asking for anesthesia services, I agree to:  Allow the anesthesiologist (anesthesia doctor) to give me medicine and do additional procedures as necessary. Some examples are: Starting or using an “IV” to give me medicine, fluids or blood during my procedure, and having a breathing tube placed to help me breathe when I’m asleep (intubation). In the event that my heart stops working properly, I understand that my anesthesiologist will make every effort to sustain my life, unless otherwise directed by [facility] Do Not Resuscitate documents.  Tell my anesthesia doctor before my procedure:  If I am pregnant.  The last time that I ate or drank.  All of the medicines I take (including prescriptions, herbal supplements, and pills I can buy without a prescription (including street drugs/illegal medications). Failure to inform my anesthesiologist about these medicines may increase my risk of anesthetic complications.  If I am allergic to anything or have had a reaction to anesthesia before.  I understand how the anesthesia medicine will help me (benefits).  I understand that with any type of anesthesia medicine there are risks:  The most common risks are: nausea, vomiting, sore throat, muscle soreness, damage to my eyes, mouth, or teeth (from breathing tube placement).  Rare risks include: remembering what happened during my procedure, allergic reactions to medications, injury to my airway, heart, lungs, vision, nerves, or muscles and in extremely rare instances death.  My doctor has explained to me other choices  available to me for my care (alternatives).  Pregnant Patients (“epidural”):  I understand that the risks of having an epidural (medicine given into my back to help control pain during labor), include itching, low blood pressure, difficulty urinating, headache or slowing of the baby’s heart. Very rare risks include infection, bleeding, seizure, irregular heart rhythms and nerve injury.  Regional Anesthesia (“spinal”, “epidural”, & “nerve blocks”):  I understand that rare but potential complications include headache, bleeding, infection, seizure, irregular heart rhythms, and nerve injury.    I can change my mind about having anesthesia services at any time before I get the medicine.    _____________________________________________________________________________  Patient (or Representative) Signature/Relationship to Patient  Date   Time    _____________________________________________________________________________   Name (if used)    Language/Organization   Time    _____________________________________________________________________________  Anesthesiologist Signature     Date   Time  I have discussed the procedure and information above with the patient (or patient’s representative) and answered their questions. The patient or their representative has agreed to have anesthesia services.    _____________________________________________________________________________  Witness        Date   Time  I have verified that the signature is that of the patient or patient’s representative, and that it was signed before the procedure  Patient Name: Gabriela Ndiaye     : 1945                 Printed: 2024     Medical Record #: [id number]                     Page 2 of 2

## (undated) NOTE — LETTER
Dr. Wyatt    Surgical Clearance Needed    Date: 9/9/2024                                                                       From: Suyapa Matos: Dr. Jana Connell                                                                                Fax:     RE: Surgical Clearance               # of Pages: 1        Patient Name: Gabriela Ndiaye    Patient YOB: 1945    Consult For: Cardiac Clearance and anti coagulation management    Surgery: Laparoscopic cholecystectomy possible open with cholangiogram    Date of Surgery: 9/27/2024 at [facility]        This facsimile transmission is provided for your internal use only. If the reader of this message is not the intended recipient, you are hereby notified that you have received this document in error, and that any review, dissemination, distribution, or copying of this message is strictly prohibited. If you have received this communication in error, please notify us immediately by telephone and return the original message to us by mail.

## (undated) NOTE — LETTER
24    Patient: Gabriela Ndiaye  : 1945 Visit date: 3/21/2024    Dear  August Coley MD    Thank you for referring Gabriela Ndiaye to my practice.  Please find my assessment and plan below.     Assessment   1. Calculus of gallbladder with cholecystitis without biliary obstruction, unspecified cholecystitis acuity        This is a very nice 78-year-old female referred to me from the emergency room with concern for symptomatic cholelithiasis, possible chronic cholecystitis.  Patient was recently in the emergency room on 3/19/2024.  She was worked up with an abdominal ultrasound there showing sludge and stones in the gallbladder including sludge and a mobile stone in the gallbladder neck.  Negative sonographic Pitts sign and no thickening of the gallbladder wall or pericholecystic fluid.    Patient has been feeling unwell for around 2 months now.  She complains of abdominal discomfort and nausea after eating.  She has had a poor appetite during this time.  Her pain and discomfort is in the right upper quadrant.  She has had chills without any known fevers.  She denies any jaundice.  She has a history of chronic diarrhea and history of C. difficile infection.  She has had a recent colonoscopy with Dr. Kimball on 1/15/2024.  This revealed a descending colon tubular adenoma.  Random biopsies of the terminal ileum and colon were negative for pathologic change.  Patient has had multiple abdominal surgeries including a partial hysterectomy and oophorectomy and colectomy for what sounds like perforated diverticulitis around 15 years ago.  She does take clopidogrel for history of coronary artery disease.    Patient appears generally well on exam.  She is afebrile and non-tachycardic.  She does have right upper quadrant tenderness.  Overall, I do suspect she has symptomatic cholelithiasis, possible chronic cholecystitis.    Plan  I offered patient elective robotic cholecystectomy with ICG, possible open.  The  details of this procedure were discussed including the expected recovery time, risks, benefits and alternatives.  Specific risk discussed include but not limited to pain, bleeding, infection, damage to surrounding organs including common bile duct injury.  Patient expressed understanding and was agreeable to schedule surgery.  Surgery has been scheduled for 4/3/2024 pending medical and cardiac clearance.  I would recommend permission to hold Plavix for 7 days prior to surgery.    In the meantime, I suggest patient continues a low-fat diet.  Return precautions discussed and patient expressed understanding.      Sincerely,       Burt Wyatt MD   CC: No Recipients

## (undated) NOTE — LETTER
Burt Wyatt MD    Surgical Clearance Needed    Date: 3/21/2024                                                                       From: [facility]    Attn: Dr. Connell                                                                                    RE: Surgical Clearance               # of Pages: 1        Patient Name: Gbariela Ndiaye    Patient YOB: 1945    Consult For: Anti coagulation management and cardiac clearance. Requesting off plavix for 7 days before surgery.     Surgery:  ROBOTIC LAPAROSCOPIC CHOLECYSTECTOMY POSSIBLE OPEN WITH INJECTION OF INDOCYANINE GREEN     Date of Surgery: 4/3/24 at [facility]         This facsimile transmission is provided for your internal use only. If the reader of this message is not the intended recipient, you are hereby notified that you have received this document in error, and that any review, dissemination, distribution, or copying of this message is strictly prohibited. If you have received this communication in error, please notify us immediately by telephone and return the original message to us by mail.

## (undated) NOTE — LETTER
24    Patient: Gabriela Ndiaye  : 1945 Visit date: 2024    Dear  August Coley MD    Thank you for referring Gabriela Ndiaye to my practice.  Please find my assessment and plan below.    Assessment   1. Calculus of gallbladder with cholecystitis without biliary obstruction, unspecified cholecystitis acuity      This is a very nice 79-year-old female with multiple medical problems including but not limited to coronary artery disease status post recent coronary stent placement in  of this year and COPD who returns for follow-up today at the request of her primary care physician, Dr. Coley, with concern for increasingly symptomatic cholelithiasis.    I actually met the patient on 3/21/2024 when she presented to me with right upper quadrant pain, nausea and poor appetite.  She had an ultrasound performed in the emergency room on 3/19/2024 which revealed sludge and stones in the gallbladder including sludge and a mobile stone in the gallbladder neck. Negative sonographic Pitts sign and no thickening of the gallbladder wall or pericholecystic fluid.  I recommended scheduling an elective cholecystectomy pending cardiology clearance.  Patient ended up having a positive stress test and ultimately underwent cardiac catheterization with coronary artery stent placement in  of this year.  She is currently on aspirin and Brilinta.  Her cardiologist is Dr. Jana Connell with [facility] cardiology.    Patient returns to follow-up with her son.  Unfortunately, the patient's daughter recently passed away yesterday.  Patient continues to complain of near constant right upper quadrant abdominal pain that radiates to her back.  The pain is associated with nausea.  Patient denies any fevers, jaundice or vomiting.  Patient does have chronic diarrhea.  Pain is worse with eating and patient states she cannot eat well due to the pain.  She has lost around 8 pounds lately.  Overall, patient feels her symptoms are  quite debilitating and life-limiting at this point.    She has had a recent colonoscopy with Dr. Kimball on 1/15/2024.  This revealed a descending colon tubular adenoma.  Random biopsies of the terminal ileum and colon were negative for pathologic change.  Patient has had multiple abdominal surgeries including a partial hysterectomy and oophorectomy and colectomy for what sounds like perforated diverticulitis around 15 years ago.    Patient appears generally well on exam today though does have some mild right upper quadrant tenderness.  Overall, I suspect the patient continues to have symptomatic cholelithiasis, possible chronic cholecystitis.    Plan   I recommend planning for laparoscopic cholecystectomy with intraoperative cholangiogram, possible open. The details of this procedure were discussed including the expected recovery time, risks, benefits and alternatives.  Specific risks discussed include but not limited to pain, bleeding, infection, damage to surrounding organs including common bile duct injury.  Patient expressed understanding and wished to schedule surgery as soon as reasonably possible.  Surgery has been scheduled for 9/27/2024 pending cardiac clearance.  I would recommend permission to hold brilinta for 5 days prior to surgery.  Patient can continue with the daily aspirin perioperatively.     In the meantime, I suggest patient continues a low-fat diet.  I advised patient to go to the emergency room with severe abdominal pain, persistent vomiting, jaundice, fevers or any other new/worsening symptoms.       Sincerely,       Burt Wyatt MD   CC:   No Recipients

## (undated) NOTE — LETTER
To:  Dr. Connell  Date:  2024  Patient Name: Gabriela Ndiaye-Age / Sex: 1945-A: 79 y  female  Medical Records: [id number]   [facility]: [id number]      Clearance for Surgery Requested by Surgeon    Request for:  Cardiac Clearance and anticoagulation management    Requested by Surgeon: Dr. Wyatt    Surgical Date: 2024    Procedure: LAPAROSCOPIC CHOLECYSTECTOMY POSSIBLE OPEN WITH CHOLANGIOGRAM (C-ARM), N/A      Please fax the clearance note to the Pre-Admission Testing department.  Thank you.

## (undated) NOTE — Clinical Note
Patient Name: Maury Hernandez  : 1945  MRN: [id number]  Patient Address: [address] [place] [address] [address] [id number]      Coronavirus Disease 2019 (COVID-19)     [facility] is committed to the safety and well-being of our patients, member carefully. If your symptoms get worse, call your healthcare provider immediately. 3. Get rest and stay hydrated.    4. If you have a medical appointment, call the healthcare provider ahead of time and tell them that you have or may have COVID-19.  5. For m of fever-reducing medications; and  · Improvement in respiratory symptoms (e.g., cough, shortness of breath); and  · At least 10 days have passed since symptoms first appeared OR if asymptomatic patient or date of symptom onset is unclear then use 10 days donors must:    · Have had a confirmed diagnosis of COVID-19  · Be symptom-free for at least 14 days*    *Some people will be required to have a repeat COVID-19 test in order to be eligible to donate.  If you’re instructed by Amanda that a repeat test is r random. Researchers are trying to identify similarities between people with a Post-COVID condition to better understand if there are risk factors. How do I prevent a Post-COVID condition?   The best way to prevent the long-term symptoms of COVID-19 is

## (undated) NOTE — LETTER
OUTSIDE TESTING RESULT REQUEST     IMPORTANT: FOR YOUR IMMEDIATE ATTENTION  Please FAX all test results listed below to: [phone]     Testing already done on or about: 24     * * * * If testing is NOT complete, arrange with patient A.S.A.P. * * * *    Patient Name: Gabriela Ndiaye  Surgery Date: 2024  Medical Record: [id number]  CSN: [id number]  : 1945 - A: 79 y     Sex: female  Surgeon(s):  Burt Wyatt MD  Procedure: LAPAROSCOPIC CHOLECYSTECTOMY POSSIBLE OPEN WITH CHOLANGIOGRAM (C-ARM)  Anesthesia Type: General     Surgeon: Burt Wyatt MD     The following Testing and Time Line are REQUIRED PER ANESTHESIA     EKG READ AND SIGNED WITHIN   90 days  CBC, Platelet; NO Differential within  30 days  Electrolytes within 21 days  COVID ALINITY TEST      Thank You,   Sent by: ANA Winn

## (undated) NOTE — LETTER
Patient Name: Gabriela Ndiaye-Age 1945-A: 79 y Sex: female   MRN: [id number] CSN: [id number]      ANTIBIOTIC ALLERGY/SENSITIVITY/CONTRAINDICATION  Surgery Date:  2024  Procedure: LAPAROSCOPIC CHOLECYSTECTOMY POSSIBLE OPEN WITH CHOLANGIOGRAM (C-ARM)  Anesthesia Type: General  Surgeon(s):  Osiris Castro MD    Allergy Reaction: allergy to Cephalexin causes hives    The above patient has an allergy/sensitivity or contraindication to the antibiotic ordered from your standing orders/[facility] Pre-op Standing orders/[facility] Adult Preoperative Prophylactic Protocol listed below.  If you would like the medication given, please fax this form back indicating the override reason with a physician signature/date/and time.     ___  Benefit outweighs risk ___  Insignificant ___  Low risk      ___ Not a true allergy  ___  Dose appropriate ___  Tolerated regimen in the past   If you prefer to order an alternate antibiotic please list drug, dose, route and time to administer below:     _____________________________________  _______    ___________   __________________  Antibiotic                                                                                        Dose                 Route                        Time of administration    ________________________________________Date____________Time________  (MD signature)  Fax this form back to [phone]

## (undated) NOTE — LETTER
EKG FROM 2024 HAS NOT BEEN SIGNED/DATED/CONFIRMED.  PLEASE COMPLETE AND RETURN ASAP         OUTSIDE TESTING RESULT REQUEST     IMPORTANT: FOR YOUR IMMEDIATE ATTENTION  Please FAX all test results listed below to: [phone]     Testing already done on or about: 2024       * * * * If testing is NOT complete, arrange with patient A.S.A.P. * * * *      Patient Name: Gabriela Ndiaye  Surgery Date: 2024  Medical Record: [id number]  CSN: [id number]  : 1945 - A: 79 y     Sex: female  Surgeon(s):  Burt Wyatt MD  Procedure: LAPAROSCOPIC CHOLECYSTECTOMY POSSIBLE OPEN WITH CHOLANGIOGRAM (C-ARM)  Anesthesia Type: General     Surgeon: Burt Wyatt MD     The following Testing and Time Line are REQUIRED PER ANESTHESIA     EKG READ AND SIGNED WITHIN   90 days      Thank You,   Sent by: Karissa PEREZ

## (undated) NOTE — LETTER
03/28/23    Dear: Celsa Woodson a friendly reminder you are due for your Medicare Advantage Wellness Visit. Unlike regular appointments for medication refills or care of an acute illness, this time is uniquely dedicated to prevention, screening and coordination of care. There is simply not enough time at routine medical follow-up visits to cover all of these important topics. At a well visit, I will discuss your day-to-day functioning, update your health history and medication list, I would screen for vision, hearing, memory problems, depression, cancer, osteoporosis and heart attack or stroke risks. This is also the time to review and update vaccines, if necessary, to prevent pneumonia or flu. I can also offer advice on healthful eating and exercise, advice on weight loss if needed, and steps you can take to prevent falls or other injuries. Lastly, and optionally, wellness visits allow time to discuss advance directives such as living will or health care power of . I want to make sure that your personal wishes for end-of-life care are documented and respected in case you develop future health problems that make you unable to express your desires for life-sustaining care. As you see, this wellness benefit, created only in the last few years by Medicare, offers a unique opportunity for a thorough review of your current health status, and a chance for me to work with you to improve your health and set goals for your health care. I strongly encourage you to schedule a visit at your soonest opportunity. Please call the office at [phone]  to schedule your Medicare Advantage Wellness Visit or for your convenience you can schedule thru [email or web address].     Sincerely,  Dr Gustavo Hernandez

## (undated) NOTE — LETTER
OUTSIDE TESTING RESULT REQUEST     IMPORTANT: FOR YOUR IMMEDIATE ATTENTION  Please FAX all test results listed below to: [phone]     Testing already done on or about: 24     * * * * If testing is NOT complete, arrange with patient A.S.A.P. * * * *      Patient Name: Gabriela Ndiaye  Surgery Date: 2024  Medical Record: [id number]  CSN: [id number]  : 1945 - A: 79 y     Sex: female  Surgeon(s):  Osiris Castro MD  Procedure: LAPAROSCOPIC CHOLECYSTECTOMY POSSIBLE OPEN WITH CHOLANGIOGRAM (C-ARM)  Anesthesia Type: General     Surgeon: Osiris Castro MD     The following Testing and Time Line are REQUIRED PER ANESTHESIA     EKG READ AND SIGNED WITHIN   90 days      Thank You,   Sent by: Caitlin CLAROS

## (undated) NOTE — LETTER
Burt Wyatt MD    Surgical Clearance Needed    Date: 3/21/2024                                                                       From: [facility]    Attn: Dr. Coley                                                                                    RE: Surgical Clearance               # of Pages: 1        Patient Name: Gabriela Ndiaye    Patient YOB: 1945    Consult For: Medical clearance    Surgery:  ROBOTIC LAPAROSCOPIC CHOLECYSTECTOMY POSSIBLE OPEN WITH INJECTION OF INDOCYANINE GREEN     Date of Surgery: 4/3/24 at [facility]         This facsimile transmission is provided for your internal use only. If the reader of this message is not the intended recipient, you are hereby notified that you have received this document in error, and that any review, dissemination, distribution, or copying of this message is strictly prohibited. If you have received this communication in error, please notify us immediately by telephone and return the original message to us by mail.